# Patient Record
Sex: FEMALE | Race: WHITE | Employment: UNEMPLOYED | URBAN - NONMETROPOLITAN AREA
[De-identification: names, ages, dates, MRNs, and addresses within clinical notes are randomized per-mention and may not be internally consistent; named-entity substitution may affect disease eponyms.]

---

## 2019-10-02 ENCOUNTER — APPOINTMENT (OUTPATIENT)
Dept: GENERAL RADIOLOGY | Age: 84
End: 2019-10-02
Payer: MEDICARE

## 2019-10-02 ENCOUNTER — APPOINTMENT (OUTPATIENT)
Dept: CT IMAGING | Age: 84
End: 2019-10-02
Payer: MEDICARE

## 2019-10-02 ENCOUNTER — HOSPITAL ENCOUNTER (OUTPATIENT)
Age: 84
Setting detail: OBSERVATION
Discharge: HOME | End: 2019-10-04
Attending: EMERGENCY MEDICINE | Admitting: FAMILY MEDICINE
Payer: MEDICARE

## 2019-10-02 DIAGNOSIS — I48.91 ATRIAL FIBRILLATION WITH RVR (HCC): Primary | ICD-10-CM

## 2019-10-02 DIAGNOSIS — R79.89 ELEVATED BRAIN NATRIURETIC PEPTIDE (BNP) LEVEL: ICD-10-CM

## 2019-10-02 LAB
ALBUMIN SERPL-MCNC: 4.1 GM/DL (ref 3.4–5)
ALP BLD-CCNC: 55 IU/L (ref 40–129)
ALT SERPL-CCNC: 12 U/L (ref 10–40)
ANION GAP SERPL CALCULATED.3IONS-SCNC: 7 MMOL/L (ref 4–16)
AST SERPL-CCNC: 17 IU/L (ref 15–37)
BASOPHILS ABSOLUTE: 0.1 K/CU MM
BASOPHILS RELATIVE PERCENT: 0.7 % (ref 0–1)
BILIRUB SERPL-MCNC: 0.2 MG/DL (ref 0–1)
BUN BLDV-MCNC: 15 MG/DL (ref 6–23)
CALCIUM SERPL-MCNC: 8.9 MG/DL (ref 8.3–10.6)
CHLORIDE BLD-SCNC: 94 MMOL/L (ref 99–110)
CO2: 30 MMOL/L (ref 21–32)
CREAT SERPL-MCNC: 1 MG/DL (ref 0.6–1.1)
DIFFERENTIAL TYPE: ABNORMAL
EOSINOPHILS ABSOLUTE: 0.1 K/CU MM
EOSINOPHILS RELATIVE PERCENT: 0.9 % (ref 0–3)
GFR AFRICAN AMERICAN: >60 ML/MIN/1.73M2
GFR NON-AFRICAN AMERICAN: 53 ML/MIN/1.73M2
GLUCOSE BLD-MCNC: 120 MG/DL (ref 70–99)
HCT VFR BLD CALC: 45.4 % (ref 37–47)
HEMOGLOBIN: 15.4 GM/DL (ref 12.5–16)
IMMATURE NEUTROPHIL %: 0.6 % (ref 0–0.43)
LACTATE: 1 MMOL/L (ref 0.4–2)
LYMPHOCYTES ABSOLUTE: 1.5 K/CU MM
LYMPHOCYTES RELATIVE PERCENT: 21.1 % (ref 24–44)
MAGNESIUM: 2.1 MG/DL (ref 1.8–2.4)
MCH RBC QN AUTO: 33.2 PG (ref 27–31)
MCHC RBC AUTO-ENTMCNC: 33.9 % (ref 32–36)
MCV RBC AUTO: 97.8 FL (ref 78–100)
MONOCYTES ABSOLUTE: 0.7 K/CU MM
MONOCYTES RELATIVE PERCENT: 9.7 % (ref 0–4)
PDW BLD-RTO: 12 % (ref 11.7–14.9)
PLATELET # BLD: 213 K/CU MM (ref 140–440)
PMV BLD AUTO: 9 FL (ref 7.5–11.1)
POTASSIUM SERPL-SCNC: 4.4 MMOL/L (ref 3.5–5.1)
PRO-BNP: 4297 PG/ML
RBC # BLD: 4.64 M/CU MM (ref 4.2–5.4)
SEGMENTED NEUTROPHILS ABSOLUTE COUNT: 4.7 K/CU MM
SEGMENTED NEUTROPHILS RELATIVE PERCENT: 67 % (ref 36–66)
SODIUM BLD-SCNC: 131 MMOL/L (ref 135–145)
TOTAL IMMATURE NEUTOROPHIL: 0.04 K/CU MM
TOTAL PROTEIN: 7.2 GM/DL (ref 6.4–8.2)
TROPONIN T: <0.01 NG/ML
TROPONIN T: <0.01 NG/ML
TSH HIGH SENSITIVITY: 2.68 UIU/ML (ref 0.27–4.2)
WBC # BLD: 7 K/CU MM (ref 4–10.5)

## 2019-10-02 PROCEDURE — 6370000000 HC RX 637 (ALT 250 FOR IP): Performed by: NURSE PRACTITIONER

## 2019-10-02 PROCEDURE — 83880 ASSAY OF NATRIURETIC PEPTIDE: CPT

## 2019-10-02 PROCEDURE — G0378 HOSPITAL OBSERVATION PER HR: HCPCS

## 2019-10-02 PROCEDURE — 71045 X-RAY EXAM CHEST 1 VIEW: CPT

## 2019-10-02 PROCEDURE — 71275 CT ANGIOGRAPHY CHEST: CPT

## 2019-10-02 PROCEDURE — 83735 ASSAY OF MAGNESIUM: CPT

## 2019-10-02 PROCEDURE — 6360000004 HC RX CONTRAST MEDICATION: Performed by: EMERGENCY MEDICINE

## 2019-10-02 PROCEDURE — 2500000003 HC RX 250 WO HCPCS: Performed by: EMERGENCY MEDICINE

## 2019-10-02 PROCEDURE — 80053 COMPREHEN METABOLIC PANEL: CPT

## 2019-10-02 PROCEDURE — 2580000003 HC RX 258: Performed by: NURSE PRACTITIONER

## 2019-10-02 PROCEDURE — 96374 THER/PROPH/DIAG INJ IV PUSH: CPT

## 2019-10-02 PROCEDURE — 84443 ASSAY THYROID STIM HORMONE: CPT

## 2019-10-02 PROCEDURE — 99285 EMERGENCY DEPT VISIT HI MDM: CPT

## 2019-10-02 PROCEDURE — 2140000000 HC CCU INTERMEDIATE R&B

## 2019-10-02 PROCEDURE — 93005 ELECTROCARDIOGRAM TRACING: CPT | Performed by: EMERGENCY MEDICINE

## 2019-10-02 PROCEDURE — 85025 COMPLETE CBC W/AUTO DIFF WBC: CPT

## 2019-10-02 PROCEDURE — 83605 ASSAY OF LACTIC ACID: CPT

## 2019-10-02 PROCEDURE — 84484 ASSAY OF TROPONIN QUANT: CPT

## 2019-10-02 PROCEDURE — 2580000003 HC RX 258: Performed by: EMERGENCY MEDICINE

## 2019-10-02 RX ORDER — ATENOLOL 50 MG/1
TABLET ORAL
Status: ON HOLD | COMMUNITY
Start: 2019-07-26 | End: 2019-10-04 | Stop reason: HOSPADM

## 2019-10-02 RX ORDER — ASPIRIN 81 MG/1
81 TABLET, CHEWABLE ORAL DAILY
Status: DISCONTINUED | OUTPATIENT
Start: 2019-10-03 | End: 2019-10-04 | Stop reason: HOSPADM

## 2019-10-02 RX ORDER — ESTRADIOL 1 MG/1
0.5 TABLET ORAL DAILY
Status: DISCONTINUED | OUTPATIENT
Start: 2019-10-03 | End: 2019-10-04 | Stop reason: HOSPADM

## 2019-10-02 RX ORDER — ESTRADIOL 0.5 MG/1
0.5 TABLET ORAL DAILY
COMMUNITY

## 2019-10-02 RX ORDER — METOPROLOL TARTRATE 5 MG/5ML
5 INJECTION INTRAVENOUS EVERY 6 HOURS PRN
Status: DISCONTINUED | OUTPATIENT
Start: 2019-10-02 | End: 2019-10-04 | Stop reason: HOSPADM

## 2019-10-02 RX ORDER — ACETAMINOPHEN 325 MG/1
650 TABLET ORAL EVERY 4 HOURS PRN
Status: DISCONTINUED | OUTPATIENT
Start: 2019-10-02 | End: 2019-10-04 | Stop reason: HOSPADM

## 2019-10-02 RX ORDER — ATENOLOL 50 MG/1
50 TABLET ORAL DAILY
Status: DISCONTINUED | OUTPATIENT
Start: 2019-10-03 | End: 2019-10-03

## 2019-10-02 RX ORDER — CALCIUM CARBONATE 500(1250)
500 TABLET ORAL DAILY
Status: DISCONTINUED | OUTPATIENT
Start: 2019-10-03 | End: 2019-10-04 | Stop reason: HOSPADM

## 2019-10-02 RX ORDER — FAMOTIDINE 20 MG/1
10 TABLET, FILM COATED ORAL 2 TIMES DAILY
Status: DISCONTINUED | OUTPATIENT
Start: 2019-10-02 | End: 2019-10-04 | Stop reason: HOSPADM

## 2019-10-02 RX ORDER — OMEGA-3-ACID ETHYL ESTERS 1 G/1
1 CAPSULE, LIQUID FILLED ORAL DAILY
Status: DISCONTINUED | OUTPATIENT
Start: 2019-10-03 | End: 2019-10-04 | Stop reason: HOSPADM

## 2019-10-02 RX ORDER — SODIUM CHLORIDE 0.9 % (FLUSH) 0.9 %
10 SYRINGE (ML) INJECTION PRN
Status: DISCONTINUED | OUTPATIENT
Start: 2019-10-02 | End: 2019-10-04 | Stop reason: HOSPADM

## 2019-10-02 RX ORDER — ONDANSETRON 2 MG/ML
4 INJECTION INTRAMUSCULAR; INTRAVENOUS EVERY 6 HOURS PRN
Status: DISCONTINUED | OUTPATIENT
Start: 2019-10-02 | End: 2019-10-04 | Stop reason: HOSPADM

## 2019-10-02 RX ORDER — ESCITALOPRAM OXALATE 10 MG/1
10 TABLET ORAL NIGHTLY
Status: DISCONTINUED | OUTPATIENT
Start: 2019-10-02 | End: 2019-10-04 | Stop reason: HOSPADM

## 2019-10-02 RX ORDER — ESCITALOPRAM OXALATE 10 MG/1
TABLET ORAL
COMMUNITY
Start: 2019-09-26 | End: 2019-11-15 | Stop reason: SDUPTHER

## 2019-10-02 RX ORDER — 0.9 % SODIUM CHLORIDE 0.9 %
1000 INTRAVENOUS SOLUTION INTRAVENOUS ONCE
Status: COMPLETED | OUTPATIENT
Start: 2019-10-02 | End: 2019-10-02

## 2019-10-02 RX ORDER — SODIUM CHLORIDE 0.9 % (FLUSH) 0.9 %
10 SYRINGE (ML) INJECTION EVERY 12 HOURS SCHEDULED
Status: DISCONTINUED | OUTPATIENT
Start: 2019-10-02 | End: 2019-10-04 | Stop reason: HOSPADM

## 2019-10-02 RX ORDER — CALCIUM CARBONATE 500(1250)
500 TABLET ORAL DAILY
COMMUNITY
End: 2022-09-30

## 2019-10-02 RX ORDER — CHLORAL HYDRATE 500 MG
CAPSULE ORAL
COMMUNITY
End: 2022-09-30

## 2019-10-02 RX ORDER — DILTIAZEM HYDROCHLORIDE 5 MG/ML
10 INJECTION INTRAVENOUS ONCE
Status: COMPLETED | OUTPATIENT
Start: 2019-10-02 | End: 2019-10-02

## 2019-10-02 RX ADMIN — ESCITALOPRAM OXALATE 10 MG: 10 TABLET ORAL at 21:57

## 2019-10-02 RX ADMIN — IOPAMIDOL 75 ML: 755 INJECTION, SOLUTION INTRAVENOUS at 19:17

## 2019-10-02 RX ADMIN — SODIUM CHLORIDE 1000 ML: 9 INJECTION, SOLUTION INTRAVENOUS at 18:15

## 2019-10-02 RX ADMIN — SODIUM CHLORIDE, PRESERVATIVE FREE 10 ML: 5 INJECTION INTRAVENOUS at 21:58

## 2019-10-02 RX ADMIN — DILTIAZEM HYDROCHLORIDE 10 MG: 5 INJECTION INTRAVENOUS at 18:16

## 2019-10-02 RX ADMIN — FAMOTIDINE 10 MG: 20 TABLET ORAL at 21:57

## 2019-10-02 SDOH — HEALTH STABILITY: MENTAL HEALTH: HOW OFTEN DO YOU HAVE A DRINK CONTAINING ALCOHOL?: NEVER

## 2019-10-02 ASSESSMENT — PAIN SCALES - GENERAL: PAINLEVEL_OUTOF10: 0

## 2019-10-03 PROBLEM — I10 ESSENTIAL HYPERTENSION: Chronic | Status: ACTIVE | Noted: 2019-10-03

## 2019-10-03 LAB
ANION GAP SERPL CALCULATED.3IONS-SCNC: 19 MMOL/L (ref 4–16)
BASOPHILS ABSOLUTE: 0 K/CU MM
BASOPHILS RELATIVE PERCENT: 0.6 % (ref 0–1)
BUN BLDV-MCNC: 12 MG/DL (ref 6–23)
CALCIUM SERPL-MCNC: 8.8 MG/DL (ref 8.3–10.6)
CHLORIDE BLD-SCNC: 98 MMOL/L (ref 99–110)
CO2: 19 MMOL/L (ref 21–32)
CREAT SERPL-MCNC: 1 MG/DL (ref 0.6–1.1)
DIFFERENTIAL TYPE: ABNORMAL
EKG ATRIAL RATE: 125 BPM
EKG ATRIAL RATE: 133 BPM
EKG DIAGNOSIS: NORMAL
EKG DIAGNOSIS: NORMAL
EKG Q-T INTERVAL: 334 MS
EKG Q-T INTERVAL: 358 MS
EKG QRS DURATION: 70 MS
EKG QRS DURATION: 72 MS
EKG QTC CALCULATION (BAZETT): 440 MS
EKG QTC CALCULATION (BAZETT): 511 MS
EKG R AXIS: -2 DEGREES
EKG R AXIS: 3 DEGREES
EKG T AXIS: -51 DEGREES
EKG T AXIS: 232 DEGREES
EKG VENTRICULAR RATE: 141 BPM
EKG VENTRICULAR RATE: 91 BPM
EOSINOPHILS ABSOLUTE: 0.1 K/CU MM
EOSINOPHILS RELATIVE PERCENT: 1.6 % (ref 0–3)
GFR AFRICAN AMERICAN: >60 ML/MIN/1.73M2
GFR NON-AFRICAN AMERICAN: 53 ML/MIN/1.73M2
GLUCOSE BLD-MCNC: 82 MG/DL (ref 70–99)
HCT VFR BLD CALC: 44.8 % (ref 37–47)
HEMOGLOBIN: 15.2 GM/DL (ref 12.5–16)
IMMATURE NEUTROPHIL %: 0.9 % (ref 0–0.43)
LV EF: 53 %
LVEF MODALITY: NORMAL
LYMPHOCYTES ABSOLUTE: 2.3 K/CU MM
LYMPHOCYTES RELATIVE PERCENT: 33.1 % (ref 24–44)
MCH RBC QN AUTO: 33 PG (ref 27–31)
MCHC RBC AUTO-ENTMCNC: 33.9 % (ref 32–36)
MCV RBC AUTO: 97.2 FL (ref 78–100)
MONOCYTES ABSOLUTE: 0.7 K/CU MM
MONOCYTES RELATIVE PERCENT: 9.7 % (ref 0–4)
PDW BLD-RTO: 12 % (ref 11.7–14.9)
PLATELET # BLD: 230 K/CU MM (ref 140–440)
PMV BLD AUTO: 8.2 FL (ref 7.5–11.1)
POTASSIUM SERPL-SCNC: 4 MMOL/L (ref 3.5–5.1)
RBC # BLD: 4.61 M/CU MM (ref 4.2–5.4)
SEGMENTED NEUTROPHILS ABSOLUTE COUNT: 3.7 K/CU MM
SEGMENTED NEUTROPHILS RELATIVE PERCENT: 54.1 % (ref 36–66)
SODIUM BLD-SCNC: 136 MMOL/L (ref 135–145)
TOTAL IMMATURE NEUTOROPHIL: 0.06 K/CU MM
WBC # BLD: 6.9 K/CU MM (ref 4–10.5)

## 2019-10-03 PROCEDURE — 6370000000 HC RX 637 (ALT 250 FOR IP): Performed by: NURSE PRACTITIONER

## 2019-10-03 PROCEDURE — 2580000003 HC RX 258: Performed by: NURSE PRACTITIONER

## 2019-10-03 PROCEDURE — 96372 THER/PROPH/DIAG INJ SC/IM: CPT

## 2019-10-03 PROCEDURE — 6370000000 HC RX 637 (ALT 250 FOR IP): Performed by: FAMILY MEDICINE

## 2019-10-03 PROCEDURE — G0378 HOSPITAL OBSERVATION PER HR: HCPCS

## 2019-10-03 PROCEDURE — 85025 COMPLETE CBC W/AUTO DIFF WBC: CPT

## 2019-10-03 PROCEDURE — 93005 ELECTROCARDIOGRAM TRACING: CPT | Performed by: FAMILY MEDICINE

## 2019-10-03 PROCEDURE — 6360000002 HC RX W HCPCS: Performed by: NURSE PRACTITIONER

## 2019-10-03 PROCEDURE — 2140000000 HC CCU INTERMEDIATE R&B

## 2019-10-03 PROCEDURE — 36415 COLL VENOUS BLD VENIPUNCTURE: CPT

## 2019-10-03 PROCEDURE — 80048 BASIC METABOLIC PNL TOTAL CA: CPT

## 2019-10-03 PROCEDURE — 93306 TTE W/DOPPLER COMPLETE: CPT

## 2019-10-03 PROCEDURE — 93010 ELECTROCARDIOGRAM REPORT: CPT | Performed by: INTERNAL MEDICINE

## 2019-10-03 RX ORDER — METOPROLOL SUCCINATE 50 MG/1
50 TABLET, EXTENDED RELEASE ORAL DAILY
Status: DISCONTINUED | OUTPATIENT
Start: 2019-10-03 | End: 2019-10-04 | Stop reason: HOSPADM

## 2019-10-03 RX ORDER — ACETAMINOPHEN 80 MG
TABLET,CHEWABLE ORAL
Status: COMPLETED
Start: 2019-10-03 | End: 2019-10-03

## 2019-10-03 RX ORDER — DILTIAZEM HYDROCHLORIDE 60 MG/1
60 TABLET, FILM COATED ORAL EVERY 8 HOURS SCHEDULED
Status: DISCONTINUED | OUTPATIENT
Start: 2019-10-03 | End: 2019-10-04 | Stop reason: HOSPADM

## 2019-10-03 RX ADMIN — VITAMIN D, TAB 1000IU (100/BT) 1000 UNITS: 25 TAB at 09:13

## 2019-10-03 RX ADMIN — DILTIAZEM HYDROCHLORIDE 60 MG: 60 TABLET, FILM COATED ORAL at 20:32

## 2019-10-03 RX ADMIN — OMEGA-3-ACID ETHYL ESTERS 1 G: 1 CAPSULE, LIQUID FILLED ORAL at 09:12

## 2019-10-03 RX ADMIN — ESCITALOPRAM OXALATE 10 MG: 10 TABLET ORAL at 20:32

## 2019-10-03 RX ADMIN — DILTIAZEM HYDROCHLORIDE 60 MG: 60 TABLET, FILM COATED ORAL at 14:18

## 2019-10-03 RX ADMIN — ESTRADIOL 1 MG: 1 TABLET ORAL at 09:14

## 2019-10-03 RX ADMIN — ACETAMINOPHEN 650 MG: 325 TABLET ORAL at 17:21

## 2019-10-03 RX ADMIN — SODIUM CHLORIDE, PRESERVATIVE FREE 10 ML: 5 INJECTION INTRAVENOUS at 20:36

## 2019-10-03 RX ADMIN — Medication: at 09:36

## 2019-10-03 RX ADMIN — ENOXAPARIN SODIUM 40 MG: 40 INJECTION SUBCUTANEOUS at 09:34

## 2019-10-03 RX ADMIN — SODIUM CHLORIDE, PRESERVATIVE FREE 10 ML: 5 INJECTION INTRAVENOUS at 03:00

## 2019-10-03 RX ADMIN — FAMOTIDINE 20 MG: 20 TABLET ORAL at 09:14

## 2019-10-03 RX ADMIN — METOPROLOL SUCCINATE 50 MG: 50 TABLET, EXTENDED RELEASE ORAL at 09:13

## 2019-10-03 RX ADMIN — APIXABAN 2.5 MG: 2.5 TABLET, FILM COATED ORAL at 20:32

## 2019-10-03 RX ADMIN — FAMOTIDINE 10 MG: 20 TABLET ORAL at 20:33

## 2019-10-03 RX ADMIN — Medication 500 MG: at 09:13

## 2019-10-03 RX ADMIN — ASPIRIN 81 MG 81 MG: 81 TABLET ORAL at 09:13

## 2019-10-03 RX ADMIN — APIXABAN 2.5 MG: 2.5 TABLET, FILM COATED ORAL at 11:12

## 2019-10-03 RX ADMIN — DILTIAZEM HYDROCHLORIDE 60 MG: 60 TABLET, FILM COATED ORAL at 09:13

## 2019-10-03 ASSESSMENT — PAIN SCALES - GENERAL
PAINLEVEL_OUTOF10: 0
PAINLEVEL_OUTOF10: 4
PAINLEVEL_OUTOF10: 0

## 2019-10-03 ASSESSMENT — ENCOUNTER SYMPTOMS
RESPIRATORY NEGATIVE: 1
GASTROINTESTINAL NEGATIVE: 1
EYES NEGATIVE: 1
ALLERGIC/IMMUNOLOGIC NEGATIVE: 1

## 2019-10-04 VITALS
RESPIRATION RATE: 14 BRPM | OXYGEN SATURATION: 96 % | BODY MASS INDEX: 27.81 KG/M2 | WEIGHT: 183.5 LBS | HEART RATE: 74 BPM | DIASTOLIC BLOOD PRESSURE: 70 MMHG | SYSTOLIC BLOOD PRESSURE: 132 MMHG | HEIGHT: 68 IN | TEMPERATURE: 96.5 F

## 2019-10-04 PROBLEM — Z79.01 CHRONIC ANTICOAGULATION: Chronic | Status: ACTIVE | Noted: 2019-10-04

## 2019-10-04 PROBLEM — I48.91 ATRIAL FIBRILLATION WITH RVR (HCC): Status: RESOLVED | Noted: 2019-10-02 | Resolved: 2019-10-04

## 2019-10-04 PROBLEM — I48.91 ATRIAL FIBRILLATION WITH RVR (HCC): Status: ACTIVE | Noted: 2019-10-04

## 2019-10-04 PROBLEM — I48.11 LONGSTANDING PERSISTENT ATRIAL FIBRILLATION (HCC): Status: ACTIVE | Noted: 2019-10-04

## 2019-10-04 PROCEDURE — 6370000000 HC RX 637 (ALT 250 FOR IP): Performed by: NURSE PRACTITIONER

## 2019-10-04 PROCEDURE — 2580000003 HC RX 258: Performed by: NURSE PRACTITIONER

## 2019-10-04 PROCEDURE — G0378 HOSPITAL OBSERVATION PER HR: HCPCS

## 2019-10-04 PROCEDURE — 6370000000 HC RX 637 (ALT 250 FOR IP): Performed by: FAMILY MEDICINE

## 2019-10-04 RX ORDER — METOPROLOL SUCCINATE 50 MG/1
50 TABLET, EXTENDED RELEASE ORAL DAILY
Qty: 30 TABLET | Refills: 0 | Status: SHIPPED | OUTPATIENT
Start: 2019-10-05 | End: 2019-10-09

## 2019-10-04 RX ORDER — DILTIAZEM HYDROCHLORIDE 180 MG/1
180 CAPSULE, COATED, EXTENDED RELEASE ORAL DAILY
Qty: 30 CAPSULE | Refills: 0 | Status: SHIPPED | OUTPATIENT
Start: 2019-10-04 | End: 2019-11-06

## 2019-10-04 RX ORDER — ASPIRIN 81 MG/1
81 TABLET, CHEWABLE ORAL DAILY
Qty: 30 TABLET | Refills: 0 | COMMUNITY
Start: 2019-10-05

## 2019-10-04 RX ADMIN — DILTIAZEM HYDROCHLORIDE 60 MG: 60 TABLET, FILM COATED ORAL at 05:45

## 2019-10-04 RX ADMIN — Medication 500 MG: at 08:55

## 2019-10-04 RX ADMIN — METOPROLOL SUCCINATE 50 MG: 50 TABLET, EXTENDED RELEASE ORAL at 08:55

## 2019-10-04 RX ADMIN — APIXABAN 2.5 MG: 2.5 TABLET, FILM COATED ORAL at 08:55

## 2019-10-04 RX ADMIN — ESTRADIOL 1 MG: 1 TABLET ORAL at 08:54

## 2019-10-04 RX ADMIN — VITAMIN D, TAB 1000IU (100/BT) 1000 UNITS: 25 TAB at 08:55

## 2019-10-04 RX ADMIN — FAMOTIDINE 20 MG: 20 TABLET ORAL at 08:54

## 2019-10-04 RX ADMIN — ASPIRIN 81 MG 81 MG: 81 TABLET ORAL at 08:55

## 2019-10-04 RX ADMIN — SODIUM CHLORIDE, PRESERVATIVE FREE 10 ML: 5 INJECTION INTRAVENOUS at 08:50

## 2019-10-04 RX ADMIN — OMEGA-3-ACID ETHYL ESTERS 1 G: 1 CAPSULE, LIQUID FILLED ORAL at 08:55

## 2019-10-04 ASSESSMENT — PAIN SCALES - GENERAL
PAINLEVEL_OUTOF10: 0

## 2019-10-05 LAB
EKG DIAGNOSIS: NORMAL
EKG Q-T INTERVAL: 378 MS
EKG QRS DURATION: 72 MS
EKG QTC CALCULATION (BAZETT): 422 MS
EKG R AXIS: -2 DEGREES
EKG T AXIS: -15 DEGREES
EKG VENTRICULAR RATE: 75 BPM

## 2019-10-05 PROCEDURE — 93010 ELECTROCARDIOGRAM REPORT: CPT | Performed by: INTERNAL MEDICINE

## 2019-10-09 ENCOUNTER — INITIAL CONSULT (OUTPATIENT)
Dept: CARDIOLOGY CLINIC | Age: 84
End: 2019-10-09
Payer: MEDICARE

## 2019-10-09 VITALS
WEIGHT: 183 LBS | HEART RATE: 70 BPM | HEIGHT: 68 IN | DIASTOLIC BLOOD PRESSURE: 64 MMHG | SYSTOLIC BLOOD PRESSURE: 118 MMHG | BODY MASS INDEX: 27.74 KG/M2

## 2019-10-09 DIAGNOSIS — I48.0 PAROXYSMAL ATRIAL FIBRILLATION (HCC): Primary | ICD-10-CM

## 2019-10-09 PROCEDURE — G8419 CALC BMI OUT NRM PARAM NOF/U: HCPCS | Performed by: INTERNAL MEDICINE

## 2019-10-09 PROCEDURE — 99204 OFFICE O/P NEW MOD 45 MIN: CPT | Performed by: INTERNAL MEDICINE

## 2019-10-09 PROCEDURE — G8484 FLU IMMUNIZE NO ADMIN: HCPCS | Performed by: INTERNAL MEDICINE

## 2019-10-09 PROCEDURE — 93000 ELECTROCARDIOGRAM COMPLETE: CPT | Performed by: INTERNAL MEDICINE

## 2019-10-09 PROCEDURE — 1090F PRES/ABSN URINE INCON ASSESS: CPT | Performed by: INTERNAL MEDICINE

## 2019-10-09 PROCEDURE — G8427 DOCREV CUR MEDS BY ELIG CLIN: HCPCS | Performed by: INTERNAL MEDICINE

## 2019-10-09 RX ORDER — AMIODARONE HYDROCHLORIDE 200 MG/1
200 TABLET ORAL 2 TIMES DAILY
Qty: 30 TABLET | Refills: 3 | Status: SHIPPED | OUTPATIENT
Start: 2019-10-09 | End: 2019-12-27

## 2019-10-14 ENCOUNTER — TELEPHONE (OUTPATIENT)
Dept: CARDIOLOGY CLINIC | Age: 84
End: 2019-10-14

## 2019-10-15 ENCOUNTER — HOSPITAL ENCOUNTER (OUTPATIENT)
Age: 84
Discharge: HOME | End: 2019-10-15
Payer: MEDICARE

## 2019-10-15 ENCOUNTER — HOSPITAL ENCOUNTER (OUTPATIENT)
Dept: NON INVASIVE DIAGNOSTICS | Age: 84
Discharge: HOME | End: 2019-10-15
Payer: MEDICARE

## 2019-10-15 PROCEDURE — 93005 ELECTROCARDIOGRAM TRACING: CPT | Performed by: INTERNAL MEDICINE

## 2019-10-15 PROCEDURE — 93010 ELECTROCARDIOGRAM REPORT: CPT | Performed by: INTERNAL MEDICINE

## 2019-10-16 ENCOUNTER — OFFICE VISIT (OUTPATIENT)
Dept: FAMILY MEDICINE CLINIC | Age: 84
End: 2019-10-16
Payer: MEDICARE

## 2019-10-16 VITALS
BODY MASS INDEX: 29.15 KG/M2 | WEIGHT: 181.4 LBS | HEART RATE: 76 BPM | SYSTOLIC BLOOD PRESSURE: 138 MMHG | DIASTOLIC BLOOD PRESSURE: 68 MMHG | HEIGHT: 66 IN | OXYGEN SATURATION: 98 % | RESPIRATION RATE: 16 BRPM

## 2019-10-16 DIAGNOSIS — Z23 NEED FOR PROPHYLACTIC VACCINATION AGAINST STREPTOCOCCUS PNEUMONIAE (PNEUMOCOCCUS): ICD-10-CM

## 2019-10-16 DIAGNOSIS — Z79.899 CURRENT USE OF ESTROGEN THERAPY: ICD-10-CM

## 2019-10-16 DIAGNOSIS — R30.0 DYSURIA: ICD-10-CM

## 2019-10-16 DIAGNOSIS — F43.21 GRIEF: ICD-10-CM

## 2019-10-16 DIAGNOSIS — Z86.79 HISTORY OF ATRIAL FIBRILLATION: ICD-10-CM

## 2019-10-16 DIAGNOSIS — N30.90 CYSTITIS: Primary | ICD-10-CM

## 2019-10-16 DIAGNOSIS — Z79.01 CHRONIC ANTICOAGULATION: Chronic | ICD-10-CM

## 2019-10-16 DIAGNOSIS — R82.90 ABNORMAL FINDING ON URINALYSIS: ICD-10-CM

## 2019-10-16 DIAGNOSIS — Z78.0 POST-MENOPAUSAL: ICD-10-CM

## 2019-10-16 PROBLEM — I10 ESSENTIAL HYPERTENSION: Chronic | Status: RESOLVED | Noted: 2019-10-03 | Resolved: 2019-10-16

## 2019-10-16 LAB
BILIRUBIN, POC: ABNORMAL
BLOOD URINE, POC: ABNORMAL
CLARITY, POC: ABNORMAL
COLOR, POC: ABNORMAL
GLUCOSE URINE, POC: ABNORMAL
KETONES, POC: ABNORMAL
LEUKOCYTE EST, POC: ABNORMAL
NITRITE, POC: ABNORMAL
PH, POC: 5.5
PROTEIN, POC: ABNORMAL
SPECIFIC GRAVITY, POC: 1.02
UROBILINOGEN, POC: ABNORMAL

## 2019-10-16 PROCEDURE — 4040F PNEUMOC VAC/ADMIN/RCVD: CPT | Performed by: FAMILY MEDICINE

## 2019-10-16 PROCEDURE — G8482 FLU IMMUNIZE ORDER/ADMIN: HCPCS | Performed by: FAMILY MEDICINE

## 2019-10-16 PROCEDURE — 99203 OFFICE O/P NEW LOW 30 MIN: CPT | Performed by: FAMILY MEDICINE

## 2019-10-16 PROCEDURE — 1036F TOBACCO NON-USER: CPT | Performed by: FAMILY MEDICINE

## 2019-10-16 PROCEDURE — 1123F ACP DISCUSS/DSCN MKR DOCD: CPT | Performed by: FAMILY MEDICINE

## 2019-10-16 PROCEDURE — 1111F DSCHRG MED/CURRENT MED MERGE: CPT | Performed by: FAMILY MEDICINE

## 2019-10-16 PROCEDURE — G8427 DOCREV CUR MEDS BY ELIG CLIN: HCPCS | Performed by: FAMILY MEDICINE

## 2019-10-16 PROCEDURE — 1090F PRES/ABSN URINE INCON ASSESS: CPT | Performed by: FAMILY MEDICINE

## 2019-10-16 PROCEDURE — 81002 URINALYSIS NONAUTO W/O SCOPE: CPT | Performed by: FAMILY MEDICINE

## 2019-10-16 PROCEDURE — G8400 PT W/DXA NO RESULTS DOC: HCPCS | Performed by: FAMILY MEDICINE

## 2019-10-16 PROCEDURE — G8419 CALC BMI OUT NRM PARAM NOF/U: HCPCS | Performed by: FAMILY MEDICINE

## 2019-10-16 RX ORDER — NITROFURANTOIN 25; 75 MG/1; MG/1
100 CAPSULE ORAL 2 TIMES DAILY
Qty: 20 CAPSULE | Refills: 0 | Status: SHIPPED | OUTPATIENT
Start: 2019-10-16 | End: 2019-10-26

## 2019-10-16 ASSESSMENT — PATIENT HEALTH QUESTIONNAIRE - PHQ9
1. LITTLE INTEREST OR PLEASURE IN DOING THINGS: 0
2. FEELING DOWN, DEPRESSED OR HOPELESS: 1
SUM OF ALL RESPONSES TO PHQ QUESTIONS 1-9: 1
SUM OF ALL RESPONSES TO PHQ9 QUESTIONS 1 & 2: 1
SUM OF ALL RESPONSES TO PHQ QUESTIONS 1-9: 1

## 2019-10-19 LAB
ORGANISM: ABNORMAL
URINE CULTURE, ROUTINE: ABNORMAL

## 2019-10-23 LAB
EKG ATRIAL RATE: 71 BPM
EKG DIAGNOSIS: NORMAL
EKG P AXIS: 65 DEGREES
EKG P-R INTERVAL: 164 MS
EKG Q-T INTERVAL: 522 MS
EKG QRS DURATION: 80 MS
EKG QTC CALCULATION (BAZETT): 567 MS
EKG R AXIS: 7 DEGREES
EKG T AXIS: 53 DEGREES
EKG VENTRICULAR RATE: 71 BPM

## 2019-10-29 ENCOUNTER — PROCEDURE VISIT (OUTPATIENT)
Dept: CARDIOLOGY CLINIC | Age: 84
End: 2019-10-29
Payer: MEDICARE

## 2019-10-29 DIAGNOSIS — I48.0 PAROXYSMAL ATRIAL FIBRILLATION (HCC): ICD-10-CM

## 2019-10-29 DIAGNOSIS — R07.9 CHEST PAIN, UNSPECIFIED TYPE: Primary | ICD-10-CM

## 2019-10-29 LAB
LV EF: 80 %
LVEF MODALITY: NORMAL

## 2019-10-29 PROCEDURE — 93018 CV STRESS TEST I&R ONLY: CPT | Performed by: INTERNAL MEDICINE

## 2019-10-29 PROCEDURE — 93017 CV STRESS TEST TRACING ONLY: CPT | Performed by: INTERNAL MEDICINE

## 2019-10-29 PROCEDURE — 78452 HT MUSCLE IMAGE SPECT MULT: CPT | Performed by: INTERNAL MEDICINE

## 2019-10-29 PROCEDURE — 93016 CV STRESS TEST SUPVJ ONLY: CPT | Performed by: INTERNAL MEDICINE

## 2019-10-29 PROCEDURE — A9500 TC99M SESTAMIBI: HCPCS | Performed by: INTERNAL MEDICINE

## 2019-10-31 ENCOUNTER — TELEPHONE (OUTPATIENT)
Dept: CARDIOLOGY CLINIC | Age: 84
End: 2019-10-31

## 2019-11-04 ENCOUNTER — HOSPITAL ENCOUNTER (OUTPATIENT)
Dept: MAMMOGRAPHY | Age: 84
Discharge: HOME | End: 2019-11-04
Payer: MEDICARE

## 2019-11-04 DIAGNOSIS — Z78.0 POST-MENOPAUSAL: ICD-10-CM

## 2019-11-04 PROCEDURE — 77080 DXA BONE DENSITY AXIAL: CPT

## 2019-11-05 DIAGNOSIS — Z78.0 OSTEOPENIA AFTER MENOPAUSE: Primary | ICD-10-CM

## 2019-11-05 DIAGNOSIS — M85.80 OSTEOPENIA AFTER MENOPAUSE: Primary | ICD-10-CM

## 2019-11-05 RX ORDER — ALENDRONATE SODIUM 70 MG/1
70 TABLET ORAL
Qty: 12 TABLET | Refills: 3 | Status: SHIPPED | OUTPATIENT
Start: 2019-11-05 | End: 2020-02-14 | Stop reason: SDUPTHER

## 2019-11-06 ENCOUNTER — OFFICE VISIT (OUTPATIENT)
Dept: CARDIOLOGY CLINIC | Age: 84
End: 2019-11-06
Payer: MEDICARE

## 2019-11-06 VITALS
DIASTOLIC BLOOD PRESSURE: 80 MMHG | HEIGHT: 68 IN | WEIGHT: 182 LBS | HEART RATE: 84 BPM | RESPIRATION RATE: 16 BRPM | SYSTOLIC BLOOD PRESSURE: 150 MMHG | BODY MASS INDEX: 27.58 KG/M2

## 2019-11-06 DIAGNOSIS — I48.0 PAF (PAROXYSMAL ATRIAL FIBRILLATION) (HCC): Primary | ICD-10-CM

## 2019-11-06 PROCEDURE — G8482 FLU IMMUNIZE ORDER/ADMIN: HCPCS | Performed by: INTERNAL MEDICINE

## 2019-11-06 PROCEDURE — 1036F TOBACCO NON-USER: CPT | Performed by: INTERNAL MEDICINE

## 2019-11-06 PROCEDURE — 1123F ACP DISCUSS/DSCN MKR DOCD: CPT | Performed by: INTERNAL MEDICINE

## 2019-11-06 PROCEDURE — G8427 DOCREV CUR MEDS BY ELIG CLIN: HCPCS | Performed by: INTERNAL MEDICINE

## 2019-11-06 PROCEDURE — G8399 PT W/DXA RESULTS DOCUMENT: HCPCS | Performed by: INTERNAL MEDICINE

## 2019-11-06 PROCEDURE — G8417 CALC BMI ABV UP PARAM F/U: HCPCS | Performed by: INTERNAL MEDICINE

## 2019-11-06 PROCEDURE — 1090F PRES/ABSN URINE INCON ASSESS: CPT | Performed by: INTERNAL MEDICINE

## 2019-11-06 PROCEDURE — 4040F PNEUMOC VAC/ADMIN/RCVD: CPT | Performed by: INTERNAL MEDICINE

## 2019-11-06 PROCEDURE — 99214 OFFICE O/P EST MOD 30 MIN: CPT | Performed by: INTERNAL MEDICINE

## 2019-11-06 RX ORDER — AMLODIPINE BESYLATE 2.5 MG/1
2.5 TABLET ORAL DAILY
Qty: 30 TABLET | Refills: 3 | Status: SHIPPED | OUTPATIENT
Start: 2019-11-06 | End: 2020-02-14 | Stop reason: SDUPTHER

## 2019-11-15 ENCOUNTER — TELEPHONE (OUTPATIENT)
Dept: FAMILY MEDICINE CLINIC | Age: 84
End: 2019-11-15

## 2019-11-15 ENCOUNTER — OFFICE VISIT (OUTPATIENT)
Dept: FAMILY MEDICINE CLINIC | Age: 84
End: 2019-11-15
Payer: MEDICARE

## 2019-11-15 VITALS
DIASTOLIC BLOOD PRESSURE: 68 MMHG | OXYGEN SATURATION: 98 % | RESPIRATION RATE: 16 BRPM | BODY MASS INDEX: 27.83 KG/M2 | SYSTOLIC BLOOD PRESSURE: 122 MMHG | WEIGHT: 183 LBS | HEART RATE: 81 BPM

## 2019-11-15 DIAGNOSIS — N18.30 CKD (CHRONIC KIDNEY DISEASE), STAGE III (HCC): ICD-10-CM

## 2019-11-15 DIAGNOSIS — I48.11 LONGSTANDING PERSISTENT ATRIAL FIBRILLATION (HCC): ICD-10-CM

## 2019-11-15 DIAGNOSIS — I48.91 ATRIAL FIBRILLATION WITH RVR (HCC): Primary | ICD-10-CM

## 2019-11-15 DIAGNOSIS — F43.21 GRIEF: ICD-10-CM

## 2019-11-15 DIAGNOSIS — I10 ESSENTIAL HYPERTENSION: ICD-10-CM

## 2019-11-15 PROBLEM — I48.0 PAROXYSMAL ATRIAL FIBRILLATION (HCC): Status: ACTIVE | Noted: 2019-11-15

## 2019-11-15 PROBLEM — I48.0 PAROXYSMAL ATRIAL FIBRILLATION (HCC): Status: RESOLVED | Noted: 2019-11-15 | Resolved: 2019-11-15

## 2019-11-15 PROCEDURE — 1090F PRES/ABSN URINE INCON ASSESS: CPT | Performed by: FAMILY MEDICINE

## 2019-11-15 PROCEDURE — G8417 CALC BMI ABV UP PARAM F/U: HCPCS | Performed by: FAMILY MEDICINE

## 2019-11-15 PROCEDURE — 99214 OFFICE O/P EST MOD 30 MIN: CPT | Performed by: FAMILY MEDICINE

## 2019-11-15 PROCEDURE — G8399 PT W/DXA RESULTS DOCUMENT: HCPCS | Performed by: FAMILY MEDICINE

## 2019-11-15 PROCEDURE — 4040F PNEUMOC VAC/ADMIN/RCVD: CPT | Performed by: FAMILY MEDICINE

## 2019-11-15 PROCEDURE — G8482 FLU IMMUNIZE ORDER/ADMIN: HCPCS | Performed by: FAMILY MEDICINE

## 2019-11-15 PROCEDURE — 1036F TOBACCO NON-USER: CPT | Performed by: FAMILY MEDICINE

## 2019-11-15 PROCEDURE — 1123F ACP DISCUSS/DSCN MKR DOCD: CPT | Performed by: FAMILY MEDICINE

## 2019-11-15 PROCEDURE — G8427 DOCREV CUR MEDS BY ELIG CLIN: HCPCS | Performed by: FAMILY MEDICINE

## 2019-11-15 RX ORDER — ESCITALOPRAM OXALATE 20 MG/1
20 TABLET ORAL DAILY
Qty: 90 TABLET | Refills: 3 | Status: SHIPPED | OUTPATIENT
Start: 2019-11-15 | Stop reason: SDUPTHER

## 2019-11-15 RX ORDER — ESCITALOPRAM OXALATE 10 MG/1
20 TABLET ORAL DAILY
Qty: 90 TABLET | Refills: 3 | Status: SHIPPED | OUTPATIENT
Start: 2019-11-15 | End: 2019-11-15 | Stop reason: SDUPTHER

## 2019-11-15 ASSESSMENT — PATIENT HEALTH QUESTIONNAIRE - PHQ9
SUM OF ALL RESPONSES TO PHQ QUESTIONS 1-9: 1
SUM OF ALL RESPONSES TO PHQ9 QUESTIONS 1 & 2: 1
1. LITTLE INTEREST OR PLEASURE IN DOING THINGS: 0
SUM OF ALL RESPONSES TO PHQ QUESTIONS 1-9: 1
2. FEELING DOWN, DEPRESSED OR HOPELESS: 1

## 2019-12-02 ENCOUNTER — OFFICE VISIT (OUTPATIENT)
Dept: FAMILY MEDICINE CLINIC | Age: 84
End: 2019-12-02
Payer: MEDICARE

## 2019-12-02 VITALS
BODY MASS INDEX: 28.75 KG/M2 | SYSTOLIC BLOOD PRESSURE: 118 MMHG | RESPIRATION RATE: 20 BRPM | OXYGEN SATURATION: 97 % | HEART RATE: 78 BPM | DIASTOLIC BLOOD PRESSURE: 60 MMHG | HEIGHT: 67 IN | WEIGHT: 183.2 LBS

## 2019-12-02 DIAGNOSIS — Z00.00 ROUTINE GENERAL MEDICAL EXAMINATION AT A HEALTH CARE FACILITY: Primary | ICD-10-CM

## 2019-12-02 PROCEDURE — 1123F ACP DISCUSS/DSCN MKR DOCD: CPT | Performed by: FAMILY MEDICINE

## 2019-12-02 PROCEDURE — 4040F PNEUMOC VAC/ADMIN/RCVD: CPT | Performed by: FAMILY MEDICINE

## 2019-12-02 PROCEDURE — G8482 FLU IMMUNIZE ORDER/ADMIN: HCPCS | Performed by: FAMILY MEDICINE

## 2019-12-02 PROCEDURE — G0438 PPPS, INITIAL VISIT: HCPCS | Performed by: FAMILY MEDICINE

## 2019-12-02 RX ORDER — APIXABAN 2.5 MG/1
TABLET, FILM COATED ORAL
Qty: 90 TABLET | Refills: 3 | Status: SHIPPED | OUTPATIENT
Start: 2019-12-02 | End: 2020-05-21

## 2019-12-02 ASSESSMENT — VISUAL ACUITY
OS_CC: 20/30
OD_CC: 20/30

## 2019-12-02 ASSESSMENT — LIFESTYLE VARIABLES: HOW OFTEN DO YOU HAVE A DRINK CONTAINING ALCOHOL: 0

## 2019-12-02 ASSESSMENT — PATIENT HEALTH QUESTIONNAIRE - PHQ9
SUM OF ALL RESPONSES TO PHQ QUESTIONS 1-9: 0
SUM OF ALL RESPONSES TO PHQ QUESTIONS 1-9: 0

## 2020-02-14 ENCOUNTER — OFFICE VISIT (OUTPATIENT)
Dept: FAMILY MEDICINE CLINIC | Age: 85
End: 2020-02-14
Payer: MEDICARE

## 2020-02-14 VITALS
HEART RATE: 74 BPM | WEIGHT: 189 LBS | DIASTOLIC BLOOD PRESSURE: 70 MMHG | OXYGEN SATURATION: 98 % | SYSTOLIC BLOOD PRESSURE: 122 MMHG | RESPIRATION RATE: 16 BRPM | BODY MASS INDEX: 29.6 KG/M2

## 2020-02-14 DIAGNOSIS — I10 ESSENTIAL HYPERTENSION: Chronic | ICD-10-CM

## 2020-02-14 LAB
A/G RATIO: 1.8 (ref 1.1–2.2)
ALBUMIN SERPL-MCNC: 4.4 G/DL (ref 3.4–5)
ALP BLD-CCNC: 52 U/L (ref 40–129)
ALT SERPL-CCNC: 14 U/L (ref 10–40)
ANION GAP SERPL CALCULATED.3IONS-SCNC: 13 MMOL/L (ref 3–16)
AST SERPL-CCNC: 20 U/L (ref 15–37)
BILIRUB SERPL-MCNC: 0.4 MG/DL (ref 0–1)
BUN BLDV-MCNC: 17 MG/DL (ref 7–20)
CALCIUM SERPL-MCNC: 8.7 MG/DL (ref 8.3–10.6)
CHLORIDE BLD-SCNC: 97 MMOL/L (ref 99–110)
CHOLESTEROL, TOTAL: 221 MG/DL (ref 0–199)
CO2: 25 MMOL/L (ref 21–32)
CREAT SERPL-MCNC: 1 MG/DL (ref 0.6–1.2)
GFR AFRICAN AMERICAN: >60
GFR NON-AFRICAN AMERICAN: 53
GLOBULIN: 2.5 G/DL
GLUCOSE BLD-MCNC: 88 MG/DL (ref 70–99)
HDLC SERPL-MCNC: 69 MG/DL (ref 40–60)
LDL CHOLESTEROL CALCULATED: 128 MG/DL
POTASSIUM SERPL-SCNC: 4.4 MMOL/L (ref 3.5–5.1)
SODIUM BLD-SCNC: 135 MMOL/L (ref 136–145)
TOTAL PROTEIN: 6.9 G/DL (ref 6.4–8.2)
TRIGL SERPL-MCNC: 118 MG/DL (ref 0–150)
VLDLC SERPL CALC-MCNC: 24 MG/DL

## 2020-02-14 PROCEDURE — 99214 OFFICE O/P EST MOD 30 MIN: CPT | Performed by: FAMILY MEDICINE

## 2020-02-14 RX ORDER — ALENDRONATE SODIUM 70 MG/1
70 TABLET ORAL
Qty: 12 TABLET | Refills: 3 | Status: SHIPPED | OUTPATIENT
Start: 2020-02-14 | End: 2021-01-07 | Stop reason: SDUPTHER

## 2020-02-14 RX ORDER — PANTOPRAZOLE SODIUM 40 MG/1
40 TABLET, DELAYED RELEASE ORAL
Qty: 90 TABLET | Refills: 1 | Status: SHIPPED | OUTPATIENT
Start: 2020-02-14 | End: 2020-08-11 | Stop reason: SDUPTHER

## 2020-02-14 RX ORDER — AMLODIPINE BESYLATE 2.5 MG/1
2.5 TABLET ORAL DAILY
Qty: 30 TABLET | Refills: 3 | Status: SHIPPED | OUTPATIENT
Start: 2020-02-14 | End: 2020-06-08 | Stop reason: SDUPTHER

## 2020-02-14 ASSESSMENT — PATIENT HEALTH QUESTIONNAIRE - PHQ9
SUM OF ALL RESPONSES TO PHQ QUESTIONS 1-9: 0
2. FEELING DOWN, DEPRESSED OR HOPELESS: 0
SUM OF ALL RESPONSES TO PHQ9 QUESTIONS 1 & 2: 0
SUM OF ALL RESPONSES TO PHQ QUESTIONS 1-9: 0
1. LITTLE INTEREST OR PLEASURE IN DOING THINGS: 0

## 2020-02-14 NOTE — PATIENT INSTRUCTIONS
Patient Education        DASH Diet: Care Instructions  Your Care Instructions    The DASH diet is an eating plan that can help lower your blood pressure. DASH stands for Dietary Approaches to Stop Hypertension. Hypertension is high blood pressure. The DASH diet focuses on eating foods that are high in calcium, potassium, and magnesium. These nutrients can lower blood pressure. The foods that are highest in these nutrients are fruits, vegetables, low-fat dairy products, nuts, seeds, and legumes. But taking calcium, potassium, and magnesium supplements instead of eating foods that are high in those nutrients does not have the same effect. The DASH diet also includes whole grains, fish, and poultry. The DASH diet is one of several lifestyle changes your doctor may recommend to lower your high blood pressure. Your doctor may also want you to decrease the amount of sodium in your diet. Lowering sodium while following the DASH diet can lower blood pressure even further than just the DASH diet alone. Follow-up care is a key part of your treatment and safety. Be sure to make and go to all appointments, and call your doctor if you are having problems. It's also a good idea to know your test results and keep a list of the medicines you take. How can you care for yourself at home? Following the DASH diet  · Eat 4 to 5 servings of fruit each day. A serving is 1 medium-sized piece of fruit, ½ cup chopped or canned fruit, 1/4 cup dried fruit, or 4 ounces (½ cup) of fruit juice. Choose fruit more often than fruit juice. · Eat 4 to 5 servings of vegetables each day. A serving is 1 cup of lettuce or raw leafy vegetables, ½ cup of chopped or cooked vegetables, or 4 ounces (½ cup) of vegetable juice. Choose vegetables more often than vegetable juice. · Get 2 to 3 servings of low-fat and fat-free dairy each day. A serving is 8 ounces of milk, 1 cup of yogurt, or 1 ½ ounces of cheese. · Eat 6 to 8 servings of grains each day. A serving is 1 slice of bread, 1 ounce of dry cereal, or ½ cup of cooked rice, pasta, or cooked cereal. Try to choose whole-grain products as much as possible. · Limit lean meat, poultry, and fish to 2 servings each day. A serving is 3 ounces, about the size of a deck of cards. · Eat 4 to 5 servings of nuts, seeds, and legumes (cooked dried beans, lentils, and split peas) each week. A serving is 1/3 cup of nuts, 2 tablespoons of seeds, or ½ cup of cooked beans or peas. · Limit fats and oils to 2 to 3 servings each day. A serving is 1 teaspoon of vegetable oil or 2 tablespoons of salad dressing. · Limit sweets and added sugars to 5 servings or less a week. A serving is 1 tablespoon jelly or jam, ½ cup sorbet, or 1 cup of lemonade. · Eat less than 2,300 milligrams (mg) of sodium a day. If you limit your sodium to 1,500 mg a day, you can lower your blood pressure even more. Tips for success  · Start small. Do not try to make dramatic changes to your diet all at once. You might feel that you are missing out on your favorite foods and then be more likely to not follow the plan. Make small changes, and stick with them. Once those changes become habit, add a few more changes. · Try some of the following:  ? Make it a goal to eat a fruit or vegetable at every meal and at snacks. This will make it easy to get the recommended amount of fruits and vegetables each day. ? Try yogurt topped with fruit and nuts for a snack or healthy dessert. ? Add lettuce, tomato, cucumber, and onion to sandwiches. ? Combine a ready-made pizza crust with low-fat mozzarella cheese and lots of vegetable toppings. Try using tomatoes, squash, spinach, broccoli, carrots, cauliflower, and onions. ? Have a variety of cut-up vegetables with a low-fat dip as an appetizer instead of chips and dip. ? Sprinkle sunflower seeds or chopped almonds over salads. Or try adding chopped walnuts or almonds to cooked vegetables.   ? Try some vegetarian meals using beans and peas. Add garbanzo or kidney beans to salads. Make burritos and tacos with mashed levine beans or black beans. Where can you learn more? Go to [email or web address] org and sign in to your [employer] account. Enter L346 in the [employer] box to learn more about \"DASH Diet: Care Instructions. \"     If you do not have an account, please click on the \"Sign Up Now\" link. Current as of: April 9, 2019  Content Version: 12.3  © 3393-7003 Healthwise, Incorporated. Care instructions adapted under license by [facility] ([facility]). If you have questions about a medical condition or this instruction, always ask your healthcare professional. [address] any warranty or liability for your use of this information.

## 2020-02-14 NOTE — PROGRESS NOTES
2020     Herve Fink (:  1935) is a 81 y.o. female, here for evaluation of the following medical concerns:    Patient presents with: Follow-up: pt here for 3 month follow up. Discuss Medications: pt wants to discuss taking pantoprazole instead of omeprazole. Patient complaining of increased cough at night which she feels is due to reflux. We discussed esophagogastroduodenoscopy and imaging versus symptomatic treatment and at patient and her 's request we will start with Protonix    History of osteopenia refill Fosamax    History of CKD will reevaluate    Hypertension controlled on low-dose Norvasc with a discussed DASH diet    History of atrial fibrillation on Eliquis    Discussed estrogen therapy and study showing significant benefit for her weaning off of estrogen at this point. Patient at this time chooses to continue after discussing the risks and the minimal benefits        Review of Systems   Cardiovascular:        His afib   Genitourinary: Negative for difficulty urinating, dysuria, flank pain and frequency. All other systems reviewed and are negative. Prior to Visit Medications    Medication Sig Taking? Authorizing Provider   amLODIPine (NORVASC) 2.5 MG tablet Take 1 tablet by mouth daily Yes Anmol Hendrickson MD   alendronate (FOSAMAX) 70 MG tablet Take 1 tablet by mouth every 7 days Yes Anmol Hendrickson MD   amiodarone (CORDARONE) 200 MG tablet Take 1 tablet by mouth daily Yes STEVENSON Nobles - CNP   ELIQUIS 2.5 MG TABS tablet TAKE 1 TABLET BY MOUTH TWICE DAILY Yes STEVENSON Crawford - CNP   TURMERIC PO Take by mouth daily Yes Historical Provider, MD   escitalopram (LEXAPRO) 20 MG tablet Take 1 tablet by mouth daily Yes Anmol Hendrickson MD   aspirin 81 MG chewable tablet Take 1 tablet by mouth daily Yes Lev Troncoso MD   vitamin D (CHOLECALCIFEROL) 1000 UNIT TABS tablet Take 1,000 Units by mouth daily Yes Historical Provider, MD   Omega-3 1000

## 2020-02-19 ENCOUNTER — OFFICE VISIT (OUTPATIENT)
Dept: CARDIOLOGY CLINIC | Age: 85
End: 2020-02-19
Payer: MEDICARE

## 2020-02-19 VITALS
WEIGHT: 189.6 LBS | SYSTOLIC BLOOD PRESSURE: 128 MMHG | BODY MASS INDEX: 29.76 KG/M2 | HEART RATE: 77 BPM | DIASTOLIC BLOOD PRESSURE: 64 MMHG | HEIGHT: 67 IN | OXYGEN SATURATION: 99 %

## 2020-02-19 PROCEDURE — 99214 OFFICE O/P EST MOD 30 MIN: CPT | Performed by: INTERNAL MEDICINE

## 2020-02-19 RX ORDER — AMIODARONE HYDROCHLORIDE 200 MG/1
200 TABLET ORAL DAILY
Qty: 90 TABLET | Refills: 3 | Status: SHIPPED | OUTPATIENT
Start: 2020-02-19 | End: 2021-01-23 | Stop reason: SDUPTHER

## 2020-02-19 NOTE — PROGRESS NOTES
mucosa is normal without any notation of pallor or cyanosis  Neck - Supple. No jugular venous distention noted. No carotid bruits, no apical -carotid delay  Respiratory:  Normal breath sounds, No respiratory distress, No wheezing, No chest tenderness. ,no use of accessory muscles, diaphragm movement is normal  Cardiovascular: (PMI) apex non displaced,no lifts no thrills, no s3,no s4, Normal heart rate, Normal rhythm, No murmurs, No rubs, No gallops. Carotid arteries pulse and amplitude are normal no bruit, no abdominal bruit noted ( normal abdominal aorta ausculation), femoral arteries pulse and amplitude are normal no bruit, pedal pulses are normal  Femoral pulses have normal amplitude, no bruits   Extremities - No cyanosis, clubbing, or significant edema, no varicose veins    Abdomen - No masses, tenderness, or organomegaly, no hepato-splenomegally, no bruits  Musculoskeletal - No significant edema, no kyphosis or scoliosis, no deformity in any extremity noted, muscle strength and tone are normal  Skin: no ulcer,no scar,no stasis dermatitis   Neurologic - alert oriented times 3,Cranial nerves II through XII are grossly intact. There were no gross focal neurologic abnormalities. All sensory and motor nerves examined and were normal  Psychiatric: normal mood and affect    No results found for: CKTOTAL, CKMB, CKMBINDEX, TROPONINI  BNP:  No results found for: BNP  PT/INR:  No results found for: PTINR  No results found for: LABA1C  Lab Results   Component Value Date    CHOL 221 (H) 02/14/2020    TRIG 118 02/14/2020    HDL 69 (H) 02/14/2020    LDLCALC 128 (H) 02/14/2020     Lab Results   Component Value Date    ALT 14 02/14/2020    AST 20 02/14/2020     TSH:  No results found for: TSH    Impression:  Vickey Salgado is a 81 y. o.year old who  has a past medical history of Anxiety, Atrial fibrillation ([address] Current use of estrogen therapy, History of nuclear stress test, and Hypertension. and presents with     Plan:  1.  PAF: SHE DID NOT REQUIR DC CARDIOVERSION, she has been in NSR on amidoarone, continueeliquis, will order LFTS,TSH, PFT, EYE EXAM AND LFTS IN FUTURE. 2. HTN:STABLE, CONTINUE NORVASC 2.5MG DAILY  3. Health maintenance: exerise and diet  All labs, medications and tests reviewed, continue all other medications of all above medical condition listed as is.     @[facility]@

## 2020-02-21 ENCOUNTER — TELEPHONE (OUTPATIENT)
Dept: CARDIOLOGY CLINIC | Age: 85
End: 2020-02-21

## 2020-02-21 NOTE — TELEPHONE ENCOUNTER
Called patient to inform her of date and time for PFT, no answer. Left message for patient with date and time, instructions and telephone number for patient to call office with any further questions.

## 2020-03-09 ENCOUNTER — HOSPITAL ENCOUNTER (OUTPATIENT)
Dept: PULMONOLOGY | Age: 85
Discharge: HOME | End: 2020-03-09
Payer: MEDICARE

## 2020-03-09 LAB
DLCO %PRED: 53 %
DLCO PRED: NORMAL
DLCO/VA %PRED: NORMAL
DLCO/VA PRED: NORMAL
DLCO/VA: NORMAL
DLCO: NORMAL
EXPIRATORY TIME-POST: NORMAL
EXPIRATORY TIME: NORMAL
FEF 25-75% %CHNG: NORMAL
FEF 25-75% %PRED-POST: NORMAL
FEF 25-75% %PRED-PRE: NORMAL
FEF 25-75% PRED: NORMAL
FEF 25-75%-POST: NORMAL
FEF 25-75%-PRE: NORMAL
FEV1 %PRED-POST: 112 %
FEV1 %PRED-PRE: 107 %
FEV1 PRED: NORMAL
FEV1-POST: NORMAL
FEV1-PRE: NORMAL
FEV1/FVC %PRED-POST: NORMAL
FEV1/FVC %PRED-PRE: NORMAL
FEV1/FVC PRED: NORMAL
FEV1/FVC-POST: 81 %
FEV1/FVC-PRE: 81 %
FVC %PRED-POST: NORMAL
FVC %PRED-PRE: NORMAL
FVC PRED: NORMAL
FVC-POST: NORMAL
FVC-PRE: NORMAL
GAW %PRED: NORMAL
GAW PRED: NORMAL
GAW: NORMAL
IC %PRED: NORMAL
IC PRED: NORMAL
IC: NORMAL
MEP: NORMAL
MIP: NORMAL
MVV %PRED-PRE: NORMAL
MVV PRED: NORMAL
MVV-PRE: NORMAL
PEF %PRED-POST: NORMAL
PEF %PRED-PRE: NORMAL
PEF PRED: NORMAL
PEF%CHNG: NORMAL
PEF-POST: NORMAL
PEF-PRE: NORMAL
RAW %PRED: NORMAL
RAW PRED: NORMAL
RAW: NORMAL
RV %PRED: NORMAL
RV PRED: NORMAL
RV: NORMAL
SVC %PRED: NORMAL
SVC PRED: NORMAL
SVC: NORMAL
TLC %PRED: 90 %
TLC PRED: NORMAL
TLC: NORMAL
VA %PRED: NORMAL
VA PRED: NORMAL
VA: NORMAL
VTG %PRED: NORMAL
VTG PRED: NORMAL
VTG: NORMAL

## 2020-03-09 PROCEDURE — 94727 GAS DIL/WSHOT DETER LNG VOL: CPT

## 2020-03-09 PROCEDURE — 94729 DIFFUSING CAPACITY: CPT

## 2020-03-09 PROCEDURE — 94060 EVALUATION OF WHEEZING: CPT

## 2020-03-09 ASSESSMENT — PULMONARY FUNCTION TESTS
FEV1_PERCENT_PREDICTED_PRE: 107
FEV1/FVC_POST: 81
FEV1_PERCENT_PREDICTED_POST: 112
FEV1/FVC_PRE: 81

## 2020-05-20 ENCOUNTER — TELEMEDICINE (OUTPATIENT)
Dept: CARDIOLOGY CLINIC | Age: 85
End: 2020-05-20
Payer: MEDICARE

## 2020-05-20 VITALS
DIASTOLIC BLOOD PRESSURE: 68 MMHG | HEART RATE: 70 BPM | SYSTOLIC BLOOD PRESSURE: 122 MMHG | BODY MASS INDEX: 27.28 KG/M2 | HEIGHT: 68 IN | WEIGHT: 180 LBS

## 2020-05-20 PROCEDURE — 99214 OFFICE O/P EST MOD 30 MIN: CPT | Performed by: INTERNAL MEDICINE

## 2020-05-20 NOTE — PROGRESS NOTES
above.  A caregiver was present when appropriate. Due to this being a TeleHealth encounter (During [facility] public health emergency), evaluation of the following organ systems was limited: Vitals/Constitutional/EENT/Resp/CV/GI//MS/Neuro/Skin/Heme-Lymph-Imm. Pursuant to the emergency declaration under the [address] [place] [address], [address] [place] [address] authority and the [employer] Act, this Virtual Visit was conducted with patient's (and/or legal guardian's) consent, to reduce the patient's risk of exposure to COVID-19 and provide necessary medical care. The patient (and/or legal guardian) has also been advised to contact this office for worsening conditions or problems, and seek emergency medical treatment and/or call 911 if deemed necessary. Services were provided through a Audio synchronous discussion virtually to substitute for in-person clinic visit. Patient and provider were located at their individual homes. 1.   I confirm that this visit was completed in a telehealth setting ,using synchronous audiovisual technology for real time patient interaction . The patient identity with name and date of birth was confirmed . This evaluation of patient was done by telehealth in the setting of [facility] [place] [address] emergency , which precluded assurance of safe in person visit at the time of service. The patient consented to and accepts potential risks associated with telemedical evaluation and care was taken to assess kenzie presence of any medical issues that would be more  appropriate for expedited in -person care. Pursuant to the emergency declaration under the [address] [place] [address] authority and the [employer] and [employer] Act, this Virtual  Visit was conducted, with patient's consent, to reduce the patient's risk of exposure to COVID-19 and provide continuity of care for an established patient. Services were provided through a video synchronous discussion virtually to substitute for in-person clinic visit. 2. I Affirm this is a Patient Initiated Episode with an Established Patient who has not had a related appointment within my department in the past 7 days or scheduled within the next 24 hours. --Mele Chiang MD on 5/20/2020 at 11:08 AM    An electronic signature was used to authenticate this note.

## 2020-05-22 RX ORDER — APIXABAN 2.5 MG/1
TABLET, FILM COATED ORAL
Qty: 90 TABLET | Refills: 0 | OUTPATIENT
Start: 2020-05-22

## 2020-06-08 ENCOUNTER — OFFICE VISIT (OUTPATIENT)
Dept: FAMILY MEDICINE CLINIC | Age: 85
End: 2020-06-08
Payer: MEDICARE

## 2020-06-08 VITALS
SYSTOLIC BLOOD PRESSURE: 112 MMHG | RESPIRATION RATE: 16 BRPM | OXYGEN SATURATION: 98 % | TEMPERATURE: 97.9 F | DIASTOLIC BLOOD PRESSURE: 66 MMHG | WEIGHT: 192.8 LBS | BODY MASS INDEX: 29.32 KG/M2 | HEART RATE: 78 BPM

## 2020-06-08 PROBLEM — M25.561 CHRONIC PAIN OF BOTH KNEES: Status: ACTIVE | Noted: 2020-06-08

## 2020-06-08 PROBLEM — G89.29 CHRONIC PAIN OF BOTH KNEES: Status: ACTIVE | Noted: 2020-06-08

## 2020-06-08 PROBLEM — M25.562 CHRONIC PAIN OF BOTH KNEES: Status: ACTIVE | Noted: 2020-06-08

## 2020-06-08 PROCEDURE — 99213 OFFICE O/P EST LOW 20 MIN: CPT | Performed by: PHYSICIAN ASSISTANT

## 2020-06-08 RX ORDER — AMLODIPINE BESYLATE 2.5 MG/1
2.5 TABLET ORAL DAILY
Qty: 30 TABLET | Refills: 11 | Status: SHIPPED | OUTPATIENT
Start: 2020-06-08 | End: 2021-05-11 | Stop reason: SDUPTHER

## 2020-06-08 ASSESSMENT — ENCOUNTER SYMPTOMS
EYES NEGATIVE: 1
DIARRHEA: 0
SHORTNESS OF BREATH: 0
COUGH: 0
VOMITING: 0
ABDOMINAL PAIN: 0
NAUSEA: 0

## 2020-06-08 NOTE — PROGRESS NOTES
Caleb Ariadnes  1935  81 y.o.  female    SUBJECTIVE:    Chief Complaint   Patient presents with   Rishi Wallace Doctor     Pt here to establish care with new provider.  Knee Pain     Pt c/o bilateral knee pain. HPI   Pt here to establish care as previous PCP has left the office. Chronic conditions reviewed/addressed today as follows:    HTN-The patient is taking hypertensive medications compliantly without side effects. Denies chest pain, dyspnea, edema, or TIA's.    afib-pt following with cardiology, compliant with eliquis and amiodarone and has not had need for cardioversion in past. Denies sob/chest pain/palpitations. CKD-GFR 53 per labs in Feb, 2020. On review of diagnosis with pt, she and , who is with her today, are unaware of this diagnosis. Wade knee pain-x several months, pt states she has not been walking as much as normal due to winter/covid restrictions/weather and she has had increased pain/stiffness when first waking or after being inactive for awhile, stiffness improves after \"moving around some\". Has tried an occ tylenol with moderate relief.      Current Outpatient Medications on File Prior to Visit   Medication Sig Dispense Refill    apixaban (ELIQUIS) 5 MG TABS tablet Take 1 tablet by mouth 2 times daily 60 tablet 5    amiodarone (CORDARONE) 200 MG tablet Take 1 tablet by mouth daily 90 tablet 3    alendronate (FOSAMAX) 70 MG tablet Take 1 tablet by mouth every 7 days 12 tablet 3    pantoprazole (PROTONIX) 40 MG tablet Take 1 tablet by mouth every morning (before breakfast) 90 tablet 1    TURMERIC PO Take by mouth daily      escitalopram (LEXAPRO) 20 MG tablet Take 1 tablet by mouth daily 90 tablet 3    aspirin 81 MG chewable tablet Take 1 tablet by mouth daily 30 tablet 0    vitamin D (CHOLECALCIFEROL) 1000 UNIT TABS tablet Take 1,000 Units by mouth daily      Omega-3 1000 MG CAPS Take by mouth      calcium carbonate (OSCAL) 500 MG TABS tablet Take 500 mg Physically abused: None     Forced sexual activity: None   Other Topics Concern    None   Social History Narrative    None       Review of Systems   Constitutional: Negative for chills, fever and unexpected weight change. HENT: Negative. Eyes: Negative. Respiratory: Negative for cough and shortness of breath. Cardiovascular: Negative for chest pain and palpitations. Gastrointestinal: Negative for abdominal pain, diarrhea, nausea and vomiting. Endocrine: Negative. Musculoskeletal: Positive for arthralgias. Negative for gait problem and joint swelling. Skin: Negative for rash. Neurological: Negative for headaches. Hematological: Negative for adenopathy. Psychiatric/Behavioral: Negative for dysphoric mood. OBJECTIVE:    /66 (Site: Right Upper Arm, Position: Sitting, Cuff Size: Medium Adult)   Pulse 78   Temp 97.9 °F (36.6 °C) (Temporal)   Resp 16   Wt 192 lb 12.8 oz (87.5 kg)   SpO2 98%   BMI 29.32 kg/m²     Physical Exam  Vitals signs reviewed. Constitutional:       General: She is not in acute distress. Appearance: Normal appearance. HENT:      Head: Normocephalic. Right Ear: External ear normal.      Left Ear: External ear normal.   Eyes:      Conjunctiva/sclera: Conjunctivae normal.   Neck:      Musculoskeletal: Normal range of motion and neck supple. Cardiovascular:      Rate and Rhythm: Normal rate and regular rhythm. Pulses: Normal pulses. Heart sounds: Normal heart sounds. Pulmonary:      Effort: Pulmonary effort is normal.      Breath sounds: Normal breath sounds. Abdominal:      General: Bowel sounds are normal.      Palpations: Abdomen is soft. Musculoskeletal:      Right knee: Tenderness found. Left knee: Tenderness found. Skin:     General: Skin is warm and dry. Neurological:      Mental Status: She is alert and oriented to person, place, and time.          ASSESSMENT/PLAN:    Problem List        Circulatory Longstanding persistent atrial fibrillation     In NSR today, on amiodarone and eliquis compliantly         Essential hypertension - Primary (Chronic)     No change in treatment at this time, well controlled         Relevant Medications    amLODIPine (NORVASC) 2.5 MG tablet       Genitourinary    CKD (chronic kidney disease), stage III (HCC)     Pt unaware of diagnosis, reviewed labs/treatment/monitoring/etc..with her and  today, all questions answered            Other    Chronic pain of both knees     Can try tylenol prn, symptoms consistent with OA         Relevant Medications    aspirin 81 MG chewable tablet    escitalopram (LEXAPRO) 20 MG tablet               Return in about 4 months (around 10/8/2020).

## 2020-07-08 ENCOUNTER — TELEPHONE (OUTPATIENT)
Dept: FAMILY MEDICINE CLINIC | Age: 85
End: 2020-07-08

## 2020-08-07 RX ORDER — ESTRADIOL 0.5 MG/1
0.5 TABLET ORAL DAILY
Qty: 21 TABLET | OUTPATIENT
Start: 2020-08-07

## 2020-08-07 NOTE — TELEPHONE ENCOUNTER
Please verify with pt she is actually taking this and who prescribed.  This med has risk factors associated with long term use so she should contact whoever is prescribing it for refills

## 2020-08-10 NOTE — TELEPHONE ENCOUNTER
Spoke with Gaetano Louis, she is taking the Estradiol but cannot remember who prescribed it. She is going to think about and she will either call us back or the provider who prescribed it if she remembers.

## 2020-08-11 RX ORDER — PANTOPRAZOLE SODIUM 40 MG/1
40 TABLET, DELAYED RELEASE ORAL
Qty: 90 TABLET | Refills: 1 | Status: SHIPPED | OUTPATIENT
Start: 2020-08-11 | End: 2021-05-13 | Stop reason: SDUPTHER

## 2020-09-29 RX ORDER — ESTRADIOL 0.5 MG/1
0.5 TABLET ORAL DAILY
Qty: 21 TABLET | OUTPATIENT
Start: 2020-09-29

## 2020-10-06 NOTE — TELEPHONE ENCOUNTER
Pt states that she has been on it for years and she believes it was a doctor who retired. Pt states she is nervous about just dropping medication and  states she gets grumpy without it. Pt  states she is on a low dose.

## 2020-10-08 NOTE — TELEPHONE ENCOUNTER
Pt is at increased risk for stroke, heart attack and blood clots with long term use of hormones. . If she wants to stay on it and understands the risks,  I would like to at least decrease it if she is agreeable.

## 2020-11-30 ENCOUNTER — TELEPHONE (OUTPATIENT)
Dept: FAMILY MEDICINE CLINIC | Age: 85
End: 2020-11-30

## 2020-11-30 RX ORDER — NITROFURANTOIN 25; 75 MG/1; MG/1
100 CAPSULE ORAL 2 TIMES DAILY
Qty: 14 CAPSULE | Refills: 0 | Status: SHIPPED | OUTPATIENT
Start: 2020-11-30 | End: 2020-12-07

## 2020-11-30 NOTE — TELEPHONE ENCOUNTER
Patient's  called and lm on MA vm-regarding uti-dr. Tracie Erickson always gave her macrobid when sh has a issue with uti's-requesting medication

## 2020-11-30 NOTE — TELEPHONE ENCOUNTER
Called patient-spoke with -serafin-patient is having some issues with a UTI-she states she has a little blood in her underwear and having the urgency-dr. Cristhian Mendez always gave her Tonya Georgie would prefer not to come in today due to the weather and if we sent it to [facility] can deliver-I spoke with serafin and explained to him that we do like to get a urine  we can send for a c/s-just so we know the medication takes care of the issue-he verbalizes understanding but the would rather not drive at this time due to the weather-please advise

## 2020-11-30 NOTE — TELEPHONE ENCOUNTER
Let pt/ know I will send in ATB but if not improving in next few days, pt will need to either be seen or at least come in for UA

## 2020-12-23 RX ORDER — ESCITALOPRAM OXALATE 20 MG/1
20 TABLET ORAL DAILY
Qty: 90 TABLET | Refills: 1 | Status: SHIPPED | OUTPATIENT
Start: 2020-12-23 | End: 2021-05-20 | Stop reason: SDUPTHER

## 2021-01-07 DIAGNOSIS — M85.80 OSTEOPENIA AFTER MENOPAUSE: ICD-10-CM

## 2021-01-07 DIAGNOSIS — Z78.0 OSTEOPENIA AFTER MENOPAUSE: ICD-10-CM

## 2021-01-07 RX ORDER — ALENDRONATE SODIUM 70 MG/1
70 TABLET ORAL
Qty: 12 TABLET | Refills: 3 | Status: SHIPPED | OUTPATIENT
Start: 2021-01-07 | End: 2022-09-30 | Stop reason: SDUPTHER

## 2021-01-22 DIAGNOSIS — Z79.899 ON AMIODARONE THERAPY: ICD-10-CM

## 2021-01-23 RX ORDER — AMIODARONE HYDROCHLORIDE 200 MG/1
200 TABLET ORAL DAILY
Qty: 30 TABLET | Refills: 0 | Status: SHIPPED | OUTPATIENT
Start: 2021-01-23 | End: 2021-02-26

## 2021-01-23 NOTE — TELEPHONE ENCOUNTER
Patient needs EKG, TSH, and Hepatic panel before any more scripts called in. Will send one month supply.

## 2021-02-25 DIAGNOSIS — Z79.899 ON AMIODARONE THERAPY: ICD-10-CM

## 2021-02-26 RX ORDER — AMIODARONE HYDROCHLORIDE 200 MG/1
200 TABLET ORAL DAILY
Qty: 30 TABLET | Refills: 0 | Status: SHIPPED | OUTPATIENT
Start: 2021-02-26 | End: 2021-03-03 | Stop reason: SDUPTHER

## 2021-03-03 ENCOUNTER — OFFICE VISIT (OUTPATIENT)
Dept: CARDIOLOGY CLINIC | Age: 86
End: 2021-03-03
Payer: MEDICARE

## 2021-03-03 DIAGNOSIS — I10 ESSENTIAL HYPERTENSION: Chronic | ICD-10-CM

## 2021-03-03 DIAGNOSIS — I48.0 PAROXYSMAL ATRIAL FIBRILLATION (HCC): ICD-10-CM

## 2021-03-03 DIAGNOSIS — Z79.899 ON AMIODARONE THERAPY: ICD-10-CM

## 2021-03-03 PROCEDURE — 93000 ELECTROCARDIOGRAM COMPLETE: CPT | Performed by: INTERNAL MEDICINE

## 2021-03-03 RX ORDER — AMIODARONE HYDROCHLORIDE 200 MG/1
200 TABLET ORAL DAILY
Qty: 30 TABLET | Refills: 5 | Status: SHIPPED | OUTPATIENT
Start: 2021-03-03 | End: 2021-05-11 | Stop reason: SDUPTHER

## 2021-04-07 ENCOUNTER — TELEPHONE (OUTPATIENT)
Dept: FAMILY MEDICINE CLINIC | Age: 86
End: 2021-04-07

## 2021-04-07 NOTE — TELEPHONE ENCOUNTER
Pt's  called and lvm at our office. I called him back and he said that he thinks his wife has a UTI. She is having symptoms of urgency and pain. I told him that she will need to be seen and eval by a provider before we are able to prescribe any medication. I said he can visit the walk in clinic possibly tonight or tomorrow. I said Artemio Sweeney does not have anything available until next Friday. I offered to him an appt with another provider in our office tomorrow afternoon. I was willing to give him the phone number to our walk in clinic. I advised she can be seen at any urgent care or walk in clinic. He said that he is going to have to figure it out and call us back.

## 2021-05-11 DIAGNOSIS — I10 ESSENTIAL HYPERTENSION: Chronic | ICD-10-CM

## 2021-05-11 DIAGNOSIS — Z79.899 ON AMIODARONE THERAPY: ICD-10-CM

## 2021-05-11 RX ORDER — AMLODIPINE BESYLATE 2.5 MG/1
2.5 TABLET ORAL DAILY
Qty: 30 TABLET | Refills: 11 | Status: SHIPPED | OUTPATIENT
Start: 2021-05-11 | End: 2022-05-18

## 2021-05-11 RX ORDER — AMIODARONE HYDROCHLORIDE 200 MG/1
200 TABLET ORAL DAILY
Qty: 30 TABLET | Refills: 5 | Status: SHIPPED | OUTPATIENT
Start: 2021-05-11 | End: 2021-12-06

## 2021-05-13 DIAGNOSIS — K21.9 GASTROESOPHAGEAL REFLUX DISEASE WITHOUT ESOPHAGITIS: ICD-10-CM

## 2021-05-13 RX ORDER — PANTOPRAZOLE SODIUM 40 MG/1
40 TABLET, DELAYED RELEASE ORAL
Qty: 90 TABLET | Refills: 1 | Status: SHIPPED | OUTPATIENT
Start: 2021-05-13 | End: 2021-07-26

## 2021-05-19 DIAGNOSIS — F43.21 GRIEF: ICD-10-CM

## 2021-05-20 ENCOUNTER — HOSPITAL ENCOUNTER (EMERGENCY)
Age: 86
Discharge: HOME | End: 2021-05-21
Attending: EMERGENCY MEDICINE
Payer: MEDICARE

## 2021-05-20 VITALS
OXYGEN SATURATION: 97 % | HEART RATE: 96 BPM | BODY MASS INDEX: 25.76 KG/M2 | DIASTOLIC BLOOD PRESSURE: 89 MMHG | HEIGHT: 68 IN | SYSTOLIC BLOOD PRESSURE: 138 MMHG | WEIGHT: 170 LBS | RESPIRATION RATE: 16 BRPM

## 2021-05-20 DIAGNOSIS — R04.0 EPISTAXIS: Primary | ICD-10-CM

## 2021-05-20 PROCEDURE — 99283 EMERGENCY DEPT VISIT LOW MDM: CPT

## 2021-05-20 RX ORDER — ESCITALOPRAM OXALATE 20 MG/1
20 TABLET ORAL DAILY
Qty: 90 TABLET | Refills: 1 | Status: SHIPPED | OUTPATIENT
Start: 2021-05-20 | End: 2021-07-26

## 2021-05-21 ASSESSMENT — ENCOUNTER SYMPTOMS
COUGH: 0
RESPIRATORY NEGATIVE: 1
EYES NEGATIVE: 1
SINUS PAIN: 0
GASTROINTESTINAL NEGATIVE: 1
SORE THROAT: 0

## 2021-05-21 NOTE — ED PROVIDER NOTES
The history is provided by the patient. Epistaxis  Location:  R nare  Severity:  Moderate  Timing:  Constant  Progression:  Unchanged  Chronicity:  New  Context: anticoagulants    Relieved by:  Nothing  Worsened by:  Nothing  Ineffective treatments:  Applying pressure and ice  Associated symptoms: no blood in oropharynx, no congestion, no cough, no dizziness, no sinus pain, no sneezing and no sore throat        Review of Systems   Constitutional: Negative. HENT: Positive for nosebleeds. Negative for congestion, sinus pain, sneezing and sore throat. Eyes: Negative. Respiratory: Negative. Negative for cough. Cardiovascular: Negative. Gastrointestinal: Negative. Genitourinary: Negative. Musculoskeletal: Negative. Skin: Negative. Neurological: Negative. Negative for dizziness. All other systems reviewed and are negative. Family History   Problem Relation Age of Onset    Dementia Mother     Other Father         emphysema    Cancer Brother         Melanoma     Social History     Socioeconomic History    Marital status:      Spouse name: Not on file    Number of children: Not on file    Years of education: Not on file    Highest education level: Not on file   Occupational History    Not on file   Tobacco Use    Smoking status: Never Smoker    Smokeless tobacco: Never Used   Vaping Use    Vaping Use: Never used   Substance and Sexual Activity    Alcohol use: Not Currently     Comment: rarely    Drug use: Never    Sexual activity: Not Currently   Other Topics Concern    Not on file   Social History Narrative    Not on file     Social Determinants of Health     Financial Resource Strain:     Difficulty of Paying Living Expenses:    Food Insecurity:     Worried About Running Out of Food in the Last Year:     [address] in the Last Year:    Transportation Needs:     Lack of Transportation (Medical):      Lack of Transportation (Non-Medical):    Physical Activity:     Days of Exercise per Week:     Minutes of Exercise per Session:    Stress:     Feeling of Stress :    Social Connections:     Frequency of Communication with Friends and Family:     Frequency of Social Gatherings with Friends and Family:     Attends Adventism Services:     Active Member of Clubs or Organizations:     Attends Club or Organization Meetings:     Marital Status:    Intimate Partner Violence:     Fear of Current or Ex-Partner:     Emotionally Abused:     Physically Abused:     Sexually Abused:      Past Surgical History:   Procedure Laterality Date    HYSTERECTOMY, TOTAL ABDOMINAL       Past Medical History:   Diagnosis Date    Anxiety     Atrial fibrillation ([facility] [address]     Current use of estrogen therapy     History of nuclear stress test 10/29/2019    EF 80%. Normal study.  Hypertension      No Known Allergies  Prior to Admission medications    Medication Sig Start Date End Date Taking? Authorizing Provider   escitalopram (LEXAPRO) 20 MG tablet Take 1 tablet by mouth daily 5/20/21   Osmar Dow PA-C   pantoprazole (PROTONIX) 40 MG tablet Take 1 tablet by mouth every morning (before breakfast) 5/13/21   Osmar Dow PA-C   amLODIPine (NORVASC) 2.5 MG tablet Take 1 tablet by mouth daily 5/11/21   Elan Crawford MD   amiodarone (CORDARONE) 200 MG tablet Take 1 tablet by mouth daily 5/11/21   Elan Crawford MD   alendronate (FOSAMAX) 70 MG tablet Take 1 tablet by mouth every 7 days 1/7/21   Osmar Dow PA-C   apixaban (ELIQUIS) 5 MG TABS tablet Take 1 tablet by mouth 2 times daily 11/25/20   Ovidio Gandhi, APRN - CNP   TURMERIC PO Take by mouth daily    Historical Provider, MD   escitalopram (LEXAPRO) 20 MG tablet Take 1 tablet by mouth daily 11/15/19   Dee Dee Andino MD   aspirin 81 MG chewable tablet Take 1 tablet by mouth daily 10/5/19   Dalila Perez MD   vitamin D (CHOLECALCIFEROL) 1000 UNIT TABS tablet Take 1,000 Units by mouth daily Historical Provider, MD   Marshall-3 1000 MG CAPS Take by mouth    Historical Provider, MD   calcium carbonate (OSCAL) 500 MG TABS tablet Take 500 mg by mouth daily    Historical Provider, MD   estradiol (ESTRACE) 0.5 MG tablet Take 0.5 mg by mouth daily    Historical Provider, MD       /89   Pulse 96   Resp 16   Ht 5' 8\" (1.727 m)   Wt 170 lb (77.1 kg)   SpO2 97%   BMI 25.85 kg/m²     Physical Exam  Vitals and nursing note reviewed. Constitutional:       Appearance: She is well-developed. HENT:      Head: Normocephalic and atraumatic. Right Ear: External ear normal.      Left Ear: External ear normal.      Nose: No nasal deformity, signs of injury, nasal tenderness or congestion. Right Nostril: Epistaxis present. No foreign body, septal hematoma or occlusion. Right Turbinates: Pale. Comments: Diffuse bleeding noted anteriorly. Pale turbinates  Eyes:      Conjunctiva/sclera: Conjunctivae normal.      Pupils: Pupils are equal, round, and reactive to light. Cardiovascular:      Rate and Rhythm: Normal rate and regular rhythm. Heart sounds: Normal heart sounds. Pulmonary:      Effort: Pulmonary effort is normal.      Breath sounds: Normal breath sounds. Abdominal:      General: Bowel sounds are normal.      Palpations: Abdomen is soft. Musculoskeletal:         General: Normal range of motion. Cervical back: Normal range of motion and neck supple. Skin:     General: Skin is warm and dry. Neurological:      Mental Status: She is alert and oriented to person, place, and time. GCS: GCS eye subscore is 4. GCS verbal subscore is 5. GCS motor subscore is 6. Psychiatric:         Behavior: Behavior normal.         Thought Content:  Thought content normal.         Judgment: Judgment normal.         MDM:    Labs Reviewed - No data to display    No orders to display    Epistaxis Mgmt    Date/Time: 5/21/2021 2:21 AM  Performed by: Augusto Cobb DO  Authorized by: Pancho Blackwood DO     Consent:     Consent obtained:  Verbal    Consent given by:  Patient    Risks discussed:  Bleeding, infection, nasal injury and pain    Alternatives discussed:  No treatment, delayed treatment, alternative treatment, observation and referral  Universal protocol:     Procedure explained and questions answered to patient or proxy's satisfaction: yes      Relevant documents present and verified: yes      Test results available and properly labeled: yes      Imaging studies available: yes      Required blood products, implants, devices, and special equipment available: yes      Site/side marked: yes      Time out called: yes      Patient identity confirmed:  Verbally with patient  Anesthesia (see MAR for exact dosages): Anesthesia method:  None  Procedure details:     Treatment site:  R anterior    Treatment method:  Nasal balloon    Treatment complexity:  Limited    Treatment episode: initial    Post-procedure details:     Assessment:  Bleeding stopped    Patient tolerance of procedure: Tolerated well, no immediate complications          Case discussed with Cardiology Dr. Beaver Resides and wants her to stop the Eliquis for next 5 days and then restart the medication. She is to call his office for follow up. The patient Nasal rocket can be removed in next 2 to 3 days. She can return to the ER for removal since this is weekend. All bleeding was controlled and patient was ambulated in the ER and is safe for discharge. My typical dicussion, presentation,and considerations for this patients' chief complaint, diagnosis, and differential diagnosis have been considered and discussed. I have stressed need for follow up and reexamination for this encounter.    Final Impression    1. Epistaxis              Pancho Blackwood DO  05/21/21 [id number]

## 2021-07-24 DIAGNOSIS — F43.21 GRIEF: ICD-10-CM

## 2021-07-24 DIAGNOSIS — K21.9 GASTROESOPHAGEAL REFLUX DISEASE WITHOUT ESOPHAGITIS: ICD-10-CM

## 2021-07-26 RX ORDER — PANTOPRAZOLE SODIUM 40 MG/1
40 TABLET, DELAYED RELEASE ORAL
Qty: 90 TABLET | Refills: 1 | Status: SHIPPED | OUTPATIENT
Start: 2021-07-26 | End: 2021-10-18

## 2021-07-26 RX ORDER — ESCITALOPRAM OXALATE 20 MG/1
20 TABLET ORAL DAILY
Qty: 90 TABLET | Refills: 1 | Status: SHIPPED | OUTPATIENT
Start: 2021-07-26 | End: 2022-02-22

## 2021-10-16 DIAGNOSIS — K21.9 GASTROESOPHAGEAL REFLUX DISEASE WITHOUT ESOPHAGITIS: ICD-10-CM

## 2021-10-18 RX ORDER — PANTOPRAZOLE SODIUM 40 MG/1
40 TABLET, DELAYED RELEASE ORAL
Qty: 90 TABLET | Refills: 1 | Status: SHIPPED | OUTPATIENT
Start: 2021-10-18 | End: 2022-06-06

## 2021-12-06 DIAGNOSIS — Z79.899 ON AMIODARONE THERAPY: ICD-10-CM

## 2021-12-06 RX ORDER — AMIODARONE HYDROCHLORIDE 200 MG/1
200 TABLET ORAL DAILY
Qty: 30 TABLET | Refills: 0 | Status: SHIPPED | OUTPATIENT
Start: 2021-12-06 | End: 2022-01-26

## 2022-01-01 ENCOUNTER — HOSPITAL ENCOUNTER (INPATIENT)
Age: 87
LOS: 14 days | End: 2023-01-13
Attending: STUDENT IN AN ORGANIZED HEALTH CARE EDUCATION/TRAINING PROGRAM | Admitting: STUDENT IN AN ORGANIZED HEALTH CARE EDUCATION/TRAINING PROGRAM
Payer: MEDICARE

## 2022-01-01 ENCOUNTER — APPOINTMENT (OUTPATIENT)
Dept: GENERAL RADIOLOGY | Age: 87
End: 2022-01-01
Attending: STUDENT IN AN ORGANIZED HEALTH CARE EDUCATION/TRAINING PROGRAM
Payer: MEDICARE

## 2022-01-01 DIAGNOSIS — J96.01 ACUTE RESPIRATORY FAILURE WITH HYPOXIA (HCC): Primary | ICD-10-CM

## 2022-01-01 LAB
ANION GAP SERPL CALCULATED.3IONS-SCNC: 9 MMOL/L (ref 4–16)
BASE EXCESS MIXED: 8.7 (ref 0–2.3)
BUN BLDV-MCNC: 22 MG/DL (ref 6–23)
CALCIUM SERPL-MCNC: 7.7 MG/DL (ref 8.3–10.6)
CHLORIDE BLD-SCNC: 89 MMOL/L (ref 99–110)
CO2: 30 MMOL/L (ref 21–32)
COMMENT: ABNORMAL
CREAT SERPL-MCNC: 1.1 MG/DL (ref 0.6–1.1)
GFR SERPL CREATININE-BSD FRML MDRD: 49 ML/MIN/1.73M2
GLUCOSE BLD-MCNC: 95 MG/DL (ref 70–99)
HCO3 VENOUS: 35 MMOL/L (ref 19–25)
HCT VFR BLD CALC: 31.9 % (ref 37–47)
HEMOGLOBIN: 10.6 GM/DL (ref 12.5–16)
MAGNESIUM: 2.1 MG/DL (ref 1.8–2.4)
MCH RBC QN AUTO: 33.2 PG (ref 27–31)
MCHC RBC AUTO-ENTMCNC: 33.2 % (ref 32–36)
MCV RBC AUTO: 100 FL (ref 78–100)
O2 SAT, VEN: 63.6 % (ref 50–70)
PCO2, VEN: 54 MMHG (ref 38–52)
PDW BLD-RTO: 12.2 % (ref 11.7–14.9)
PH VENOUS: 7.42 (ref 7.32–7.42)
PHOSPHORUS: 3.1 MG/DL (ref 2.5–4.9)
PLATELET # BLD: 300 K/CU MM (ref 140–440)
PMV BLD AUTO: 8.6 FL (ref 7.5–11.1)
PO2, VEN: 34 MMHG (ref 28–48)
POTASSIUM SERPL-SCNC: 3.8 MMOL/L (ref 3.5–5.1)
RBC # BLD: 3.19 M/CU MM (ref 4.2–5.4)
SODIUM BLD-SCNC: 128 MMOL/L (ref 135–145)
WBC # BLD: 15.9 K/CU MM (ref 4–10.5)

## 2022-01-01 PROCEDURE — 85027 COMPLETE CBC AUTOMATED: CPT

## 2022-01-01 PROCEDURE — 6360000002 HC RX W HCPCS: Performed by: STUDENT IN AN ORGANIZED HEALTH CARE EDUCATION/TRAINING PROGRAM

## 2022-01-01 PROCEDURE — 71045 X-RAY EXAM CHEST 1 VIEW: CPT

## 2022-01-01 PROCEDURE — 94660 CPAP INITIATION&MGMT: CPT

## 2022-01-01 PROCEDURE — 2580000003 HC RX 258: Performed by: STUDENT IN AN ORGANIZED HEALTH CARE EDUCATION/TRAINING PROGRAM

## 2022-01-01 PROCEDURE — 82805 BLOOD GASES W/O2 SATURATION: CPT

## 2022-01-01 PROCEDURE — 2700000000 HC OXYGEN THERAPY PER DAY

## 2022-01-01 PROCEDURE — 94640 AIRWAY INHALATION TREATMENT: CPT

## 2022-01-01 PROCEDURE — 2580000003 HC RX 258: Performed by: PHYSICIAN ASSISTANT

## 2022-01-01 PROCEDURE — P9047 ALBUMIN (HUMAN), 25%, 50ML: HCPCS | Performed by: INTERNAL MEDICINE

## 2022-01-01 PROCEDURE — 2000000000 HC ICU R&B

## 2022-01-01 PROCEDURE — 2500000003 HC RX 250 WO HCPCS: Performed by: PHYSICIAN ASSISTANT

## 2022-01-01 PROCEDURE — 6370000000 HC RX 637 (ALT 250 FOR IP): Performed by: NURSE PRACTITIONER

## 2022-01-01 PROCEDURE — 6360000002 HC RX W HCPCS: Performed by: PHYSICIAN ASSISTANT

## 2022-01-01 PROCEDURE — 2580000003 HC RX 258: Performed by: NURSE PRACTITIONER

## 2022-01-01 PROCEDURE — 6370000000 HC RX 637 (ALT 250 FOR IP): Performed by: STUDENT IN AN ORGANIZED HEALTH CARE EDUCATION/TRAINING PROGRAM

## 2022-01-01 PROCEDURE — 94761 N-INVAS EAR/PLS OXIMETRY MLT: CPT

## 2022-01-01 PROCEDURE — 84100 ASSAY OF PHOSPHORUS: CPT

## 2022-01-01 PROCEDURE — 94664 DEMO&/EVAL PT USE INHALER: CPT

## 2022-01-01 PROCEDURE — 80048 BASIC METABOLIC PNL TOTAL CA: CPT

## 2022-01-01 PROCEDURE — 6360000002 HC RX W HCPCS: Performed by: INTERNAL MEDICINE

## 2022-01-01 PROCEDURE — 83735 ASSAY OF MAGNESIUM: CPT

## 2022-01-01 PROCEDURE — 6360000002 HC RX W HCPCS: Performed by: NURSE PRACTITIONER

## 2022-01-01 RX ORDER — AMLODIPINE BESYLATE 5 MG/1
2.5 TABLET ORAL DAILY
Status: DISCONTINUED | OUTPATIENT
Start: 2022-01-01 | End: 2023-01-01 | Stop reason: HOSPADM

## 2022-01-01 RX ORDER — SODIUM CHLORIDE 9 MG/ML
INJECTION, SOLUTION INTRAVENOUS PRN
Status: DISCONTINUED | OUTPATIENT
Start: 2022-01-01 | End: 2023-01-01 | Stop reason: HOSPADM

## 2022-01-01 RX ORDER — SODIUM CHLORIDE 0.9 % (FLUSH) 0.9 %
10 SYRINGE (ML) INJECTION EVERY 12 HOURS SCHEDULED
Status: DISCONTINUED | OUTPATIENT
Start: 2022-01-01 | End: 2023-01-01

## 2022-01-01 RX ORDER — ONDANSETRON 4 MG/1
4 TABLET, ORALLY DISINTEGRATING ORAL EVERY 6 HOURS PRN
Status: DISCONTINUED | OUTPATIENT
Start: 2022-01-01 | End: 2023-01-01 | Stop reason: HOSPADM

## 2022-01-01 RX ORDER — DEXMEDETOMIDINE HYDROCHLORIDE 4 UG/ML
.1-.8 INJECTION, SOLUTION INTRAVENOUS CONTINUOUS
Status: DISCONTINUED | OUTPATIENT
Start: 2022-01-01 | End: 2022-01-01

## 2022-01-01 RX ORDER — SODIUM CHLORIDE 0.9 % (FLUSH) 0.9 %
10 SYRINGE (ML) INJECTION PRN
Status: DISCONTINUED | OUTPATIENT
Start: 2022-01-01 | End: 2023-01-01 | Stop reason: HOSPADM

## 2022-01-01 RX ORDER — FUROSEMIDE 10 MG/ML
20 INJECTION INTRAMUSCULAR; INTRAVENOUS DAILY
Status: DISCONTINUED | OUTPATIENT
Start: 2022-01-01 | End: 2023-01-01

## 2022-01-01 RX ORDER — GUAIFENESIN 100 MG/5ML
200 SYRUP ORAL EVERY 4 HOURS PRN
Status: DISCONTINUED | OUTPATIENT
Start: 2022-01-01 | End: 2023-01-01

## 2022-01-01 RX ORDER — ASPIRIN 81 MG/1
81 TABLET ORAL DAILY
Status: DISCONTINUED | OUTPATIENT
Start: 2022-01-01 | End: 2023-01-01

## 2022-01-01 RX ORDER — AMIODARONE HYDROCHLORIDE 200 MG/1
200 TABLET ORAL DAILY
Status: DISCONTINUED | OUTPATIENT
Start: 2022-01-01 | End: 2023-01-01 | Stop reason: HOSPADM

## 2022-01-01 RX ORDER — ALBUTEROL SULFATE 90 UG/1
2 AEROSOL, METERED RESPIRATORY (INHALATION) EVERY 4 HOURS PRN
Status: DISCONTINUED | OUTPATIENT
Start: 2022-01-01 | End: 2023-01-01 | Stop reason: HOSPADM

## 2022-01-01 RX ORDER — PANTOPRAZOLE SODIUM 40 MG/1
40 TABLET, DELAYED RELEASE ORAL
Status: DISCONTINUED | OUTPATIENT
Start: 2022-01-01 | End: 2023-01-01

## 2022-01-01 RX ORDER — IPRATROPIUM BROMIDE AND ALBUTEROL SULFATE 2.5; .5 MG/3ML; MG/3ML
1 SOLUTION RESPIRATORY (INHALATION)
Status: DISCONTINUED | OUTPATIENT
Start: 2022-01-01 | End: 2023-01-01

## 2022-01-01 RX ORDER — ACETAMINOPHEN 325 MG/1
650 TABLET ORAL EVERY 6 HOURS PRN
Status: DISCONTINUED | OUTPATIENT
Start: 2022-01-01 | End: 2023-01-01 | Stop reason: HOSPADM

## 2022-01-01 RX ORDER — POLYETHYLENE GLYCOL 3350 17 G/17G
17 POWDER, FOR SOLUTION ORAL DAILY PRN
Status: DISCONTINUED | OUTPATIENT
Start: 2022-01-01 | End: 2023-01-01 | Stop reason: HOSPADM

## 2022-01-01 RX ORDER — GUAIFENESIN 600 MG/1
600 TABLET, EXTENDED RELEASE ORAL 2 TIMES DAILY
Status: DISCONTINUED | OUTPATIENT
Start: 2022-01-01 | End: 2023-01-01

## 2022-01-01 RX ORDER — DEXAMETHASONE SODIUM PHOSPHATE 4 MG/ML
10 INJECTION, SOLUTION INTRA-ARTICULAR; INTRALESIONAL; INTRAMUSCULAR; INTRAVENOUS; SOFT TISSUE DAILY
Status: COMPLETED | OUTPATIENT
Start: 2023-01-01 | End: 2023-01-01

## 2022-01-01 RX ORDER — ESCITALOPRAM OXALATE 10 MG/1
20 TABLET ORAL DAILY
Status: DISCONTINUED | OUTPATIENT
Start: 2022-01-01 | End: 2023-01-01 | Stop reason: HOSPADM

## 2022-01-01 RX ORDER — TRAZODONE HYDROCHLORIDE 50 MG/1
50 TABLET ORAL NIGHTLY
Status: DISCONTINUED | OUTPATIENT
Start: 2022-01-01 | End: 2023-01-01

## 2022-01-01 RX ORDER — ALBUMIN (HUMAN) 12.5 G/50ML
12.5 SOLUTION INTRAVENOUS ONCE
Status: COMPLETED | OUTPATIENT
Start: 2022-01-01 | End: 2022-01-01

## 2022-01-01 RX ADMIN — GUAIFENESIN 600 MG: 600 TABLET, EXTENDED RELEASE ORAL at 21:04

## 2022-01-01 RX ADMIN — IPRATROPIUM BROMIDE AND ALBUTEROL SULFATE 1 AMPULE: 2.5; .5 SOLUTION RESPIRATORY (INHALATION) at 07:10

## 2022-01-01 RX ADMIN — TRAZODONE HYDROCHLORIDE 50 MG: 50 TABLET ORAL at 20:45

## 2022-01-01 RX ADMIN — ACETAMINOPHEN 650 MG: 325 TABLET ORAL at 22:09

## 2022-01-01 RX ADMIN — ASPIRIN 81 MG: 81 TABLET, COATED ORAL at 09:51

## 2022-01-01 RX ADMIN — FUROSEMIDE 20 MG: 10 INJECTION, SOLUTION INTRAVENOUS at 09:51

## 2022-01-01 RX ADMIN — VANCOMYCIN HYDROCHLORIDE 1000 MG: 1 INJECTION, POWDER, LYOPHILIZED, FOR SOLUTION INTRAVENOUS at 16:24

## 2022-01-01 RX ADMIN — PHENOL 1 SPRAY: 1.4 LIQUID ORAL at 15:09

## 2022-01-01 RX ADMIN — MEROPENEM 1000 MG: 1 INJECTION, POWDER, FOR SOLUTION INTRAVENOUS at 06:11

## 2022-01-01 RX ADMIN — AMIODARONE HYDROCHLORIDE 200 MG: 200 TABLET ORAL at 09:51

## 2022-01-01 RX ADMIN — IPRATROPIUM BROMIDE AND ALBUTEROL SULFATE 1 AMPULE: 2.5; .5 SOLUTION RESPIRATORY (INHALATION) at 11:57

## 2022-01-01 RX ADMIN — DEXMEDETOMIDINE HYDROCHLORIDE 0.2 MCG/KG/HR: 4 INJECTION, SOLUTION INTRAVENOUS at 17:01

## 2022-01-01 RX ADMIN — APIXABAN 5 MG: 5 TABLET, FILM COATED ORAL at 20:45

## 2022-01-01 RX ADMIN — CEFEPIME HYDROCHLORIDE 2000 MG: 2 INJECTION, POWDER, FOR SOLUTION INTRAVENOUS at 15:53

## 2022-01-01 RX ADMIN — SODIUM CHLORIDE, PRESERVATIVE FREE 10 ML: 5 INJECTION INTRAVENOUS at 20:46

## 2022-01-01 RX ADMIN — MEROPENEM 1000 MG: 1 INJECTION, POWDER, FOR SOLUTION INTRAVENOUS at 20:37

## 2022-01-01 RX ADMIN — AZITHROMYCIN MONOHYDRATE 500 MG: 500 INJECTION, POWDER, LYOPHILIZED, FOR SOLUTION INTRAVENOUS at 20:43

## 2022-01-01 RX ADMIN — SODIUM CHLORIDE, PRESERVATIVE FREE 10 ML: 5 INJECTION INTRAVENOUS at 09:52

## 2022-01-01 RX ADMIN — GUAIFENESIN 600 MG: 600 TABLET, EXTENDED RELEASE ORAL at 20:45

## 2022-01-01 RX ADMIN — DEXAMETHASONE SODIUM PHOSPHATE 20 MG: 4 INJECTION, SOLUTION INTRAMUSCULAR; INTRAVENOUS at 10:26

## 2022-01-01 RX ADMIN — AZITHROMYCIN MONOHYDRATE 500 MG: 500 INJECTION, POWDER, LYOPHILIZED, FOR SOLUTION INTRAVENOUS at 21:13

## 2022-01-01 RX ADMIN — IPRATROPIUM BROMIDE AND ALBUTEROL SULFATE 1 AMPULE: 2.5; .5 SOLUTION RESPIRATORY (INHALATION) at 21:37

## 2022-01-01 RX ADMIN — IPRATROPIUM BROMIDE AND ALBUTEROL SULFATE 1 AMPULE: 2.5; .5 SOLUTION RESPIRATORY (INHALATION) at 15:50

## 2022-01-01 RX ADMIN — ALBUMIN (HUMAN) 12.5 G: 0.25 INJECTION, SOLUTION INTRAVENOUS at 07:11

## 2022-01-01 RX ADMIN — IPRATROPIUM BROMIDE AND ALBUTEROL SULFATE 1 AMPULE: 2.5; .5 SOLUTION RESPIRATORY (INHALATION) at 15:27

## 2022-01-01 RX ADMIN — VANCOMYCIN HYDROCHLORIDE 1500 MG: 5 INJECTION, POWDER, LYOPHILIZED, FOR SOLUTION INTRAVENOUS at 00:07

## 2022-01-01 RX ADMIN — MEROPENEM 1000 MG: 1 INJECTION, POWDER, FOR SOLUTION INTRAVENOUS at 21:15

## 2022-01-01 RX ADMIN — SODIUM CHLORIDE, PRESERVATIVE FREE 10 ML: 5 INJECTION INTRAVENOUS at 21:05

## 2022-01-01 RX ADMIN — APIXABAN 5 MG: 5 TABLET, FILM COATED ORAL at 21:04

## 2022-01-01 RX ADMIN — ESCITALOPRAM OXALATE 20 MG: 10 TABLET ORAL at 09:52

## 2022-01-01 RX ADMIN — GUAIFENESIN 600 MG: 600 TABLET, EXTENDED RELEASE ORAL at 09:49

## 2022-01-01 RX ADMIN — MEROPENEM 1000 MG: 1 INJECTION, POWDER, FOR SOLUTION INTRAVENOUS at 14:00

## 2022-01-01 RX ADMIN — ACETAMINOPHEN 650 MG: 325 TABLET ORAL at 02:48

## 2022-01-01 RX ADMIN — IPRATROPIUM BROMIDE AND ALBUTEROL SULFATE 1 AMPULE: 2.5; .5 SOLUTION RESPIRATORY (INHALATION) at 20:54

## 2022-01-01 RX ADMIN — APIXABAN 5 MG: 5 TABLET, FILM COATED ORAL at 09:52

## 2022-01-01 ASSESSMENT — PAIN DESCRIPTION - DESCRIPTORS
DESCRIPTORS: SORE
DESCRIPTORS: SORE
DESCRIPTORS: ACHING

## 2022-01-01 ASSESSMENT — PAIN SCALES - GENERAL
PAINLEVEL_OUTOF10: 3
PAINLEVEL_OUTOF10: 2
PAINLEVEL_OUTOF10: 0
PAINLEVEL_OUTOF10: 0
PAINLEVEL_OUTOF10: 5
PAINLEVEL_OUTOF10: 0
PAINLEVEL_OUTOF10: 0

## 2022-01-01 ASSESSMENT — PAIN DESCRIPTION - ONSET
ONSET: ON-GOING
ONSET: ON-GOING

## 2022-01-01 ASSESSMENT — PAIN DESCRIPTION - FREQUENCY
FREQUENCY: INTERMITTENT
FREQUENCY: CONTINUOUS

## 2022-01-01 ASSESSMENT — PAIN DESCRIPTION - LOCATION
LOCATION: THROAT

## 2022-01-01 ASSESSMENT — PAIN DESCRIPTION - PAIN TYPE
TYPE: ACUTE PAIN
TYPE: ACUTE PAIN

## 2022-01-01 ASSESSMENT — PAIN - FUNCTIONAL ASSESSMENT
PAIN_FUNCTIONAL_ASSESSMENT: ACTIVITIES ARE NOT PREVENTED
PAIN_FUNCTIONAL_ASSESSMENT: PREVENTS OR INTERFERES SOME ACTIVE ACTIVITIES AND ADLS

## 2022-01-01 ASSESSMENT — PAIN SCALES - WONG BAKER: WONGBAKER_NUMERICALRESPONSE: 0

## 2022-01-01 ASSESSMENT — PAIN DESCRIPTION - ORIENTATION: ORIENTATION: INNER

## 2022-01-10 DIAGNOSIS — Z79.899 ON AMIODARONE THERAPY: ICD-10-CM

## 2022-01-10 RX ORDER — APIXABAN 5 MG/1
TABLET, FILM COATED ORAL
Qty: 60 TABLET | Refills: 0 | OUTPATIENT
Start: 2022-01-10

## 2022-01-10 RX ORDER — AMIODARONE HYDROCHLORIDE 200 MG/1
200 TABLET ORAL DAILY
Qty: 30 TABLET | Refills: 0 | OUTPATIENT
Start: 2022-01-10

## 2022-01-25 DIAGNOSIS — Z79.899 ON AMIODARONE THERAPY: ICD-10-CM

## 2022-01-26 RX ORDER — AMIODARONE HYDROCHLORIDE 200 MG/1
200 TABLET ORAL DAILY
Qty: 30 TABLET | Refills: 0 | Status: SHIPPED | OUTPATIENT
Start: 2022-01-26 | End: 2022-02-22

## 2022-01-26 RX ORDER — APIXABAN 5 MG/1
TABLET, FILM COATED ORAL
Qty: 60 TABLET | Refills: 0 | Status: SHIPPED | OUTPATIENT
Start: 2022-01-26 | End: 2022-02-09

## 2022-01-26 NOTE — TELEPHONE ENCOUNTER
Pt answered, stated we need to schedule an follow up appt. w her due to being on amio. Pt states she is trying to stay in doors due to wheather this month. Scheduled pt a follow up appt. w Luis Berrios 1 month out on a friday due to transportation.   [address] [facility]

## 2022-02-09 RX ORDER — APIXABAN 5 MG/1
TABLET, FILM COATED ORAL
Qty: 60 TABLET | Refills: 0 | Status: SHIPPED | OUTPATIENT
Start: 2022-02-09 | End: 2022-06-06

## 2022-02-22 DIAGNOSIS — F43.21 GRIEF: ICD-10-CM

## 2022-02-22 DIAGNOSIS — Z79.899 ON AMIODARONE THERAPY: ICD-10-CM

## 2022-02-22 RX ORDER — AMIODARONE HYDROCHLORIDE 200 MG/1
200 TABLET ORAL DAILY
Qty: 30 TABLET | Refills: 0 | Status: SHIPPED | OUTPATIENT
Start: 2022-02-22 | End: 2022-03-28

## 2022-02-22 RX ORDER — ESCITALOPRAM OXALATE 20 MG/1
20 TABLET ORAL DAILY
Qty: 90 TABLET | Refills: 1 | Status: SHIPPED | OUTPATIENT
Start: 2022-02-22 | End: 2022-08-25

## 2022-03-25 DIAGNOSIS — Z79.899 ON AMIODARONE THERAPY: ICD-10-CM

## 2022-03-28 RX ORDER — AMIODARONE HYDROCHLORIDE 200 MG/1
200 TABLET ORAL DAILY
Qty: 30 TABLET | Refills: 5 | Status: SHIPPED | OUTPATIENT
Start: 2022-03-28 | End: 2022-08-24

## 2022-04-16 DIAGNOSIS — I10 ESSENTIAL HYPERTENSION: Chronic | ICD-10-CM

## 2022-04-18 RX ORDER — AMLODIPINE BESYLATE 2.5 MG/1
2.5 TABLET ORAL DAILY
Qty: 30 TABLET | Refills: 11 | OUTPATIENT
Start: 2022-04-18

## 2022-05-18 DIAGNOSIS — I10 ESSENTIAL HYPERTENSION: Chronic | ICD-10-CM

## 2022-05-18 RX ORDER — AMLODIPINE BESYLATE 2.5 MG/1
2.5 TABLET ORAL DAILY
Qty: 30 TABLET | Refills: 11 | Status: SHIPPED | OUTPATIENT
Start: 2022-05-18

## 2022-06-06 DIAGNOSIS — K21.9 GASTROESOPHAGEAL REFLUX DISEASE WITHOUT ESOPHAGITIS: ICD-10-CM

## 2022-06-06 RX ORDER — PANTOPRAZOLE SODIUM 40 MG/1
40 TABLET, DELAYED RELEASE ORAL
Qty: 30 TABLET | Refills: 0 | Status: SHIPPED | OUTPATIENT
Start: 2022-06-06 | End: 2022-08-25

## 2022-06-06 RX ORDER — APIXABAN 5 MG/1
TABLET, FILM COATED ORAL
Qty: 60 TABLET | Refills: 0 | Status: SHIPPED | OUTPATIENT
Start: 2022-06-06 | End: 2022-07-07

## 2022-06-07 NOTE — TELEPHONE ENCOUNTER
Called patient and updated that prescription being sent to pharmacy . Explained to her she needed to have a follow up appointment with Thomas Summers PA-C .   Patient didn't want to schedule appointment at this time but will call later to schedule appointment

## 2022-07-07 RX ORDER — APIXABAN 5 MG/1
TABLET, FILM COATED ORAL
Qty: 60 TABLET | Refills: 0 | Status: SHIPPED | OUTPATIENT
Start: 2022-07-07 | End: 2022-09-20

## 2022-08-09 NOTE — TELEPHONE ENCOUNTER
Pt has not had OV since 5/2020. Attempted to call pt to schedule appt for refills but no answer no VM.

## 2022-08-10 RX ORDER — APIXABAN 5 MG/1
TABLET, FILM COATED ORAL
Qty: 60 TABLET | Refills: 0 | OUTPATIENT
Start: 2022-08-10

## 2022-08-24 DIAGNOSIS — Z79.899 ON AMIODARONE THERAPY: ICD-10-CM

## 2022-08-24 DIAGNOSIS — F43.21 GRIEF: ICD-10-CM

## 2022-08-24 DIAGNOSIS — K21.9 GASTROESOPHAGEAL REFLUX DISEASE WITHOUT ESOPHAGITIS: ICD-10-CM

## 2022-08-24 RX ORDER — AMIODARONE HYDROCHLORIDE 200 MG/1
200 TABLET ORAL DAILY
Qty: 30 TABLET | Refills: 0 | Status: SHIPPED | OUTPATIENT
Start: 2022-08-24

## 2022-08-24 NOTE — LETTER
[place] [facility]  [address]  [place] [address]  Phone: [phone]  Fax: [phone]    Prabha Freeman        August 26, 2022    [address] [id number]      Dear Kirsten Costa: Your medication that you requested has been refilled at this time. Please note that you will need an appointment with your provider to continue getting your refills in a timely manner. If you have any questions or concerns, please don't hesitate to call.     Sincerely,        Bebe Nuñez PA-C

## 2022-08-25 RX ORDER — ESCITALOPRAM OXALATE 20 MG/1
20 TABLET ORAL DAILY
Qty: 30 TABLET | Refills: 1 | Status: SHIPPED | OUTPATIENT
Start: 2022-08-25 | End: 2022-09-30 | Stop reason: SDUPTHER

## 2022-08-25 RX ORDER — PANTOPRAZOLE SODIUM 40 MG/1
40 TABLET, DELAYED RELEASE ORAL
Qty: 30 TABLET | Refills: 0 | Status: SHIPPED | OUTPATIENT
Start: 2022-08-25 | End: 2022-09-01

## 2022-08-26 NOTE — TELEPHONE ENCOUNTER
Called patient and unable to reach.   Unable to leave voice message for patient to return call to the office

## 2022-09-01 DIAGNOSIS — K21.9 GASTROESOPHAGEAL REFLUX DISEASE WITHOUT ESOPHAGITIS: ICD-10-CM

## 2022-09-01 RX ORDER — PANTOPRAZOLE SODIUM 40 MG/1
40 TABLET, DELAYED RELEASE ORAL
Qty: 30 TABLET | Refills: 0 | Status: SHIPPED | OUTPATIENT
Start: 2022-09-01 | End: 2022-09-30 | Stop reason: SDUPTHER

## 2022-09-20 RX ORDER — APIXABAN 5 MG/1
TABLET, FILM COATED ORAL
Qty: 60 TABLET | Refills: 0 | Status: SHIPPED | OUTPATIENT
Start: 2022-09-20

## 2022-09-30 ENCOUNTER — OFFICE VISIT (OUTPATIENT)
Dept: FAMILY MEDICINE CLINIC | Age: 87
End: 2022-09-30
Payer: MEDICARE

## 2022-09-30 VITALS
DIASTOLIC BLOOD PRESSURE: 80 MMHG | SYSTOLIC BLOOD PRESSURE: 132 MMHG | TEMPERATURE: 97.3 F | RESPIRATION RATE: 18 BRPM | WEIGHT: 172.8 LBS | BODY MASS INDEX: 26.27 KG/M2

## 2022-09-30 DIAGNOSIS — Z00.00 MEDICARE ANNUAL WELLNESS VISIT, SUBSEQUENT: Primary | ICD-10-CM

## 2022-09-30 DIAGNOSIS — F43.21 GRIEF: ICD-10-CM

## 2022-09-30 DIAGNOSIS — K21.9 GASTROESOPHAGEAL REFLUX DISEASE WITHOUT ESOPHAGITIS: ICD-10-CM

## 2022-09-30 DIAGNOSIS — Z78.0 OSTEOPENIA AFTER MENOPAUSE: ICD-10-CM

## 2022-09-30 DIAGNOSIS — M85.80 OSTEOPENIA AFTER MENOPAUSE: ICD-10-CM

## 2022-09-30 PROCEDURE — G0439 PPPS, SUBSEQ VISIT: HCPCS | Performed by: PHYSICIAN ASSISTANT

## 2022-09-30 PROCEDURE — 90674 CCIIV4 VAC NO PRSV 0.5 ML IM: CPT | Performed by: PHYSICIAN ASSISTANT

## 2022-09-30 PROCEDURE — G0008 ADMIN INFLUENZA VIRUS VAC: HCPCS | Performed by: PHYSICIAN ASSISTANT

## 2022-09-30 PROCEDURE — 1123F ACP DISCUSS/DSCN MKR DOCD: CPT | Performed by: PHYSICIAN ASSISTANT

## 2022-09-30 RX ORDER — PANTOPRAZOLE SODIUM 40 MG/1
40 TABLET, DELAYED RELEASE ORAL
Qty: 90 TABLET | Refills: 3 | Status: SHIPPED | OUTPATIENT
Start: 2022-09-30

## 2022-09-30 RX ORDER — ESCITALOPRAM OXALATE 20 MG/1
20 TABLET ORAL DAILY
Qty: 90 TABLET | Refills: 3 | Status: SHIPPED | OUTPATIENT
Start: 2022-09-30

## 2022-09-30 RX ORDER — ALENDRONATE SODIUM 70 MG/1
70 TABLET ORAL
Qty: 12 TABLET | Refills: 3 | Status: SHIPPED | OUTPATIENT
Start: 2022-09-30

## 2022-09-30 ASSESSMENT — LIFESTYLE VARIABLES
HOW OFTEN DURING THE LAST YEAR HAVE YOU BEEN UNABLE TO REMEMBER WHAT HAPPENED THE NIGHT BEFORE BECAUSE YOU HAD BEEN DRINKING: 0
HAS A RELATIVE, FRIEND, DOCTOR, OR ANOTHER HEALTH PROFESSIONAL EXPRESSED CONCERN ABOUT YOUR DRINKING OR SUGGESTED YOU CUT DOWN: 0
HAVE YOU OR SOMEONE ELSE BEEN INJURED AS A RESULT OF YOUR DRINKING: 0
HOW MANY STANDARD DRINKS CONTAINING ALCOHOL DO YOU HAVE ON A TYPICAL DAY: 1 OR 2
HOW OFTEN DURING THE LAST YEAR HAVE YOU HAD A FEELING OF GUILT OR REMORSE AFTER DRINKING: 0
HOW OFTEN DURING THE LAST YEAR HAVE YOU NEEDED AN ALCOHOLIC DRINK FIRST THING IN THE MORNING TO GET YOURSELF GOING AFTER A NIGHT OF HEAVY DRINKING: 0
HOW OFTEN DURING THE LAST YEAR HAVE YOU FAILED TO DO WHAT WAS NORMALLY EXPECTED FROM YOU BECAUSE OF DRINKING: 0
HOW OFTEN DO YOU HAVE A DRINK CONTAINING ALCOHOL: 4 OR MORE TIMES A WEEK
HOW OFTEN DURING THE LAST YEAR HAVE YOU FOUND THAT YOU WERE NOT ABLE TO STOP DRINKING ONCE YOU HAD STARTED: 0

## 2022-09-30 NOTE — PROGRESS NOTES
Vaccine Information Sheet, \"Influenza - Inactivated\"  given to Juanito Murguia, or parent/legal guardian of  Juanito Murguia and verbalized understanding. Patient responses:    Have you ever had a reaction to a flu vaccine? No  Do you have any current illness? No  Have you ever had [place]illian [place] Syndrome? No  Do you have a serious allergy to any of the follow: Neomycin, Polymyxin, Thimerosal, eggs or egg products? No    Flu vaccine given per order. Please see immunization tab. Risks and benefits explained. Current VIS given. Immunizations Administered       Name Date Dose Route    Influenza, FLUCELVAX, (age 10 mo+), MDCK, PF, 0.5mL 9/30/2022 0.5 mL Intramuscular    Site: Deltoid- Left    Lot: [id number]    NDC: 88392-980-76        ?

## 2022-09-30 NOTE — PATIENT INSTRUCTIONS
Advance Directives: Care Instructions  Overview  An advance directive is a legal way to state your wishes at the end of your life. It tells your family and your doctor what to do if you can't say what you want. There are two main types of advance directives. You can change them any time your wishes change. Living will. This form tells your family and your doctor your wishes about life support and other treatment. The form is also called a declaration. Medical power of . This form lets you name a person to make treatment decisions for you when you can't speak for yourself. This person is called a health care agent (health care proxy, health care surrogate). The form is also called a durable power of  for health care. If you do not have an advance directive, decisions about your medical care may be made by a family member, or by a doctor or a  who doesn't know you. It may help to think of an advance directive as a gift to the people who care for you. If you have one, they won't have to make tough decisions by themselves. Follow-up care is a key part of your treatment and safety. Be sure to make and go to all appointments, and call your doctor if you are having problems. It's also a good idea to know your test results and keep a list of the medicines you take. What should you include in an advance directive? Many states have a unique advance directive form. (It may ask you to address specific issues.) Or you might use a universal form that's approved by many states. If your form doesn't tell you what to address, it may be hard to know what to include in your advance directive. Use the questions below to help you get started. Who do you want to make decisions about your medical care if you are not able to? What life-support measures do you want if you have a serious illness that gets worse over time or can't be cured? What are you most afraid of that might happen?  (Maybe you're afraid of having pain, losing your independence, or being kept alive by machines.)  Where would you prefer to die? (Your home? A hospital? A nursing home?)  Do you want to donate your organs when you die? Do you want certain Spiritism practices performed before you die? When should you call for help? Be sure to contact your doctor if you have any questions. Where can you learn more? Go to [email or web address] and sign in to your [employer] account. Enter R264 in the [employer] box to learn more about \"Advance Directives: Care Instructions. \"     If you do not have an account, please click on the \"Sign Up Now\" link. Current as of: June 16, 2022               Content Version: 13.4  © 3745-2954 [employer]. Care instructions adapted under license by [facility] ([facility]). If you have questions about a medical condition or this instruction, always ask your healthcare professional. [place] [address] any warranty or liability for your use of this information. Learning About Medical Power of   What is a medical power of ? A medical power of , also called a durable power of  for health care, is one type of the legal forms called advance directives. It lets you name the person you want to make treatment decisions for you if you can't speak or decide for yourself. The person you choose is called your health care agent. This person is also called a health care proxy or health care surrogate. A medical power of  may be called something else in your state. How do you choose a health care agent? Choose your health care agent carefully. This person may or may not be a family member. Talk to the person before you make your final decision. Make sure he or she is comfortable with this responsibility. It's a good idea to choose someone who:  Is at least 25years old.   Knows you well and understands what makes life meaningful for you. Understands your Temple and moral values. Will do what you want, not what he or she wants. Will be able to make difficult choices at a stressful time. Will be able to refuse or stop treatment, if that is what you would want, even if you could die. Will be firm and confident with health professionals if needed. Will ask questions to get needed information. Lives near you or agrees to travel to you if needed. Your family may help you make medical decisions while you can still be part of that process. But it's important to choose one person to be your health care agent in case you aren't able to make decisions for yourself. If you don't fill out the legal form and name a health care agent, the decisions your family can make may be limited. A health care agent may be called something else in your state. Who will make decisions for you if you don't have a health care agent? If you don't have a health care agent or a living will, you may not get the care you want. Decisions may be made by family members who disagree about your medical care. Or decisions may be made by a medical professional who doesn't know you well. In some cases, a  makes the decisions. When you name a health care agent, it is very clear who has the power to make health decisions for you. How do you name a health care agent? You name your health care agent on a legal form. This form is usually called a medical power of . Ask your hospital, state bar association, or office on aging where to find these forms. You must sign the form to make it legal. Some states require you to get the form notarized. This means that a person called a  watches you sign the form and then he or she signs the form. Some states also require that two or more witnesses sign the form. Be sure to tell your family members and doctors who your health care agent is. Where can you learn more?   Go to https://[email or web address] org and sign in to your [employer] account. Enter [id number] in the [facility] box to learn more about \"Learning About [address]. \"     If you do not have an account, please click on the \"Sign Up Now\" link. Current as of: October 6, 2021               Content Version: 13.4  © 2006-2022 Healthwise, [employer]. Care instructions adapted under license by [facility] ([facility]). If you have questions about a medical condition or this instruction, always ask your healthcare professional. [address] any warranty or liability for your use of this information. Learning About Living Slade Chews  What is a living will? A living will, also called a declaration, is a legal form. It tells your family and your doctor your wishes when you can't speak for yourself. It's used by the health professionals who will treat you as you near the end of your life or if you get seriously hurt or ill. If you put your wishes in writing, your loved ones and others will know what kind of care you want. They won't need to guess. This can ease your mind and be helpful to others. And you can change or cancel your living will at any time. A living will is not the same as an estate or property will. An estate will explains what you want to happen with your money and property after you die. How do you use it? Keep these facts in mind about how a living will is used. Your living will is used only if you can't speak or make decisions for yourself. Most often, one or more doctors must certify that you can't speak or decide for yourself before your living will takes effect. If you get better and can speak for yourself again, you can accept or refuse any treatment. It doesn't matter what you said in your living will. Some states may limit your right to refuse treatment in certain cases.  For example, you may need to clearly state in your living will that you don't want artificial hydration and nutrition, such as being fed through a tube. Is a living will a legal document? A living will is a legal document. Each state has its own laws about living richter. And a living will may be called something else in your state. Here are some things to know about living richter. You don't need an  to complete a living will. But legal advice can be helpful if your state's laws are unclear. It can also help if your health history is complicated or your family can't agree on what should be in your living will. You can change your living will at any time. Some people find that their wishes about end-of-life care change as their health changes. If you make big changes to your living will, complete a new form. If you move to another state, make sure that your living will is legal in the state where you now live. In most cases, doctors will respect your wishes even if you have a form from a different state. You might use a universal form that has been approved by many states. This kind of form can sometimes be filled out and stored online. Your digital copy will then be available wherever you have a connection to the internet. The doctors and nurses who need to treat you can find it right away. Your state may offer an online registry. This is another place where you can store your living will online. It's a good idea to get your living will notarized. This means using a person called a  to watch two people sign, or witness, your living will. What should you know when you create a living will? Here are some questions to ask yourself as you make your living will. Do you know enough about life support methods that might be used? If not, talk to your doctor so you know what might be done if you can't breathe on your own, your heart stops, or you can't swallow. What things would you still want to be able to do after you receive life-support methods?  Would you want to be able to walk? To speak? To eat on your own? To live without the help of machines? Do you want certain Bahai practices performed if you become very ill? If you have a choice, where do you want to be cared for? In your home? At a hospital or nursing home? If you have a choice at the end of your life, where would you prefer to die? At home? In a hospital or nursing home? Somewhere else? Would you prefer to be buried or cremated? Do you want your organs to be donated after you die? What should you do with your living will? Make sure that your family members and your health care agent have copies of your living will (also called a declaration). Give your doctor a copy of your living will. Ask to have it kept as part of your medical record. If you have more than one doctor, make sure that each one has a copy. Put a copy of your living will where it can be easily found. For example, some people may put a copy on their refrigerator door. If you are using a digital copy, be sure your doctor, family members, and health care agent know how to find and access it. Where can you learn more? Go to https://[email or web address] [email or web address] org and sign in to your [employer] account. Enter R324 in the amaysim box to learn more about \"Learning About Living Clarita Pisano. \"     If you do not have an account, please click on the \"Sign Up Now\" link. Current as of: June 16, 2022               Content Version: 13.4  © 2006-2022 Healthwise, Incorporated. Care instructions adapted under license by [facility] ([facility]). If you have questions about a medical condition or this instruction, always ask your healthcare professional. [place] [address] any warranty or liability for your use of this information. Personalized Preventive Plan for Tahira Corona - 9/30/2022  Medicare offers a range of preventive health benefits.  Some of the tests and screenings are paid in full while other may be subject to a deductible, co-insurance, and/or copay. Some of these benefits include a comprehensive review of your medical history including lifestyle, illnesses that may run in your family, and various assessments and screenings as appropriate. After reviewing your medical record and screening and assessments performed today your provider may have ordered immunizations, labs, imaging, and/or referrals for you. A list of these orders (if applicable) as well as your Preventive Care list are included within your After Visit Summary for your review. Other Preventive Recommendations:    A preventive eye exam performed by an eye specialist is recommended every 1-2 years to screen for glaucoma; cataracts, macular degeneration, and other eye disorders. A preventive dental visit is recommended every 6 months. Try to get at least 150 minutes of exercise per week or 10,000 steps per day on a pedometer . Order or download the FREE \"Exercise & Physical Activity: Your Everyday Guide\" from [employer]. Call 1-651.189.8215 or search [employer] online. You need 0579-1244 mg of calcium and 0261-7684 IU of vitamin D per day. It is possible to meet your calcium requirement with diet alone, but a vitamin D supplement is usually necessary to meet this goal.  When exposed to the sun, use a sunscreen that protects against both UVA and UVB radiation with an SPF of 30 or greater. Reapply every 2 to 3 hours or after sweating, drying off with a towel, or swimming. Always wear a seat belt when traveling in a car. Always wear a helmet when riding a bicycle or motorcycle.

## 2022-09-30 NOTE — PROGRESS NOTES
Medicare Annual Wellness Visit    Derrick Rodriguez is here for Medicare AWV and Flu Vaccine (Pt requesting flu vaccine)    Assessment & Plan   Medicare annual wellness visit, subsequent  -     Comprehensive Metabolic Panel; Future  Grief  -     escitalopram (LEXAPRO) 20 MG tablet; Take 1 tablet by mouth daily, Disp-90 tablet, R-3Normal  Gastroesophageal reflux disease without esophagitis  -     pantoprazole (PROTONIX) 40 MG tablet; Take 1 tablet by mouth every morning (before breakfast), Disp-90 tablet, R-3Normal  Osteopenia after menopause  -     alendronate (FOSAMAX) 70 MG tablet; Take 1 tablet by mouth every 7 days, Disp-12 tablet, R-3Normal    Recommendations for Preventive Services Due: see orders and patient instructions/AVS.  Recommended screening schedule for the next 5-10 years is provided to the patient in written form: see Patient Instructions/AVS.     Return for Medicare Annual Wellness Visit in 1 year. Subjective     The following acute and/or chronic problems were also addressed today:  Grief-stable with lexapro, declines further intervention  GERD- well controlled with protonix  Osteopenia-will need refill fosamax-reviewed long term use risks with med, pt voices understanding and would like to continue for now. Declines DEXA today     Patient's complete Health Risk Assessment and screening values have been reviewed and are found in Flowsheets. The following problems were reviewed today and where indicated follow up appointments were made and/or referrals ordered.     Positive Risk Factor Screenings with Interventions:    Fall Risk:  Do you feel unsteady or are you worried about falling? : (!) yes  2 or more falls in past year?: no  Fall with injury in past year?: no   Fall Risk Interventions:    Home safety tips provided            General Health and ACP:  General  In general, how would you say your health is?: Very Good  In the past 7 days, have you experienced any of the following: New or Increased Pain, New or Increased Fatigue, Loneliness, Social Isolation, Stress or Anger?: (!) Yes  Select all that apply: (!) Stress  Do you get the social and emotional support that you need?: Yes  Do you have a Living Will?: Yes    Advance Directives       Power of  Living Will ACP-Advance Directive ACP-Power of     Not on File Not on File Not on File Not on File        General Health Risk Interventions:  Stress: patient declines any further evaluation/treatment for this issue    Health Habits/Nutrition:  Physical Activity: Inactive    Days of Exercise per Week: 0 days    Minutes of Exercise per Session: 0 min     Have you lost any weight without trying in the past 3 months?: No     Have you seen the dentist within the past year?: Yes  Health Habits/Nutrition Interventions:  Inadequate physical activity:  patient is not ready to increase his/her physical activity level at this time      ADLs:  In the past 7 days, did you need help from others to perform any of the following everyday activities: Eating, dressing, grooming, bathing, toileting, or walking/balance?: No  In the past 7 days, did you need help from others to take care of any of the following: Laundry, housekeeping, banking/finances, shopping, telephone use, food preparation, transportation, or taking medications?: (!) Yes  Select all that apply: (!) Banking/Finances  ADL Interventions:  Patient declines any further evaluation/treatment for this issue  Son helps with her needs          Objective   Vitals:    09/30/22 1024   BP: 132/80   Site: Left Upper Arm   Position: Sitting   Cuff Size: Medium Adult   Resp: 18   Temp: 97.3 °F (36.3 °C)   Weight: 172 lb 12.8 oz (78.4 kg)      Body mass index is 26.27 kg/m².       General Appearance: alert and oriented to person, place and time, well-developed and well-nourished, in no acute distress  Pulmonary/Chest: clear to auscultation bilaterally- no wheezes, rales or rhonchi, normal air movement, no designating a competent adult as an Agent (or -in-fact) that could take make health care decisions for the patient if incompetent. Patient was asked to complete the declaration forms, either acknowledge the forms by a public notary or an eligible witness and provide a signed copy to the practice office. Time spent (minutes): 3     Cardiovascular Disease Risk Counseling: Assessed the patient's risk to develop cardiovascular disease and reviewed main risk factors. Reviewed steps to reduce disease risk including:   Quitting tobacco use, reducing amount smoked, or not starting the habit  Making healthy food choices  Being physically active and gradualy increasing activity levels   Reduce weight and determine a healthy BMI goal  Monitor blood pressure and treat if higher than 140/90 mmHg  Maintain blood total cholesterol levels under 5 mmol/l or 190 mg/dl  Maintain LDL cholesterol levels under 3.0 mmol/l or 115 mg/dl   Control blood glucose levels  Consider taking aspirin (75 mg daily), once blood pressure is controlled   Provided a follow up plan.   Time spent (minutes): 3

## 2022-10-04 DIAGNOSIS — N18.32 STAGE 3B CHRONIC KIDNEY DISEASE (HCC): Primary | ICD-10-CM

## 2022-10-25 DIAGNOSIS — Z79.899 ON AMIODARONE THERAPY: ICD-10-CM

## 2022-10-25 RX ORDER — APIXABAN 5 MG/1
TABLET, FILM COATED ORAL
Qty: 60 TABLET | Refills: 0 | OUTPATIENT
Start: 2022-10-25

## 2022-10-25 RX ORDER — AMIODARONE HYDROCHLORIDE 200 MG/1
TABLET ORAL
Qty: 30 TABLET | Refills: 0 | OUTPATIENT
Start: 2022-10-25

## 2022-10-25 NOTE — TELEPHONE ENCOUNTER
Pt called and informed she needs appt for refills. Reported transportation issues and would call back to schedule appt.

## 2022-10-30 PROBLEM — Z00.00 MEDICARE ANNUAL WELLNESS VISIT, SUBSEQUENT: Status: RESOLVED | Noted: 2022-01-01 | Resolved: 2022-01-01

## 2022-11-04 DIAGNOSIS — Z79.899 ON AMIODARONE THERAPY: ICD-10-CM

## 2022-11-04 RX ORDER — AMIODARONE HYDROCHLORIDE 200 MG/1
TABLET ORAL
Qty: 30 TABLET | Refills: 0 | OUTPATIENT
Start: 2022-11-04

## 2022-11-07 RX ORDER — APIXABAN 5 MG/1
TABLET, FILM COATED ORAL
Qty: 30 TABLET | Refills: 0 | Status: SHIPPED | OUTPATIENT
Start: 2022-11-07 | End: 2022-11-11 | Stop reason: SDUPTHER

## 2022-11-08 DIAGNOSIS — Z79.899 ON AMIODARONE THERAPY: ICD-10-CM

## 2022-11-09 RX ORDER — AMIODARONE HYDROCHLORIDE 200 MG/1
TABLET ORAL
Qty: 30 TABLET | Refills: 6 | OUTPATIENT
Start: 2022-11-09

## 2022-11-11 ENCOUNTER — OFFICE VISIT (OUTPATIENT)
Dept: CARDIOLOGY CLINIC | Age: 87
End: 2022-11-11
Payer: MEDICARE

## 2022-11-11 ENCOUNTER — HOSPITAL ENCOUNTER (OUTPATIENT)
Age: 87
Discharge: HOME | End: 2022-11-11
Payer: MEDICARE

## 2022-11-11 VITALS
HEIGHT: 68 IN | SYSTOLIC BLOOD PRESSURE: 120 MMHG | HEART RATE: 80 BPM | BODY MASS INDEX: 26.07 KG/M2 | WEIGHT: 172 LBS | DIASTOLIC BLOOD PRESSURE: 60 MMHG

## 2022-11-11 DIAGNOSIS — I48.0 PAF (PAROXYSMAL ATRIAL FIBRILLATION) (HCC): Primary | ICD-10-CM

## 2022-11-11 DIAGNOSIS — I10 ESSENTIAL HYPERTENSION: Chronic | ICD-10-CM

## 2022-11-11 DIAGNOSIS — E78.5 DYSLIPIDEMIA: ICD-10-CM

## 2022-11-11 LAB — TSH HIGH SENSITIVITY: 4.67 UIU/ML (ref 0.27–4.2)

## 2022-11-11 PROCEDURE — 93000 ELECTROCARDIOGRAM COMPLETE: CPT | Performed by: NURSE PRACTITIONER

## 2022-11-11 PROCEDURE — 84443 ASSAY THYROID STIM HORMONE: CPT

## 2022-11-11 PROCEDURE — 1123F ACP DISCUSS/DSCN MKR DOCD: CPT | Performed by: NURSE PRACTITIONER

## 2022-11-11 PROCEDURE — 99214 OFFICE O/P EST MOD 30 MIN: CPT | Performed by: NURSE PRACTITIONER

## 2022-11-11 PROCEDURE — 36415 COLL VENOUS BLD VENIPUNCTURE: CPT

## 2022-11-11 RX ORDER — AMIODARONE HYDROCHLORIDE 200 MG/1
200 TABLET ORAL DAILY
Qty: 90 TABLET | Refills: 3 | Status: SHIPPED | OUTPATIENT
Start: 2022-11-11

## 2022-11-11 RX ORDER — AMLODIPINE BESYLATE 2.5 MG/1
2.5 TABLET ORAL DAILY
Qty: 90 TABLET | Refills: 3 | Status: SHIPPED | OUTPATIENT
Start: 2022-11-11

## 2022-11-11 ASSESSMENT — ENCOUNTER SYMPTOMS: SHORTNESS OF BREATH: 0

## 2022-11-11 NOTE — PROGRESS NOTES
[facility] PHYSICAL REHABILITATION [facility] [address] [facility],Third [address]  Phone: [phone]    Fax [phone]                  Apryl Martin MD, Sandeep Gonzalez MD, Carlyle Londono MD, MD Obie Early MD, Kadie Caba MD, Jeffrey Campo MD, [address], APRN       Sharon Heraquel, APRN  Mariola Leyden, APRN       [facility], APRN        Cardiology Progress Note      11/11/2022    RE: Magno Donato  (1935)                             Primary cardiologist: Dr. Obie Grajeda       Subjective:  CC:   1. Essential hypertension    2. PAF (paroxysmal atrial fibrillation) ([facility] [address]    3. Dyslipidemia        HPI: Magno Donato, who is a  81y.o. year old female with a past medical history as listed below. Patient presents to the office for follow up on PAF, HTN, and hyperlipidemia. Patient is  an active female who walks regularly. Patient is  compliant with medications. Patient denies any chest pain, shortness of breath, dizziness, syncope, or palpitations. Past Medical History:   Diagnosis Date    Anxiety     Atrial fibrillation [facility])     Current use of estrogen therapy     History of nuclear stress test 10/29/2019    EF 80%. Normal study.     Hypertension        Current Outpatient Medications   Medication Sig Dispense Refill    ELIQUIS 5 MG TABS tablet TAKE 1 TABLET BY MOUTH 2 TIMES DAILY 30 tablet 0    escitalopram (LEXAPRO) 20 MG tablet Take 1 tablet by mouth daily 90 tablet 3    pantoprazole (PROTONIX) 40 MG tablet Take 1 tablet by mouth every morning (before breakfast) 90 tablet 3    alendronate (FOSAMAX) 70 MG tablet Take 1 tablet by mouth every 7 days 12 tablet 3    amiodarone (CORDARONE) 200 MG tablet TAKE 1 TABLET BY MOUTH DAILY 30 tablet 0    amLODIPine (NORVASC) 2.5 MG tablet TAKE 1 TABLET BY MOUTH DAILY 30 tablet 11    aspirin 81 MG chewable tablet Take 1 tablet by mouth daily 30 tablet 0    estradiol (ESTRACE) 0.5 MG tablet structure and function. EF is 50-55% . Sclerotic, but non-stenotic aortic valve. Trace aortic regurgitation is noted. leaflets are calcified but open well   No evidence of pericardial effusion. Stress Test:10/29/19  No ischemia         The ASCVD Risk score (Alison DK, et al., 2019) failed to calculate for the following reasons: The 2019 ASCVD risk score is only valid for ages 36 to 78      Assessment/ Plan:     PAF (paroxysmal atrial fibrillation) ([facility] [address]   - Patient has not followed up with cardiology in over 2 years. We will get TSH. Recommend yearly eye exam.  Continue with Eliquis for anticoagulation. LFT recently within normal limits. EKG shows NSR QTc 385. Normal PFT in 2020. Essential hypertension   - Stable, continue with Norvasc 2.5 mg daily. Dyslipidemia   -At or near goal No, patient is not on any statin.    -The nature of cardiac risk has been fully discussed with this patient. I have made her aware of her LDL target goal given her cardiovascular risk analysis. I have discussed the appropriate diet. The need for lifelong compliance in order to reduce risk is stressed. A regular exercise program is recommended to help achieve and maintain normal body weight, fitness and improve lipid balance. A written copy of a low fat, low cholesterol diet has been given to the patient. Patient seen, interviewed and examined. Testing was reviewed. Patient is encouraged to exercise even a brisk walk for 30 minutes at least 3 to 4 times a week. Lifestyle and risk factor modificatons discussed. Various goals are discussed and questions answered. Continue current medications. Appropriate prescriptions are addressed. Questions answered and patient verbalizes understanding. Call for any problems, questions, or concerns. Pt is to follow up in 6 months for Cardiac management    Electronically signed by Demarco Irving.  STEVENSON Gandhi CNP on 11/11/2022 at 11:09 AM

## 2022-11-11 NOTE — ASSESSMENT & PLAN NOTE
-At or near goal No, patient is not on any statin.    -The nature of cardiac risk has been fully discussed with this patient. I have made her aware of her LDL target goal given her cardiovascular risk analysis. I have discussed the appropriate diet. The need for lifelong compliance in order to reduce risk is stressed. A regular exercise program is recommended to help achieve and maintain normal body weight, fitness and improve lipid balance. A written copy of a low fat, low cholesterol diet has been given to the patient.

## 2022-11-11 NOTE — ASSESSMENT & PLAN NOTE
- Patient has not followed up with cardiology in over 2 years. We will get TSH, EKG. Recommend yearly eye exam.  Continue with Eliquis for anticoagulation. LFT recently within normal limits.

## 2022-12-22 ENCOUNTER — HOSPITAL ENCOUNTER (INPATIENT)
Age: 87
LOS: 7 days | Discharge: ANOTHER HOSPITAL | DRG: 871 | End: 2022-12-30
Attending: EMERGENCY MEDICINE | Admitting: INTERNAL MEDICINE
Payer: MEDICARE

## 2022-12-22 ENCOUNTER — APPOINTMENT (OUTPATIENT)
Dept: GENERAL RADIOLOGY | Age: 87
DRG: 871 | End: 2022-12-22
Payer: MEDICARE

## 2022-12-22 DIAGNOSIS — J18.9 PNEUMONIA OF RIGHT MIDDLE LOBE DUE TO INFECTIOUS ORGANISM: Primary | ICD-10-CM

## 2022-12-22 DIAGNOSIS — R09.02 HYPOXIA: ICD-10-CM

## 2022-12-22 PROBLEM — J16.8 PNEUMONIA DUE TO OTHER SPECIFIED INFECTIOUS ORGANISMS: Status: ACTIVE | Noted: 2022-01-01

## 2022-12-22 LAB
ADENOVIRUS DETECTION BY PCR: NOT DETECTED
ALBUMIN SERPL-MCNC: 3.4 GM/DL (ref 3.4–5)
ALP BLD-CCNC: 64 IU/L (ref 40–129)
ALT SERPL-CCNC: 31 U/L (ref 10–40)
ANION GAP SERPL CALCULATED.3IONS-SCNC: 13 MMOL/L (ref 4–16)
AST SERPL-CCNC: 52 IU/L (ref 15–37)
BASOPHILS ABSOLUTE: 0.1 K/CU MM
BASOPHILS RELATIVE PERCENT: 0.6 % (ref 0–1)
BILIRUB SERPL-MCNC: 0.6 MG/DL (ref 0–1)
BORDETELLA PARAPERTUSSIS BY PCR: NOT DETECTED
BORDETELLA PERTUSSIS PCR: NOT DETECTED
BUN BLDV-MCNC: 16 MG/DL (ref 6–23)
CALCIUM SERPL-MCNC: 8.5 MG/DL (ref 8.3–10.6)
CHLAMYDOPHILA PNEUMONIA PCR: NOT DETECTED
CHLORIDE BLD-SCNC: 95 MMOL/L (ref 99–110)
CO2: 23 MMOL/L (ref 21–32)
CORONAVIRUS 229E PCR: NOT DETECTED
CORONAVIRUS HKU1 PCR: NOT DETECTED
CORONAVIRUS NL63 PCR: NOT DETECTED
CORONAVIRUS OC43 PCR: NOT DETECTED
CREAT SERPL-MCNC: 1 MG/DL (ref 0.6–1.1)
D DIMER: 291 NG/ML(DDU)
DIFFERENTIAL TYPE: ABNORMAL
EOSINOPHILS ABSOLUTE: 0.1 K/CU MM
EOSINOPHILS RELATIVE PERCENT: 1.4 % (ref 0–3)
GFR SERPL CREATININE-BSD FRML MDRD: 55 ML/MIN/1.73M2
GLUCOSE BLD-MCNC: 110 MG/DL (ref 70–99)
HCT VFR BLD CALC: 40.6 % (ref 37–47)
HEMOGLOBIN: 14.2 GM/DL (ref 12.5–16)
HUMAN METAPNEUMOVIRUS PCR: NOT DETECTED
IMMATURE NEUTROPHIL %: 1 % (ref 0–0.43)
INFLUENZA A BY PCR: NOT DETECTED
INFLUENZA A H1 (2009) PCR: NOT DETECTED
INFLUENZA A H1 PANDEMIC PCR: NOT DETECTED
INFLUENZA A H3 PCR: NOT DETECTED
INFLUENZA B BY PCR: NOT DETECTED
LACTIC ACID, SEPSIS: 1 MMOL/L (ref 0.5–1.9)
LACTIC ACID, SEPSIS: 1.7 MMOL/L (ref 0.5–1.9)
LYMPHOCYTES ABSOLUTE: 1.2 K/CU MM
LYMPHOCYTES RELATIVE PERCENT: 13.6 % (ref 24–44)
MCH RBC QN AUTO: 34.2 PG (ref 27–31)
MCHC RBC AUTO-ENTMCNC: 35 % (ref 32–36)
MCV RBC AUTO: 97.8 FL (ref 78–100)
MONOCYTES ABSOLUTE: 0.7 K/CU MM
MONOCYTES RELATIVE PERCENT: 7.3 % (ref 0–4)
MYCOPLASMA PNEUMONIAE PCR: NOT DETECTED
PARAINFLUENZA 1 PCR: NOT DETECTED
PARAINFLUENZA 2 PCR: NOT DETECTED
PARAINFLUENZA 3 PCR: NOT DETECTED
PARAINFLUENZA 4 PCR: NOT DETECTED
PDW BLD-RTO: 12.2 % (ref 11.7–14.9)
PLATELET # BLD: 350 K/CU MM (ref 140–440)
PMV BLD AUTO: 8.3 FL (ref 7.5–11.1)
POTASSIUM SERPL-SCNC: 4 MMOL/L (ref 3.5–5.1)
PRO-BNP: 610.2 PG/ML
RBC # BLD: 4.15 M/CU MM (ref 4.2–5.4)
RHINOVIRUS ENTEROVIRUS PCR: NOT DETECTED
RSV PCR: NOT DETECTED
SARS-COV-2: NOT DETECTED
SEGMENTED NEUTROPHILS ABSOLUTE COUNT: 6.9 K/CU MM
SEGMENTED NEUTROPHILS RELATIVE PERCENT: 76.1 % (ref 36–66)
SODIUM BLD-SCNC: 131 MMOL/L (ref 135–145)
TOTAL IMMATURE NEUTOROPHIL: 0.09 K/CU MM
TOTAL PROTEIN: 7.2 GM/DL (ref 6.4–8.2)
TROPONIN T: <0.01 NG/ML
TROPONIN T: <0.01 NG/ML
WBC # BLD: 9 K/CU MM (ref 4–10.5)

## 2022-12-22 PROCEDURE — 85025 COMPLETE CBC W/AUTO DIFF WBC: CPT

## 2022-12-22 PROCEDURE — 96375 TX/PRO/DX INJ NEW DRUG ADDON: CPT

## 2022-12-22 PROCEDURE — 0202U NFCT DS 22 TRGT SARS-COV-2: CPT

## 2022-12-22 PROCEDURE — 99285 EMERGENCY DEPT VISIT HI MDM: CPT

## 2022-12-22 PROCEDURE — 93005 ELECTROCARDIOGRAM TRACING: CPT | Performed by: EMERGENCY MEDICINE

## 2022-12-22 PROCEDURE — 6360000002 HC RX W HCPCS: Performed by: EMERGENCY MEDICINE

## 2022-12-22 PROCEDURE — 85379 FIBRIN DEGRADATION QUANT: CPT

## 2022-12-22 PROCEDURE — 83605 ASSAY OF LACTIC ACID: CPT

## 2022-12-22 PROCEDURE — 87040 BLOOD CULTURE FOR BACTERIA: CPT

## 2022-12-22 PROCEDURE — 80053 COMPREHEN METABOLIC PANEL: CPT

## 2022-12-22 PROCEDURE — 84484 ASSAY OF TROPONIN QUANT: CPT

## 2022-12-22 PROCEDURE — 2580000003 HC RX 258: Performed by: NURSE PRACTITIONER

## 2022-12-22 PROCEDURE — 6360000002 HC RX W HCPCS: Performed by: NURSE PRACTITIONER

## 2022-12-22 PROCEDURE — 83880 ASSAY OF NATRIURETIC PEPTIDE: CPT

## 2022-12-22 PROCEDURE — 6370000000 HC RX 637 (ALT 250 FOR IP): Performed by: EMERGENCY MEDICINE

## 2022-12-22 PROCEDURE — 6370000000 HC RX 637 (ALT 250 FOR IP): Performed by: NURSE PRACTITIONER

## 2022-12-22 PROCEDURE — 71045 X-RAY EXAM CHEST 1 VIEW: CPT

## 2022-12-22 PROCEDURE — G0378 HOSPITAL OBSERVATION PER HR: HCPCS

## 2022-12-22 PROCEDURE — 96365 THER/PROPH/DIAG IV INF INIT: CPT

## 2022-12-22 PROCEDURE — 2580000003 HC RX 258: Performed by: EMERGENCY MEDICINE

## 2022-12-22 RX ORDER — ACETAMINOPHEN 650 MG/1
650 SUPPOSITORY RECTAL EVERY 6 HOURS PRN
Status: DISCONTINUED | OUTPATIENT
Start: 2022-12-22 | End: 2022-12-30 | Stop reason: HOSPADM

## 2022-12-22 RX ORDER — DOXYCYCLINE HYCLATE 100 MG
100 TABLET ORAL EVERY 12 HOURS SCHEDULED
Status: DISCONTINUED | OUTPATIENT
Start: 2022-12-23 | End: 2022-12-23

## 2022-12-22 RX ORDER — DIPHENHYDRAMINE HYDROCHLORIDE 50 MG/ML
50 INJECTION INTRAMUSCULAR; INTRAVENOUS ONCE
Status: COMPLETED | OUTPATIENT
Start: 2022-12-22 | End: 2022-12-22

## 2022-12-22 RX ORDER — ESCITALOPRAM OXALATE 20 MG/1
20 TABLET ORAL DAILY
Status: DISCONTINUED | OUTPATIENT
Start: 2022-12-23 | End: 2022-12-30 | Stop reason: HOSPADM

## 2022-12-22 RX ORDER — SODIUM CHLORIDE 9 MG/ML
INJECTION, SOLUTION INTRAVENOUS CONTINUOUS
Status: DISCONTINUED | OUTPATIENT
Start: 2022-12-22 | End: 2022-12-23

## 2022-12-22 RX ORDER — AZITHROMYCIN 250 MG/1
500 TABLET, FILM COATED ORAL ONCE
Status: COMPLETED | OUTPATIENT
Start: 2022-12-22 | End: 2022-12-22

## 2022-12-22 RX ORDER — AMIODARONE HYDROCHLORIDE 200 MG/1
200 TABLET ORAL DAILY
Status: DISCONTINUED | OUTPATIENT
Start: 2022-12-23 | End: 2022-12-30 | Stop reason: HOSPADM

## 2022-12-22 RX ORDER — AMLODIPINE BESYLATE 2.5 MG/1
2.5 TABLET ORAL DAILY
Status: DISCONTINUED | OUTPATIENT
Start: 2022-12-23 | End: 2022-12-30 | Stop reason: HOSPADM

## 2022-12-22 RX ORDER — PANTOPRAZOLE SODIUM 40 MG/1
40 TABLET, DELAYED RELEASE ORAL
Status: DISCONTINUED | OUTPATIENT
Start: 2022-12-23 | End: 2022-12-30 | Stop reason: HOSPADM

## 2022-12-22 RX ORDER — SODIUM CHLORIDE 9 MG/ML
INJECTION, SOLUTION INTRAVENOUS PRN
Status: DISCONTINUED | OUTPATIENT
Start: 2022-12-22 | End: 2022-12-30 | Stop reason: HOSPADM

## 2022-12-22 RX ORDER — SODIUM CHLORIDE 0.9 % (FLUSH) 0.9 %
10 SYRINGE (ML) INJECTION PRN
Status: DISCONTINUED | OUTPATIENT
Start: 2022-12-22 | End: 2022-12-30 | Stop reason: HOSPADM

## 2022-12-22 RX ORDER — POLYETHYLENE GLYCOL 3350 17 G/17G
17 POWDER, FOR SOLUTION ORAL DAILY PRN
Status: DISCONTINUED | OUTPATIENT
Start: 2022-12-22 | End: 2022-12-30 | Stop reason: HOSPADM

## 2022-12-22 RX ORDER — ASPIRIN 81 MG/1
81 TABLET, CHEWABLE ORAL DAILY
Status: DISCONTINUED | OUTPATIENT
Start: 2022-12-23 | End: 2022-12-23 | Stop reason: ALTCHOICE

## 2022-12-22 RX ORDER — ACETAMINOPHEN 325 MG/1
650 TABLET ORAL EVERY 6 HOURS PRN
Status: DISCONTINUED | OUTPATIENT
Start: 2022-12-22 | End: 2022-12-30 | Stop reason: HOSPADM

## 2022-12-22 RX ORDER — ALBUTEROL SULFATE 90 UG/1
2 AEROSOL, METERED RESPIRATORY (INHALATION) EVERY 4 HOURS PRN
Status: DISCONTINUED | OUTPATIENT
Start: 2022-12-22 | End: 2022-12-30 | Stop reason: HOSPADM

## 2022-12-22 RX ORDER — SODIUM CHLORIDE 0.9 % (FLUSH) 0.9 %
10 SYRINGE (ML) INJECTION EVERY 12 HOURS SCHEDULED
Status: DISCONTINUED | OUTPATIENT
Start: 2022-12-22 | End: 2022-12-30 | Stop reason: HOSPADM

## 2022-12-22 RX ADMIN — SODIUM CHLORIDE: 9 INJECTION, SOLUTION INTRAVENOUS at 23:14

## 2022-12-22 RX ADMIN — SODIUM CHLORIDE, PRESERVATIVE FREE 10 ML: 5 INJECTION INTRAVENOUS at 23:19

## 2022-12-22 RX ADMIN — DIPHENHYDRAMINE HYDROCHLORIDE 50 MG: 50 INJECTION, SOLUTION INTRAMUSCULAR; INTRAVENOUS at 23:29

## 2022-12-22 RX ADMIN — APIXABAN 5 MG: 5 TABLET, FILM COATED ORAL at 23:26

## 2022-12-22 RX ADMIN — CEFTRIAXONE SODIUM 1000 MG: 1 INJECTION, POWDER, FOR SOLUTION INTRAMUSCULAR; INTRAVENOUS at 20:37

## 2022-12-22 RX ADMIN — AZITHROMYCIN MONOHYDRATE 500 MG: 250 TABLET ORAL at 21:09

## 2022-12-22 ASSESSMENT — PAIN SCALES - GENERAL: PAINLEVEL_OUTOF10: 7

## 2022-12-22 ASSESSMENT — PAIN DESCRIPTION - PAIN TYPE: TYPE: ACUTE PAIN

## 2022-12-22 ASSESSMENT — LIFESTYLE VARIABLES
HOW OFTEN DO YOU HAVE A DRINK CONTAINING ALCOHOL: 4 OR MORE TIMES A WEEK
HOW MANY STANDARD DRINKS CONTAINING ALCOHOL DO YOU HAVE ON A TYPICAL DAY: 1 OR 2

## 2022-12-22 ASSESSMENT — PAIN DESCRIPTION - ORIENTATION: ORIENTATION: RIGHT;UPPER

## 2022-12-22 ASSESSMENT — PAIN - FUNCTIONAL ASSESSMENT: PAIN_FUNCTIONAL_ASSESSMENT: 0-10

## 2022-12-22 ASSESSMENT — PAIN DESCRIPTION - DESCRIPTORS: DESCRIPTORS: DISCOMFORT

## 2022-12-23 ENCOUNTER — APPOINTMENT (OUTPATIENT)
Dept: CT IMAGING | Age: 87
DRG: 871 | End: 2022-12-23
Payer: MEDICARE

## 2022-12-23 PROBLEM — J96.00 ACUTE RESPIRATORY FAILURE (HCC): Status: ACTIVE | Noted: 2022-01-01

## 2022-12-23 LAB
ALBUMIN SERPL-MCNC: 2.7 GM/DL (ref 3.4–5)
ALP BLD-CCNC: 54 IU/L (ref 40–129)
ALT SERPL-CCNC: 24 U/L (ref 10–40)
ANION GAP SERPL CALCULATED.3IONS-SCNC: 10 MMOL/L (ref 4–16)
AST SERPL-CCNC: 39 IU/L (ref 15–37)
BASOPHILS ABSOLUTE: 0 K/CU MM
BASOPHILS RELATIVE PERCENT: 0.5 % (ref 0–1)
BILIRUB SERPL-MCNC: 0.5 MG/DL (ref 0–1)
BUN BLDV-MCNC: 14 MG/DL (ref 6–23)
CALCIUM SERPL-MCNC: 8 MG/DL (ref 8.3–10.6)
CHLORIDE BLD-SCNC: 100 MMOL/L (ref 99–110)
CO2: 24 MMOL/L (ref 21–32)
CREAT SERPL-MCNC: 0.9 MG/DL (ref 0.6–1.1)
DIFFERENTIAL TYPE: ABNORMAL
EKG ATRIAL RATE: 73 BPM
EKG DIAGNOSIS: NORMAL
EKG P AXIS: 58 DEGREES
EKG P-R INTERVAL: 164 MS
EKG Q-T INTERVAL: 414 MS
EKG QRS DURATION: 82 MS
EKG QTC CALCULATION (BAZETT): 456 MS
EKG R AXIS: -9 DEGREES
EKG T AXIS: 5 DEGREES
EKG VENTRICULAR RATE: 73 BPM
EOSINOPHILS ABSOLUTE: 0.5 K/CU MM
EOSINOPHILS RELATIVE PERCENT: 6.7 % (ref 0–3)
GFR SERPL CREATININE-BSD FRML MDRD: >60 ML/MIN/1.73M2
GLUCOSE BLD-MCNC: 79 MG/DL (ref 70–99)
HCT VFR BLD CALC: 36.4 % (ref 37–47)
HEMOGLOBIN: 12.5 GM/DL (ref 12.5–16)
IMMATURE NEUTROPHIL %: 0.9 % (ref 0–0.43)
LYMPHOCYTES ABSOLUTE: 1.1 K/CU MM
LYMPHOCYTES RELATIVE PERCENT: 14.2 % (ref 24–44)
MCH RBC QN AUTO: 34 PG (ref 27–31)
MCHC RBC AUTO-ENTMCNC: 34.3 % (ref 32–36)
MCV RBC AUTO: 98.9 FL (ref 78–100)
MONOCYTES ABSOLUTE: 0.7 K/CU MM
MONOCYTES RELATIVE PERCENT: 8.5 % (ref 0–4)
PDW BLD-RTO: 12.1 % (ref 11.7–14.9)
PLATELET # BLD: 324 K/CU MM (ref 140–440)
PMV BLD AUTO: 8.4 FL (ref 7.5–11.1)
POTASSIUM SERPL-SCNC: 4 MMOL/L (ref 3.5–5.1)
RBC # BLD: 3.68 M/CU MM (ref 4.2–5.4)
SEGMENTED NEUTROPHILS ABSOLUTE COUNT: 5.4 K/CU MM
SEGMENTED NEUTROPHILS RELATIVE PERCENT: 69.2 % (ref 36–66)
SODIUM BLD-SCNC: 134 MMOL/L (ref 135–145)
TOTAL IMMATURE NEUTOROPHIL: 0.07 K/CU MM
TOTAL PROTEIN: 6 GM/DL (ref 6.4–8.2)
WBC # BLD: 7.8 K/CU MM (ref 4–10.5)

## 2022-12-23 PROCEDURE — 6360000002 HC RX W HCPCS: Performed by: NURSE PRACTITIONER

## 2022-12-23 PROCEDURE — 6370000000 HC RX 637 (ALT 250 FOR IP): Performed by: NURSE PRACTITIONER

## 2022-12-23 PROCEDURE — 94761 N-INVAS EAR/PLS OXIMETRY MLT: CPT

## 2022-12-23 PROCEDURE — 2700000000 HC OXYGEN THERAPY PER DAY

## 2022-12-23 PROCEDURE — 80053 COMPREHEN METABOLIC PANEL: CPT

## 2022-12-23 PROCEDURE — 2580000003 HC RX 258: Performed by: NURSE PRACTITIONER

## 2022-12-23 PROCEDURE — 85025 COMPLETE CBC W/AUTO DIFF WBC: CPT

## 2022-12-23 PROCEDURE — 71275 CT ANGIOGRAPHY CHEST: CPT

## 2022-12-23 PROCEDURE — 87899 AGENT NOS ASSAY W/OPTIC: CPT

## 2022-12-23 PROCEDURE — 6360000004 HC RX CONTRAST MEDICATION: Performed by: INTERNAL MEDICINE

## 2022-12-23 PROCEDURE — 2140000000 HC CCU INTERMEDIATE R&B

## 2022-12-23 PROCEDURE — 93010 ELECTROCARDIOGRAM REPORT: CPT | Performed by: INTERNAL MEDICINE

## 2022-12-23 PROCEDURE — 87449 NOS EACH ORGANISM AG IA: CPT

## 2022-12-23 RX ORDER — IPRATROPIUM BROMIDE AND ALBUTEROL SULFATE 2.5; .5 MG/3ML; MG/3ML
1 SOLUTION RESPIRATORY (INHALATION)
Status: DISCONTINUED | OUTPATIENT
Start: 2022-12-23 | End: 2022-12-30 | Stop reason: HOSPADM

## 2022-12-23 RX ORDER — ASPIRIN 81 MG/1
81 TABLET ORAL DAILY
Status: DISCONTINUED | OUTPATIENT
Start: 2022-12-23 | End: 2022-12-30 | Stop reason: HOSPADM

## 2022-12-23 RX ORDER — DOXYCYCLINE HYCLATE 100 MG
100 TABLET ORAL EVERY 12 HOURS SCHEDULED
Status: DISCONTINUED | OUTPATIENT
Start: 2022-12-23 | End: 2022-12-25

## 2022-12-23 RX ADMIN — SODIUM CHLORIDE 25 ML: 9 INJECTION, SOLUTION INTRAVENOUS at 16:08

## 2022-12-23 RX ADMIN — CEFTRIAXONE SODIUM 1000 MG: 1 INJECTION, POWDER, FOR SOLUTION INTRAMUSCULAR; INTRAVENOUS at 16:10

## 2022-12-23 RX ADMIN — PANTOPRAZOLE SODIUM 40 MG: 40 TABLET, DELAYED RELEASE ORAL at 06:02

## 2022-12-23 RX ADMIN — DOXYCYCLINE HYCLATE 100 MG: 100 TABLET, COATED ORAL at 20:45

## 2022-12-23 RX ADMIN — APIXABAN 5 MG: 5 TABLET, FILM COATED ORAL at 20:45

## 2022-12-23 RX ADMIN — AMIODARONE HYDROCHLORIDE 200 MG: 200 TABLET ORAL at 10:08

## 2022-12-23 RX ADMIN — APIXABAN 5 MG: 5 TABLET, FILM COATED ORAL at 10:08

## 2022-12-23 RX ADMIN — DOXYCYCLINE HYCLATE 100 MG: 100 TABLET, COATED ORAL at 10:07

## 2022-12-23 RX ADMIN — ESCITALOPRAM 20 MG: 20 TABLET, FILM COATED ORAL at 10:07

## 2022-12-23 RX ADMIN — IOPAMIDOL 75 ML: 755 INJECTION, SOLUTION INTRAVENOUS at 08:32

## 2022-12-23 RX ADMIN — ASPIRIN 81 MG: 81 TABLET, COATED ORAL at 10:07

## 2022-12-23 ASSESSMENT — ENCOUNTER SYMPTOMS
SHORTNESS OF BREATH: 1
EYES NEGATIVE: 1
ALLERGIC/IMMUNOLOGIC NEGATIVE: 1
GASTROINTESTINAL NEGATIVE: 1
COUGH: 1

## 2022-12-23 ASSESSMENT — PAIN SCALES - GENERAL
PAINLEVEL_OUTOF10: 0
PAINLEVEL_OUTOF10: 0

## 2022-12-23 NOTE — ED NOTES
Dr Jose Aly in to discuss test results and plan of care.      Sam Hensley, RN  12/22/22 1 S Kana Ruiz RN  12/22/22 2097

## 2022-12-23 NOTE — CARE COORDINATION
.Chart reviewed. The patient is from home alone with her two dogs. She states her two sons are taking care of the dogs. She states her spouse passed away a year ago. The patient has a PCP and insurance and can afford and take her medications as prescribed. The patient has reliable transportation-she drives but she states her sons help her as needed. The patient does not require HHC or any DME and is independent in ADL's. The patient denies any other needs at this time.

## 2022-12-23 NOTE — H&P
History and Physical  This patient was seen and examined autonomously  A hospitalist attending Dr. Nahun Maria was available for questions/consultation as needed and plan was discussed. Name:  Aniya Rodney /Age/Sex: 1935  (81 y.o. female)   MRN & CSN:  [id number] & [id number] Admission Date/Time: 2022  6:57 PM   Location:   PCP: Thomas Summers, [facility] Day: 1            Assessment and Plan:   Aniya Rodney is a 81 y.o. female who presents with  Shortness of Breath (C/o not feeling weak w/ no energy for 1 week . C/o wheezing in rt upper chest. Pt came into today feeling lethargic and SOB)     Acute on chronic respiratory failure with hypoxia likely 2/2 acute bilateral lower lobes PNA. Oxygen saturation upon presentation sustained in 86% and requiring supplemental oxygen            Lactic WNL in ED did not meet SIRS criteria BNP WNL was 610.2             IV abx  Rocephin and Zithromax for empiric coverage in ED changed to doxy PO in Am. Pending cultures- sputum/ viral /blood  Resp panel neg. pro eddie ordered, urine antigens ordered             Inhaled corticosteroids and LABAs              Respiratory interventions- IS, supplemental oxygen, continuous pulse oximetry          Abnormal labs:   Mild hyponatremia: will trend was 131 on admission  Elevated AST: was 52 on admission will trend in am may need further work up if it trends up or remains the same. Ddimmer: came back at 291 low suspension for blood clots patient is on long term anticoag.       Other concerns:  Eseential HTN: con home meds and trend  GERD: con PPI  Displipdemia: not currently on a statin, lipid panel ordered  Afib: con home amniodarone dose, cardiac monitor, EKG was NSR, trend trops first one neg  Depression/ anxiety: con home lexapro     Patient case discussed with ED provider    Diet No diet orders on file   DVT Prophylaxis [] Lovenox, []  Heparin, [] SCDs, [] Ambulation  [x] Long term AC   GI Prophylaxis [x] PPI,  [] H2 Blocker,  [] Carafate,  [x] Diet/Tube Feeds   Code Status Full    Disposition Admit to med surg. Patient plans to return home upon discharge     . Patient would like to be a full  code, she would like her son Meli Moy to be her medical POA    History of Present Illness:     Chief Complaint: Shortness of Breath (C/o not feeling weak w/ no energy for 1 week . C/o wheezing in rt upper chest. Pt came into today feeling lethargic and SOB)      Abdiel Brar is a 81 y.o. female who presents with cough with clear sputum x 3 days, SOB with right upper chest pain when taking deep breaths, the pain does not radiate and is intermittent 4/10 pain. The SOB seems to be more with extortion, she does not war home oxygen and does not have a medical hx of COPD/ former smoker in her 29's. Denied fevers, N/V/D abdominal pian, burning or freq urination, lower leg swelling. History obtained from patient        Ten point ROS: reviewed negative, unless as noted in above HPI. Viviana Chapa Review of Systems   Constitutional:  Positive for activity change. Respiratory:  Positive for cough and shortness of breath. Objective:   No intake or output data in the 24 hours ending 12/22/22 2116     Vitals:   Vitals:    12/22/22 1901   BP: (!) 150/81   Pulse: 80   Resp: 18   Temp: 98.3 °F (36.8 °C)   TempSrc: Oral   SpO2: (!) 86%   Weight: 170 lb (77.1 kg)   Height: 5' 8.5\" (1.74 m)       Physical Exam: 12/22/22     GEN -Awake pale appearing female, sitting upright in bed , NAD. normal body habitus. Appears given age. EYES -No scleral erythema, discharge, or conjunctivitis. HENT -MM are moist. Oral pharynx without exudates, no evidence of thrush. NECK -Supple, no apparent thyromegaly or masses. RESP -CTA, no wheezes, rales or rhonchi. Symmetric chest movement while on NC 2 L  C/V -S1/S2 auscultated. No peripheral edema. GI -Abdomen is soft non distended and without significant TTP. + BS.  No masses or guarding. Rectal exam deferred. No HSM. MS -No gross joint deformities. SKIN -Normal coloration, warm, dry. NEURO-Cranial nerves appear grossly intact, normal speech, no lateralizing weakness. PSYC-Awake, alert, oriented x 4- person, place, time, situation,  Appropriate affect. Past Medical History:      Past Medical History:   Diagnosis Date    Anxiety     Atrial fibrillation ([address]     Current use of estrogen therapy     History of nuclear stress test 10/29/2019    EF 80%. Normal study. Hypertension        Past Surgical  History:    has a past surgical history that includes Hysterectomy, total abdominal.    Social History:    FAM HX: Reviewed and noncontributory   family history includes Cancer in her brother; Dementia in her mother; Other in her father. Soc HX:   Social History     Socioeconomic History    Marital status:      Spouse name: None    Number of children: None    Years of education: None    Highest education level: None   Tobacco Use    Smoking status: Never    Smokeless tobacco: Never   Vaping Use    Vaping Use: Never used   Substance and Sexual Activity    Alcohol use: Yes     Alcohol/week: 2.0 standard drinks     Types: 2 Cans of beer per week     Comment: 2 BEERS DAILY    Drug use: Never    Sexual activity: Not Currently     Social Determinants of Health     Physical Activity: Inactive    Days of Exercise per Week: 0 days    Minutes of Exercise per Session: 0 min     TOBACCO:   reports that she has never smoked. She has never used smokeless tobacco.  ETOH:   reports current alcohol use of about 2.0 standard drinks per week. Drugs:  reports no history of drug use. Allergies: No Known Allergies    Home Medications:     Prior to Admission medications    Medication Sig Start Date End Date Taking? Authorizing Provider   amLODIPine (NORVASC) 2.5 MG tablet Take 1 tablet by mouth daily 11/11/22   Ovidio Gandhi APRN - CNP   amiodarone (CORDARONE) 200 MG tablet Take 1 tablet by mouth daily 11/11/22   Ovidio Gandhi APRN - CNP   apixaban (ELIQUIS) 5 MG TABS tablet Take 1 tablet by mouth 2 times daily 11/11/22   STEVENSON Nobles - CNP   escitalopram (LEXAPRO) 20 MG tablet Take 1 tablet by mouth daily 9/30/22   Ananda Pham PA-C   pantoprazole (PROTONIX) 40 MG tablet Take 1 tablet by mouth every morning (before breakfast) 9/30/22   Ananda Pham PA-C   alendronate (FOSAMAX) 70 MG tablet Take 1 tablet by mouth every 7 days 9/30/22   Ananda BRIDGET Pham   escitalopram (LEXAPRO) 20 MG tablet Take 1 tablet by mouth daily 11/15/19   Devaughn Pascual MD   aspirin 81 MG chewable tablet Take 1 tablet by mouth daily 10/5/19   Diane Perry MD   estradiol (ESTRACE) 0.5 MG tablet Take 0.5 mg by mouth daily    Historical Provider, MD         Medications:   Medications:    Infusions:   PRN Meds:     Data:     Laboratory this visit:  Reviewed  Recent Labs     12/22/22 1910   WBC 9.0   HGB 14.2   HCT 40.6         Recent Labs     12/22/22 1910   *   K 4.0   CL 95*   CO2 23   BUN 16   CREATININE 1.0     Recent Labs     12/22/22 1910   AST 52*   ALT 31   BILITOT 0.6   ALKPHOS 64     No results for input(s): INR in the last 72 hours. Radiology this visit:  Reviewed. XR CHEST PORTABLE    Result Date: 12/22/2022  EXAMINATION: ONE XRAY VIEW OF THE CHEST 12/22/2022 7:21 pm COMPARISON: Chest x-ray and CT chest October 2, 2019 HISTORY: ORDERING SYSTEM PROVIDED HISTORY: chest pain TECHNOLOGIST PROVIDED HISTORY: Reason for exam:->chest pain Reason for Exam: chest pain FINDINGS: Cardiac silhouette appears stable. Chronic interstitial changes of the lungs with superimposed interstitial opacities and more focal airspace opacities at the periphery of the right mid lung and left lung base. Multifocal pneumonia of concern. No pleural effusion or pneumothorax. Osseous structures appear intact. 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      1. Focal airspace opacities of the right lateral mid lung and left lung base concerning for multifocal pneumonia in the appropriate clinical setting. This is superimposed on a background of chronic underlying lung changes.            EKG this visit:  Reviewed         Electronically signed by STEVENSON Rivera CNP on 12/22/2022 at 9:16 PM

## 2022-12-23 NOTE — PROGRESS NOTES
V2.0  [facility] [place]      Name:  Chelsie Morales /Age/Sex: 1935  (81 y.o. female)   MRN & CSN:  [id number] & [id number] Encounter Date/Time: 2022 10:41 AM EST    Location:   PCP: Julio Shafer [facility] Day: 2    Assessment and Plan:   Chelsie Morales is a 81 y.o. female with pmh of hypertension, atrial fibrillation on Eliquis, anxiety and depression who presents with Pneumonia due to other specified infectious organisms      Plan:  Acute respiratory failure  Multifocal pneumonia  -Currently on 2 L NC  -CTA ruled out PE, confirmed multifocal pneumonia, right side worsening than left side  -IV antibiotics with Rocephin and azithromycin  -DuoNeb treatment as scheduled  -Pending strep, Legionella antigen  -Continue wean down oxygen if possible    Hypertension  -Continue home meds    Atrial fibrillation  -Continue amiodarone, Eliquis    GERD  -Continue PPI    Anxiety and depression  -Continue Lexapro    Diet ADULT DIET; Regular; Low Fat/Low Chol/High Fiber/JAVI   DVT Prophylaxis [x] Lovenox, []  Heparin, [] SCDs, [] Ambulation,  [] Eliquis, [] Xarelto  [] Coumadin   Code Status Full Code   Disposition From: Home  Expected Disposition: Home  Estimated Date of Discharge: 2 days  Patient requires continued admission due to medical condition   Surrogate Decision Maker/ POA      Subjective:     Chief Complaint: Shortness of Breath (C/o not feeling weak w/ no energy for 1 week . C/o wheezing in rt upper chest. Pt came into today feeling lethargic and SOB)       Chelsie Morales is a 81 y.o. female is evaluated in the room. Patient reported mild chest pain in right side with deep breath. No shortness of breath. Still on 2 L NC. Diminished breathing sound in right lower lung. Review of Systems:    Review of Systems   Constitutional: Negative. HENT: Negative. Eyes: Negative. Respiratory:  Positive for cough. Cardiovascular: Negative.     Gastrointestinal: Negative. Endocrine: Negative. Genitourinary: Negative. Musculoskeletal: Negative. Skin: Negative. Allergic/Immunologic: Negative. Neurological: Negative. Hematological: Negative. Psychiatric/Behavioral: Negative. Objective: Intake/Output Summary (Last 24 hours) at 12/23/2022 1046  Last data filed at 12/23/2022 1014  Gross per 24 hour   Intake 791.93 ml   Output --   Net 791.93 ml        Vitals:   Vitals:    12/23/22 0955   BP: (!) 101/58   Pulse: 79   Resp: 17   Temp: 98.1 °F (36.7 °C)   SpO2: 97%       Physical Exam:     General: NAD  Eyes: EOMI  ENT: neck supple  Cardiovascular: Regular rate. Respiratory: Diminished breathing sound in right lower lobe  Gastrointestinal: Soft, non tender  Genitourinary: no suprapubic tenderness  Musculoskeletal: No edema  Skin: warm, dry  Neuro: Alert. Psych: Mood appropriate.      Medications:   Medications:    aspirin  81 mg Oral Daily    cefTRIAXone (ROCEPHIN) IV  1,000 mg IntraVENous Q24H    azithromycin  500 mg IntraVENous Q24H    ipratropium-albuterol  1 ampule Inhalation Q4H WA    amiodarone  200 mg Oral Daily    amLODIPine  2.5 mg Oral Daily    apixaban  5 mg Oral BID    escitalopram  20 mg Oral Daily    pantoprazole  40 mg Oral QAM AC    sodium chloride flush  10 mL IntraVENous 2 times per day      Infusions:    sodium chloride       PRN Meds: sodium chloride flush, 10 mL, PRN  sodium chloride, , PRN  polyethylene glycol, 17 g, Daily PRN  acetaminophen, 650 mg, Q6H PRN   Or  acetaminophen, 650 mg, Q6H PRN  albuterol sulfate HFA, 2 puff, Q4H PRN      Labs      Recent Results (from the past 24 hour(s))   Brain Natriuretic Peptide    Collection Time: 12/22/22  7:10 PM   Result Value Ref Range    Pro-.2 (H) <300 PG/ML   CBC with Auto Differential    Collection Time: 12/22/22  7:10 PM   Result Value Ref Range    WBC 9.0 4.0 - 10.5 K/CU MM    RBC 4.15 (L) 4.2 - 5.4 M/CU MM    Hemoglobin 14.2 12.5 - 16.0 GM/DL    Hematocrit 40.6 37 - 47 %    MCV 97.8 78 - 100 FL    MCH 34.2 (H) 27 - 31 PG    MCHC 35.0 32.0 - 36.0 %    RDW 12.2 11.7 - 14.9 %    Platelets 634 820 - 615 K/CU MM    MPV 8.3 7.5 - 11.1 FL    Differential Type AUTOMATED DIFFERENTIAL     Segs Relative 76.1 (H) 36 - 66 %    Lymphocytes % 13.6 (L) 24 - 44 %    Monocytes % 7.3 (H) 0 - 4 %    Eosinophils % 1.4 0 - 3 %    Basophils % 0.6 0 - 1 %    Segs Absolute 6.9 K/CU MM    Lymphocytes Absolute 1.2 K/CU MM    Monocytes Absolute 0.7 K/CU MM    Eosinophils Absolute 0.1 K/CU MM    Basophils Absolute 0.1 K/CU MM    Immature Neutrophil % 1.0 (H) 0 - 0.43 %    Total Immature Neutrophil 0.09 K/CU MM   Comprehensive Metabolic Panel    Collection Time: 12/22/22  7:10 PM   Result Value Ref Range    Sodium 131 (L) 135 - 145 MMOL/L    Potassium 4.0 3.5 - 5.1 MMOL/L    Chloride 95 (L) 99 - 110 mMol/L    CO2 23 21 - 32 MMOL/L    BUN 16 6 - 23 MG/DL    Creatinine 1.0 0.6 - 1.1 MG/DL    Est, Glom Filt Rate 55 (L) >60 mL/min/1.73m2    Glucose 110 (H) 70 - 99 MG/DL    Calcium 8.5 8.3 - 10.6 MG/DL    Albumin 3.4 3.4 - 5.0 GM/DL    Total Protein 7.2 6.4 - 8.2 GM/DL    Total Bilirubin 0.6 0.0 - 1.0 MG/DL    ALT 31 10 - 40 U/L    AST 52 (H) 15 - 37 IU/L    Alkaline Phosphatase 64 40 - 129 IU/L    Anion Gap 13 4 - 16   D-Dimer, Quantitative    Collection Time: 12/22/22  7:10 PM   Result Value Ref Range    D-Dimer, Quant 291 (H) <230 NG/mL(DDU)   Lactate, Sepsis    Collection Time: 12/22/22  7:10 PM   Result Value Ref Range    Lactic Acid, Sepsis 1.7 0.5 - 1.9 mMOL/L   Troponin    Collection Time: 12/22/22  7:10 PM   Result Value Ref Range    Troponin T <0.010 <0.01 NG/ML   Respiratory Panel, Molecular, with COVID-19 (Restricted: peds pts or suitable admitted adults)    Collection Time: 12/22/22  7:30 PM    Specimen: Nasopharyngeal   Result Value Ref Range    Adenovirus Detection by PCR NOT DETECTED NOT DETECTED    Coronavirus 229E PCR NOT DETECTED NOT DETECTED    Coronavirus HKU1 PCR NOT DETECTED NOT DETECTED Coronavirus NL63 PCR NOT DETECTED NOT DETECTED    Coronavirus OC43 PCR NOT DETECTED NOT DETECTED    SARS-CoV-2 NOT DETECTED NOT DETECTED    Human Metapneumovirus PCR NOT DETECTED NOT DETECTED    Rhinovirus Enterovirus PCR NOT DETECTED NOT DETECTED    Influenza A by PCR NOT DETECTED NOT DETECTED    Influenza A H1 Pandemic PCR NOT DETECTED NOT DETECTED    Influenza A H1 (2009) PCR NOT DETECTED NOT DETECTED    Influenza A H3 PCR NOT DETECTED NOT DETECTED    Influenza B by PCR NOT DETECTED NOT DETECTED    Parainfluenza 1 PCR NOT DETECTED NOT DETECTED    Parainfluenza 2 PCR NOT DETECTED NOT DETECTED    Parainfluenza 3 PCR NOT DETECTED NOT DETECTED    Parainfluenza 4 PCR NOT DETECTED NOT DETECTED    RSV PCR NOT DETECTED NOT DETECTED    Bordetella parapertussis by PCR NOT DETECTED NOT DETECTED    B Pertussis by PCR NOT DETECTED NOT DETECTED    Chlamydophila Pneumonia PCR NOT DETECTED NOT DETECTED    Mycoplasma pneumo by PCR NOT DETECTED NOT DETECTED   EKG 12 Lead    Collection Time: 12/22/22  7:47 PM   Result Value Ref Range    Ventricular Rate 73 BPM    Atrial Rate 73 BPM    P-R Interval 164 ms    QRS Duration 82 ms    Q-T Interval 414 ms    QTc Calculation (Bazett) 456 ms    P Axis 58 degrees    R Axis -9 degrees    T Axis 5 degrees    Diagnosis       Normal sinus rhythm  Low voltage QRS  Nonspecific T wave abnormality  Abnormal ECG  When compared with ECG of 15-OCT-2019 12:56,  QT has shortened     Lactate, Sepsis    Collection Time: 12/22/22 11:00 PM   Result Value Ref Range    Lactic Acid, Sepsis 1.0 0.5 - 1.9 mMOL/L   Troponin    Collection Time: 12/22/22 11:00 PM   Result Value Ref Range    Troponin T <0.010 <0.01 NG/ML   CBC auto differential    Collection Time: 12/23/22  6:15 AM   Result Value Ref Range    WBC 7.8 4.0 - 10.5 K/CU MM    RBC 3.68 (L) 4.2 - 5.4 M/CU MM    Hemoglobin 12.5 12.5 - 16.0 GM/DL    Hematocrit 36.4 (L) 37 - 47 %    MCV 98.9 78 - 100 FL    MCH 34.0 (H) 27 - 31 PG    MCHC 34.3 32.0 - 36.0 % RDW 12.1 11.7 - 14.9 %    Platelets 465 769 - 505 K/CU MM    MPV 8.4 7.5 - 11.1 FL    Differential Type AUTOMATED DIFFERENTIAL     Segs Relative 69.2 (H) 36 - 66 %    Lymphocytes % 14.2 (L) 24 - 44 %    Monocytes % 8.5 (H) 0 - 4 %    Eosinophils % 6.7 (H) 0 - 3 %    Basophils % 0.5 0 - 1 %    Segs Absolute 5.4 K/CU MM    Lymphocytes Absolute 1.1 K/CU MM    Monocytes Absolute 0.7 K/CU MM    Eosinophils Absolute 0.5 K/CU MM    Basophils Absolute 0.0 K/CU MM    Immature Neutrophil % 0.9 (H) 0 - 0.43 %    Total Immature Neutrophil 0.07 K/CU MM   Comprehensive Metabolic Panel w/ Reflex to MG    Collection Time: 12/23/22  6:15 AM   Result Value Ref Range    Sodium 134 (L) 135 - 145 MMOL/L    Potassium 4.0 3.5 - 5.1 MMOL/L    Chloride 100 99 - 110 mMol/L    CO2 24 21 - 32 MMOL/L    BUN 14 6 - 23 MG/DL    Creatinine 0.9 0.6 - 1.1 MG/DL    Est, Glom Filt Rate >60 >60 mL/min/1.73m2    Glucose 79 70 - 99 MG/DL    Calcium 8.0 (L) 8.3 - 10.6 MG/DL    Albumin 2.7 (L) 3.4 - 5.0 GM/DL    Total Protein 6.0 (L) 6.4 - 8.2 GM/DL    Total Bilirubin 0.5 0.0 - 1.0 MG/DL    ALT 24 10 - 40 U/L    AST 39 (H) 15 - 37 IU/L    Alkaline Phosphatase 54 40 - 129 IU/L    Anion Gap 10 4 - 16        Imaging/Diagnostics Last 24 Hours   XR CHEST PORTABLE    Result Date: 12/22/2022  EXAMINATION: ONE XRAY VIEW OF THE CHEST 12/22/2022 7:21 pm COMPARISON: Chest x-ray and CT chest October 2, 2019 HISTORY: ORDERING SYSTEM PROVIDED HISTORY: chest pain TECHNOLOGIST PROVIDED HISTORY: Reason for exam:->chest pain Reason for Exam: chest pain FINDINGS: Cardiac silhouette appears stable. Chronic interstitial changes of the lungs with superimposed interstitial opacities and more focal airspace opacities at the periphery of the right mid lung and left lung base. Multifocal pneumonia of concern. No pleural effusion or pneumothorax. Osseous structures appear intact. 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      1. Focal airspace opacities of the right lateral mid lung and left lung base concerning for multifocal pneumonia in the appropriate clinical setting. This is superimposed on a background of chronic underlying lung changes. CTA PULMONARY W CONTRAST    Result Date: 12/23/2022  EXAMINATION: CTA OF THE CHEST 12/23/2022 8:26 am TECHNIQUE: CTA of the chest was performed after the administration of intravenous contrast.  Multiplanar reformatted images are provided for review. MIP images are provided for review. Automated exposure control, iterative reconstruction, and/or weight based adjustment of the mA/kV was utilized to reduce the radiation dose to as low as reasonably achievable. COMPARISON: 12/22/2022, 10/02/2019 HISTORY: ORDERING SYSTEM PROVIDED HISTORY: elevated d-dimer, hypoxia,  concerns of PE TECHNOLOGIST PROVIDED HISTORY: Reason for exam:->elevated d-dimer, hypoxia,  concerns of PE Reason for Exam: elevated d-dimer, hypoxia FINDINGS: Pulmonary Arteries: Pulmonary arteries are adequately opacified for evaluation. No evidence of intraluminal filling defect to suggest pulmonary embolism. Main pulmonary artery is normal in caliber. Mediastinum: No pericardial effusion. Small hiatal hernia. The esophagus is within normal limits. Mild mediastinal adenopathy. The thyroid is unremarkable. Coronary atherosclerosis. Lungs/pleura: Extensive patchy opacities are present in both lungs, with a mixed ground-glass and consolidative appearance. No pneumothorax. Trace bilateral effusions, right greater than left. The central airways are patent. Upper Abdomen: Limited images of the upper abdomen are unremarkable. Soft Tissues/Bones: No acute bone or soft tissue abnormality. 1. No evidence of pulmonary embolism. 2. Findings compatible with multifocal pneumonia, including atypical/viral etiologies. 3. Trace bilateral pleural effusions, right greater than left. 4. Mild mediastinal lymphadenopathy, which is nonspecific and may be reactive.  5. Coronary atherosclerosis.        Electronically signed by Ashley Marroquin CNP on 12/23/2022 at 10:46 AM

## 2022-12-23 NOTE — ED PROVIDER NOTES
Emergency Department Encounter  Location: [facility] [address] [place] [address]    Patient: Brian Magallanes  MRN: [id number]  : 1935  Date of evaluation: 2022  ED Provider: Kurt Vides DO, FACEP    Chief Complaint:    Shortness of Breath (C/o not feeling weak w/ no energy for 1 week . C/o wheezing in rt upper chest. Pt came into today feeling lethargic and SOB)    [place]:  Brian Magallanes is a 81 y.o. female that presents to the emergency department via private auto with complaints of feeling weak with shortness of breath no energy for the past 2 to 3 days to a week or so. Her family members in the room with her and states that she was complaining about feeling short of breath when she was up walking from the living room to the kitchen. She is having some tightness in her right upper chest.  The patient does not have any previous history of respiratory problems. She does have a history of A. fib and anxiety. She is normally not on oxygen at home. They have a home O2 monitor and the pulse ox device at home had her O2 sat at 85% when she was up moving around. She presents to the ER with an O2 sat of 86% on room air. She denies pain. She denies swelling in her legs. She states she has a slight cough. She denies congestion sore throat nausea vomiting or diarrhea. Denies fever or chills. ROS - see HPI, below listed is current ROS at time of my eval:  At least 10 systems reviewed and otherwise acutely negative except as in the [address]   General:  No fevers, no chills, no weakness  Eyes:  No recent vison changes, no discharge  ENT:  No sore throat, no nasal congestion, no hearing changes  Cardiovascular:  No chest pain, no palpitations  Respiratory: Positive for shortness of breath, positive for cough, positive for wheezing  Gastrointestinal:  No pain, no nausea, no vomiting, no diarrhea  Musculoskeletal:  No muscle pain, no joint pain  Skin:  No rash, no pruritis, no easy bruising  Neurologic: No speech problems, no headache, no extremity numbness, no extremity tingling, no extremity weakness  Psychiatric: Positive for anxiety  Genitourinary:  No dysuria, no hematuria  Endocrine:  No unexpected weight gain, no unexpected weight loss  Extremities:  no edema, no pain    Past Medical History:   Diagnosis Date    Anxiety     Atrial fibrillation ([facility] [address]     Current use of estrogen therapy     History of nuclear stress test 10/29/2019    EF 80%. Normal study. Hypertension      Past Surgical History:   Procedure Laterality Date    HYSTERECTOMY, TOTAL ABDOMINAL (CERVIX REMOVED)       Family History   Problem Relation Age of Onset    Dementia Mother     Other Father         emphysema    Cancer Brother         Melanoma     Social History     Socioeconomic History    Marital status:      Spouse name: Not on file    Number of children: Not on file    Years of education: Not on file    Highest education level: Not on file   Occupational History    Not on file   Tobacco Use    Smoking status: Never    Smokeless tobacco: Never   Vaping Use    Vaping Use: Never used   Substance and Sexual Activity    Alcohol use: Yes     Alcohol/week: 2.0 standard drinks     Types: 2 Cans of beer per week     Comment: 2 BEERS DAILY    Drug use: Never    Sexual activity: Not Currently   Other Topics Concern    Not on file   Social History Narrative    Not on file     Social Determinants of Health     Financial Resource Strain: Not on file   Food Insecurity: Not on file   Transportation Needs: Not on file   Physical Activity: Inactive    Days of Exercise per Week: 0 days    Minutes of Exercise per Session: 0 min   Stress: Not on file   Social Connections: Not on file   Intimate Partner Violence: Not on file   Housing Stability: Not on file     No current facility-administered medications for this encounter.      Current Outpatient Medications   Medication Sig Dispense Refill    amLODIPine (NORVASC) 2.5 MG tablet Take 1 tablet by mouth daily 90 tablet 3    amiodarone (CORDARONE) 200 MG tablet Take 1 tablet by mouth daily 90 tablet 3    apixaban (ELIQUIS) 5 MG TABS tablet Take 1 tablet by mouth 2 times daily 60 tablet 5    escitalopram (LEXAPRO) 20 MG tablet Take 1 tablet by mouth daily 90 tablet 3    pantoprazole (PROTONIX) 40 MG tablet Take 1 tablet by mouth every morning (before breakfast) 90 tablet 3    alendronate (FOSAMAX) 70 MG tablet Take 1 tablet by mouth every 7 days 12 tablet 3    aspirin 81 MG chewable tablet Take 1 tablet by mouth daily 30 tablet 0    estradiol (ESTRACE) 0.5 MG tablet Take 0.5 mg by mouth daily       No Known Allergies    Nursing Notes Reviewed    Physical Exam:  ED Triage Vitals [12/22/22 1901]   Enc Vitals Group      BP (!) 150/81      Heart Rate 80      Resp 18      Temp 98.3 °F (36.8 °C)      Temp Source Oral      SpO2 (!) 86 %      Weight 170 lb (77.1 kg)      Height 5' 8.5\" (1.74 m)      Head Circumference       Peak Flow       Pain Score       Pain Loc       Pain Edu? Excl. in [address]? GENERAL APPEARANCE: Awake and alert. Cooperative. No acute distress. Nontoxic in appearance. She is on 2 L by nasal cannula when I evaluate her. O2 sat is 94% on 2 L. HEAD: Normocephalic. Atraumatic. EYES: EOM's grossly intact. Sclera anicteric. ENT: Tolerates saliva. No trismus. NECK: Supple. Trachea midline. CARDIO: RRR. Radial pulse 2+. LUNGS: Respirations unlabored. CTAB. No wheezes rales or rhonchi on auscultation  ABDOMEN: Soft. Non-distended. Non-tender. EXTREMITIES: No acute deformities. No swelling in lower extremities no pain to palpation of her calves  SKIN: Warm and dry. NEUROLOGICAL: No gross facial drooping. Moves all 4 extremities spontaneously. PSYCHIATRIC: Normal mood.      Labs:  Results for orders placed or performed during the hospital encounter of 12/22/22   Respiratory Panel, Molecular, with COVID-19 (Restricted: peds pts or suitable admitted adults)    Specimen: Nasopharyngeal   Result Value Ref Range    Adenovirus Detection by PCR NOT DETECTED NOT DETECTED    Coronavirus 229E PCR NOT DETECTED NOT DETECTED    Coronavirus HKU1 PCR NOT DETECTED NOT DETECTED    Coronavirus NL63 PCR NOT DETECTED NOT DETECTED    Coronavirus OC43 PCR NOT DETECTED NOT DETECTED    SARS-CoV-2 NOT DETECTED NOT DETECTED    Human Metapneumovirus PCR NOT DETECTED NOT DETECTED    Rhinovirus Enterovirus PCR NOT DETECTED NOT DETECTED    Influenza A by PCR NOT DETECTED NOT DETECTED    Influenza A H1 Pandemic PCR NOT DETECTED NOT DETECTED    Influenza A H1 (2009) PCR NOT DETECTED NOT DETECTED    Influenza A H3 PCR NOT DETECTED NOT DETECTED    Influenza B by PCR NOT DETECTED NOT DETECTED    Parainfluenza 1 PCR NOT DETECTED NOT DETECTED    Parainfluenza 2 PCR NOT DETECTED NOT DETECTED    Parainfluenza 3 PCR NOT DETECTED NOT DETECTED    Parainfluenza 4 PCR NOT DETECTED NOT DETECTED    RSV PCR NOT DETECTED NOT DETECTED    Bordetella parapertussis by PCR NOT DETECTED NOT DETECTED    B Pertussis by PCR NOT DETECTED NOT DETECTED    Chlamydophila Pneumonia PCR NOT DETECTED NOT DETECTED    Mycoplasma pneumo by PCR NOT DETECTED NOT DETECTED   Brain Natriuretic Peptide   Result Value Ref Range    Pro-.2 (H) <300 PG/ML   CBC with Auto Differential   Result Value Ref Range    WBC 9.0 4.0 - 10.5 K/CU MM    RBC 4.15 (L) 4.2 - 5.4 M/CU MM    Hemoglobin 14.2 12.5 - 16.0 GM/DL    Hematocrit 40.6 37 - 47 %    MCV 97.8 78 - 100 FL    MCH 34.2 (H) 27 - 31 PG    MCHC 35.0 32.0 - 36.0 %    RDW 12.2 11.7 - 14.9 %    Platelets 321 792 - 136 K/CU MM    MPV 8.3 7.5 - 11.1 FL    Differential Type AUTOMATED DIFFERENTIAL     Segs Relative 76.1 (H) 36 - 66 %    Lymphocytes % 13.6 (L) 24 - 44 %    Monocytes % 7.3 (H) 0 - 4 %    Eosinophils % 1.4 0 - 3 %    Basophils % 0.6 0 - 1 %    Segs Absolute 6.9 K/CU MM    Lymphocytes Absolute 1.2 K/CU MM    Monocytes Absolute 0.7 K/CU MM    Eosinophils Absolute 0.1 K/CU MM    Basophils Absolute 0.1 K/CU MM    Immature Neutrophil % 1.0 (H) 0 - 0.43 %    Total Immature Neutrophil 0.09 K/CU MM   Comprehensive Metabolic Panel   Result Value Ref Range    Sodium 131 (L) 135 - 145 MMOL/L    Potassium 4.0 3.5 - 5.1 MMOL/L    Chloride 95 (L) 99 - 110 mMol/L    CO2 23 21 - 32 MMOL/L    BUN 16 6 - 23 MG/DL    Creatinine 1.0 0.6 - 1.1 MG/DL    Est, Glom Filt Rate 55 (L) >60 mL/min/1.73m2    Glucose 110 (H) 70 - 99 MG/DL    Calcium 8.5 8.3 - 10.6 MG/DL    Albumin 3.4 3.4 - 5.0 GM/DL    Total Protein 7.2 6.4 - 8.2 GM/DL    Total Bilirubin 0.6 0.0 - 1.0 MG/DL    ALT 31 10 - 40 U/L    AST 52 (H) 15 - 37 IU/L    Alkaline Phosphatase 64 40 - 129 IU/L    Anion Gap 13 4 - 16   D-Dimer, Quantitative   Result Value Ref Range    D-Dimer, Quant 291 (H) <230 NG/mL(DDU)   Lactate, Sepsis   Result Value Ref Range    Lactic Acid, Sepsis 1.7 0.5 - 1.9 mMOL/L   Troponin   Result Value Ref Range    Troponin T <0.010 <0.01 NG/ML   EKG 12 Lead   Result Value Ref Range    Ventricular Rate 73 BPM    Atrial Rate 73 BPM    P-R Interval 164 ms    QRS Duration 82 ms    Q-T Interval 414 ms    QTc Calculation (Bazett) 456 ms    P Axis 58 degrees    R Axis -9 degrees    T Axis 5 degrees    Diagnosis       Normal sinus rhythm  Low voltage QRS  Nonspecific T wave abnormality  Abnormal ECG  When compared with ECG of 15-OCT-2019 12:56,  QT has shortened         EKG (if obtained): (All EKG's are interpreted by myself in the absence of a cardiologist)  The Ekg interpreted by me in the absence of a cardiologist shows. normal sinus rhythm with a rate of 73  Axis is   Normal  QTc is   456  Intervals and Durations are unremarkable. No specific ST-T wave changes appreciated. No evidence of acute ischemia.    No significant change from prior EKG dated 11 November 2022    Radiographs (if obtained):  [] The following radiograph was interpreted by myself in the absence of a radiologist:  [x] Radiologist's Report reviewed at time of ED visit:  XR CHEST PORTABLE   Final Result   1. Focal airspace opacities of the right lateral mid lung and left lung base   concerning for multifocal pneumonia in the appropriate clinical setting. This is superimposed on a background of chronic underlying lung changes. ED Course and MDM:  Patient's x-ray shows focal airspace opacities in the right lateral midlung and left base concerning for multifocal pneumonia. With her oxygen requirement of 2 L am recommending admission for this patient. I have ordered Rocephin and Zithromax for her here in the emergency department. I discussed her care with nurse practitioner Ronnell Merlin who has agreed to admit the patient for continued evaluation and treatment for pneumonia and hypoxia. She has remained stable through her ER course. Final Impression:  1. Pneumonia of right middle lobe due to infectious organism    2. Hypoxia      DISPOSITION Decision To Admit    Patient referred to: No follow-up provider specified.   Discharge medications:  Current Discharge Medication List        (Please note that portions of this note may have been completed with a voice recognition program. Efforts were made to edit the dictations but occasionally words are mis-transcribed.)    Rebeca Craig DO, [facility]  Board certified in [id number]  12/22/22 3330

## 2022-12-23 NOTE — PROGRESS NOTES
Attempted to  wean patient from 2 L oxygen, removed nasal cannula approximately 20 min, Spo2 85-86% on room air, applied oxygen, patient quickly returned to 96% on 2 L.

## 2022-12-24 LAB
D DIMER: 371 NG/ML(DDU)
LEGIONELLA URINARY AG: NEGATIVE
STREP PNEUMONIAE ANTIGEN: NORMAL
TROPONIN T: <0.01 NG/ML

## 2022-12-24 PROCEDURE — 6360000002 HC RX W HCPCS: Performed by: NURSE PRACTITIONER

## 2022-12-24 PROCEDURE — 2580000003 HC RX 258: Performed by: NURSE PRACTITIONER

## 2022-12-24 PROCEDURE — 85379 FIBRIN DEGRADATION QUANT: CPT

## 2022-12-24 PROCEDURE — 94761 N-INVAS EAR/PLS OXIMETRY MLT: CPT

## 2022-12-24 PROCEDURE — 84484 ASSAY OF TROPONIN QUANT: CPT

## 2022-12-24 PROCEDURE — 2500000003 HC RX 250 WO HCPCS: Performed by: NURSE PRACTITIONER

## 2022-12-24 PROCEDURE — 2140000000 HC CCU INTERMEDIATE R&B

## 2022-12-24 PROCEDURE — 0202U NFCT DS 22 TRGT SARS-COV-2: CPT

## 2022-12-24 PROCEDURE — 94640 AIRWAY INHALATION TREATMENT: CPT

## 2022-12-24 PROCEDURE — 6370000000 HC RX 637 (ALT 250 FOR IP): Performed by: NURSE PRACTITIONER

## 2022-12-24 PROCEDURE — 2700000000 HC OXYGEN THERAPY PER DAY

## 2022-12-24 RX ORDER — ONDANSETRON 2 MG/ML
4 INJECTION INTRAMUSCULAR; INTRAVENOUS EVERY 6 HOURS PRN
Status: DISCONTINUED | OUTPATIENT
Start: 2022-12-24 | End: 2022-12-26

## 2022-12-24 RX ORDER — GUAIFENESIN 600 MG/1
600 TABLET, EXTENDED RELEASE ORAL 2 TIMES DAILY
Status: DISCONTINUED | OUTPATIENT
Start: 2022-12-24 | End: 2022-12-30 | Stop reason: HOSPADM

## 2022-12-24 RX ORDER — GUAIFENESIN 100 MG/5ML
200 SYRUP ORAL EVERY 4 HOURS PRN
Status: DISCONTINUED | OUTPATIENT
Start: 2022-12-24 | End: 2022-12-30 | Stop reason: HOSPADM

## 2022-12-24 RX ORDER — DEXAMETHASONE SODIUM PHOSPHATE 10 MG/ML
6 INJECTION, SOLUTION INTRAMUSCULAR; INTRAVENOUS DAILY
Status: DISCONTINUED | OUTPATIENT
Start: 2022-12-25 | End: 2022-12-24

## 2022-12-24 RX ORDER — DEXAMETHASONE SODIUM PHOSPHATE 10 MG/ML
6 INJECTION, SOLUTION INTRAMUSCULAR; INTRAVENOUS DAILY
Status: DISCONTINUED | OUTPATIENT
Start: 2022-12-25 | End: 2022-12-25

## 2022-12-24 RX ADMIN — APIXABAN 5 MG: 5 TABLET, FILM COATED ORAL at 21:02

## 2022-12-24 RX ADMIN — APIXABAN 5 MG: 5 TABLET, FILM COATED ORAL at 08:16

## 2022-12-24 RX ADMIN — AMIODARONE HYDROCHLORIDE 200 MG: 200 TABLET ORAL at 08:17

## 2022-12-24 RX ADMIN — AMLODIPINE BESYLATE 2.5 MG: 2.5 TABLET ORAL at 08:17

## 2022-12-24 RX ADMIN — CEFTRIAXONE SODIUM 1000 MG: 1 INJECTION, POWDER, FOR SOLUTION INTRAMUSCULAR; INTRAVENOUS at 15:44

## 2022-12-24 RX ADMIN — IPRATROPIUM BROMIDE AND ALBUTEROL SULFATE 1 AMPULE: 2.5; .5 SOLUTION RESPIRATORY (INHALATION) at 19:27

## 2022-12-24 RX ADMIN — IPRATROPIUM BROMIDE AND ALBUTEROL SULFATE 1 AMPULE: 2.5; .5 SOLUTION RESPIRATORY (INHALATION) at 07:26

## 2022-12-24 RX ADMIN — ESCITALOPRAM 20 MG: 20 TABLET, FILM COATED ORAL at 08:16

## 2022-12-24 RX ADMIN — PANTOPRAZOLE SODIUM 40 MG: 40 TABLET, DELAYED RELEASE ORAL at 06:27

## 2022-12-24 RX ADMIN — GUAIFENESIN 600 MG: 600 TABLET, EXTENDED RELEASE ORAL at 21:02

## 2022-12-24 RX ADMIN — DOXYCYCLINE HYCLATE 100 MG: 100 TABLET, COATED ORAL at 08:16

## 2022-12-24 RX ADMIN — IPRATROPIUM BROMIDE AND ALBUTEROL SULFATE 1 AMPULE: 2.5; .5 SOLUTION RESPIRATORY (INHALATION) at 22:55

## 2022-12-24 RX ADMIN — GUAIFENESIN 200 MG: 200 SOLUTION ORAL at 22:37

## 2022-12-24 RX ADMIN — POLYETHYLENE GLYCOL 3350 17 G: 17 POWDER, FOR SOLUTION ORAL at 06:45

## 2022-12-24 RX ADMIN — IPRATROPIUM BROMIDE AND ALBUTEROL SULFATE 1 AMPULE: 2.5; .5 SOLUTION RESPIRATORY (INHALATION) at 15:11

## 2022-12-24 RX ADMIN — ASPIRIN 81 MG: 81 TABLET, COATED ORAL at 08:17

## 2022-12-24 RX ADMIN — DOXYCYCLINE HYCLATE 100 MG: 100 TABLET, COATED ORAL at 21:02

## 2022-12-24 RX ADMIN — SODIUM CHLORIDE, PRESERVATIVE FREE 10 ML: 5 INJECTION INTRAVENOUS at 08:16

## 2022-12-24 RX ADMIN — IPRATROPIUM BROMIDE AND ALBUTEROL SULFATE 1 AMPULE: 2.5; .5 SOLUTION RESPIRATORY (INHALATION) at 11:06

## 2022-12-24 ASSESSMENT — PAIN SCALES - GENERAL
PAINLEVEL_OUTOF10: 0
PAINLEVEL_OUTOF10: 0

## 2022-12-24 ASSESSMENT — PAIN DESCRIPTION - DESCRIPTORS: DESCRIPTORS: DISCOMFORT

## 2022-12-24 NOTE — PLAN OF CARE
Problem: Discharge Planning  Goal: Discharge to home or other facility with appropriate resources  12/24/2022 0819 by Usha Arboleda RN  Outcome: Progressing  12/23/2022 2048 by Cha Henry RN  Outcome: Progressing     Problem: Pain  Goal: Verbalizes/displays adequate comfort level or baseline comfort level  12/24/2022 0819 by Usha Arboleda RN  Outcome: Progressing  12/23/2022 2048 by Cha Henry RN  Outcome: Progressing     Problem: ABCDS Injury Assessment  Goal: Absence of physical injury  12/24/2022 0819 by Usha Arboleda RN  Outcome: Progressing  12/23/2022 2048 by Cha Henry RN  Outcome: Progressing

## 2022-12-24 NOTE — PROGRESS NOTES
Hospitalist Progress Note      Name:  Sofy Amezcua /Age/Sex: 1935  (81 y.o. female)   MRN & CSN:  [id number] & [id number] Admission Date/Time: 2022  6:57 PM   Location:   PCP: Shaun Bynum, [facility] Day: 3    Assessment and Plan:   Sofy Amezcua is a 81 y.o.  female  who presents with Pneumonia due to other specified infectious organisms    Acute Hypoxemic Respiratory Failure: Continue to wean oxygen, on 2L this AM  Multifocal Pneumonia: Continue IV abx, procal in AM, continue other symptomatic treatments  Hypertension: Home meds Resumed  Hx Paroxysmal Atrial Fibrillation: Continue Amiodarone, Eliquis  GERD: Continue PPI  Anxiety and Depression: Continue Lexapro     Diet ADULT DIET; Regular; Low Fat/Low Chol/High Fiber/JAVI   DVT Prophylaxis [] Lovenox, []  Heparin, [] SCDs, [] Ambulation   GI Prophylaxis [] PPI,  [] H2 Blocker,  [] Carafate,  [] Diet/Tube Feeds   Code Status Full Code   Disposition Patient requires continued admission due to respiratory faliure   MDM [] Low, [] Moderate,[]  High  Patient's risk as above due to      History of Present Illness:     Patient resting comfortably; still some SOB but overall states she is improved and feeling well. No fevers or chills, nausea or vomiting    Objective: Intake/Output Summary (Last 24 hours) at 2022 0909  Last data filed at 2022 1832  Gross per 24 hour   Intake 945.38 ml   Output --   Net 945.38 ml      Vitals:   Vitals:    22 0816   BP: 137/62   Pulse:    Resp:    Temp:    SpO2:      Physical Exam:   GEN Awake female, sitting upright in bed in no apparent distress. Appears given age. EYES Pupils are equally round. No scleral erythema, discharge, or conjunctivitis. NECK Supple, no apparent thyromegaly or masses. RESP Clear to auscultation, no wheezes, rales or rhonchi. Symmetric chest movement while on 2L NC  CARDIO/VASC S1/S2 auscultated.  Regular rate without appreciable murmurs, rubs, or gallops. HEME/LYMPH No petechiae or ecchymoses. MSK No gross joint deformities. SKIN Normal coloration, warm, dry. NEURO Cranial nerves appear grossly intact, normal speech, no lateralizing weakness. PSYCH Awake, alert, oriented x 4. Affect appropriate.     Medications:   Medications:    aspirin  81 mg Oral Daily    cefTRIAXone (ROCEPHIN) IV  1,000 mg IntraVENous Q24H    ipratropium-albuterol  1 ampule Inhalation Q4H WA    doxycycline hyclate  100 mg Oral 2 times per day    amiodarone  200 mg Oral Daily    amLODIPine  2.5 mg Oral Daily    apixaban  5 mg Oral BID    escitalopram  20 mg Oral Daily    pantoprazole  40 mg Oral QAM AC    sodium chloride flush  10 mL IntraVENous 2 times per day      Infusions:    sodium chloride Stopped (12/23/22 1708)     PRN Meds: sodium chloride flush, 10 mL, PRN  sodium chloride, , PRN  polyethylene glycol, 17 g, Daily PRN  acetaminophen, 650 mg, Q6H PRN   Or  acetaminophen, 650 mg, Q6H PRN  albuterol sulfate HFA, 2 puff, Q4H PRN          Electronically signed by Nelli Clark MD on 12/24/2022 at 9:09 AM

## 2022-12-25 ENCOUNTER — APPOINTMENT (OUTPATIENT)
Dept: GENERAL RADIOLOGY | Age: 87
DRG: 871 | End: 2022-12-25
Payer: MEDICARE

## 2022-12-25 LAB
ADENOVIRUS DETECTION BY PCR: NOT DETECTED
BANDED NEUTROPHILS ABSOLUTE COUNT: 0.1 K/CU MM
BANDED NEUTROPHILS RELATIVE PERCENT: 1 % (ref 5–11)
BORDETELLA PARAPERTUSSIS BY PCR: NOT DETECTED
BORDETELLA PERTUSSIS PCR: NOT DETECTED
CHLAMYDOPHILA PNEUMONIA PCR: NOT DETECTED
CORONAVIRUS 229E PCR: NOT DETECTED
CORONAVIRUS HKU1 PCR: NOT DETECTED
CORONAVIRUS NL63 PCR: NOT DETECTED
CORONAVIRUS OC43 PCR: NOT DETECTED
DIFFERENTIAL TYPE: ABNORMAL
HCT VFR BLD CALC: 37.3 % (ref 37–47)
HEMOGLOBIN: 12.8 GM/DL (ref 12.5–16)
HUMAN METAPNEUMOVIRUS PCR: NOT DETECTED
INFLUENZA A BY PCR: NOT DETECTED
INFLUENZA A H1 (2009) PCR: NOT DETECTED
INFLUENZA A H1 PANDEMIC PCR: NOT DETECTED
INFLUENZA A H3 PCR: NOT DETECTED
INFLUENZA B BY PCR: NOT DETECTED
LYMPHOCYTES ABSOLUTE: 0.3 K/CU MM
LYMPHOCYTES RELATIVE PERCENT: 3 % (ref 24–44)
MCH RBC QN AUTO: 33.7 PG (ref 27–31)
MCHC RBC AUTO-ENTMCNC: 34.3 % (ref 32–36)
MCV RBC AUTO: 98.2 FL (ref 78–100)
MYCOPLASMA PNEUMONIAE PCR: NOT DETECTED
OVALOCYTES: ABNORMAL
PARAINFLUENZA 1 PCR: NOT DETECTED
PARAINFLUENZA 2 PCR: NOT DETECTED
PARAINFLUENZA 3 PCR: NOT DETECTED
PARAINFLUENZA 4 PCR: NOT DETECTED
PDW BLD-RTO: 12.2 % (ref 11.7–14.9)
PLATELET # BLD: 355 K/CU MM (ref 140–440)
PMV BLD AUTO: 8.5 FL (ref 7.5–11.1)
PROCALCITONIN: 0.09
RBC # BLD: 3.8 M/CU MM (ref 4.2–5.4)
RHINOVIRUS ENTEROVIRUS PCR: NOT DETECTED
RSV PCR: NOT DETECTED
SARS-COV-2: NOT DETECTED
SEGMENTED NEUTROPHILS ABSOLUTE COUNT: 9.8 K/CU MM
SEGMENTED NEUTROPHILS RELATIVE PERCENT: 96 % (ref 36–66)
TARGET CELLS: ABNORMAL
WBC # BLD: 10.2 K/CU MM (ref 4–10.5)

## 2022-12-25 PROCEDURE — 85007 BL SMEAR W/DIFF WBC COUNT: CPT

## 2022-12-25 PROCEDURE — 6360000002 HC RX W HCPCS: Performed by: NURSE PRACTITIONER

## 2022-12-25 PROCEDURE — 71045 X-RAY EXAM CHEST 1 VIEW: CPT

## 2022-12-25 PROCEDURE — 84145 PROCALCITONIN (PCT): CPT

## 2022-12-25 PROCEDURE — 6370000000 HC RX 637 (ALT 250 FOR IP): Performed by: NURSE PRACTITIONER

## 2022-12-25 PROCEDURE — 94640 AIRWAY INHALATION TREATMENT: CPT

## 2022-12-25 PROCEDURE — 2700000000 HC OXYGEN THERAPY PER DAY

## 2022-12-25 PROCEDURE — 2500000003 HC RX 250 WO HCPCS: Performed by: NURSE PRACTITIONER

## 2022-12-25 PROCEDURE — 2500000003 HC RX 250 WO HCPCS: Performed by: INTERNAL MEDICINE

## 2022-12-25 PROCEDURE — 85027 COMPLETE CBC AUTOMATED: CPT

## 2022-12-25 PROCEDURE — 2580000003 HC RX 258: Performed by: NURSE PRACTITIONER

## 2022-12-25 PROCEDURE — 2580000003 HC RX 258: Performed by: INTERNAL MEDICINE

## 2022-12-25 PROCEDURE — 6360000002 HC RX W HCPCS: Performed by: INTERNAL MEDICINE

## 2022-12-25 PROCEDURE — 2140000000 HC CCU INTERMEDIATE R&B

## 2022-12-25 RX ADMIN — IPRATROPIUM BROMIDE AND ALBUTEROL SULFATE 1 AMPULE: 2.5; .5 SOLUTION RESPIRATORY (INHALATION) at 19:17

## 2022-12-25 RX ADMIN — APIXABAN 5 MG: 5 TABLET, FILM COATED ORAL at 22:25

## 2022-12-25 RX ADMIN — SODIUM CHLORIDE 25 ML: 9 INJECTION, SOLUTION INTRAVENOUS at 23:40

## 2022-12-25 RX ADMIN — AMLODIPINE BESYLATE 2.5 MG: 2.5 TABLET ORAL at 08:25

## 2022-12-25 RX ADMIN — SODIUM CHLORIDE, PRESERVATIVE FREE 10 ML: 5 INJECTION INTRAVENOUS at 09:48

## 2022-12-25 RX ADMIN — APIXABAN 5 MG: 5 TABLET, FILM COATED ORAL at 08:25

## 2022-12-25 RX ADMIN — GUAIFENESIN 600 MG: 600 TABLET, EXTENDED RELEASE ORAL at 22:25

## 2022-12-25 RX ADMIN — GUAIFENESIN 600 MG: 600 TABLET, EXTENDED RELEASE ORAL at 08:25

## 2022-12-25 RX ADMIN — CEFEPIME 2000 MG: 2 INJECTION, POWDER, FOR SOLUTION INTRAVENOUS at 08:33

## 2022-12-25 RX ADMIN — GUAIFENESIN 200 MG: 200 SOLUTION ORAL at 22:25

## 2022-12-25 RX ADMIN — IPRATROPIUM BROMIDE AND ALBUTEROL SULFATE 1 AMPULE: 2.5; .5 SOLUTION RESPIRATORY (INHALATION) at 14:59

## 2022-12-25 RX ADMIN — CEFEPIME 2000 MG: 2 INJECTION, POWDER, FOR SOLUTION INTRAVENOUS at 23:41

## 2022-12-25 RX ADMIN — DOXYCYCLINE 100 MG: 100 INJECTION, POWDER, LYOPHILIZED, FOR SOLUTION INTRAVENOUS at 22:25

## 2022-12-25 RX ADMIN — SODIUM CHLORIDE: 9 INJECTION, SOLUTION INTRAVENOUS at 08:31

## 2022-12-25 RX ADMIN — DEXAMETHASONE SODIUM PHOSPHATE 6 MG: 10 INJECTION, SOLUTION INTRAMUSCULAR; INTRAVENOUS at 00:26

## 2022-12-25 RX ADMIN — ASPIRIN 81 MG: 81 TABLET, COATED ORAL at 08:25

## 2022-12-25 RX ADMIN — IPRATROPIUM BROMIDE AND ALBUTEROL SULFATE 1 AMPULE: 2.5; .5 SOLUTION RESPIRATORY (INHALATION) at 07:47

## 2022-12-25 RX ADMIN — PANTOPRAZOLE SODIUM 40 MG: 40 TABLET, DELAYED RELEASE ORAL at 05:50

## 2022-12-25 RX ADMIN — DOXYCYCLINE 100 MG: 100 INJECTION, POWDER, LYOPHILIZED, FOR SOLUTION INTRAVENOUS at 09:48

## 2022-12-25 RX ADMIN — ESCITALOPRAM 20 MG: 20 TABLET, FILM COATED ORAL at 08:25

## 2022-12-25 RX ADMIN — IPRATROPIUM BROMIDE AND ALBUTEROL SULFATE 1 AMPULE: 2.5; .5 SOLUTION RESPIRATORY (INHALATION) at 11:17

## 2022-12-25 RX ADMIN — AMIODARONE HYDROCHLORIDE 200 MG: 200 TABLET ORAL at 08:25

## 2022-12-25 RX ADMIN — SODIUM CHLORIDE, PRESERVATIVE FREE 10 ML: 5 INJECTION INTRAVENOUS at 22:25

## 2022-12-25 NOTE — PROGRESS NOTES
Patient reports feeling more SOB primary nurse put con pulse ox and patients oxygen sat was dropping in 80's patient also complaints of cough with increased wheezing.     Duo -neb ordered  Muccinex  Robitussin   Decardon 6mg ordered    Labs: resp panel repeat, Ddimmer, trop, procal already ordered for AM    EKG:pend

## 2022-12-25 NOTE — PROGRESS NOTES
Hospitalist Progress Note      Name:  True Avilez /Age/Sex: 1935  (81 y.o. female)   MRN & CSN:  [id number] & [id number] Admission Date/Time: 2022  6:57 PM   Location:   PCP: César Bruno, [facility] Day: 4    Assessment and Plan:   True Avilez is a 81 y.o.  female  who presents with Pneumonia due to other specified infectious organisms    Acute Hypoxemic Respiratory Failure: Increased to 7L overnight and now 5L this AM and states she feels improved; changing abx as per below, repeat CXR ordered, BNP ordered as well, CTA unremarkable on admission for PE, Respiratory PCR also unremarkable  Multifocal Pneumonia: Given worsening overnight broadened abx to Cefepime and Doxy, holding Vanc as no risk factors and not recently on abx, sputum culture, procal pending, monitor  Hypertension: Home meds Resumed  Hx Paroxysmal Atrial Fibrillation: Continue Amiodarone, Eliquis  GERD: Continue PPI  Anxiety and Depression: Continue Lexapro     Diet ADULT DIET; Regular; Low Fat/Low Chol/High Fiber/JAVI   DVT Prophylaxis [] Lovenox, []  Heparin, [] SCDs, [] Ambulation   GI Prophylaxis [] PPI,  [] H2 Blocker,  [] Carafate,  [] Diet/Tube Feeds   Code Status Full Code   Disposition Patient requires continued admission due to respiratory faliure   MDM [] Low, [] Moderate,[]  High  Patient's risk as above due to      History of Present Illness:     Overnight patient did have increasing oxygen requirements was up to 7 L at one-point; this morning is back down to 5 L and states she does feel significantly better compared to last night; denies any chest pain nausea or vomiting    Objective:      Intake/Output Summary (Last 24 hours) at 2022 0917  Last data filed at 2022 0905  Gross per 24 hour   Intake 840 ml   Output --   Net 840 ml        Vitals:   Vitals:    22 0748   BP:    Pulse:    Resp:    Temp:    SpO2: 95%     Physical Exam:   GEN Awake female, sitting upright in bed in no apparent distress. Appears given age. EYES Pupils are equally round. No scleral erythema, discharge, or conjunctivitis. NECK Supple, no apparent thyromegaly or masses. RESP Occasional rhonchi. Symmetric chest movement while on 5L NC  CARDIO/VASC S1/S2 auscultated. Regular rate without appreciable murmurs, rubs, or gallops. HEME/LYMPH No petechiae or ecchymoses. MSK No gross joint deformities. SKIN Normal coloration, warm, dry. NEURO Cranial nerves appear grossly intact, normal speech, no lateralizing weakness. PSYCH Awake, alert, oriented x 4. Affect appropriate.     Medications:   Medications:    cefepime  2,000 mg IntraVENous Q12H    doxycycline (VIBRAMYCIN) IV  100 mg IntraVENous Q12H    guaiFENesin  600 mg Oral BID    dexamethasone  6 mg IntraVENous Daily    aspirin  81 mg Oral Daily    ipratropium-albuterol  1 ampule Inhalation Q4H WA    amiodarone  200 mg Oral Daily    amLODIPine  2.5 mg Oral Daily    apixaban  5 mg Oral BID    escitalopram  20 mg Oral Daily    pantoprazole  40 mg Oral QAM AC    sodium chloride flush  10 mL IntraVENous 2 times per day      Infusions:    sodium chloride 25 mL/hr at 12/25/22 0831     PRN Meds: guaiFENesin, 200 mg, Q4H PRN  ondansetron, 4 mg, Q6H PRN  sodium chloride flush, 10 mL, PRN  sodium chloride, , PRN  polyethylene glycol, 17 g, Daily PRN  acetaminophen, 650 mg, Q6H PRN   Or  acetaminophen, 650 mg, Q6H PRN  albuterol sulfate HFA, 2 puff, Q4H PRN        Electronically signed by Maggie Viveros MD on 12/25/2022 at 9:17 AM

## 2022-12-25 NOTE — PROGRESS NOTES
Patient placed on high flow nasal cannula at 7 lpm to maintain Sp02 90-93%. Sara ROBISON aware and RT will  follow.

## 2022-12-26 LAB
ANION GAP SERPL CALCULATED.3IONS-SCNC: 11 MMOL/L (ref 4–16)
BASOPHILS ABSOLUTE: 0 K/CU MM
BASOPHILS RELATIVE PERCENT: 0.1 % (ref 0–1)
BUN BLDV-MCNC: 15 MG/DL (ref 6–23)
CALCIUM SERPL-MCNC: 8.1 MG/DL (ref 8.3–10.6)
CHLORIDE BLD-SCNC: 94 MMOL/L (ref 99–110)
CO2: 25 MMOL/L (ref 21–32)
CREAT SERPL-MCNC: 0.8 MG/DL (ref 0.6–1.1)
DIFFERENTIAL TYPE: ABNORMAL
EOSINOPHILS ABSOLUTE: 0 K/CU MM
EOSINOPHILS RELATIVE PERCENT: 0.1 % (ref 0–3)
GFR SERPL CREATININE-BSD FRML MDRD: >60 ML/MIN/1.73M2
GLUCOSE BLD-MCNC: 110 MG/DL (ref 70–99)
HCT VFR BLD CALC: 35.7 % (ref 37–47)
HEMOGLOBIN: 12.1 GM/DL (ref 12.5–16)
IMMATURE NEUTROPHIL %: 1 % (ref 0–0.43)
LYMPHOCYTES ABSOLUTE: 1 K/CU MM
LYMPHOCYTES RELATIVE PERCENT: 5.6 % (ref 24–44)
MCH RBC QN AUTO: 33.3 PG (ref 27–31)
MCHC RBC AUTO-ENTMCNC: 33.9 % (ref 32–36)
MCV RBC AUTO: 98.3 FL (ref 78–100)
MONOCYTES ABSOLUTE: 0.9 K/CU MM
MONOCYTES RELATIVE PERCENT: 5.2 % (ref 0–4)
PDW BLD-RTO: 12.3 % (ref 11.7–14.9)
PLATELET # BLD: 373 K/CU MM (ref 140–440)
PMV BLD AUTO: 8.8 FL (ref 7.5–11.1)
POTASSIUM SERPL-SCNC: 4 MMOL/L (ref 3.5–5.1)
PRO-BNP: 847.9 PG/ML
RBC # BLD: 3.63 M/CU MM (ref 4.2–5.4)
SEGMENTED NEUTROPHILS ABSOLUTE COUNT: 15.4 K/CU MM
SEGMENTED NEUTROPHILS RELATIVE PERCENT: 88 % (ref 36–66)
SODIUM BLD-SCNC: 130 MMOL/L (ref 135–145)
TOTAL IMMATURE NEUTOROPHIL: 0.18 K/CU MM
WBC # BLD: 17.5 K/CU MM (ref 4–10.5)

## 2022-12-26 PROCEDURE — 6370000000 HC RX 637 (ALT 250 FOR IP): Performed by: NURSE PRACTITIONER

## 2022-12-26 PROCEDURE — 2500000003 HC RX 250 WO HCPCS: Performed by: INTERNAL MEDICINE

## 2022-12-26 PROCEDURE — 85025 COMPLETE CBC W/AUTO DIFF WBC: CPT

## 2022-12-26 PROCEDURE — 87081 CULTURE SCREEN ONLY: CPT

## 2022-12-26 PROCEDURE — 2700000000 HC OXYGEN THERAPY PER DAY

## 2022-12-26 PROCEDURE — 80048 BASIC METABOLIC PNL TOTAL CA: CPT

## 2022-12-26 PROCEDURE — 2500000003 HC RX 250 WO HCPCS: Performed by: NURSE PRACTITIONER

## 2022-12-26 PROCEDURE — 87205 SMEAR GRAM STAIN: CPT

## 2022-12-26 PROCEDURE — 87070 CULTURE OTHR SPECIMN AEROBIC: CPT

## 2022-12-26 PROCEDURE — 83880 ASSAY OF NATRIURETIC PEPTIDE: CPT

## 2022-12-26 PROCEDURE — 2580000003 HC RX 258: Performed by: NURSE PRACTITIONER

## 2022-12-26 PROCEDURE — 2140000000 HC CCU INTERMEDIATE R&B

## 2022-12-26 PROCEDURE — 2580000003 HC RX 258: Performed by: INTERNAL MEDICINE

## 2022-12-26 PROCEDURE — 94640 AIRWAY INHALATION TREATMENT: CPT

## 2022-12-26 PROCEDURE — 6360000002 HC RX W HCPCS: Performed by: INTERNAL MEDICINE

## 2022-12-26 RX ORDER — PREDNISONE 20 MG/1
40 TABLET ORAL DAILY
Status: DISCONTINUED | OUTPATIENT
Start: 2022-12-26 | End: 2022-12-30

## 2022-12-26 RX ORDER — ONDANSETRON 4 MG/1
4 TABLET, ORALLY DISINTEGRATING ORAL EVERY 6 HOURS PRN
Status: DISCONTINUED | OUTPATIENT
Start: 2022-12-26 | End: 2022-12-30 | Stop reason: HOSPADM

## 2022-12-26 RX ADMIN — SODIUM CHLORIDE: 9 INJECTION, SOLUTION INTRAVENOUS at 11:46

## 2022-12-26 RX ADMIN — SODIUM CHLORIDE, PRESERVATIVE FREE 10 ML: 5 INJECTION INTRAVENOUS at 20:17

## 2022-12-26 RX ADMIN — SODIUM CHLORIDE 20 ML: 9 INJECTION, SOLUTION INTRAVENOUS at 20:15

## 2022-12-26 RX ADMIN — SODIUM CHLORIDE: 9 INJECTION, SOLUTION INTRAVENOUS at 08:36

## 2022-12-26 RX ADMIN — APIXABAN 5 MG: 5 TABLET, FILM COATED ORAL at 20:15

## 2022-12-26 RX ADMIN — GUAIFENESIN 600 MG: 600 TABLET, EXTENDED RELEASE ORAL at 08:31

## 2022-12-26 RX ADMIN — IPRATROPIUM BROMIDE AND ALBUTEROL SULFATE 1 AMPULE: 2.5; .5 SOLUTION RESPIRATORY (INHALATION) at 11:20

## 2022-12-26 RX ADMIN — PANTOPRAZOLE SODIUM 40 MG: 40 TABLET, DELAYED RELEASE ORAL at 04:55

## 2022-12-26 RX ADMIN — IPRATROPIUM BROMIDE AND ALBUTEROL SULFATE 1 AMPULE: 2.5; .5 SOLUTION RESPIRATORY (INHALATION) at 20:11

## 2022-12-26 RX ADMIN — DOXYCYCLINE 100 MG: 100 INJECTION, POWDER, LYOPHILIZED, FOR SOLUTION INTRAVENOUS at 20:15

## 2022-12-26 RX ADMIN — AMIODARONE HYDROCHLORIDE 200 MG: 200 TABLET ORAL at 08:31

## 2022-12-26 RX ADMIN — AMLODIPINE BESYLATE 2.5 MG: 2.5 TABLET ORAL at 08:31

## 2022-12-26 RX ADMIN — ASPIRIN 81 MG: 81 TABLET, COATED ORAL at 08:31

## 2022-12-26 RX ADMIN — ESCITALOPRAM 20 MG: 20 TABLET, FILM COATED ORAL at 08:30

## 2022-12-26 RX ADMIN — ONDANSETRON 4 MG: 4 TABLET, ORALLY DISINTEGRATING ORAL at 15:25

## 2022-12-26 RX ADMIN — APIXABAN 5 MG: 5 TABLET, FILM COATED ORAL at 08:31

## 2022-12-26 RX ADMIN — GUAIFENESIN 200 MG: 200 SOLUTION ORAL at 01:45

## 2022-12-26 RX ADMIN — CEFEPIME 2000 MG: 2 INJECTION, POWDER, FOR SOLUTION INTRAVENOUS at 11:46

## 2022-12-26 RX ADMIN — IPRATROPIUM BROMIDE AND ALBUTEROL SULFATE 1 AMPULE: 2.5; .5 SOLUTION RESPIRATORY (INHALATION) at 07:45

## 2022-12-26 RX ADMIN — GUAIFENESIN 200 MG: 200 SOLUTION ORAL at 20:15

## 2022-12-26 RX ADMIN — IPRATROPIUM BROMIDE AND ALBUTEROL SULFATE 1 AMPULE: 2.5; .5 SOLUTION RESPIRATORY (INHALATION) at 14:27

## 2022-12-26 RX ADMIN — SODIUM CHLORIDE, PRESERVATIVE FREE 10 ML: 5 INJECTION INTRAVENOUS at 08:31

## 2022-12-26 RX ADMIN — GUAIFENESIN 200 MG: 200 SOLUTION ORAL at 08:31

## 2022-12-26 RX ADMIN — PREDNISONE 40 MG: 20 TABLET ORAL at 11:41

## 2022-12-26 RX ADMIN — DOXYCYCLINE 100 MG: 100 INJECTION, POWDER, LYOPHILIZED, FOR SOLUTION INTRAVENOUS at 08:37

## 2022-12-26 RX ADMIN — GUAIFENESIN 600 MG: 600 TABLET, EXTENDED RELEASE ORAL at 20:15

## 2022-12-26 ASSESSMENT — PAIN SCALES - GENERAL: PAINLEVEL_OUTOF10: 0

## 2022-12-26 NOTE — PROGRESS NOTES
Hospitalist Progress Note      Name:  Reddy Harmon /Age/Sex: 1935  (81 y.o. female)   MRN & CSN:  [id number] & [id number] Admission Date/Time: 2022  6:57 PM   Location:   PCP: Bebe Nuñez, [facility] Day: 5                                               Attending Physician Dr. Maynor Pop and Plan:   Reddy Harmon is a 81 y.o.  female  who presents with Pneumonia due to other specified infectious organisms      Acute hypoxemic respiratory failure 2/2 multifocal pneumonia   Repeat CXR (2022) patchy opacities within the lungs bilaterally, which appear minimally worsened within the right mid lung  Continues on 5 L/min high flow nasal cannula -attempting to wean as tolerated   Antibiotics broadened  to cefepime/doxycycline due to worsening hypoxia and leukocytosis   Vancomycin held due to low MRSA risk factors. Pending screening swab    Pending sputum culture/ BLCs   Added prednisone due to wheezing   Continue pulmonary hygiene/bronchodilators   PRN symptom control     Hyponatremia (130)   Trend for now with IVF    Hypertension   Continue home antihypertensive regimen   Trends appear controlled today    Paroxysmal atrial fibrillation    [id number] Score: 4  Continue amiodarone/Eliquis    DEYANIRA/depression    Continue Lexapro        Diet ADULT DIET; Regular; Low Fat/Low Chol/High Fiber/JAVI   DVT Prophylaxis [] Lovenox, []  Heparin, [] SCDs, [] Ambulation Eliquis   GI Prophylaxis [x] PPI,  [] H2 Blocker,  [] Carafate,  [] Diet/Tube Feeds   Code Status Full Code   Disposition Patient requires continued admission due to management of hypoxia     -Patient assessment and plan discussed with supervising physician-  Subjective 2022     Reddy Harmon is a 81 y.o.  female, who presented with  Shortness of Breath (C/o not feeling weak w/ no energy for 1 week .  C/o wheezing in rt upper chest. Pt came into today feeling lethargic and SOB)    Patient reports continued short of breath but overall feeling improved. Productive cough with purulent sputum expectorated. Ten point ROS reviewed negative, unless as noted above    Objective: Intake/Output Summary (Last 24 hours) at 12/26/2022 1149  Last data filed at 12/25/2022 1752  Gross per 24 hour   Intake 240 ml   Output --   Net 240 ml      Vitals:   Vitals:    12/26/22 0045 12/26/22 0745 12/26/22 0821 12/26/22 1122   BP: 132/69  126/67    Pulse: 71  75    Resp: 16  16    Temp: 98.8 °F (37.1 °C)  97.7 °F (36.5 °C)    TempSrc: Oral      SpO2: 93% 93% 91% 93%   Weight:       Height:         Physical Exam: 12/26/22     GEN -Awake acutely ill appearing female, sitting upright in bed , NAD. Normal body habitus. Appears given age. EYES -Pupils are equally round. No scleral erythema, discharge, or conjunctivitis. HENT -MM are moist. Oral pharynx without exudates, no evidence of thrush. NECK -Supple, no apparent thyromegaly or masses. RESP -coarse rhonchi/and expiratory wheezing bilaterally. Symmetric chest movement while on high flow supplemental oxygen. C/V -S1/S2 auscultated. RRR without appreciable M/R/G. No JVD or carotid bruits. Peripheral pulses equal bilaterally and palpable. No peripheral edema. GI -Abdomen is soft non distended and without significant tenderness. No masses or guarding. + BS Rectal exam deferred.  -No CVA tenderness. Quinones catheter is not present. LYMPH-No palpable cervical lymphadenopathy and no hepatosplenomegaly. No petechiae or ecchymoses. MS -No gross joint deformities. SKIN -Normal coloration, warm, dry. NEURO-Cranial nerves appear grossly intact, normal speech, no lateralizing weakness. PSYC-Awake, alert, oriented x 4. Appropriate affect.     Medications:   Medications:    predniSONE  40 mg Oral Daily    cefepime  2,000 mg IntraVENous Q12H    doxycycline (VIBRAMYCIN) IV  100 mg IntraVENous Q12H    guaiFENesin  600 mg Oral BID    aspirin  81 mg Oral Daily ipratropium-albuterol  1 ampule Inhalation Q4H WA    amiodarone  200 mg Oral Daily    amLODIPine  2.5 mg Oral Daily    apixaban  5 mg Oral BID    escitalopram  20 mg Oral Daily    pantoprazole  40 mg Oral QAM AC    sodium chloride flush  10 mL IntraVENous 2 times per day      Infusions:    sodium chloride 10 mL/hr at 12/26/22 1146     PRN Meds: guaiFENesin, 200 mg, Q4H PRN  ondansetron, 4 mg, Q6H PRN  sodium chloride flush, 10 mL, PRN  sodium chloride, , PRN  polyethylene glycol, 17 g, Daily PRN  acetaminophen, 650 mg, Q6H PRN   Or  acetaminophen, 650 mg, Q6H PRN  albuterol sulfate HFA, 2 puff, Q4H PRN        LABS  CBC   Recent Labs     12/25/22  0615 12/26/22  0400   WBC 10.2 17.5*   HGB 12.8 12.1*   HCT 37.3 35.7*    373      RENAL  Recent Labs     12/26/22  0400   *   K 4.0   CL 94*   CO2 25   BUN 15   CREATININE 0.8     LFT'S  No results for input(s): AST, ALT, ALB, BILIDIR, BILITOT, ALKPHOS in the last 72 hours. COAG  No results for input(s): INR in the last 72 hours. CARDIAC ENZYMES  No results for input(s): CKTOTAL, CKMB, CKMBINDEX, TROPONINI in the last 72 hours. Radiology this visit:  Reviewed. XR CHEST PORTABLE    Result Date: 12/25/2022  EXAMINATION: ONE XRAY VIEW OF THE CHEST 12/25/2022 8:23 am COMPARISON: 12/22/2022. HISTORY: ORDERING SYSTEM PROVIDED HISTORY: worsening respiratroy status overnight, eval pneumonia TECHNOLOGIST PROVIDED HISTORY: Reason for exam:->worsening respiratroy status overnight, eval pneumonia Initial evaluation. FINDINGS: The cardiac silhouette is within normal limits for size. Patchy opacities are seen within the lungs bilaterally. There is perhaps minimal worsening in the opacification within the right mid lung. No pleural effusion or pneumothorax. Patchy opacities within the lungs bilaterally, which appear minimally worsened within the right mid lung.      XR CHEST PORTABLE    Result Date: 12/22/2022  EXAMINATION: ONE XRAY VIEW OF THE CHEST 12/22/2022 7:21 pm COMPARISON: Chest x-ray and CT chest October 2, 2019 HISTORY: ORDERING SYSTEM PROVIDED HISTORY: chest pain TECHNOLOGIST PROVIDED HISTORY: Reason for exam:->chest pain Reason for Exam: chest pain FINDINGS: Cardiac silhouette appears stable. Chronic interstitial changes of the lungs with superimposed interstitial opacities and more focal airspace opacities at the periphery of the right mid lung and left lung base. Multifocal pneumonia of concern. No pleural effusion or pneumothorax. Osseous structures appear intact. 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 1. Focal airspace opacities of the right lateral mid lung and left lung base concerning for multifocal pneumonia in the appropriate clinical setting. This is superimposed on a background of chronic underlying lung changes. CTA PULMONARY W CONTRAST    Result Date: 12/23/2022  EXAMINATION: CTA OF THE CHEST 12/23/2022 8:26 am TECHNIQUE: CTA of the chest was performed after the administration of intravenous contrast.  Multiplanar reformatted images are provided for review. MIP images are provided for review. Automated exposure control, iterative reconstruction, and/or weight based adjustment of the mA/kV was utilized to reduce the radiation dose to as low as reasonably achievable. COMPARISON: 12/22/2022, 10/02/2019 HISTORY: ORDERING SYSTEM PROVIDED HISTORY: elevated d-dimer, hypoxia,  concerns of PE TECHNOLOGIST PROVIDED HISTORY: Reason for exam:->elevated d-dimer, hypoxia,  concerns of PE Reason for Exam: elevated d-dimer, hypoxia FINDINGS: Pulmonary Arteries: Pulmonary arteries are adequately opacified for evaluation. No evidence of intraluminal filling defect to suggest pulmonary embolism. Main pulmonary artery is normal in caliber. Mediastinum: No pericardial effusion. Small hiatal hernia. The esophagus is within normal limits. Mild mediastinal adenopathy. The thyroid is unremarkable. Coronary atherosclerosis. Lungs/pleura: Extensive patchy opacities are present in both lungs, with a mixed ground-glass and consolidative appearance. No pneumothorax. Trace bilateral effusions, right greater than left. The central airways are patent. Upper Abdomen: Limited images of the upper abdomen are unremarkable. Soft Tissues/Bones: No acute bone or soft tissue abnormality. 1. No evidence of pulmonary embolism. 2. Findings compatible with multifocal pneumonia, including atypical/viral etiologies. 3. Trace bilateral pleural effusions, right greater than left. 4. Mild mediastinal lymphadenopathy, which is nonspecific and may be reactive. 5. Coronary atherosclerosis.              Electronically signed by STEEVNSON Ballard CNP on 12/26/2022 at 11:49 AM

## 2022-12-27 ENCOUNTER — APPOINTMENT (OUTPATIENT)
Dept: GENERAL RADIOLOGY | Age: 87
DRG: 871 | End: 2022-12-27
Payer: MEDICARE

## 2022-12-27 LAB
ALBUMIN SERPL-MCNC: 2.7 GM/DL (ref 3.4–5)
ALP BLD-CCNC: 53 IU/L (ref 40–129)
ALT SERPL-CCNC: 43 U/L (ref 10–40)
ANION GAP SERPL CALCULATED.3IONS-SCNC: 8 MMOL/L (ref 4–16)
AST SERPL-CCNC: 49 IU/L (ref 15–37)
BILIRUB SERPL-MCNC: 0.5 MG/DL (ref 0–1)
BUN BLDV-MCNC: 16 MG/DL (ref 6–23)
CALCIUM SERPL-MCNC: 8.2 MG/DL (ref 8.3–10.6)
CHLORIDE BLD-SCNC: 99 MMOL/L (ref 99–110)
CO2: 25 MMOL/L (ref 21–32)
CREAT SERPL-MCNC: 0.9 MG/DL (ref 0.6–1.1)
CULTURE: NORMAL
CULTURE: NORMAL
GFR SERPL CREATININE-BSD FRML MDRD: >60 ML/MIN/1.73M2
GLUCOSE BLD-MCNC: 104 MG/DL (ref 70–99)
HCT VFR BLD CALC: 34.2 % (ref 37–47)
HEMOGLOBIN: 11.6 GM/DL (ref 12.5–16)
Lab: NORMAL
Lab: NORMAL
MCH RBC QN AUTO: 33.7 PG (ref 27–31)
MCHC RBC AUTO-ENTMCNC: 33.9 % (ref 32–36)
MCV RBC AUTO: 99.4 FL (ref 78–100)
PDW BLD-RTO: 12.5 % (ref 11.7–14.9)
PLATELET # BLD: 366 K/CU MM (ref 140–440)
PMV BLD AUTO: 8.4 FL (ref 7.5–11.1)
POTASSIUM SERPL-SCNC: 4.3 MMOL/L (ref 3.5–5.1)
PRO-BNP: 947.7 PG/ML
RBC # BLD: 3.44 M/CU MM (ref 4.2–5.4)
SODIUM BLD-SCNC: 132 MMOL/L (ref 135–145)
SPECIMEN: NORMAL
SPECIMEN: NORMAL
TOTAL PROTEIN: 5.9 GM/DL (ref 6.4–8.2)
WBC # BLD: 14.3 K/CU MM (ref 4–10.5)

## 2022-12-27 PROCEDURE — 94761 N-INVAS EAR/PLS OXIMETRY MLT: CPT

## 2022-12-27 PROCEDURE — 2580000003 HC RX 258: Performed by: INTERNAL MEDICINE

## 2022-12-27 PROCEDURE — 6360000002 HC RX W HCPCS: Performed by: NURSE PRACTITIONER

## 2022-12-27 PROCEDURE — 6370000000 HC RX 637 (ALT 250 FOR IP): Performed by: NURSE PRACTITIONER

## 2022-12-27 PROCEDURE — 2140000000 HC CCU INTERMEDIATE R&B

## 2022-12-27 PROCEDURE — 6360000002 HC RX W HCPCS: Performed by: INTERNAL MEDICINE

## 2022-12-27 PROCEDURE — 80053 COMPREHEN METABOLIC PANEL: CPT

## 2022-12-27 PROCEDURE — 2700000000 HC OXYGEN THERAPY PER DAY

## 2022-12-27 PROCEDURE — 71045 X-RAY EXAM CHEST 1 VIEW: CPT

## 2022-12-27 PROCEDURE — 2500000003 HC RX 250 WO HCPCS: Performed by: INTERNAL MEDICINE

## 2022-12-27 PROCEDURE — 94640 AIRWAY INHALATION TREATMENT: CPT

## 2022-12-27 PROCEDURE — 85027 COMPLETE CBC AUTOMATED: CPT

## 2022-12-27 PROCEDURE — 2580000003 HC RX 258: Performed by: NURSE PRACTITIONER

## 2022-12-27 PROCEDURE — 83880 ASSAY OF NATRIURETIC PEPTIDE: CPT

## 2022-12-27 RX ORDER — FUROSEMIDE 10 MG/ML
20 INJECTION INTRAMUSCULAR; INTRAVENOUS ONCE
Status: COMPLETED | OUTPATIENT
Start: 2022-12-27 | End: 2022-12-27

## 2022-12-27 RX ORDER — FUROSEMIDE 10 MG/ML
20 INJECTION INTRAMUSCULAR; INTRAVENOUS DAILY
Status: DISCONTINUED | OUTPATIENT
Start: 2022-12-28 | End: 2022-12-30 | Stop reason: HOSPADM

## 2022-12-27 RX ADMIN — SODIUM CHLORIDE: 9 INJECTION, SOLUTION INTRAVENOUS at 12:33

## 2022-12-27 RX ADMIN — GUAIFENESIN 600 MG: 600 TABLET, EXTENDED RELEASE ORAL at 08:06

## 2022-12-27 RX ADMIN — CEFEPIME 2000 MG: 2 INJECTION, POWDER, FOR SOLUTION INTRAVENOUS at 00:27

## 2022-12-27 RX ADMIN — AMLODIPINE BESYLATE 2.5 MG: 2.5 TABLET ORAL at 08:06

## 2022-12-27 RX ADMIN — PREDNISONE 40 MG: 20 TABLET ORAL at 08:06

## 2022-12-27 RX ADMIN — SODIUM CHLORIDE, PRESERVATIVE FREE 10 ML: 5 INJECTION INTRAVENOUS at 20:02

## 2022-12-27 RX ADMIN — IPRATROPIUM BROMIDE AND ALBUTEROL SULFATE 1 AMPULE: 2.5; .5 SOLUTION RESPIRATORY (INHALATION) at 19:35

## 2022-12-27 RX ADMIN — CEFEPIME 2000 MG: 2 INJECTION, POWDER, FOR SOLUTION INTRAVENOUS at 12:35

## 2022-12-27 RX ADMIN — MOMETASONE FUROATE AND FORMOTEROL FUMARATE DIHYDRATE 2 PUFF: 200; 5 AEROSOL RESPIRATORY (INHALATION) at 19:38

## 2022-12-27 RX ADMIN — DOXYCYCLINE 100 MG: 100 INJECTION, POWDER, LYOPHILIZED, FOR SOLUTION INTRAVENOUS at 08:15

## 2022-12-27 RX ADMIN — APIXABAN 5 MG: 5 TABLET, FILM COATED ORAL at 08:06

## 2022-12-27 RX ADMIN — AMIODARONE HYDROCHLORIDE 200 MG: 200 TABLET ORAL at 08:06

## 2022-12-27 RX ADMIN — IPRATROPIUM BROMIDE AND ALBUTEROL SULFATE 1 AMPULE: 2.5; .5 SOLUTION RESPIRATORY (INHALATION) at 14:42

## 2022-12-27 RX ADMIN — PANTOPRAZOLE SODIUM 40 MG: 40 TABLET, DELAYED RELEASE ORAL at 06:14

## 2022-12-27 RX ADMIN — SODIUM CHLORIDE 20 ML: 9 INJECTION, SOLUTION INTRAVENOUS at 08:14

## 2022-12-27 RX ADMIN — GUAIFENESIN 600 MG: 600 TABLET, EXTENDED RELEASE ORAL at 20:02

## 2022-12-27 RX ADMIN — ASPIRIN 81 MG: 81 TABLET, COATED ORAL at 08:06

## 2022-12-27 RX ADMIN — APIXABAN 5 MG: 5 TABLET, FILM COATED ORAL at 20:02

## 2022-12-27 RX ADMIN — ESCITALOPRAM 20 MG: 20 TABLET, FILM COATED ORAL at 08:06

## 2022-12-27 RX ADMIN — IPRATROPIUM BROMIDE AND ALBUTEROL SULFATE 1 AMPULE: 2.5; .5 SOLUTION RESPIRATORY (INHALATION) at 07:43

## 2022-12-27 RX ADMIN — SODIUM CHLORIDE, PRESERVATIVE FREE 10 ML: 5 INJECTION INTRAVENOUS at 08:07

## 2022-12-27 RX ADMIN — POLYETHYLENE GLYCOL 3350 17 G: 17 POWDER, FOR SOLUTION ORAL at 11:24

## 2022-12-27 RX ADMIN — FUROSEMIDE 20 MG: 10 INJECTION, SOLUTION INTRAMUSCULAR; INTRAVENOUS at 08:06

## 2022-12-27 RX ADMIN — SODIUM CHLORIDE 20 ML: 9 INJECTION, SOLUTION INTRAVENOUS at 00:27

## 2022-12-27 RX ADMIN — IPRATROPIUM BROMIDE AND ALBUTEROL SULFATE 1 AMPULE: 2.5; .5 SOLUTION RESPIRATORY (INHALATION) at 11:36

## 2022-12-27 ASSESSMENT — PAIN SCALES - GENERAL
PAINLEVEL_OUTOF10: 0
PAINLEVEL_OUTOF10: 0

## 2022-12-27 NOTE — PROGRESS NOTES
This RN has reviewed and agrees with Ruben Gonzales LPN's data collection and has collaborated with this LPN regarding the patient's care plan.

## 2022-12-27 NOTE — PLAN OF CARE
Problem: Discharge Planning  Goal: Discharge to home or other facility with appropriate resources  12/27/2022 0936 by Dasia Huggins RN  Outcome: Progressing  12/27/2022 0002 by Inge Green LPN  Outcome: Progressing  12/27/2022 0001 by Inge Green LPN  Outcome: Progressing     Problem: Pain  Goal: Verbalizes/displays adequate comfort level or baseline comfort level  12/27/2022 0936 by Dasia Huggins RN  Outcome: Progressing  12/27/2022 0002 by Inge Green LPN  Outcome: Progressing  12/27/2022 0001 by Inge Green LPN  Outcome: Progressing     Problem: ABCDS Injury Assessment  Goal: Absence of physical injury  12/27/2022 0936 by Dasia Huggins RN  Outcome: Progressing  12/27/2022 0002 by Inge Green LPN  Outcome: Progressing  12/27/2022 0001 by Inge Green LPN  Outcome: Progressing     Problem: Safety - Adult  Goal: Free from fall injury  Outcome: Progressing

## 2022-12-27 NOTE — PROGRESS NOTES
Hospitalist Progress Note      Name:  Vannessa Robert /Age/Sex: 1935  (81 y.o. female)   MRN & CSN:  [id number] & [id number] Admission Date/Time: 2022  6:57 PM   Location:   PCP: Connor Gunn, [facility] Day: 6                                               Attending Physician Dr. Alex Khan and Plan:   Vannessa Robert is a 81 y.o.  female  who presents with Pneumonia due to other specified infectious organisms    Acute hypoxemic respiratory failure 2/2 multifocal pneumonia   Repeat CXR (2022) Worsening aeration throughout both lungs predominantly involving the left lower lung zone. Increased oxygen requirement with hypoxic episode overnight   Antibiotics broadened : Cefepime day 3 (doxycycline stopped -day 3)   Vancomycin held due to low MRSA risk factors. Pending MRSA screening    Pending sputum culture   BLC NGTD, urine antigens negative, viral panel negative   Continue prednisone   Continue incentive spirometry//Acapella/pulmonary hygiene  Bronchodilators-scheduled duo nebs. Added ICS-Dulera   PRN symptom control    Increase activity as tolerated   Concern for hypervolemia Lasix 20 mg daily. BNP trending up since admission    Hyponatremia (132)   Improved. Continue to trend    Hypertension   Continue home antihypertensive regimen   Trends appear controlled today    Paroxysmal atrial fibrillation    [id number] Score: 4  Continue amiodarone/Eliquis   Continue telemetry    DEYANIRA/depression    Continue Lexapro        Diet ADULT DIET;  Regular; Low Fat/Low Chol/High Fiber/JAVI   DVT Prophylaxis [] Lovenox, []  Heparin, [] SCDs, [] Ambulation Eliquis   GI Prophylaxis [x] PPI,  [] H2 Blocker,  [] Carafate,  [] Diet/Tube Feeds   Code Status Full Code   Disposition Patient requires continued admission due to management of hypoxia     -Patient assessment and plan discussed with supervising physician-  Subjective 2022     Vannessa Robert is a 81 y.o.  female, who presented with  Shortness of Breath (C/o not feeling weak w/ no energy for 1 week . C/o wheezing in rt upper chest. Pt came into today feeling lethargic and SOB)    Per nursing hypoxia overnight requiring increased supplemental oxygen. Did not report any respiratory distress or acute symptomology. She does report increased exertional intolerance with mobility. Ten point ROS reviewed negative, unless as noted above    Objective: Intake/Output Summary (Last 24 hours) at 12/27/2022 1507  Last data filed at 12/27/2022 1129  Gross per 24 hour   Intake 600 ml   Output 800 ml   Net -200 ml        Vitals:   Vitals:    12/27/22 0806 12/27/22 0835 12/27/22 1136 12/27/22 1442   BP: (!) 117/48      Pulse:       Resp:       Temp:       TempSrc:       SpO2:  (!) 88% 92% 95%   Weight:       Height:         Physical Exam: 12/27/22     GEN -Awake acutely ill appearing female, sitting upright in bed , NAD. Normal body habitus. Appears given age. EYES -Pupils are equally round. No scleral erythema, discharge, or conjunctivitis. HENT -MM are moist. Oral pharynx without exudates, no evidence of thrush. NECK -Supple, no apparent thyromegaly or masses. RESP -coarse rhonchi/and expiratory wheezing bilaterally. Symmetric chest movement while on high flow supplemental oxygen. C/V -S1/S2 auscultated. RRR without appreciable M/R/G. No JVD or carotid bruits. Peripheral pulses equal bilaterally and palpable. No peripheral edema. GI -Abdomen is soft non distended and without significant tenderness. No masses or guarding. + BS Rectal exam deferred.  -No CVA tenderness. Quinones catheter is not present. LYMPH-No palpable cervical lymphadenopathy and no hepatosplenomegaly. No petechiae or ecchymoses. MS -No gross joint deformities. SKIN -Normal coloration, warm, dry. NEURO-Cranial nerves appear grossly intact, normal speech, no lateralizing weakness. PSYC-Awake, alert, oriented x 4.   Appropriate affect. Medications:   Medications:    predniSONE  40 mg Oral Daily    cefepime  2,000 mg IntraVENous Q12H    guaiFENesin  600 mg Oral BID    aspirin  81 mg Oral Daily    ipratropium-albuterol  1 ampule Inhalation Q4H WA    amiodarone  200 mg Oral Daily    amLODIPine  2.5 mg Oral Daily    apixaban  5 mg Oral BID    escitalopram  20 mg Oral Daily    pantoprazole  40 mg Oral QAM AC    sodium chloride flush  10 mL IntraVENous 2 times per day      Infusions:    sodium chloride 25 mL/hr at 12/27/22 1233     PRN Meds: ondansetron, 4 mg, Q6H PRN  guaiFENesin, 200 mg, Q4H PRN  sodium chloride flush, 10 mL, PRN  sodium chloride, , PRN  polyethylene glycol, 17 g, Daily PRN  acetaminophen, 650 mg, Q6H PRN   Or  acetaminophen, 650 mg, Q6H PRN  albuterol sulfate HFA, 2 puff, Q4H PRN      LABS  CBC   Recent Labs     12/25/22  0615 12/26/22  0400 12/27/22  0615   WBC 10.2 17.5* 14.3*   HGB 12.8 12.1* 11.6*   HCT 37.3 35.7* 34.2*    373 366        RENAL  Recent Labs     12/26/22  0400 12/27/22  0615   * 132*   K 4.0 4.3   CL 94* 99   CO2 25 25   BUN 15 16   CREATININE 0.8 0.9       LFT'S  Recent Labs     12/27/22  0615   AST 49*   ALT 43*   BILITOT 0.5   ALKPHOS 53     COAG  No results for input(s): INR in the last 72 hours. CARDIAC ENZYMES  No results for input(s): CKTOTAL, CKMB, CKMBINDEX, TROPONINI in the last 72 hours. Radiology this visit:  Reviewed. XR CHEST PORTABLE    Result Date: 12/25/2022  EXAMINATION: ONE XRAY VIEW OF THE CHEST 12/25/2022 8:23 am COMPARISON: 12/22/2022. HISTORY: ORDERING SYSTEM PROVIDED HISTORY: worsening respiratroy status overnight, eval pneumonia TECHNOLOGIST PROVIDED HISTORY: Reason for exam:->worsening respiratroy status overnight, eval pneumonia Initial evaluation. FINDINGS: The cardiac silhouette is within normal limits for size. Patchy opacities are seen within the lungs bilaterally. There is perhaps minimal worsening in the opacification within the right mid lung.   No pleural effusion or pneumothorax. Patchy opacities within the lungs bilaterally, which appear minimally worsened within the right mid lung. XR CHEST PORTABLE    Result Date: 12/22/2022  EXAMINATION: ONE XRAY VIEW OF THE CHEST 12/22/2022 7:21 pm COMPARISON: Chest x-ray and CT chest October 2, 2019 HISTORY: ORDERING SYSTEM PROVIDED HISTORY: chest pain TECHNOLOGIST PROVIDED HISTORY: Reason for exam:->chest pain Reason for Exam: chest pain FINDINGS: Cardiac silhouette appears stable. Chronic interstitial changes of the lungs with superimposed interstitial opacities and more focal airspace opacities at the periphery of the right mid lung and left lung base. Multifocal pneumonia of concern. No pleural effusion or pneumothorax. Osseous structures appear intact. 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 1. Focal airspace opacities of the right lateral mid lung and left lung base concerning for multifocal pneumonia in the appropriate clinical setting. This is superimposed on a background of chronic underlying lung changes. CTA PULMONARY W CONTRAST    Result Date: 12/23/2022  EXAMINATION: CTA OF THE CHEST 12/23/2022 8:26 am TECHNIQUE: CTA of the chest was performed after the administration of intravenous contrast.  Multiplanar reformatted images are provided for review. MIP images are provided for review. Automated exposure control, iterative reconstruction, and/or weight based adjustment of the mA/kV was utilized to reduce the radiation dose to as low as reasonably achievable. COMPARISON: 12/22/2022, 10/02/2019 HISTORY: ORDERING SYSTEM PROVIDED HISTORY: elevated d-dimer, hypoxia,  concerns of PE TECHNOLOGIST PROVIDED HISTORY: Reason for exam:->elevated d-dimer, hypoxia,  concerns of PE Reason for Exam: elevated d-dimer, hypoxia FINDINGS: Pulmonary Arteries: Pulmonary arteries are adequately opacified for evaluation.   No evidence of intraluminal filling defect to suggest pulmonary embolism. Main pulmonary artery is normal in caliber. Mediastinum: No pericardial effusion. Small hiatal hernia. The esophagus is within normal limits. Mild mediastinal adenopathy. The thyroid is unremarkable. Coronary atherosclerosis. Lungs/pleura: Extensive patchy opacities are present in both lungs, with a mixed ground-glass and consolidative appearance. No pneumothorax. Trace bilateral effusions, right greater than left. The central airways are patent. Upper Abdomen: Limited images of the upper abdomen are unremarkable. Soft Tissues/Bones: No acute bone or soft tissue abnormality. 1. No evidence of pulmonary embolism. 2. Findings compatible with multifocal pneumonia, including atypical/viral etiologies. 3. Trace bilateral pleural effusions, right greater than left. 4. Mild mediastinal lymphadenopathy, which is nonspecific and may be reactive. 5. Coronary atherosclerosis.        Electronically signed by STEVENSON Virk CNP on 12/27/2022 at 3:07 PM

## 2022-12-27 NOTE — PLAN OF CARE
Problem: Discharge Planning  Goal: Discharge to home or other facility with appropriate resources  12/27/2022 0002 by Jamie Contreras LPN  Outcome: Progressing  12/27/2022 0001 by Jamie Contreras LPN  Outcome: Progressing     Problem: Pain  Goal: Verbalizes/displays adequate comfort level or baseline comfort level  12/27/2022 0002 by Jamie Contreras LPN  Outcome: Progressing  12/27/2022 0001 by Jamie Contreras LPN  Outcome: Progressing     Problem: ABCDS Injury Assessment  Goal: Absence of physical injury  12/27/2022 0002 by Jamie Contreras LPN  Outcome: Progressing  12/27/2022 0001 by Jamie Contreras LPN  Outcome: Progressing

## 2022-12-28 ENCOUNTER — APPOINTMENT (OUTPATIENT)
Dept: CT IMAGING | Age: 87
DRG: 871 | End: 2022-12-28
Payer: MEDICARE

## 2022-12-28 LAB
ADENOVIRUS DETECTION BY PCR: NOT DETECTED
ALBUMIN SERPL-MCNC: 3 GM/DL (ref 3.4–5)
ALP BLD-CCNC: 61 IU/L (ref 40–129)
ALT SERPL-CCNC: 59 U/L (ref 10–40)
ANION GAP SERPL CALCULATED.3IONS-SCNC: 10 MMOL/L (ref 4–16)
AST SERPL-CCNC: 59 IU/L (ref 15–37)
BASE EXCESS MIXED: 0.9 (ref 0–2.3)
BASE EXCESS: ABNORMAL (ref 0–2.4)
BILIRUB SERPL-MCNC: 0.6 MG/DL (ref 0–1)
BORDETELLA PARAPERTUSSIS BY PCR: NOT DETECTED
BORDETELLA PERTUSSIS PCR: NOT DETECTED
BUN BLDV-MCNC: 20 MG/DL (ref 6–23)
CALCIUM SERPL-MCNC: 8.2 MG/DL (ref 8.3–10.6)
CHLAMYDOPHILA PNEUMONIA PCR: NOT DETECTED
CHLORIDE BLD-SCNC: 97 MMOL/L (ref 99–110)
CO2 CONTENT: 25.5 MMOL/L (ref 19–24)
CO2: 27 MMOL/L (ref 21–32)
CORONAVIRUS 229E PCR: NOT DETECTED
CORONAVIRUS HKU1 PCR: NOT DETECTED
CORONAVIRUS NL63 PCR: NOT DETECTED
CORONAVIRUS OC43 PCR: NOT DETECTED
CREAT SERPL-MCNC: 0.9 MG/DL (ref 0.6–1.1)
CULTURE: NORMAL
EKG ATRIAL RATE: 84 BPM
EKG DIAGNOSIS: NORMAL
EKG P AXIS: 65 DEGREES
EKG P-R INTERVAL: 164 MS
EKG Q-T INTERVAL: 310 MS
EKG QRS DURATION: 86 MS
EKG QTC CALCULATION (BAZETT): 366 MS
EKG R AXIS: -2 DEGREES
EKG T AXIS: 44 DEGREES
EKG VENTRICULAR RATE: 84 BPM
GFR SERPL CREATININE-BSD FRML MDRD: >60 ML/MIN/1.73M2
GLUCOSE BLD-MCNC: 91 MG/DL (ref 70–99)
HCO3 VENOUS: 24.4 MMOL/L (ref 19–25)
HCT VFR BLD CALC: 36.7 % (ref 37–47)
HEMOGLOBIN: 12.3 GM/DL (ref 12.5–16)
HIGH SENSITIVE C-REACTIVE PROTEIN: 103.7 MG/L (ref 0–5)
HUMAN METAPNEUMOVIRUS PCR: NOT DETECTED
INFLUENZA A BY PCR: NOT DETECTED
INFLUENZA A H1 (2009) PCR: NOT DETECTED
INFLUENZA A H1 PANDEMIC PCR: NOT DETECTED
INFLUENZA A H3 PCR: NOT DETECTED
INFLUENZA B BY PCR: NOT DETECTED
LV EF: 58 %
LVEF MODALITY: NORMAL
Lab: NORMAL
MCH RBC QN AUTO: 33.4 PG (ref 27–31)
MCHC RBC AUTO-ENTMCNC: 33.5 % (ref 32–36)
MCV RBC AUTO: 99.7 FL (ref 78–100)
MYCOPLASMA PNEUMONIAE PCR: NOT DETECTED
O2 SAT, VEN: 91.2 % (ref 50–70)
PARAINFLUENZA 1 PCR: NOT DETECTED
PARAINFLUENZA 2 PCR: NOT DETECTED
PARAINFLUENZA 3 PCR: NOT DETECTED
PARAINFLUENZA 4 PCR: NOT DETECTED
PCO2, VEN: 34.8 MMHG (ref 38–52)
PDW BLD-RTO: 12.4 % (ref 11.7–14.9)
PH VENOUS: 7.45 (ref 7.32–7.42)
PLATELET # BLD: 408 K/CU MM (ref 140–440)
PMV BLD AUTO: 8.5 FL (ref 7.5–11.1)
PO2, VEN: 58 MMHG (ref 28–48)
POTASSIUM SERPL-SCNC: 4 MMOL/L (ref 3.5–5.1)
PROCALCITONIN: 0.09
RBC # BLD: 3.68 M/CU MM (ref 4.2–5.4)
REASON FOR REJECTION: NORMAL
REJECTED TEST: NORMAL
RHINOVIRUS ENTEROVIRUS PCR: NOT DETECTED
RSV PCR: NOT DETECTED
SARS-COV-2: NOT DETECTED
SODIUM BLD-SCNC: 134 MMOL/L (ref 135–145)
SOURCE, BLOOD GAS: ABNORMAL
SPECIMEN: NORMAL
TOTAL PROTEIN: 5.6 GM/DL (ref 6.4–8.2)
WBC # BLD: 15.4 K/CU MM (ref 4–10.5)

## 2022-12-28 PROCEDURE — 6360000004 HC RX CONTRAST MEDICATION: Performed by: INTERNAL MEDICINE

## 2022-12-28 PROCEDURE — 94640 AIRWAY INHALATION TREATMENT: CPT

## 2022-12-28 PROCEDURE — 86141 C-REACTIVE PROTEIN HS: CPT

## 2022-12-28 PROCEDURE — 85027 COMPLETE CBC AUTOMATED: CPT

## 2022-12-28 PROCEDURE — 84145 PROCALCITONIN (PCT): CPT

## 2022-12-28 PROCEDURE — 80053 COMPREHEN METABOLIC PANEL: CPT

## 2022-12-28 PROCEDURE — 71275 CT ANGIOGRAPHY CHEST: CPT

## 2022-12-28 PROCEDURE — 2580000003 HC RX 258: Performed by: NURSE PRACTITIONER

## 2022-12-28 PROCEDURE — 6370000000 HC RX 637 (ALT 250 FOR IP): Performed by: NURSE PRACTITIONER

## 2022-12-28 PROCEDURE — 0202U NFCT DS 22 TRGT SARS-COV-2: CPT

## 2022-12-28 PROCEDURE — 6360000002 HC RX W HCPCS: Performed by: NURSE PRACTITIONER

## 2022-12-28 PROCEDURE — 2140000000 HC CCU INTERMEDIATE R&B

## 2022-12-28 PROCEDURE — 82800 BLOOD PH: CPT

## 2022-12-28 PROCEDURE — 36415 COLL VENOUS BLD VENIPUNCTURE: CPT

## 2022-12-28 PROCEDURE — 2700000000 HC OXYGEN THERAPY PER DAY

## 2022-12-28 PROCEDURE — 6360000002 HC RX W HCPCS: Performed by: INTERNAL MEDICINE

## 2022-12-28 PROCEDURE — 94761 N-INVAS EAR/PLS OXIMETRY MLT: CPT

## 2022-12-28 PROCEDURE — 93306 TTE W/DOPPLER COMPLETE: CPT

## 2022-12-28 PROCEDURE — 2580000003 HC RX 258: Performed by: INTERNAL MEDICINE

## 2022-12-28 RX ADMIN — CEFEPIME 2000 MG: 2 INJECTION, POWDER, FOR SOLUTION INTRAVENOUS at 01:03

## 2022-12-28 RX ADMIN — IOPAMIDOL 75 ML: 755 INJECTION, SOLUTION INTRAVENOUS at 12:23

## 2022-12-28 RX ADMIN — SODIUM CHLORIDE: 9 INJECTION, SOLUTION INTRAVENOUS at 14:22

## 2022-12-28 RX ADMIN — GUAIFENESIN 600 MG: 600 TABLET, EXTENDED RELEASE ORAL at 20:25

## 2022-12-28 RX ADMIN — CEFEPIME 2000 MG: 2 INJECTION, POWDER, FOR SOLUTION INTRAVENOUS at 14:23

## 2022-12-28 RX ADMIN — SODIUM CHLORIDE 20 ML: 9 INJECTION, SOLUTION INTRAVENOUS at 01:03

## 2022-12-28 RX ADMIN — IPRATROPIUM BROMIDE AND ALBUTEROL SULFATE 1 AMPULE: 2.5; .5 SOLUTION RESPIRATORY (INHALATION) at 11:04

## 2022-12-28 RX ADMIN — IPRATROPIUM BROMIDE AND ALBUTEROL SULFATE 1 AMPULE: 2.5; .5 SOLUTION RESPIRATORY (INHALATION) at 20:32

## 2022-12-28 RX ADMIN — IPRATROPIUM BROMIDE AND ALBUTEROL SULFATE 1 AMPULE: 2.5; .5 SOLUTION RESPIRATORY (INHALATION) at 15:07

## 2022-12-28 RX ADMIN — IPRATROPIUM BROMIDE AND ALBUTEROL SULFATE 1 AMPULE: 2.5; .5 SOLUTION RESPIRATORY (INHALATION) at 07:14

## 2022-12-28 RX ADMIN — APIXABAN 5 MG: 5 TABLET, FILM COATED ORAL at 08:52

## 2022-12-28 RX ADMIN — ESCITALOPRAM 20 MG: 20 TABLET, FILM COATED ORAL at 08:52

## 2022-12-28 RX ADMIN — GUAIFENESIN 600 MG: 600 TABLET, EXTENDED RELEASE ORAL at 08:52

## 2022-12-28 RX ADMIN — MOMETASONE FUROATE AND FORMOTEROL FUMARATE DIHYDRATE 2 PUFF: 200; 5 AEROSOL RESPIRATORY (INHALATION) at 07:12

## 2022-12-28 RX ADMIN — AMIODARONE HYDROCHLORIDE 200 MG: 200 TABLET ORAL at 08:52

## 2022-12-28 RX ADMIN — FUROSEMIDE 20 MG: 10 INJECTION, SOLUTION INTRAMUSCULAR; INTRAVENOUS at 08:51

## 2022-12-28 RX ADMIN — SODIUM CHLORIDE, PRESERVATIVE FREE 10 ML: 5 INJECTION INTRAVENOUS at 20:25

## 2022-12-28 RX ADMIN — SODIUM CHLORIDE, PRESERVATIVE FREE 10 ML: 5 INJECTION INTRAVENOUS at 08:52

## 2022-12-28 RX ADMIN — ASPIRIN 81 MG: 81 TABLET, COATED ORAL at 08:52

## 2022-12-28 RX ADMIN — AMLODIPINE BESYLATE 2.5 MG: 2.5 TABLET ORAL at 08:52

## 2022-12-28 RX ADMIN — PANTOPRAZOLE SODIUM 40 MG: 40 TABLET, DELAYED RELEASE ORAL at 05:23

## 2022-12-28 RX ADMIN — APIXABAN 5 MG: 5 TABLET, FILM COATED ORAL at 20:25

## 2022-12-28 RX ADMIN — MOMETASONE FUROATE AND FORMOTEROL FUMARATE DIHYDRATE 2 PUFF: 200; 5 AEROSOL RESPIRATORY (INHALATION) at 20:40

## 2022-12-28 RX ADMIN — PREDNISONE 40 MG: 20 TABLET ORAL at 08:51

## 2022-12-28 ASSESSMENT — PAIN SCALES - GENERAL: PAINLEVEL_OUTOF10: 0

## 2022-12-28 NOTE — PLAN OF CARE
Problem: Discharge Planning  Goal: Discharge to home or other facility with appropriate resources  12/27/2022 2238 by Tracie Hashimoto, LPN  Outcome: Progressing  12/27/2022 0936 by Wendi Velasco RN  Outcome: Progressing     Problem: Pain  Goal: Verbalizes/displays adequate comfort level or baseline comfort level  12/27/2022 2238 by Tracie Hashimoto, LPN  Outcome: Progressing  12/27/2022 0936 by Wendi Velasco RN  Outcome: Progressing     Problem: ABCDS Injury Assessment  Goal: Absence of physical injury  12/27/2022 2238 by Tracie Hashimoto, LPN  Outcome: Progressing  12/27/2022 0936 by Wendi Velasco RN  Outcome: Progressing     Problem: Safety - Adult  Goal: Free from fall injury  12/27/2022 2238 by Tracie Hashimoto, LPN  Outcome: Progressing  12/27/2022 0936 by Wendi Velasco RN  Outcome: Progressing

## 2022-12-28 NOTE — PROGRESS NOTES
Hospitalist Progress Note      Name:  Katerina Gibson /Age/Sex: 1935  (81 y.o. female)   MRN & CSN:  [id number] & [id number] Admission Date/Time: 2022  6:57 PM   Location:  008 PCP: Kristen Stockton, [facility] Day: 7                                               Attending Physician Dr. Roula Gonzláes and Plan:   Katerina Gibson is a 81 y.o.  female  who presents with Pneumonia due to other specified infectious organisms    Acute hypoxemic respiratory failure 2/2 multifocal pneumonia   Repeat CXR (2022) Worsening aeration throughout both lungs predominantly involving the left lower lung zone. CTA chest () no evidence of PE. Extensive mixed groundglass and consolidative opacities, significant increase from . Small bilateral pleural effusions  Sputum culture NGTD  Procal 0.093/.7, BLC NGTD, urine antigens negative, viral panel negative    Continues to require increased oxygen   Antibiotics broadened : Cefepime day 4 (doxycycline stopped -day 3)   Vancomycin held due to low MRSA risk factors. MRSA nasal screening-negative    Continue prednisone   Continue incentive spirometry//Acapella/pulmonary hygiene  Continue bronchodilators/ICS   PRN symptom control    Increase activity as tolerated   Concern for hypervolemia Lasix 20 mg daily. BNP trending up since admission   TTE () EF 55 to 60%, mild LVH, trace AR, mild PA   Will consider transfer to [facility] if not improving however, patient does not appear distressed      Hyponatremia (134)    Improved. Continue to trend    Hypertension   Continue home antihypertensive regimen   Trends appear controlled today    Paroxysmal atrial fibrillation    [id number] Score: 4  Continue amiodarone/Eliquis   Continue telemetry    DEYANIRA/depression    Continue Lexapro        Diet ADULT DIET;  Regular; Low Fat/Low Chol/High Fiber/JAVI   DVT Prophylaxis [] Lovenox, []  Heparin, [] SCDs, [] Ambulation Eliquis   GI Prophylaxis [x] PPI,  [] H2 Blocker,  [] Carafate,  [] Diet/Tube Feeds   Code Status Full Code   Disposition Patient requires continued admission due to management of hypoxia     -Patient assessment and plan discussed with supervising physician-  Subjective 12/28/2022     Vel Romero is a 81 y.o.  female, who presented with  Shortness of Breath (C/o not feeling weak w/ no energy for 1 week . C/o wheezing in rt upper chest. Pt came into today feeling lethargic and SOB)    Patient seen examined at bedside. Resting comfortably. Reports she does have a productive cough but able to expectorate sputum. Denies chest pain. Reports feeling overall improved however still says short of breath with exertion      Ten point ROS reviewed negative, unless as noted above    Objective: Intake/Output Summary (Last 24 hours) at 12/28/2022 1348  Last data filed at 12/28/2022 1243  Gross per 24 hour   Intake 840 ml   Output 1300 ml   Net -460 ml        Vitals:   Vitals:    12/28/22 0548 12/28/22 0716 12/28/22 0849 12/28/22 1106   BP:   (!) 113/43    Pulse:   80    Resp:   16    Temp:   98.1 °F (36.7 °C)    TempSrc:   Oral    SpO2:  92% 91% (!) 88%   Weight: 172 lb 6.4 oz (78.2 kg)      Height:         Physical Exam: 12/28/22     GEN -Awake acutely ill appearing female, sitting upright in bed , NAD. Normal body habitus. Appears given age. EYES -Pupils are equally round. No scleral erythema, discharge, or conjunctivitis. HENT -MM are moist. Oral pharynx without exudates, no evidence of thrush. NECK -Supple, no apparent thyromegaly or masses. RESP -rhonchi with end expiratory wheezing. Symmetric chest movement while on high flow supplemental oxygen. C/V -S1/S2 auscultated. RRR without appreciable M/R/G. No JVD or carotid bruits. Peripheral pulses equal bilaterally and palpable. No peripheral edema. GI -Abdomen is soft non distended and without significant tenderness.  No masses or guarding. + BS Rectal exam deferred.  -No CVA tenderness. Quinones catheter is not present. LYMPH-No palpable cervical lymphadenopathy and no hepatosplenomegaly. No petechiae or ecchymoses. MS -No gross joint deformities. SKIN -Normal coloration, warm, dry. NEURO-Cranial nerves appear grossly intact, normal speech, no lateralizing weakness. PSYC-Awake, alert, oriented x 4. Appropriate affect. Medications:   Medications:    mometasone-formoterol  2 puff Inhalation BID    furosemide  20 mg IntraVENous Daily    predniSONE  40 mg Oral Daily    cefepime  2,000 mg IntraVENous Q12H    guaiFENesin  600 mg Oral BID    aspirin  81 mg Oral Daily    ipratropium-albuterol  1 ampule Inhalation Q4H WA    amiodarone  200 mg Oral Daily    amLODIPine  2.5 mg Oral Daily    apixaban  5 mg Oral BID    escitalopram  20 mg Oral Daily    pantoprazole  40 mg Oral QAM AC    sodium chloride flush  10 mL IntraVENous 2 times per day      Infusions:    sodium chloride 20 mL (12/28/22 0103)     PRN Meds: ondansetron, 4 mg, Q6H PRN  guaiFENesin, 200 mg, Q4H PRN  sodium chloride flush, 10 mL, PRN  sodium chloride, , PRN  polyethylene glycol, 17 g, Daily PRN  acetaminophen, 650 mg, Q6H PRN   Or  acetaminophen, 650 mg, Q6H PRN  albuterol sulfate HFA, 2 puff, Q4H PRN      LABS  CBC   Recent Labs     12/26/22  0400 12/27/22  0615 12/28/22  0730   WBC 17.5* 14.3* 15.4*   HGB 12.1* 11.6* 12.3*   HCT 35.7* 34.2* 36.7*    366 408        RENAL  Recent Labs     12/26/22  0400 12/27/22  0615 12/28/22  0730   * 132* 134*   K 4.0 4.3 4.0   CL 94* 99 97*   CO2 25 25 27   BUN 15 16 20   CREATININE 0.8 0.9 0.9       LFT'S  Recent Labs     12/27/22  0615 12/28/22  0730   AST 49* 59*   ALT 43* 59*   BILITOT 0.5 0.6   ALKPHOS 53 61       COAG  No results for input(s): INR in the last 72 hours. CARDIAC ENZYMES  No results for input(s): CKTOTAL, CKMB, CKMBINDEX, TROPONINI in the last 72 hours. Radiology this visit:  Reviewed.     XR CHEST PORTABLE    Result Date: 12/25/2022  EXAMINATION: ONE XRAY VIEW OF THE CHEST 12/25/2022 8:23 am COMPARISON: 12/22/2022. HISTORY: ORDERING SYSTEM PROVIDED HISTORY: worsening respiratroy status overnight, eval pneumonia TECHNOLOGIST PROVIDED HISTORY: Reason for exam:->worsening respiratroy status overnight, eval pneumonia Initial evaluation. FINDINGS: The cardiac silhouette is within normal limits for size. Patchy opacities are seen within the lungs bilaterally. There is perhaps minimal worsening in the opacification within the right mid lung. No pleural effusion or pneumothorax. Patchy opacities within the lungs bilaterally, which appear minimally worsened within the right mid lung. XR CHEST PORTABLE    Result Date: 12/22/2022  EXAMINATION: ONE XRAY VIEW OF THE CHEST 12/22/2022 7:21 pm COMPARISON: Chest x-ray and CT chest October 2, 2019 HISTORY: ORDERING SYSTEM PROVIDED HISTORY: chest pain TECHNOLOGIST PROVIDED HISTORY: Reason for exam:->chest pain Reason for Exam: chest pain FINDINGS: Cardiac silhouette appears stable. Chronic interstitial changes of the lungs with superimposed interstitial opacities and more focal airspace opacities at the periphery of the right mid lung and left lung base. Multifocal pneumonia of concern. No pleural effusion or pneumothorax. Osseous structures appear intact. 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 1. Focal airspace opacities of the right lateral mid lung and left lung base concerning for multifocal pneumonia in the appropriate clinical setting. This is superimposed on a background of chronic underlying lung changes. CTA PULMONARY W CONTRAST    Result Date: 12/23/2022  EXAMINATION: CTA OF THE CHEST 12/23/2022 8:26 am TECHNIQUE: CTA of the chest was performed after the administration of intravenous contrast.  Multiplanar reformatted images are provided for review. MIP images are provided for review.  Automated exposure control, iterative reconstruction, and/or weight based adjustment of the mA/kV was utilized to reduce the radiation dose to as low as reasonably achievable. COMPARISON: 12/22/2022, 10/02/2019 HISTORY: ORDERING SYSTEM PROVIDED HISTORY: elevated d-dimer, hypoxia,  concerns of PE TECHNOLOGIST PROVIDED HISTORY: Reason for exam:->elevated d-dimer, hypoxia,  concerns of PE Reason for Exam: elevated d-dimer, hypoxia FINDINGS: Pulmonary Arteries: Pulmonary arteries are adequately opacified for evaluation. No evidence of intraluminal filling defect to suggest pulmonary embolism. Main pulmonary artery is normal in caliber. Mediastinum: No pericardial effusion. Small hiatal hernia. The esophagus is within normal limits. Mild mediastinal adenopathy. The thyroid is unremarkable. Coronary atherosclerosis. Lungs/pleura: Extensive patchy opacities are present in both lungs, with a mixed ground-glass and consolidative appearance. No pneumothorax. Trace bilateral effusions, right greater than left. The central airways are patent. Upper Abdomen: Limited images of the upper abdomen are unremarkable. Soft Tissues/Bones: No acute bone or soft tissue abnormality. 1. No evidence of pulmonary embolism. 2. Findings compatible with multifocal pneumonia, including atypical/viral etiologies. 3. Trace bilateral pleural effusions, right greater than left. 4. Mild mediastinal lymphadenopathy, which is nonspecific and may be reactive. 5. Coronary atherosclerosis.        Electronically signed by STEVENSON Cornell CNP on 12/28/2022 at 1:48 PM

## 2022-12-29 LAB
ANION GAP SERPL CALCULATED.3IONS-SCNC: 10 MMOL/L (ref 4–16)
BUN BLDV-MCNC: 21 MG/DL (ref 6–23)
CALCIUM SERPL-MCNC: 8.5 MG/DL (ref 8.3–10.6)
CHLORIDE BLD-SCNC: 94 MMOL/L (ref 99–110)
CO2: 27 MMOL/L (ref 21–32)
CREAT SERPL-MCNC: 0.9 MG/DL (ref 0.6–1.1)
CULTURE: NORMAL
GFR SERPL CREATININE-BSD FRML MDRD: >60 ML/MIN/1.73M2
GLUCOSE BLD-MCNC: 118 MG/DL (ref 70–99)
HCT VFR BLD CALC: 35.5 % (ref 37–47)
HEMOGLOBIN: 11.8 GM/DL (ref 12.5–16)
HIGH SENSITIVE C-REACTIVE PROTEIN: 157.8 MG/L (ref 0–5)
Lab: NORMAL
MAGNESIUM: 2.1 MG/DL (ref 1.8–2.4)
MCH RBC QN AUTO: 32.8 PG (ref 27–31)
MCHC RBC AUTO-ENTMCNC: 33.2 % (ref 32–36)
MCV RBC AUTO: 98.6 FL (ref 78–100)
PDW BLD-RTO: 12.4 % (ref 11.7–14.9)
PH VENOUS: 7.44 (ref 7.32–7.42)
PLATELET # BLD: 384 K/CU MM (ref 140–440)
PMV BLD AUTO: 8.4 FL (ref 7.5–11.1)
POTASSIUM SERPL-SCNC: 3.5 MMOL/L (ref 3.5–5.1)
PRO-BNP: 790.9 PG/ML
PROCALCITONIN: 0.1
RBC # BLD: 3.6 M/CU MM (ref 4.2–5.4)
SODIUM BLD-SCNC: 131 MMOL/L (ref 135–145)
SPECIMEN: NORMAL
WBC # BLD: 16 K/CU MM (ref 4–10.5)

## 2022-12-29 PROCEDURE — 80048 BASIC METABOLIC PNL TOTAL CA: CPT

## 2022-12-29 PROCEDURE — 97530 THERAPEUTIC ACTIVITIES: CPT

## 2022-12-29 PROCEDURE — 2580000003 HC RX 258: Performed by: NURSE PRACTITIONER

## 2022-12-29 PROCEDURE — 83735 ASSAY OF MAGNESIUM: CPT

## 2022-12-29 PROCEDURE — 85027 COMPLETE CBC AUTOMATED: CPT

## 2022-12-29 PROCEDURE — 2140000000 HC CCU INTERMEDIATE R&B

## 2022-12-29 PROCEDURE — 97162 PT EVAL MOD COMPLEX 30 MIN: CPT

## 2022-12-29 PROCEDURE — 82800 BLOOD PH: CPT

## 2022-12-29 PROCEDURE — 83880 ASSAY OF NATRIURETIC PEPTIDE: CPT

## 2022-12-29 PROCEDURE — 6370000000 HC RX 637 (ALT 250 FOR IP): Performed by: NURSE PRACTITIONER

## 2022-12-29 PROCEDURE — 6360000002 HC RX W HCPCS: Performed by: NURSE PRACTITIONER

## 2022-12-29 PROCEDURE — 94640 AIRWAY INHALATION TREATMENT: CPT

## 2022-12-29 PROCEDURE — 2580000003 HC RX 258: Performed by: INTERNAL MEDICINE

## 2022-12-29 PROCEDURE — 84145 PROCALCITONIN (PCT): CPT

## 2022-12-29 PROCEDURE — 86141 C-REACTIVE PROTEIN HS: CPT

## 2022-12-29 PROCEDURE — 6360000002 HC RX W HCPCS: Performed by: INTERNAL MEDICINE

## 2022-12-29 PROCEDURE — 94761 N-INVAS EAR/PLS OXIMETRY MLT: CPT

## 2022-12-29 RX ORDER — DIPHENHYDRAMINE HYDROCHLORIDE 50 MG/ML
50 INJECTION INTRAMUSCULAR; INTRAVENOUS ONCE
Status: COMPLETED | OUTPATIENT
Start: 2022-12-29 | End: 2022-12-29

## 2022-12-29 RX ORDER — LORAZEPAM 2 MG/ML
0.5 INJECTION INTRAMUSCULAR ONCE
Status: COMPLETED | OUTPATIENT
Start: 2022-12-29 | End: 2022-12-29

## 2022-12-29 RX ORDER — TRAZODONE HYDROCHLORIDE 50 MG/1
50 TABLET ORAL NIGHTLY
Status: DISCONTINUED | OUTPATIENT
Start: 2022-12-29 | End: 2022-12-30 | Stop reason: HOSPADM

## 2022-12-29 RX ADMIN — MOMETASONE FUROATE AND FORMOTEROL FUMARATE DIHYDRATE 2 PUFF: 200; 5 AEROSOL RESPIRATORY (INHALATION) at 07:30

## 2022-12-29 RX ADMIN — GUAIFENESIN 600 MG: 600 TABLET, EXTENDED RELEASE ORAL at 21:14

## 2022-12-29 RX ADMIN — SODIUM CHLORIDE, PRESERVATIVE FREE 10 ML: 5 INJECTION INTRAVENOUS at 09:04

## 2022-12-29 RX ADMIN — CEFEPIME 2000 MG: 2 INJECTION, POWDER, FOR SOLUTION INTRAVENOUS at 12:06

## 2022-12-29 RX ADMIN — PREDNISONE 40 MG: 20 TABLET ORAL at 09:03

## 2022-12-29 RX ADMIN — AMLODIPINE BESYLATE 2.5 MG: 2.5 TABLET ORAL at 09:03

## 2022-12-29 RX ADMIN — IPRATROPIUM BROMIDE AND ALBUTEROL SULFATE 1 AMPULE: 2.5; .5 SOLUTION RESPIRATORY (INHALATION) at 11:15

## 2022-12-29 RX ADMIN — SODIUM CHLORIDE: 9 INJECTION, SOLUTION INTRAVENOUS at 12:06

## 2022-12-29 RX ADMIN — SODIUM CHLORIDE: 9 INJECTION, SOLUTION INTRAVENOUS at 23:06

## 2022-12-29 RX ADMIN — DIPHENHYDRAMINE HYDROCHLORIDE 50 MG: 50 INJECTION, SOLUTION INTRAMUSCULAR; INTRAVENOUS at 23:05

## 2022-12-29 RX ADMIN — LORAZEPAM 0.5 MG: 2 INJECTION INTRAMUSCULAR; INTRAVENOUS at 21:14

## 2022-12-29 RX ADMIN — APIXABAN 5 MG: 5 TABLET, FILM COATED ORAL at 09:03

## 2022-12-29 RX ADMIN — AMIODARONE HYDROCHLORIDE 200 MG: 200 TABLET ORAL at 09:03

## 2022-12-29 RX ADMIN — IPRATROPIUM BROMIDE AND ALBUTEROL SULFATE 1 AMPULE: 2.5; .5 SOLUTION RESPIRATORY (INHALATION) at 15:25

## 2022-12-29 RX ADMIN — ASPIRIN 81 MG: 81 TABLET, COATED ORAL at 09:03

## 2022-12-29 RX ADMIN — ESCITALOPRAM 20 MG: 20 TABLET, FILM COATED ORAL at 09:03

## 2022-12-29 RX ADMIN — SODIUM CHLORIDE: 9 INJECTION, SOLUTION INTRAVENOUS at 00:23

## 2022-12-29 RX ADMIN — APIXABAN 5 MG: 5 TABLET, FILM COATED ORAL at 21:14

## 2022-12-29 RX ADMIN — GUAIFENESIN 600 MG: 600 TABLET, EXTENDED RELEASE ORAL at 09:03

## 2022-12-29 RX ADMIN — POLYETHYLENE GLYCOL 3350 17 G: 17 POWDER, FOR SOLUTION ORAL at 09:09

## 2022-12-29 RX ADMIN — FUROSEMIDE 20 MG: 10 INJECTION, SOLUTION INTRAMUSCULAR; INTRAVENOUS at 09:04

## 2022-12-29 RX ADMIN — MOMETASONE FUROATE AND FORMOTEROL FUMARATE DIHYDRATE 2 PUFF: 200; 5 AEROSOL RESPIRATORY (INHALATION) at 19:34

## 2022-12-29 RX ADMIN — CEFEPIME 2000 MG: 2 INJECTION, POWDER, FOR SOLUTION INTRAVENOUS at 23:06

## 2022-12-29 RX ADMIN — CEFEPIME 2000 MG: 2 INJECTION, POWDER, FOR SOLUTION INTRAVENOUS at 00:24

## 2022-12-29 RX ADMIN — IPRATROPIUM BROMIDE AND ALBUTEROL SULFATE 1 AMPULE: 2.5; .5 SOLUTION RESPIRATORY (INHALATION) at 07:30

## 2022-12-29 RX ADMIN — IPRATROPIUM BROMIDE AND ALBUTEROL SULFATE 1 AMPULE: 2.5; .5 SOLUTION RESPIRATORY (INHALATION) at 19:33

## 2022-12-29 RX ADMIN — IPRATROPIUM BROMIDE AND ALBUTEROL SULFATE 1 AMPULE: 2.5; .5 SOLUTION RESPIRATORY (INHALATION) at 02:41

## 2022-12-29 RX ADMIN — SODIUM CHLORIDE, PRESERVATIVE FREE 10 ML: 5 INJECTION INTRAVENOUS at 21:15

## 2022-12-29 RX ADMIN — PANTOPRAZOLE SODIUM 40 MG: 40 TABLET, DELAYED RELEASE ORAL at 05:40

## 2022-12-29 RX ADMIN — TRAZODONE HYDROCHLORIDE 50 MG: 50 TABLET ORAL at 22:41

## 2022-12-29 NOTE — PROGRESS NOTES
Physical Therapy  Facility/Department: [facility] UNIT  Physical Therapy Initial Assessment    Name: Argentina Neves  : 1935  MRN: [id number]  Date of Service: 2022    Discharge Recommendations:  Continue to assess pending progress, Subacute/Skilled Nursing Facility- possible swing bed candidate   24 hour supervision or assist, Home with Home health PT          Patient Diagnosis(es): The primary encounter diagnosis was Pneumonia of right middle lobe due to infectious organism. A diagnosis of Hypoxia was also pertinent to this visit. Past Medical History:  has a past medical history of Anxiety, Atrial fibrillation ([address] Current use of estrogen therapy, History of nuclear stress test, and Hypertension. Past Surgical History:  has a past surgical history that includes Hysterectomy, total abdominal.    Assessment   Body Structures, Functions, Activity Limitations Requiring Skilled Therapeutic Intervention: Decreased functional mobility ; Decreased endurance  Assessment: patient's O2 sats ranged from 63% to 83% with PT activity  Treatment Diagnosis: debility  Therapy Prognosis: Good  Decision Making: Medium Complexity  History: PF- patient's age  Exam: transfers/bed mobiity  Clinical Presentation: evolving  Barriers to Learning: none  Requires PT Follow-Up: Yes     Plan   Physcial Therapy Plan  General Plan: 5-7 times per week  Current Treatment Recommendations: Endurance training, Transfer training, Gait training, Stair training  Safety Devices  Type of Devices: Call light within reach, Bed alarm in place, Gait belt     Restrictions  Restrictions/Precautions  Restrictions/Precautions: Fall Risk     Subjective   General  Chart Reviewed: Yes  Patient assessed for rehabilitation services?: Yes  Follows Commands: Within Functional Limits         Social/Functional History  Social/Functional History  Lives With: Alone  Type of Home: House  Home Layout: One level  Home Access: Stairs to enter without rails (through garage)  Entrance Stairs - Number of Steps: 2  Has the patient had two or more falls in the past year or any fall with injury in the past year?: No  Receives Help From: Family  ADL Assistance: Independent  Homemaking Assistance: Independent  Homemaking Responsibilities: Yes  Meal Prep Responsibility: Primary  Laundry Responsibility: Primary  Cleaning Responsibility: Primary  Shopping Responsibility: Primary  Ambulation Assistance: Independent  Transfer Assistance: Independent  Active : Yes  Mode of Transportation: Car  Occupation: Retired  Vision/Hearing  Vision  Vision: Within Functional Limits  Hearing  Hearing: Within functional limits    Cognition   Orientation  Overall Orientation Status: Within Normal Limits  Cognition  Overall Cognitive Status: WNL     Objective   Heart Rate: 78  Heart Rate Source: Monitor  BP: (!) 134/49  BP Location: Left upper arm  BP Method: Automatic  MAP (Calculated): 77  Resp: 18  SpO2: (!) 88 %  O2 Device: High flow nasal cannula     Observation/Palpation  Observation: in bed O2 per nc high rate  Gross Assessment  AROM: Within functional limits  PROM: Within functional limits  Strength:  Within functional limits                    Bed mobility  Supine to Sit: Supervision  Sit to Supine: Supervision  Transfers  Sit to Stand: Stand by assistance  Stand to Sit: Stand by assistance  Ambulation  Surface: Uneven  Device: Rolling Walker  Other Apparatus: O2  Assistance: Stand by assistance  Distance: in room                 OutComes Score                                                  AM-PAC Score       Basic Mobility Six Clicks Form [facility] AM-PAC Score Conversion Table   How much difficulty does the patient currently have Unable   (pt is unable to do activity) A Lot   (activity is a struggle, requires great effort/time) A Little   (pt can manage, but takes more effort/time than should) None   (pt has no difficulty) Raw Score Standardized Score CMS -100% Score CMS Modifier        6 23.55 100% CN   Turning over in bed (including adjusting bedclothes, sheets, and blankets)? []1 []2  []3  [x]4  7 26.42 92.36% CM        8 28.58 86.62% CM   Sitting down on and standing up from a chair with arms (e.g. wheelchair, bedside commode, etc.)? []1 []2 [x]3   []4   9 30.55 81.38% CM        10 32.29 76.75% CL   Moving from lying on back to sitting on the side of the bed? []1 []2  []3   [x]4   11 33.86 72.57% CL        12 35.33 68.66% CL   How much help from another person does the patient currently need Total   (Total/Dependent Assist) A Lot   (Max/Mod Assist) A Little   (Min/CGA/Supervision) None   (No human assistance) 13 36.74 64.91% CL        14 38.1 61.29% CL   Moving to and from a bed to a chair (including a wheelchair)? []1  []2   [x]3  []4   15 39.45 57.70% CK        16 40.78 54.16% CK   To walk in a hospital room? []1 []2   [x]3    []4  17 42.13 50.57% CK        18 43.63 46.58% CK   Climbing 3-5 steps with a railing?  []1  [x]2   []3    []4  19 45.44 41.77% CK        20 47.67 35.83% CJ   Raw Score  19 21 50.25 28.97% CJ   Standardized Score   22 53.28 20.91% CJ   CMS 0-100% Score   23 56.93 11.20% CI   CMS Modifier  24 61.14 0.00% CH     CH = 0% impaired  CI = 1-20% impaired  CJ = 20-40% impaired  CK = 40-60% impaired  CL = 60-80% impaired  CM = % impaired  CN = 100% impaired           Tinneti Score       Goals  Short Term Goals  Time Frame for Short Term Goals: 1 week  Short Term Goal 1: patient will safely ambulate 150 ft using RW - wearing O2       Education         Therapy Time   Individual Concurrent Group Co-treatment   Time In 1125         Time Out 1150         Minutes 25         Timed Code Treatment Minutes: 10 Minutes       BRITTANEY Jones, PT

## 2022-12-29 NOTE — PROGRESS NOTES
Hospitalist Progress Note      Name:  Tahira Corona /Age/Sex: 1935  (81 y.o. female)   MRN & CSN:  [id number] & [id number] Admission Date/Time: 2022  6:57 PM   Location:   PCP: Lester Sanz, [facility] Day: 8                                               Attending Physician Dr. Carolina Young and Plan:   Tahira Corona is a 81 y.o.  female  who presents with Pneumonia due to other specified infectious organisms    Acute hypoxemic respiratory failure 2/2 multifocal pneumonia   Repeat CXR (2022) Worsening aeration throughout both lungs predominantly involving the left lower lung zone. CTA chest () no evidence of PE. Extensive mixed groundglass and consolidative opacities, significant increase from . Small bilateral pleural effusions  Sputum culture NGTD, BLC NGTD, urine antigens negative, viral panel negative  Procal slightly trended up again(0.103)  but overall stable /CRP increased to 157.8,     Continues to require high flow supplemental oxygen   Antibiotics broadened : Cefepime day 5 (doxycycline stopped -day 3)   Vancomycin held due to low MRSA risk factors. MRSA nasal screening-negative    Continue prednisone-day 4   Continue incentive spirometry/Acapella/pulmonary hygiene  Continue bronchodilators/ICS   PRN symptom control    Increase activity as tolerated     Hypervolemia suspected. Possible right sided HF given slightly elevated RVSP 40 mmHg and imaging  Lasix 20 mg daily. BNP improving    TTE () EF 55 to 60%, mild LVH, trace AR, mild MS   Still considering transfer to [facility] if not improving however, patient does not appear distressed and overall clinically improved.   Discussed with attending physician who concurs   -1100 output overnight     Hyponatremia (131)    continue to trend    Hypertension   Continue home antihypertensive regimen   Trends appear controlled today    Paroxysmal atrial fibrillation    [id number] Score: 4  Continue amiodarone/Eliquis   Continue telemetry    DEYANIRA/depression    Continue Lexapro    Diet ADULT DIET; Regular; Low Fat/Low Chol/High Fiber/JAVI   DVT Prophylaxis [] Lovenox, []  Heparin, [] SCDs, [] Ambulation Eliquis   GI Prophylaxis [x] PPI,  [] H2 Blocker,  [] Carafate,  [] Diet/Tube Feeds   Code Status Full Code   Disposition Patient requires continued admission due to management of hypoxia     -Patient assessment and plan discussed with supervising physician-  Subjective 12/29/2022     True Avilez is a 81 y.o.  female, who presented with  Shortness of Breath (C/o not feeling weak w/ no energy for 1 week . C/o wheezing in rt upper chest. Pt came into today feeling lethargic and SOB)    Patient seen examined at bedside. Continues to appear comfortable on supplemental oxygen. Reports somewhat improved. Productive cough has improved    Son at bedside discussed plan of care for the day and imaging/lab results  Ten point ROS reviewed negative, unless as noted above    Objective: Intake/Output Summary (Last 24 hours) at 12/29/2022 1407  Last data filed at 12/29/2022 0933  Gross per 24 hour   Intake 360 ml   Output 2100 ml   Net -1740 ml        Vitals:   Vitals:    12/29/22 0730 12/29/22 0735 12/29/22 0753 12/29/22 0903   BP:  (!) 144/61 (!) 134/49 (!) 134/49   Pulse:  78 78    Resp:  16 18    Temp:  98.2 °F (36.8 °C) 97.8 °F (36.6 °C)    TempSrc:   Oral    SpO2: 90% 90% (!) 88%    Weight:       Height:         Physical Exam: 12/29/22     GEN -Awake acutely ill appearing female, sitting upright in bed , NAD. Normal body habitus. Appears given age. EYES -Pupils are equally round. No scleral erythema, discharge, or conjunctivitis. HENT -MM are moist. Oral pharynx without exudates, no evidence of thrush. NECK -Supple, no apparent thyromegaly or masses. RESP -improved aeration. Occasional rhonchi.   Symmetric chest movement while on high flow supplemental oxygen. C/V -S1/S2 auscultated. RRR without appreciable M/R/G. No JVD or carotid bruits. Peripheral pulses equal bilaterally and palpable. No peripheral edema. GI -Abdomen is soft non distended and without significant tenderness. No masses or guarding. + BS Rectal exam deferred.  -No CVA tenderness. Quinones catheter is not present. LYMPH-No palpable cervical lymphadenopathy and no hepatosplenomegaly. No petechiae or ecchymoses. MS -No gross joint deformities. SKIN -Normal coloration, warm, dry. NEURO-Cranial nerves appear grossly intact, normal speech, no lateralizing weakness. PSYC-Awake, alert, oriented x 4. Appropriate affect.     Medications:   Medications:    mometasone-formoterol  2 puff Inhalation BID    furosemide  20 mg IntraVENous Daily    predniSONE  40 mg Oral Daily    cefepime  2,000 mg IntraVENous Q12H    guaiFENesin  600 mg Oral BID    aspirin  81 mg Oral Daily    ipratropium-albuterol  1 ampule Inhalation Q4H WA    amiodarone  200 mg Oral Daily    amLODIPine  2.5 mg Oral Daily    apixaban  5 mg Oral BID    escitalopram  20 mg Oral Daily    pantoprazole  40 mg Oral QAM AC    sodium chloride flush  10 mL IntraVENous 2 times per day      Infusions:    sodium chloride Stopped (12/29/22 1239)     PRN Meds: ondansetron, 4 mg, Q6H PRN  guaiFENesin, 200 mg, Q4H PRN  sodium chloride flush, 10 mL, PRN  sodium chloride, , PRN  polyethylene glycol, 17 g, Daily PRN  acetaminophen, 650 mg, Q6H PRN   Or  acetaminophen, 650 mg, Q6H PRN  albuterol sulfate HFA, 2 puff, Q4H PRN      LABS  CBC   Recent Labs     12/27/22  0615 12/28/22  0730 12/29/22  0600   WBC 14.3* 15.4* 16.0*   HGB 11.6* 12.3* 11.8*   HCT 34.2* 36.7* 35.5*    408 384        RENAL  Recent Labs     12/27/22  0615 12/28/22  0730 12/29/22  0600   * 134* 131*   K 4.3 4.0 3.5   CL 99 97* 94*   CO2 25 27 27   BUN 16 20 21   CREATININE 0.9 0.9 0.9       LFT'S  Recent Labs     12/27/22  0615 12/28/22  0730   AST 49* 59*   ALT 43* 59* BILITOT 0.5 0.6   ALKPHOS 53 61         Radiology this visit:  Reviewed. XR CHEST PORTABLE    Result Date: 12/25/2022  EXAMINATION: ONE XRAY VIEW OF THE CHEST 12/25/2022 8:23 am COMPARISON: 12/22/2022. HISTORY: ORDERING SYSTEM PROVIDED HISTORY: worsening respiratroy status overnight, eval pneumonia TECHNOLOGIST PROVIDED HISTORY: Reason for exam:->worsening respiratroy status overnight, eval pneumonia Initial evaluation. FINDINGS: The cardiac silhouette is within normal limits for size. Patchy opacities are seen within the lungs bilaterally. There is perhaps minimal worsening in the opacification within the right mid lung. No pleural effusion or pneumothorax. Patchy opacities within the lungs bilaterally, which appear minimally worsened within the right mid lung. XR CHEST PORTABLE    Result Date: 12/22/2022  EXAMINATION: ONE XRAY VIEW OF THE CHEST 12/22/2022 7:21 pm COMPARISON: Chest x-ray and CT chest October 2, 2019 HISTORY: ORDERING SYSTEM PROVIDED HISTORY: chest pain TECHNOLOGIST PROVIDED HISTORY: Reason for exam:->chest pain Reason for Exam: chest pain FINDINGS: Cardiac silhouette appears stable. Chronic interstitial changes of the lungs with superimposed interstitial opacities and more focal airspace opacities at the periphery of the right mid lung and left lung base. Multifocal pneumonia of concern. No pleural effusion or pneumothorax. Osseous structures appear intact. 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 1. Focal airspace opacities of the right lateral mid lung and left lung base concerning for multifocal pneumonia in the appropriate clinical setting. This is superimposed on a background of chronic underlying lung changes.      CTA PULMONARY W CONTRAST    Result Date: 12/23/2022  EXAMINATION: CTA OF THE CHEST 12/23/2022 8:26 am TECHNIQUE: CTA of the chest was performed after the administration of intravenous contrast.  Multiplanar reformatted images are provided for review. MIP images are provided for review. Automated exposure control, iterative reconstruction, and/or weight based adjustment of the mA/kV was utilized to reduce the radiation dose to as low as reasonably achievable. COMPARISON: 12/22/2022, 10/02/2019 HISTORY: ORDERING SYSTEM PROVIDED HISTORY: elevated d-dimer, hypoxia,  concerns of PE TECHNOLOGIST PROVIDED HISTORY: Reason for exam:->elevated d-dimer, hypoxia,  concerns of PE Reason for Exam: elevated d-dimer, hypoxia FINDINGS: Pulmonary Arteries: Pulmonary arteries are adequately opacified for evaluation. No evidence of intraluminal filling defect to suggest pulmonary embolism. Main pulmonary artery is normal in caliber. Mediastinum: No pericardial effusion. Small hiatal hernia. The esophagus is within normal limits. Mild mediastinal adenopathy. The thyroid is unremarkable. Coronary atherosclerosis. Lungs/pleura: Extensive patchy opacities are present in both lungs, with a mixed ground-glass and consolidative appearance. No pneumothorax. Trace bilateral effusions, right greater than left. The central airways are patent. Upper Abdomen: Limited images of the upper abdomen are unremarkable. Soft Tissues/Bones: No acute bone or soft tissue abnormality. 1. No evidence of pulmonary embolism. 2. Findings compatible with multifocal pneumonia, including atypical/viral etiologies. 3. Trace bilateral pleural effusions, right greater than left. 4. Mild mediastinal lymphadenopathy, which is nonspecific and may be reactive. 5. Coronary atherosclerosis.        Electronically signed by Jadine Bence, APRN - CNP on 12/29/2022 at 2:07 PM

## 2022-12-29 NOTE — PLAN OF CARE
Problem: Discharge Planning  Goal: Discharge to home or other facility with appropriate resources  12/29/2022 1017 by Red Price RN  Outcome: Progressing  12/28/2022 2027 by Martha Valdez RN  Outcome: Progressing     Problem: Pain  Goal: Verbalizes/displays adequate comfort level or baseline comfort level  12/29/2022 1017 by Red Price RN  Outcome: Progressing  12/28/2022 2027 by Martha Valdez RN  Outcome: Progressing     Problem: ABCDS Injury Assessment  Goal: Absence of physical injury  12/29/2022 1017 by Red Price RN  Outcome: Progressing  12/28/2022 2027 by Martha Valdez RN  Outcome: Progressing     Problem: Safety - Adult  Goal: Free from fall injury  12/29/2022 1017 by Red Price RN  Outcome: Progressing  12/28/2022 2027 by Martha Valdez RN  Outcome: Progressing

## 2022-12-30 VITALS
OXYGEN SATURATION: 92 % | RESPIRATION RATE: 20 BRPM | WEIGHT: 172.4 LBS | SYSTOLIC BLOOD PRESSURE: 133 MMHG | DIASTOLIC BLOOD PRESSURE: 55 MMHG | BODY MASS INDEX: 26.13 KG/M2 | HEIGHT: 68 IN | TEMPERATURE: 99.8 F | HEART RATE: 93 BPM

## 2022-12-30 PROBLEM — J96.91 RESPIRATORY FAILURE WITH HYPOXIA (HCC): Status: ACTIVE | Noted: 2022-12-30

## 2022-12-30 LAB
ALBUMIN SERPL-MCNC: 3.2 GM/DL (ref 3.4–5)
ALP BLD-CCNC: 79 IU/L (ref 40–129)
ALT SERPL-CCNC: 61 U/L (ref 10–40)
ANION GAP SERPL CALCULATED.3IONS-SCNC: 12 MMOL/L (ref 4–16)
AST SERPL-CCNC: 55 IU/L (ref 15–37)
BASE EXCESS MIXED: 5.1 (ref 0–2.3)
BASE EXCESS: ABNORMAL (ref 0–2.4)
BILIRUB SERPL-MCNC: 0.8 MG/DL (ref 0–1)
BUN BLDV-MCNC: 18 MG/DL (ref 6–23)
CALCIUM SERPL-MCNC: 8.8 MG/DL (ref 8.3–10.6)
CHLORIDE BLD-SCNC: 92 MMOL/L (ref 99–110)
CO2 CONTENT: 30.5 MMOL/L (ref 19–24)
CO2: 29 MMOL/L (ref 21–32)
CREAT SERPL-MCNC: 0.8 MG/DL (ref 0.6–1.1)
GFR SERPL CREATININE-BSD FRML MDRD: >60 ML/MIN/1.73M2
GLUCOSE BLD-MCNC: 98 MG/DL (ref 70–99)
HCO3 ARTERIAL: 29.3 MMOL/L (ref 18–23)
HCT VFR BLD CALC: 37.8 % (ref 37–47)
HEMOGLOBIN: 12.8 GM/DL (ref 12.5–16)
HIGH SENSITIVE C-REACTIVE PROTEIN: 253.2 MG/L (ref 0–5)
MCH RBC QN AUTO: 33.1 PG (ref 27–31)
MCHC RBC AUTO-ENTMCNC: 33.9 % (ref 32–36)
MCV RBC AUTO: 97.7 FL (ref 78–100)
O2 SATURATION: 85.5 % (ref 96–97)
PCO2 ARTERIAL: 40.5 MMHG (ref 32–45)
PDW BLD-RTO: 12.4 % (ref 11.7–14.9)
PH BLOOD: 7.47 (ref 7.34–7.45)
PLATELET # BLD: 430 K/CU MM (ref 140–440)
PMV BLD AUTO: 8.7 FL (ref 7.5–11.1)
PO2 ARTERIAL: 47.7 MMHG (ref 75–100)
POTASSIUM SERPL-SCNC: 3.7 MMOL/L (ref 3.5–5.1)
PROCALCITONIN: 0.18
RBC # BLD: 3.87 M/CU MM (ref 4.2–5.4)
SODIUM BLD-SCNC: 133 MMOL/L (ref 135–145)
SOURCE, BLOOD GAS: ABNORMAL
TOTAL PROTEIN: 7 GM/DL (ref 6.4–8.2)
WBC # BLD: 19.1 K/CU MM (ref 4–10.5)

## 2022-12-30 PROCEDURE — 2500000003 HC RX 250 WO HCPCS: Performed by: NURSE PRACTITIONER

## 2022-12-30 PROCEDURE — 6370000000 HC RX 637 (ALT 250 FOR IP): Performed by: NURSE PRACTITIONER

## 2022-12-30 PROCEDURE — 6360000002 HC RX W HCPCS: Performed by: NURSE PRACTITIONER

## 2022-12-30 PROCEDURE — 94761 N-INVAS EAR/PLS OXIMETRY MLT: CPT

## 2022-12-30 PROCEDURE — 82800 BLOOD PH: CPT

## 2022-12-30 PROCEDURE — 2580000003 HC RX 258: Performed by: NURSE PRACTITIONER

## 2022-12-30 PROCEDURE — 80053 COMPREHEN METABOLIC PANEL: CPT

## 2022-12-30 PROCEDURE — 36600 WITHDRAWAL OF ARTERIAL BLOOD: CPT

## 2022-12-30 PROCEDURE — 94640 AIRWAY INHALATION TREATMENT: CPT

## 2022-12-30 PROCEDURE — 82803 BLOOD GASES ANY COMBINATION: CPT

## 2022-12-30 PROCEDURE — 2700000000 HC OXYGEN THERAPY PER DAY

## 2022-12-30 PROCEDURE — 94660 CPAP INITIATION&MGMT: CPT

## 2022-12-30 PROCEDURE — 86141 C-REACTIVE PROTEIN HS: CPT

## 2022-12-30 PROCEDURE — 84145 PROCALCITONIN (PCT): CPT

## 2022-12-30 PROCEDURE — 85027 COMPLETE CBC AUTOMATED: CPT

## 2022-12-30 RX ORDER — ONDANSETRON 4 MG/1
4 TABLET, ORALLY DISINTEGRATING ORAL EVERY 6 HOURS PRN
Status: CANCELLED | OUTPATIENT
Start: 2022-12-30

## 2022-12-30 RX ORDER — SODIUM CHLORIDE 0.9 % (FLUSH) 0.9 %
10 SYRINGE (ML) INJECTION EVERY 12 HOURS SCHEDULED
Status: CANCELLED | OUTPATIENT
Start: 2022-12-30

## 2022-12-30 RX ORDER — POLYETHYLENE GLYCOL 3350 17 G/17G
17 POWDER, FOR SOLUTION ORAL DAILY PRN
Status: CANCELLED | OUTPATIENT
Start: 2022-12-30

## 2022-12-30 RX ORDER — IPRATROPIUM BROMIDE AND ALBUTEROL SULFATE 2.5; .5 MG/3ML; MG/3ML
1 SOLUTION RESPIRATORY (INHALATION)
Status: CANCELLED | OUTPATIENT
Start: 2022-12-30

## 2022-12-30 RX ORDER — GUAIFENESIN 600 MG/1
600 TABLET, EXTENDED RELEASE ORAL 2 TIMES DAILY
Status: CANCELLED | OUTPATIENT
Start: 2022-12-30

## 2022-12-30 RX ORDER — ACETAMINOPHEN 650 MG/1
650 SUPPOSITORY RECTAL EVERY 6 HOURS PRN
Status: CANCELLED | OUTPATIENT
Start: 2022-12-30

## 2022-12-30 RX ORDER — ACETAMINOPHEN 325 MG/1
650 TABLET ORAL EVERY 6 HOURS PRN
Status: CANCELLED | OUTPATIENT
Start: 2022-12-30

## 2022-12-30 RX ORDER — AMIODARONE HYDROCHLORIDE 200 MG/1
200 TABLET ORAL DAILY
Status: CANCELLED | OUTPATIENT
Start: 2022-12-30

## 2022-12-30 RX ORDER — SODIUM CHLORIDE 9 MG/ML
INJECTION, SOLUTION INTRAVENOUS PRN
Status: CANCELLED | OUTPATIENT
Start: 2022-12-30

## 2022-12-30 RX ORDER — TRAZODONE HYDROCHLORIDE 50 MG/1
50 TABLET ORAL NIGHTLY
Status: CANCELLED | OUTPATIENT
Start: 2022-12-30

## 2022-12-30 RX ORDER — SODIUM CHLORIDE 0.9 % (FLUSH) 0.9 %
10 SYRINGE (ML) INJECTION PRN
Status: CANCELLED | OUTPATIENT
Start: 2022-12-30

## 2022-12-30 RX ORDER — AMLODIPINE BESYLATE 2.5 MG/1
2.5 TABLET ORAL DAILY
Status: CANCELLED | OUTPATIENT
Start: 2022-12-30

## 2022-12-30 RX ORDER — FUROSEMIDE 10 MG/ML
20 INJECTION INTRAMUSCULAR; INTRAVENOUS DAILY
Status: CANCELLED | OUTPATIENT
Start: 2022-12-30

## 2022-12-30 RX ORDER — ESCITALOPRAM OXALATE 20 MG/1
20 TABLET ORAL DAILY
Status: CANCELLED | OUTPATIENT
Start: 2022-12-30

## 2022-12-30 RX ORDER — PANTOPRAZOLE SODIUM 40 MG/1
40 TABLET, DELAYED RELEASE ORAL
Status: CANCELLED | OUTPATIENT
Start: 2022-12-31

## 2022-12-30 RX ORDER — ASPIRIN 81 MG/1
81 TABLET ORAL DAILY
Status: CANCELLED | OUTPATIENT
Start: 2022-12-30

## 2022-12-30 RX ORDER — GUAIFENESIN 100 MG/5ML
200 SYRUP ORAL EVERY 4 HOURS PRN
Status: CANCELLED | OUTPATIENT
Start: 2022-12-30

## 2022-12-30 RX ORDER — ALBUTEROL SULFATE 90 UG/1
2 AEROSOL, METERED RESPIRATORY (INHALATION) EVERY 4 HOURS PRN
Status: CANCELLED | OUTPATIENT
Start: 2022-12-30

## 2022-12-30 RX ADMIN — APIXABAN 5 MG: 5 TABLET, FILM COATED ORAL at 08:09

## 2022-12-30 RX ADMIN — SODIUM CHLORIDE: 9 INJECTION, SOLUTION INTRAVENOUS at 08:06

## 2022-12-30 RX ADMIN — FUROSEMIDE 20 MG: 10 INJECTION, SOLUTION INTRAMUSCULAR; INTRAVENOUS at 08:07

## 2022-12-30 RX ADMIN — SODIUM CHLORIDE, PRESERVATIVE FREE 10 ML: 5 INJECTION INTRAVENOUS at 08:09

## 2022-12-30 RX ADMIN — GUAIFENESIN 200 MG: 200 SOLUTION ORAL at 04:40

## 2022-12-30 RX ADMIN — DEXTROSE MONOHYDRATE 20 MG: 5 INJECTION, SOLUTION INTRAVENOUS at 08:07

## 2022-12-30 RX ADMIN — ESCITALOPRAM 20 MG: 20 TABLET, FILM COATED ORAL at 08:08

## 2022-12-30 RX ADMIN — GUAIFENESIN 600 MG: 600 TABLET, EXTENDED RELEASE ORAL at 08:08

## 2022-12-30 RX ADMIN — AMIODARONE HYDROCHLORIDE 200 MG: 200 TABLET ORAL at 08:08

## 2022-12-30 RX ADMIN — IPRATROPIUM BROMIDE AND ALBUTEROL SULFATE 1 AMPULE: 2.5; .5 SOLUTION RESPIRATORY (INHALATION) at 07:35

## 2022-12-30 RX ADMIN — ASPIRIN 81 MG: 81 TABLET, COATED ORAL at 08:09

## 2022-12-30 RX ADMIN — PANTOPRAZOLE SODIUM 40 MG: 40 TABLET, DELAYED RELEASE ORAL at 04:40

## 2022-12-30 RX ADMIN — IPRATROPIUM BROMIDE AND ALBUTEROL SULFATE 1 AMPULE: 2.5; .5 SOLUTION RESPIRATORY (INHALATION) at 04:22

## 2022-12-30 RX ADMIN — AMLODIPINE BESYLATE 2.5 MG: 2.5 TABLET ORAL at 08:08

## 2022-12-30 NOTE — PROGRESS NOTES
Patient's son Joss Bhardwaj called and notified that patient is being transferred to [facility] for pulmonology and will be taken to room 2127. Joss Bhardwaj was understanding and said he would try to see her before she leaves. Patient is to be picked up by transportation at 1100.

## 2022-12-30 NOTE — PROGRESS NOTES
4 Eyes Skin Assessment     NAME:  Swetha Richard OF BIRTH:  1935  MEDICAL RECORD NUMBER:  [id number]    The patient is being assessed for  Admission    I agree that One RN have performed a thorough Head to Toe Skin Assessment on the patient. ALL assessment sites listed below have been assessed. Areas assessed by both nurses:    Head, Face, Ears, Shoulders, Back, Chest, Arms, Elbows, Hands, Sacrum. Buttock, Coccyx, Ischium, and Legs. Feet and Heels        Does the Patient have a Wound?  No noted wound(s)       Rayray Prevention initiated by RN: Yes   Wound Care Orders initiated by RN: NA    Pressure Injury (Stage 3,4, Unstageable, DTI, NWPT, and Complex wounds) if present place referral order by RN under : No    New and Established Ostomies, if present place, referral order under : NA      Nurse 1 eSignature: Electronically signed by Мария Shepherd RN on 12/30/22 at 1:34 PM EST    **SHARE this note so that the co-signing nurse is able to place an eSignature**    Nurse 2 eSignature: Electronically signed by Neli Gonzalez RN on 12/30/22 at 4:07 PM EST

## 2022-12-30 NOTE — H&P
History and Physical      Name:  Junaid Merino /Age/Sex: 1935  (81 y.o. female)   MRN & CSN:  [id number] & [id number] Admission Date/Time: 2022  1:09 PM   Location:  -A PCP: Ev Wall, [facility] Day: 1     Attending physician: Dr. Alcazar Au and Plan:   Junaid Merino is a 81 y.o.  female  who presents with Respiratory failure with hypoxia ([facility] [address]    Assessment and plan:   Acute hypoxic respiratory failure, requiring BiPAP support  ARDS  Multifocal bacterial vs viral pneumonia  Sepsis  Respiratory PCR panel-on -negative  Reviewed CTA chest and CXR from - no PE- e/o mixed groundglass & consolidative opacity  C-reactive protein-253.2, Procal 0.130  WBC count trending up-19 K today  Continue cefepime (was on doxycycline and Rocephin prior to starting cefepime) pt was given 20 mg iv decadron before being transported from [facility] decadron ARDS protocol- 20 mg x total 5 days, followed by 10 mg x 5 days  Continue DuoNebs, Dulera, albuterol as needed  CXR and VBG as needed  Monitor in ICU    Chronic hyponatremia, hypervolemic  Possibly from CHF  Daily BMP      History of diastolic congestive heart failure  Echocardiogram on -EF 55 to 60%  proBNP-790 on   Stress test negative for ischemia in 2019  Cont lasix 20 mg daily    History of A. fib/a flutter  Continue home amiodarone 200 mg daily, Eliquis    Chronic Conditions: Continue all home medications except as stated above or contraindicated. BMI: 26.14 kg/m2 Life style modifications    Tobacco dependence: Non-smoker    Disposition: ICU. Patient was accepted for continue evaluation and treatment and improvement of clinical symptoms. Discussed assessment and treatment plan with the admitting and supervising physician who agrees with the plan. Diet ADULT DIET;  Regular; Low Fat/Low Chol/High Fiber/JAVI   DVT Prophylaxis [] Lovenox, []  Heparin, [] SCDs, [] Warfarin  [] NOAC , Eliquis GI Prophylaxis [x] PPI,  [] H2 Blocker,  [] Carafate,  [] Diet/Tube Feeds   Code Status Full Code     History of Present Illness:     Chief Complaint: Respiratory failure with hypoxia ([address]  Linda Bernardo is a 81 y.o.  female, with past medical history significant for A. fib/atrial flutter-on amiodarone and Eliquis, hypertension, GERD, CHF,, who presented from [place] with worsening hypoxic respiratory failure. Patient was admitted on 12/22 with a shortness of breath, imaging showed bilateral lower lobe pneumonia and was started on CAP coverage. Patiet's respiratory status continued to get worse with increasing oxygen requirement and was started on BiPAP this morning. CTA chest with evidence of possible ARDS with extensive multifocal pneumonia with underlying cardiogenic edema. Started on Decadron ARDS protocol 20 mg IV daily for 5 days followed by 10 mg IV daily for another 5 days. Patient is transferred to [address] this afternoon. On examination patient is on BiPAP with settings IPAP 12, EPAP 8 mmHg, 70% FiO2 able to answer orientation questions, admits SOB, states BiPAP is helping. Review of Systems     Patient is on BiPAP, complaining of shortness of breath, denies chest pain/pressure chest/tightness, denies nausea/vomiting/abdominal pain, no bowel or bladder dysfunction, no motor or sensory changes    Objective:   No intake or output data in the 24 hours ending 12/30/22 1425   Vitals:   Vitals:    12/30/22 1317   BP:    Pulse: 82   Resp: 29   Temp:    SpO2: 92%     Physical Exam:   GEN- patient is a 72-year-old female, appears stated age, in mild respiratory distress-on BiPAP  EYES- EOMI  HENT- Mucous membranes are moist. Oral pharynx without exudates, no evidence of thrush. NECK- Supple, no apparent thyromegaly or masses. RESP-bilateral wheeze and rhonchi+  CARDIO/VASC-  A. fib   GI- Abdomen is soft, no tenderness, masses, or guarding. Bowel sounds present.   MSK- No gross joint deformities. EXTREMITIES- pulses intact, no swelling, no calf tenderness  SKIN- Normal coloration, warm, dry. NEURO-Cranial nerves appear grossly intact, normal speech, no lateralizing weakness. PSYCH- Awake, lethargic, oriented x 3    Past Medical History:      Past Medical History:   Diagnosis Date    Anxiety     Atrial fibrillation ([address]     Current use of estrogen therapy     History of nuclear stress test 10/29/2019    EF 80%. Normal study. Hypertension      PSHX:  has a past surgical history that includes Hysterectomy, total abdominal.    Allergies: No Known Allergies    FAM HX: family history includes Cancer in her brother; Dementia in her mother; Other in her father. Soc HX:   Social History     Socioeconomic History    Marital status:    Tobacco Use    Smoking status: Never    Smokeless tobacco: Never   Vaping Use    Vaping Use: Never used   Substance and Sexual Activity    Alcohol use: Yes     Alcohol/week: 2.0 standard drinks     Types: 2 Cans of beer per week     Comment: 2 BEERS DAILY    Drug use: Never    Sexual activity: Not Currently     Social Determinants of Health     Physical Activity: Inactive    Days of Exercise per Week: 0 days    Minutes of Exercise per Session: 0 min       Social and family history reviewed with patient/family. Medications:     Home Medication   Prior to Admission medications    Medication Sig Start Date End Date Taking? Authorizing Provider   amLODIPine (NORVASC) 2.5 MG tablet Take 1 tablet by mouth daily 11/11/22   STEVENSON Nobles CNP   amiodarone (CORDARONE) 200 MG tablet Take 1 tablet by mouth daily 11/11/22   STEVENSON Nobles CNP   apixaban (ELIQUIS) 5 MG TABS tablet Take 1 tablet by mouth 2 times daily 11/11/22   STEVENSON Nobles CNP   escitalopram (LEXAPRO) 20 MG tablet Take 1 tablet by mouth daily 9/30/22   Allison Bell PA-C   pantoprazole (PROTONIX) 40 MG tablet Take 1 tablet by mouth every morning (before breakfast) 9/30/22   Semaj Malone PA-C   alendronate (FOSAMAX) 70 MG tablet Take 1 tablet by mouth every 7 days 9/30/22   Semaj Malone PA-C   escitalopram (LEXAPRO) 20 MG tablet Take 1 tablet by mouth daily 11/15/19   Kay Byrnes MD   aspirin 81 MG chewable tablet Take 1 tablet by mouth daily 10/5/19   Dalila Suero MD   estradiol (ESTRACE) 0.5 MG tablet Take 0.5 mg by mouth daily    Historical Provider, MD     Medications:    [START ON 12/31/2022] amiodarone  200 mg Oral Daily    [START ON 12/31/2022] amLODIPine  2.5 mg Oral Daily    apixaban  5 mg Oral BID    [START ON 12/31/2022] escitalopram  20 mg Oral Daily    [START ON 12/31/2022] pantoprazole  40 mg Oral QAM AC    sodium chloride flush  10 mL IntraVENous 2 times per day    [START ON 12/31/2022] aspirin  81 mg Oral Daily    ipratropium-albuterol  1 ampule Inhalation Q4H WA    guaiFENesin  600 mg Oral BID    cefepime  2,000 mg IntraVENous Q12H    mometasone-formoterol  2 puff Inhalation BID    [START ON 12/31/2022] furosemide  20 mg IntraVENous Daily    traZODone  50 mg Oral Nightly      Infusions:    sodium chloride       PRN Meds: sodium chloride flush, 10 mL, PRN  sodium chloride, , PRN  polyethylene glycol, 17 g, Daily PRN  acetaminophen, 650 mg, Q6H PRN   Or  acetaminophen, 650 mg, Q6H PRN  albuterol sulfate HFA, 2 puff, Q4H PRN  guaiFENesin, 200 mg, Q4H PRN  ondansetron, 4 mg, Q6H PRN        Recent Labs     12/28/22  0730 12/29/22  0600 12/30/22  0515   WBC 15.4* 16.0* 19.1*   HGB 12.3* 11.8* 12.8   HCT 36.7* 35.5* 37.8    384 430      Recent Labs     12/28/22  0730 12/29/22  0600 12/30/22  0515   * 131* 133*   K 4.0 3.5 3.7   CL 97* 94* 92*   CO2 27 27 29   BUN 20 21 18   CREATININE 0.9 0.9 0.8     Recent Labs     12/28/22  0730 12/30/22  0515   AST 59* 55*   ALT 59* 61*   BILITOT 0.6 0.8   ALKPHOS 61 79     No results for input(s): INR in the last 72 hours.   No results for input(s): CKTOTAL, CKMB, CKMBINDEX, TROPONINT in the last 72 hours.     Imaging reviewed  XR CHEST PORTABLE    Result Date: 12/27/2022  EXAMINATION: ONE XRAY VIEW OF THE CHEST 12/27/2022 8:07 am COMPARISON: 12/25/2022 HISTORY: ORDERING SYSTEM PROVIDED HISTORY: worsening hypoxia TECHNOLOGIST PROVIDED HISTORY: Reason for exam:->worsening hypoxia Reason for Exam: Principal Problem:  Pneumonia due to other specified infectious organisms Additional signs and symptoms: Principal Problem:  Pneumonia due to other specified infectious organisms Relevant Medical/Surgical History: Principal Problem:  Pneumonia due to other specified infectious organisms FINDINGS: Increased hazy opacities throughout both lungs, predominantly involving the left lower lung zone. Normal cardiomediastinal silhouette. No pleural effusion or pneumothorax. No acute osseous abnormality. Worsening aeration throughout both lungs predominantly involving the left lower lung zone. XR CHEST PORTABLE    Result Date: 12/25/2022  EXAMINATION: ONE XRAY VIEW OF THE CHEST 12/25/2022 8:23 am COMPARISON: 12/22/2022. HISTORY: ORDERING SYSTEM PROVIDED HISTORY: worsening respiratroy status overnight, eval pneumonia TECHNOLOGIST PROVIDED HISTORY: Reason for exam:->worsening respiratroy status overnight, eval pneumonia Initial evaluation. FINDINGS: The cardiac silhouette is within normal limits for size. Patchy opacities are seen within the lungs bilaterally. There is perhaps minimal worsening in the opacification within the right mid lung. No pleural effusion or pneumothorax. Patchy opacities within the lungs bilaterally, which appear minimally worsened within the right mid lung.      XR CHEST PORTABLE    Result Date: 12/22/2022  EXAMINATION: ONE XRAY VIEW OF THE CHEST 12/22/2022 7:21 pm COMPARISON: Chest x-ray and CT chest October 2, 2019 HISTORY: ORDERING SYSTEM PROVIDED HISTORY: chest pain TECHNOLOGIST PROVIDED HISTORY: Reason for exam:->chest pain Reason for Exam: chest pain FINDINGS: Cardiac silhouette appears stable. Chronic interstitial changes of the lungs with superimposed interstitial opacities and more focal airspace opacities at the periphery of the right mid lung and left lung base. Multifocal pneumonia of concern. No pleural effusion or pneumothorax. Osseous structures appear intact. 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 1. Focal airspace opacities of the right lateral mid lung and left lung base concerning for multifocal pneumonia in the appropriate clinical setting. This is superimposed on a background of chronic underlying lung changes. CTA PULMONARY W CONTRAST    Result Date: 12/28/2022  EXAMINATION: CTA OF THE CHEST 12/28/2022 11:22 am TECHNIQUE: CTA of the chest was performed after the administration of intravenous contrast.  Multiplanar reformatted images are provided for review. MIP images are provided for review. Automated exposure control, iterative reconstruction, and/or weight based adjustment of the mA/kV was utilized to reduce the radiation dose to as low as reasonably achievable. COMPARISON: 12/23/2022, 12/27/2022 HISTORY: ORDERING SYSTEM PROVIDED HISTORY: Persistent hypoxia TECHNOLOGIST PROVIDED HISTORY: Reason for exam:->Persistent hypoxia Reason for Exam: Persistent hypoxia Additional signs and symptoms: 75ml isovue 370 @ rt wrist/forearm @ 1330hrs gfr>60, creat 0.9 12-, pt has pnuemonia, sob, congestion, cough,Persistent hypoxia FINDINGS: Pulmonary Arteries: Pulmonary arteries are adequately opacified for evaluation. No evidence of intraluminal filling defect to suggest pulmonary embolism. Main pulmonary artery is normal in caliber. Mediastinum: Coronary atherosclerosis. No pericardial effusion. Small hiatal hernia. Borderline enlarged mediastinal lymph nodes, most likely reactive. The thyroid is within normal limits. Lungs/pleura: Patchy ground-glass opacities bilaterally with more patchy, consolidative opacities in the lower lobes.   There is associated interlobular septal thickening in the upper lobes. The central airways are patent. Small bilateral pleural effusions. No pneumothorax. Upper Abdomen: Limited images of the upper abdomen are unremarkable. Soft Tissues/Bones: No acute bone or soft tissue abnormality. 1. No evidence of pulmonary embolism. 2. Extensive mixed ground-glass and consolidative opacities, significantly increased from 12/23/2022. The differential includes atypical/viral pneumonia, an infectious process with superimposed cardiogenic edema, and ARDS. 3. Small bilateral pleural effusions. 4. Coronary atherosclerosis. CTA PULMONARY W CONTRAST    Result Date: 12/23/2022  EXAMINATION: CTA OF THE CHEST 12/23/2022 8:26 am TECHNIQUE: CTA of the chest was performed after the administration of intravenous contrast.  Multiplanar reformatted images are provided for review. MIP images are provided for review. Automated exposure control, iterative reconstruction, and/or weight based adjustment of the mA/kV was utilized to reduce the radiation dose to as low as reasonably achievable. COMPARISON: 12/22/2022, 10/02/2019 HISTORY: ORDERING SYSTEM PROVIDED HISTORY: elevated d-dimer, hypoxia,  concerns of PE TECHNOLOGIST PROVIDED HISTORY: Reason for exam:->elevated d-dimer, hypoxia,  concerns of PE Reason for Exam: elevated d-dimer, hypoxia FINDINGS: Pulmonary Arteries: Pulmonary arteries are adequately opacified for evaluation. No evidence of intraluminal filling defect to suggest pulmonary embolism. Main pulmonary artery is normal in caliber. Mediastinum: No pericardial effusion. Small hiatal hernia. The esophagus is within normal limits. Mild mediastinal adenopathy. The thyroid is unremarkable. Coronary atherosclerosis. Lungs/pleura: Extensive patchy opacities are present in both lungs, with a mixed ground-glass and consolidative appearance. No pneumothorax. Trace bilateral effusions, right greater than left.   The central airways are patent. Upper Abdomen: Limited images of the upper abdomen are unremarkable. Soft Tissues/Bones: No acute bone or soft tissue abnormality. 1. No evidence of pulmonary embolism. 2. Findings compatible with multifocal pneumonia, including atypical/viral etiologies. 3. Trace bilateral pleural effusions, right greater than left. 4. Mild mediastinal lymphadenopathy, which is nonspecific and may be reactive. 5. Coronary atherosclerosis. Critical care time 40 minutes.   Critical care time does not include separately billable procedures  Patient is critical due to acute hypoxic respiratory failure  During this time I reviewed all the imaging from [facility], discussed with attending regarding plan of care    Electronically signed by Júnior Richardson PA-C on 12/30/2022 at 2:25 PM

## 2022-12-30 NOTE — PLAN OF CARE
Problem: Discharge Planning  Goal: Discharge to home or other facility with appropriate resources  12/30/2022 1104 by Yunior Muniz RN  Outcome: Progressing  12/29/2022 2120 by Hank Gamble RN  Outcome: Progressing     Problem: Pain  Goal: Verbalizes/displays adequate comfort level or baseline comfort level  12/30/2022 1104 by Yunior Muniz RN  Outcome: Progressing  12/29/2022 2120 by Hank Gamble RN  Outcome: Progressing     Problem: ABCDS Injury Assessment  Goal: Absence of physical injury  12/30/2022 1104 by Yunior Muniz RN  Outcome: Progressing  12/29/2022 2120 by Hank Gamble RN  Outcome: Progressing     Problem: Safety - Adult  Goal: Free from fall injury  12/30/2022 1104 by Yunior Muniz RN  Outcome: Progressing  12/29/2022 2120 by Hank Gamble RN  Outcome: Progressing

## 2022-12-30 NOTE — CARE COORDINATION
Patient received from [facility] (LOS 12/22-12/30) Last CM note 12/22. She is on Bipap alt with 15 L/M HHF. CM will follow for continued discharge planning.  Silvia Britton RN

## 2022-12-30 NOTE — PLAN OF CARE
Problem: Discharge Planning  Goal: Discharge to home or other facility with appropriate resources  12/30/2022 1224 by Leny Sood RN  Outcome: Completed  12/30/2022 1104 by Leny Sood RN  Outcome: Progressing     Problem: Pain  Goal: Verbalizes/displays adequate comfort level or baseline comfort level  12/30/2022 1224 by Leny Sood RN  Outcome: Completed  12/30/2022 1104 by Leny Sood RN  Outcome: Progressing     Problem: ABCDS Injury Assessment  Goal: Absence of physical injury  12/30/2022 1224 by Leny Sood RN  Outcome: Completed  12/30/2022 1104 by Leny Sood RN  Outcome: Progressing     Problem: Safety - Adult  Goal: Free from fall injury  12/30/2022 1224 by Leny Sood RN  Outcome: Completed  12/30/2022 1104 by Leny Sood RN  Outcome: Progressing

## 2022-12-30 NOTE — PROGRESS NOTES
Patient has a room at [facility], 2127. Report called to [facility] [place], RN and she was notified of patient's status and reasoning for transfer. Patient is currently on bipap and has been requiring 15L High flow when off of bipap. Transport is not arranged yet, but this nurse notified [facility] [place] that she would call back with an ETA. 1040: [facility] [place] notified that patient would be picked up around [phone], per access center.

## 2022-12-30 NOTE — PROGRESS NOTES
Patient is anxious, worried about her daughter  in law with recent skin cancer dx. She keeps taking offer oxygen, she is re-direct able with people in the room at times attempting to get out of bed. She becomes hypoxic dropping to 75% when she gets up. Will continue high flow orders will not titrate up on oxygen. Iv ativan did not help, tele sitter ordered, will try Iv benadryl and po trazodone 50mg and see how this works. If it does work would considering increasing does to 100mg tomorrow night and see how her sleep hygine does.

## 2022-12-30 NOTE — DISCHARGE SUMMARY
Discharge Summary    Name:  Sheldon Ramsay /Age/Sex: 1935  (81 y.o. female)   MRN & CSN:  [id number] & [id number] Admission Date/Time: 2022  6:57 PM   Attending:  Camryn Bauer MD Discharging Provider Chirag Otero, APRN - [address] Course:   Sheldon Ramsay is a 81 y.o.  female  who presents with Pneumonia due to other specified infectious organisms    Hospital Course: The patient was initially admitted in 2023 for concerns of respiratory failure 2/2 multifocal pneumonia. She required supplemental oxygen on admission at 3 LPM NC. Throughout the course admission, continue to require increased amounts of oxygen. Antibiotics were broadened to cefepime/ doxy, started on steroids, and scheduled bronchodilators/ICS. She appeared overall comfortable w/o distress however biochemical work-up/imaging suggested worsening condition. Repeat CTA on  negative for PE but extensive mixed groundglass and consolidative opacities that were increasing. Viral panel, urinary antigens, MRSA swab, BLC and sputum culture were all negative. Overnight reported increased distress with hypoxia and subsequent anxiety. Oxygen titrated up to 15 LPM High flow. Discussed need for increased care/ evaluation and going to pursue transfer to higher level of care. She was accepted and transferred to [facility] ICU for intensivist evaluation. She was given 20mg IV decadron and started on BiPAP prior to departure. Her remaining clinical course is detailed below. Acute hypoxemic respiratory failure 2/2 multifocal pneumonia  Sepsis              Repeat CXR (2022) Worsening aeration throughout both lungs predominantly involving the left lower lung zone. CTA chest () no evidence of PE. Extensive mixed groundglass and consolidative opacities, significant increase from .   Small bilateral pleural effusions  Sputum culture NGTD, BLC NGTD, urine antigens negative, viral panel negative  Procal/ CRP trending up              Continues to require high flow supplemental oxygen increased to 14 L/P High flow now on bipap 14/7 70% FiO2 initially               Antibiotics broadened 12/25: Cefepime day 6 (doxycycline stopped 12/27-day 3)              Vancomycin held due to low MRSA risk factors. MRSA nasal screening-negative              Oral prednisone changed to IV decadron 20mg x 1 dose today              Continue bronchodilators/ICS              PRN symptom control                 Hypervolemia suspected. Possible right sided HF given elevated RVSP 40 mmHg and imaging  Lasix 20 mg daily. BNP improving               TTE (12/27) EF 55 to 60%, mild LVH, trace AR, mild AR   -2600 OP since 12/29                           Hyponatremia (133)               continue to trend     Hypertension              Continue home antihypertensive regimen- Norvasc                 Paroxysmal atrial fibrillation: current rhythm controlled a fib              [id number] Score: 4  Continue amiodarone/Eliquis              Continue telemetry     DEYANIRA/depression              Continue Lexapro/ trazodone       The patient expressed appropriate understanding of and agreement with the discharge recommendations, medications, and plan.      Consults this admission:  IP CONSULT TO HOSPITALIST  IP CONSULT TO HOSPITALIST    Discharge Instruction:     Disposition: Discharged to:   [facility] ICU  Condition on discharge: Stable but guarded     Discharge Medications:        Medication List        CONTINUE taking these medications      alendronate 70 MG tablet  Commonly known as: FOSAMAX  Take 1 tablet by mouth every 7 days     amiodarone 200 MG tablet  Commonly known as: CORDARONE  Take 1 tablet by mouth daily     amLODIPine 2.5 MG tablet  Commonly known as: NORVASC  Take 1 tablet by mouth daily     apixaban 5 MG Tabs tablet  Commonly known as: Eliquis  Take 1 tablet by mouth 2 times daily     aspirin 81 MG chewable tablet  Take 1 tablet by mouth daily     escitalopram 20 MG tablet  Commonly known as: LEXAPRO  Take 1 tablet by mouth daily     estradiol 0.5 MG tablet  Commonly known as: ESTRACE     pantoprazole 40 MG tablet  Commonly known as: PROTONIX  Take 1 tablet by mouth every morning (before breakfast)              Objective Findings at Discharge:     Vitals:    12/30/22 0545 12/30/22 0601 12/30/22 0605 12/30/22 0712   BP:       Pulse:       Resp:       Temp:    99.9 °F (37.7 °C)   TempSrc:    Oral   SpO2: (!) 82% (!) 89% 91%    Weight:       Height:                  Physical Exam: 12/30/22     GEN -Awake nontoxic appearing female, sitting upright in bed , NAD. normal body habitus. Appears given age. EYES -Pupils are equally round. No scleral erythema, discharge, or conjunctivitis. HENT -MM are moist. Oral pharynx without exudates, no evidence of thrush. NECK -Supple, no apparent thyromegaly or masses. RESP -Distressed. Tachypnea, + rhonchi. Symmetric chest movement while on BiPAP. C/V -S1/S2 auscultated. RRR without appreciable M/R/G. No JVD or carotid bruits. Peripheral pulses equal bilaterally and palpable. No peripheral edema. GI -Abdomen is soft non distended and without significant tenderness. No masses or guarding. + BS Rectal exam deferred.  -No CVA tenderness. Quinones catheter is not present. - Purewick   LYMPH-No palpable cervical lymphadenopathy and no hepatosplenomegaly. No petechiae or ecchymoses. MS -No gross joint deformities. SKIN -Normal coloration, warm, dry. NEURO-Cranial nerves appear grossly intact, normal speech, no lateralizing weakness. PSYC-Awake, alert, oriented x 3- confusion at times. Appropriate affect.       Data:     Laboratory this visit:  Reviewed  Recent Labs     12/28/22  0730 12/29/22  0600 12/30/22  0515   WBC 15.4* 16.0* 19.1*   HGB 12.3* 11.8* 12.8   HCT 36.7* 35.5* 37.8    384 430      Recent Labs     12/28/22  0730 12/29/22  0600 12/30/22  0515   * 131* 133*   K 4.0 3.5 3.7 CL 97* 94* 92*   CO2 27 27 29   BUN 20 21 18   CREATININE 0.9 0.9 0.8     Recent Labs     12/28/22  0730 12/30/22  0515   AST 59* 55*   ALT 59* 61*   BILITOT 0.6 0.8   ALKPHOS 61 79       Radiology this visit:  Reviewed. XR CHEST PORTABLE    Result Date: 12/27/2022  EXAMINATION: ONE XRAY VIEW OF THE CHEST 12/27/2022 8:07 am COMPARISON: 12/25/2022 HISTORY: ORDERING SYSTEM PROVIDED HISTORY: worsening hypoxia TECHNOLOGIST PROVIDED HISTORY: Reason for exam:->worsening hypoxia Reason for Exam: Principal Problem:  Pneumonia due to other specified infectious organisms Additional signs and symptoms: Principal Problem:  Pneumonia due to other specified infectious organisms Relevant Medical/Surgical History: Principal Problem:  Pneumonia due to other specified infectious organisms FINDINGS: Increased hazy opacities throughout both lungs, predominantly involving the left lower lung zone. Normal cardiomediastinal silhouette. No pleural effusion or pneumothorax. No acute osseous abnormality. Worsening aeration throughout both lungs predominantly involving the left lower lung zone. XR CHEST PORTABLE    Result Date: 12/25/2022  EXAMINATION: ONE XRAY VIEW OF THE CHEST 12/25/2022 8:23 am COMPARISON: 12/22/2022. HISTORY: ORDERING SYSTEM PROVIDED HISTORY: worsening respiratroy status overnight, eval pneumonia TECHNOLOGIST PROVIDED HISTORY: Reason for exam:->worsening respiratroy status overnight, eval pneumonia Initial evaluation. FINDINGS: The cardiac silhouette is within normal limits for size. Patchy opacities are seen within the lungs bilaterally. There is perhaps minimal worsening in the opacification within the right mid lung. No pleural effusion or pneumothorax. Patchy opacities within the lungs bilaterally, which appear minimally worsened within the right mid lung.      XR CHEST PORTABLE    Result Date: 12/22/2022  EXAMINATION: ONE XRAY VIEW OF THE CHEST 12/22/2022 7:21 pm COMPARISON: Chest x-ray and CT chest October 2, 2019 HISTORY: ORDERING SYSTEM PROVIDED HISTORY: chest pain TECHNOLOGIST PROVIDED HISTORY: Reason for exam:->chest pain Reason for Exam: chest pain FINDINGS: Cardiac silhouette appears stable. Chronic interstitial changes of the lungs with superimposed interstitial opacities and more focal airspace opacities at the periphery of the right mid lung and left lung base. Multifocal pneumonia of concern. No pleural effusion or pneumothorax. Osseous structures appear intact. 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 1. Focal airspace opacities of the right lateral mid lung and left lung base concerning for multifocal pneumonia in the appropriate clinical setting. This is superimposed on a background of chronic underlying lung changes. CTA PULMONARY W CONTRAST    Result Date: 12/28/2022  EXAMINATION: CTA OF THE CHEST 12/28/2022 11:22 am TECHNIQUE: CTA of the chest was performed after the administration of intravenous contrast.  Multiplanar reformatted images are provided for review. MIP images are provided for review. Automated exposure control, iterative reconstruction, and/or weight based adjustment of the mA/kV was utilized to reduce the radiation dose to as low as reasonably achievable. COMPARISON: 12/23/2022, 12/27/2022 HISTORY: ORDERING SYSTEM PROVIDED HISTORY: Persistent hypoxia TECHNOLOGIST PROVIDED HISTORY: Reason for exam:->Persistent hypoxia Reason for Exam: Persistent hypoxia Additional signs and symptoms: 75ml isovue 370 @ rt wrist/forearm @ 1330hrs gfr>60, creat 0.9 12-, pt has pnuemonia, sob, congestion, cough,Persistent hypoxia FINDINGS: Pulmonary Arteries: Pulmonary arteries are adequately opacified for evaluation. No evidence of intraluminal filling defect to suggest pulmonary embolism. Main pulmonary artery is normal in caliber. Mediastinum: Coronary atherosclerosis. No pericardial effusion. Small hiatal hernia.   Borderline enlarged mediastinal lymph nodes, most likely reactive. The thyroid is within normal limits. Lungs/pleura: Patchy ground-glass opacities bilaterally with more patchy, consolidative opacities in the lower lobes. There is associated interlobular septal thickening in the upper lobes. The central airways are patent. Small bilateral pleural effusions. No pneumothorax. Upper Abdomen: Limited images of the upper abdomen are unremarkable. Soft Tissues/Bones: No acute bone or soft tissue abnormality. 1. No evidence of pulmonary embolism. 2. Extensive mixed ground-glass and consolidative opacities, significantly increased from 12/23/2022. The differential includes atypical/viral pneumonia, an infectious process with superimposed cardiogenic edema, and ARDS. 3. Small bilateral pleural effusions. 4. Coronary atherosclerosis. CTA PULMONARY W CONTRAST    Result Date: 12/23/2022  EXAMINATION: CTA OF THE CHEST 12/23/2022 8:26 am TECHNIQUE: CTA of the chest was performed after the administration of intravenous contrast.  Multiplanar reformatted images are provided for review. MIP images are provided for review. Automated exposure control, iterative reconstruction, and/or weight based adjustment of the mA/kV was utilized to reduce the radiation dose to as low as reasonably achievable. COMPARISON: 12/22/2022, 10/02/2019 HISTORY: ORDERING SYSTEM PROVIDED HISTORY: elevated d-dimer, hypoxia,  concerns of PE TECHNOLOGIST PROVIDED HISTORY: Reason for exam:->elevated d-dimer, hypoxia,  concerns of PE Reason for Exam: elevated d-dimer, hypoxia FINDINGS: Pulmonary Arteries: Pulmonary arteries are adequately opacified for evaluation. No evidence of intraluminal filling defect to suggest pulmonary embolism. Main pulmonary artery is normal in caliber. Mediastinum: No pericardial effusion. Small hiatal hernia. The esophagus is within normal limits. Mild mediastinal adenopathy. The thyroid is unremarkable. Coronary atherosclerosis.  Lungs/pleura: Extensive patchy opacities are present in both lungs, with a mixed ground-glass and consolidative appearance. No pneumothorax. Trace bilateral effusions, right greater than left. The central airways are patent. Upper Abdomen: Limited images of the upper abdomen are unremarkable. Soft Tissues/Bones: No acute bone or soft tissue abnormality. 1. No evidence of pulmonary embolism. 2. Findings compatible with multifocal pneumonia, including atypical/viral etiologies. 3. Trace bilateral pleural effusions, right greater than left. 4. Mild mediastinal lymphadenopathy, which is nonspecific and may be reactive. 5. Coronary atherosclerosis.          Discharge Time of 40 minutes    Electronically signed by STEVENSON Farrar CNP on 12/30/2022 at 7:49 AM

## 2022-12-31 NOTE — PROGRESS NOTES
In-Patient Progress Note    Patient:  Madeleine Hackett 81 y.o. female MRN: [id number]     Date of Service: 12/31/2022    Hospital Day: 2      Chief complaint: had no chief complaint listed for this encounter. Assessment and Plan   Madeleine Hackett, a 81 y.o. female, with a history of hypertension (amlodipine 2.5 Mg daily), A. fib (amiodarone 200 Mg daily, apixaban 5 Mg twice daily), depression/anxiety (Lexapro 20 Mg daily), osteoporosis (alendronate 70 Mg weekly), GERD (pantoprazole 40 Mg daily), was initially admitted to [facility] on 12/22/2022 with complaints of shortness of breath, lethargy, wheezing; with diagnosis of acute on chronic respiratory failure with hypoxia secondary to acute bilateral lower lobe pneumonia. She required supplemental oxygen on admission at 3 LPM NC. Throughout the course admission, continue to require increased amounts of oxygen. Antibiotics were broadened to cefepime/ doxy, started on steroids, and scheduled bronchodilators/ICS. She appeared overall comfortable w/o distress however biochemical work-up/imaging suggested worsening condition. Repeat CTA on 12/28 negative for PE but extensive mixed groundglass and consolidative opacities that were increasing. Viral panel, urinary antigens, MRSA swab, BLC and sputum culture were all negative. Overnight reported increased distress with hypoxia and subsequent anxiety. Oxygen titrated up to 15 LPM High flow. Discussed need for increased care/ evaluation and going to pursue transfer to higher level of care. She was accepted and transferred to [facility] ICU for intensivist evaluation. She was given 20mg IV decadron and started on BiPAP prior to departure to [facility] on  12/30/2022.     Assessment and plan    Acute hypoxic respiratory failure  Bilateral diffuse groundglass opacities, concern for ARDS  Likely multifocal viral pneumonia, suspected superimposed bacterial pneumonia  CTA chest 12/28 shows no evidence of PE but does show extensive mixed groundglass and consolidative opacities bilaterally, increased from 12/23. Respiratory viral panel -12/28. Urine strep pneumoniae and Legionella antigen negative-12/23. Procalcitonin trending up since admission from 0.09-0.176 as of 12/30/2022. .2.  -Azithromycin, vancomycin and meropenem  -Decadron 20 Mg for 5 days followed by 10 Mg  -Positive pressure ventilation  -Wean FiO2 as able    Anxiety/depression  -Continue escitalopram    History of A. fib/a flutter  -Continue amiodarone and Eliquis    History of heart failure preserved ejection fraction  EF 55-60% per echo on 12/28. proBNP 790 on 12/29. Stress test was done back in 2019 which was negative for reversible perfusion defect.  -Continue Lasix 20 Mg daily      # Peptic ulcer prophylaxis: Pantoprazole  # DVT Prophylaxis: Eliquis  #CODE STATUS: Full code      Current living situation: -  Expected Disposition: -  Estimated discharge date: -      Review of System     Review of Systems   Constitutional:  Positive for fatigue. Negative for activity change, appetite change, chills and fever. HENT:  Negative for congestion, ear discharge, ear pain, hearing loss, nosebleeds, postnasal drip, rhinorrhea, sinus pain, sore throat, trouble swallowing and voice change. Eyes:  Negative for photophobia, pain, discharge, redness and itching. Respiratory:  Positive for cough and shortness of breath. Negative for chest tightness. Cardiovascular:  Negative for chest pain, palpitations and leg swelling. Gastrointestinal:  Negative for abdominal distention, abdominal pain, blood in stool, constipation, diarrhea, nausea and vomiting. Endocrine: Negative for cold intolerance, heat intolerance, polydipsia, polyphagia and polyuria. Genitourinary:  Negative for difficulty urinating, dysuria, flank pain, frequency, hematuria and urgency. Musculoskeletal:  Negative for arthralgias, back pain, gait problem, joint swelling and myalgias.    Skin: Negative for pallor, rash and wound. Allergic/Immunologic: Negative for environmental allergies and food allergies. Neurological:  Negative for dizziness, tremors, seizures, syncope, speech difficulty, weakness, light-headedness, numbness and headaches. Hematological:  Negative for adenopathy. Does not bruise/bleed easily. Psychiatric/Behavioral:  Negative for agitation, confusion, decreased concentration, hallucinations, self-injury, sleep disturbance and suicidal ideas. The patient is not nervous/anxious and is not hyperactive. I have reviewed all pertinent PMHx, PSHx, FamHx, SocialHx, medications, and allergies and updated history as appropriate. Physical Exam   VITAL SIGNS:  BP (!) 93/58   Pulse 72   Temp 98.4 °F (36.9 °C) (Oral)   Resp 22   Ht 5' 8.11\" (1.73 m)   Wt 172 lb 8 oz (78.2 kg)   SpO2 93%   BMI 26.14 kg/m²   Tmax over 24 hours:  Temp (24hrs), Av.7 °F (37.1 °C), Min:97.9 °F (36.6 °C), Max:100.2 °F (37.9 °C)      Patient Vitals for the past 6 hrs:   BP Temp Temp src Pulse Resp SpO2   22 1230 (!) 93/58 -- -- 72 22 93 %   22 1218 (!) 101/48 -- -- 72 20 93 %   22 1200 (!) 105/48 98.4 °F (36.9 °C) Oral 71 19 --   22 1158 -- -- -- 71 22 93 %   22 1130 (!) 110/57 -- -- 73 24 --   22 1100 (!) 99/50 -- -- 72 20 96 %   22 1030 (!) 101/50 -- -- 75 19 94 %   22 1000 (!) 111/52 -- -- 68 23 93 %         Intake/Output Summary (Last 24 hours) at 2022 1543  Last data filed at 2022 1200  Gross per 24 hour   Intake 150 ml   Output 300 ml   Net -150 ml     Wt Readings from Last 2 Encounters:   22 172 lb 8 oz (78.2 kg)   22 172 lb 6.4 oz (78.2 kg)     Body mass index is 26.14 kg/m². Physical Exam  Constitutional:       General: She is not in acute distress. Appearance: She is well-developed. She is ill-appearing. HENT:      Head: Normocephalic and atraumatic.       Right Ear: External ear normal.      Left Ear: External ear normal.      Nose: Nose normal.   Eyes:      General: No scleral icterus. Right eye: No discharge. Left eye: No discharge. Conjunctiva/sclera: Conjunctivae normal.      Pupils: Pupils are equal, round, and reactive to light. Neck:      Thyroid: No thyromegaly. Vascular: No JVD. Trachea: No tracheal deviation. Cardiovascular:      Rate and Rhythm: Normal rate and regular rhythm. Heart sounds: Normal heart sounds. No murmur heard. No friction rub. No gallop. Pulmonary:      Effort: Pulmonary effort is normal. No respiratory distress. Breath sounds: Normal breath sounds. No stridor. No wheezing or rales. Chest:      Chest wall: No tenderness. Abdominal:      General: Bowel sounds are normal. There is no distension. Palpations: Abdomen is soft. There is no mass. Tenderness: There is no abdominal tenderness. There is no guarding or rebound. Hernia: No hernia is present. Genitourinary:     Vagina: Normal. No vaginal discharge. Rectum: Guaiac result negative. Musculoskeletal:         General: No tenderness or deformity. Normal range of motion. Cervical back: Normal range of motion and neck supple. Lymphadenopathy:      Cervical: No cervical adenopathy. Skin:     General: Skin is warm and dry. Capillary Refill: Capillary refill takes less than 2 seconds. Coloration: Skin is not pale. Findings: No erythema or rash. Neurological:      Mental Status: She is alert and oriented to person, place, and time. Cranial Nerves: No cranial nerve deficit. Motor: No abnormal muscle tone. Coordination: Coordination normal.      Deep Tendon Reflexes: Reflexes normal.   Psychiatric:         Behavior: Behavior normal.         Thought Content:  Thought content normal.         Judgment: Judgment normal.         Current Medications      amiodarone  200 mg Oral Daily    [Held by provider] amLODIPine  2.5 mg Oral Daily apixaban  5 mg Oral BID    escitalopram  20 mg Oral Daily    pantoprazole  40 mg Oral QAM AC    sodium chloride flush  10 mL IntraVENous 2 times per day    aspirin  81 mg Oral Daily    ipratropium-albuterol  1 ampule Inhalation Q4H WA    guaiFENesin  600 mg Oral BID    mometasone-formoterol  2 puff Inhalation BID    furosemide  20 mg IntraVENous Daily    traZODone  50 mg Oral Nightly    dexamethasone  20 mg IntraVENous Daily    [START ON 1/4/2023] dexamethasone  10 mg IntraVENous Daily    meropenem  1,000 mg IntraVENous Q8H    azithromycin  500 mg IntraVENous Q24H    vancomycin  1,000 mg IntraVENous Q18H         Labs and Imaging Studies   Laboratory findings:  XR CHEST PORTABLE    Result Date: 12/31/2022  EXAMINATION: ONE XRAY VIEW OF THE CHEST 12/31/2022 4:57 am COMPARISON: 12/27/2022 HISTORY: ORDERING SYSTEM PROVIDED HISTORY: f/u ARDS TECHNOLOGIST PROVIDED HISTORY: Reason for exam:->f/u ARDS Reason for Exam: f/u ARDS FINDINGS: Cardiomediastinal silhouette is stable. Similar bilateral airspace opacities. No pleural effusion or pneumothorax. No gross bony abnormality.      Stable chest.       Recent Results (from the past 24 hour(s))   Critical Care Panel    Collection Time: 12/31/22  6:00 AM   Result Value Ref Range    Sodium 128 (L) 135 - 145 MMOL/L    Potassium 3.8 3.5 - 5.1 MMOL/L    Chloride 89 (L) 99 - 110 mMol/L    CO2 30 21 - 32 MMOL/L    Anion Gap 9 4 - 16    BUN 22 6 - 23 MG/DL    Creatinine 1.1 0.6 - 1.1 MG/DL    Est, Glom Filt Rate 49 (L) >60 mL/min/1.73m2    Glucose 95 70 - 99 MG/DL    Calcium 7.7 (L) 8.3 - 10.6 MG/DL    Phosphorus 3.1 2.5 - 4.9 MG/DL    Magnesium 2.1 1.8 - 2.4 mg/dl   CBC    Collection Time: 12/31/22  6:00 AM   Result Value Ref Range    WBC 15.9 (H) 4.0 - 10.5 K/CU MM    RBC 3.19 (L) 4.2 - 5.4 M/CU MM    Hemoglobin 10.6 (L) 12.5 - 16.0 GM/DL    Hematocrit 31.9 (L) 37 - 47 %    .0 78 - 100 FL    MCH 33.2 (H) 27 - 31 PG    MCHC 33.2 32.0 - 36.0 %    RDW 12.2 11.7 - 14.9 % Platelets 352 811 - 852 K/CU MM    MPV 8.6 7.5 - 11.1 FL           Electronically signed by Dayne Shelton MD on 12/31/2022 at 3:43 PM      Comment: Please note this report has been produced using speech recognition software and may contain errors related to that system including errors in grammar, punctuation, and spelling, as well as words and phrases that may be inappropriate. If there are any questions or concerns please feel free to contact the dictating provider for clarification.

## 2022-12-31 NOTE — PROGRESS NOTES
V2.0  [facility] [place] [facility] Progress Note      Name:  Parmjit Marcial /Age/Sex: 1935  (81 y.o. female)   MRN & CSN:  [id number] & [id number] Encounter Date/Time: 2022 3:22 PM EST    Location:  -A PCP: Italia Rea, [facility] Day: 2    Assessment and Plan:   Parmjit Marcial is a 81 y.o. female who presents with Respiratory failure with hypoxia ([facility] [address]      Plan:  Acute hypoxic respiratory failure, requiring BiPAP support  ARDS  Multifocal bacterial vs viral pneumonia  Sepsis  Respiratory PCR panel-on -negative  Reviewed CTA chest and CXR from - no PE- e/o mixed groundglass & consolidative opacity  C-reactive protein-253.2, Procal 0.130  WBC count downtrending-15.9 from 19.1  Continue azithromycin, meropenem, vancomycin  Cont decadron ARDS protocol- 20 mg x total 5 days, followed by 10 mg x 5 days  Continue DuoNebs, Dulera, albuterol as needed  Patient can switch from BiPAP to heated high flow as needed for comfort  Monitor in ICU     Chronic hyponatremia, hypervolemic  Possibly from CHF  Daily BMP     History of diastolic congestive heart failure  Echocardiogram on -EF 55 to 60%  proBNP-790 on   Stress test negative for ischemia in   Cont lasix 20 mg daily     History of A. fib/a flutter  Continue home amiodarone and Eliquis Eliquis     Chronic Conditions: Continue all home medications except as stated above or contraindicated. Diet ADULT DIET; Regular; Low Fat/Low Chol/High Fiber/JAVI   DVT Prophylaxis [] Lovenox, []  Heparin, [] SCDs, [] Ambulation,  [x] Eliquis, [] Xarelto  [] Coumadin   Code Status Full Code   Disposition From: Home  Expected Disposition: Home  Estimated Date of Discharge: TBD  Patient requires continued admission due to acute respiratory failure   Surrogate Decision Maker/ POA Son     Subjective:     Patient seen and examined this morning. She is resting comfortably on BiPAP.   She is stating that her mouth is dry and that her throat hurts.  She is able to take her BiPAP off for sips of water. I did discuss with her about transitioning to heated high flow for breaks off the BiPAP. Her son is at the bedside. Review of Systems:    Review of Systems    10 point review of systems completed. Negative unless otherwise stated above. Objective: Intake/Output Summary (Last 24 hours) at 12/31/2022 1522  Last data filed at 12/31/2022 1200  Gross per 24 hour   Intake 150 ml   Output 300 ml   Net -150 ml        Vitals:   Vitals:    12/31/22 1230   BP: (!) 93/58   Pulse: 72   Resp: 22   Temp:    SpO2: 93%       Physical Exam:     General: Age-appropriate female resting in bed on BiPAP, NAD  Eyes: EOMI  ENT: neck supple  Cardiovascular: Irregular rate  Respiratory: Rhonchi throughout  Gastrointestinal: Soft, non tender  Genitourinary: no suprapubic tenderness  Musculoskeletal: No edema  Skin: warm, dry  Neuro: Alert. Psych: Mood appropriate.      Medications:   Medications:    amiodarone  200 mg Oral Daily    [Held by provider] amLODIPine  2.5 mg Oral Daily    apixaban  5 mg Oral BID    escitalopram  20 mg Oral Daily    pantoprazole  40 mg Oral QAM AC    sodium chloride flush  10 mL IntraVENous 2 times per day    aspirin  81 mg Oral Daily    ipratropium-albuterol  1 ampule Inhalation Q4H WA    guaiFENesin  600 mg Oral BID    mometasone-formoterol  2 puff Inhalation BID    furosemide  20 mg IntraVENous Daily    traZODone  50 mg Oral Nightly    dexamethasone  20 mg IntraVENous Daily    [START ON 1/4/2023] dexamethasone  10 mg IntraVENous Daily    meropenem  1,000 mg IntraVENous Q8H    azithromycin  500 mg IntraVENous Q24H    vancomycin  1,000 mg IntraVENous Q18H      Infusions:    sodium chloride       PRN Meds: phenol, 1 spray, Q2H PRN  sodium chloride flush, 10 mL, PRN  sodium chloride, , PRN  polyethylene glycol, 17 g, Daily PRN  acetaminophen, 650 mg, Q6H PRN   Or  acetaminophen, 650 mg, Q6H PRN  albuterol sulfate HFA, 2 puff, Q4H PRN  guaiFENesin, 200 mg, Q4H PRN  ondansetron, 4 mg, Q6H PRN        Labs      Recent Results (from the past 24 hour(s))   Critical Care Panel    Collection Time: 12/31/22  6:00 AM   Result Value Ref Range    Sodium 128 (L) 135 - 145 MMOL/L    Potassium 3.8 3.5 - 5.1 MMOL/L    Chloride 89 (L) 99 - 110 mMol/L    CO2 30 21 - 32 MMOL/L    Anion Gap 9 4 - 16    BUN 22 6 - 23 MG/DL    Creatinine 1.1 0.6 - 1.1 MG/DL    Est, Glom Filt Rate 49 (L) >60 mL/min/1.73m2    Glucose 95 70 - 99 MG/DL    Calcium 7.7 (L) 8.3 - 10.6 MG/DL    Phosphorus 3.1 2.5 - 4.9 MG/DL    Magnesium 2.1 1.8 - 2.4 mg/dl   CBC    Collection Time: 12/31/22  6:00 AM   Result Value Ref Range    WBC 15.9 (H) 4.0 - 10.5 K/CU MM    RBC 3.19 (L) 4.2 - 5.4 M/CU MM    Hemoglobin 10.6 (L) 12.5 - 16.0 GM/DL    Hematocrit 31.9 (L) 37 - 47 %    .0 78 - 100 FL    MCH 33.2 (H) 27 - 31 PG    MCHC 33.2 32.0 - 36.0 %    RDW 12.2 11.7 - 14.9 %    Platelets 955 092 - 689 K/CU MM    MPV 8.6 7.5 - 11.1 FL        Imaging/Diagnostics Last 24 Hours   XR CHEST PORTABLE    Result Date: 12/31/2022  EXAMINATION: ONE XRAY VIEW OF THE CHEST 12/31/2022 4:57 am COMPARISON: 12/27/2022 HISTORY: ORDERING SYSTEM PROVIDED HISTORY: f/u ARDS TECHNOLOGIST PROVIDED HISTORY: Reason for exam:->f/u ARDS Reason for Exam: f/u ARDS FINDINGS: Cardiomediastinal silhouette is stable. Similar bilateral airspace opacities. No pleural effusion or pneumothorax. No gross bony abnormality. Stable chest.     Total critical care time 34 minutes excluding all billable procedures.     Electronically signed by STEVENSON Gastelum CNP on 12/31/2022 at 3:22 PM

## 2022-12-31 NOTE — PROGRESS NOTES
[facility]  consulted by Dr. Leticia Lockett for monitoring and adjustment. Indication for treatment: Vancomycin indication: HAP  Goal trough: Trough Goal: 15-20 mcg/mL  AUC/DEBORAH: 400-600    Risk Factors for MRSA Identified:   Prior hospitalization on 12/22/22    Pertinent Laboratory Values:   Temp Readings from Last 3 Encounters:   12/31/22 98.4 °F (36.9 °C) (Oral)   12/30/22 99.8 °F (37.7 °C) (Oral)   09/30/22 97.3 °F (36.3 °C)     Recent Labs     12/29/22  0600 12/30/22  0515 12/31/22  0600   WBC 16.0* 19.1* 15.9*     Recent Labs     12/29/22  0600 12/30/22  0515 12/31/22  0600   BUN 21 18 22   CREATININE 0.9 0.8 1.1     Estimated Creatinine Clearance: 40 mL/min (based on SCr of 1.1 mg/dL). Intake/Output Summary (Last 24 hours) at 12/31/2022 1310  Last data filed at 12/31/2022 1200  Gross per 24 hour   Intake 150 ml   Output 300 ml   Net -150 ml       Pertinent Cultures:   Date    Source    Results  12/22   Blood    No growth at 5 days  12/22   MRSA Nasal   Negative on 12/26      Vancomycin level:   TROUGH:  No results for input(s): VANCOTROUGH in the last 72 hours. RANDOM:  No results for input(s): VANCORANDOM in the last 72 hours. Assessment:  HPI: Presented at [place] with worsening hypoxic respiratory failure and admitted on 12/22 with bilateral lower lobe pneumonia. Respiratory status worsened, started on BIPAP and transferred to [facility] ICU. Day(s) of therapy: Day 1  Vancomycin concentration: To be collected    Plan:  Continue current dose: vancomycin 1000mg Q18h. Will assess dose tomorrow after vancomycin level results  Pharmacy will continue to monitor patient and adjust therapy as indicated    Adiel 3 1/1/2023 @0600    Thank you for the consult.   Lj Cervantes, [facility]  12/31/2022 1:10 PM

## 2022-12-31 NOTE — FLOWSHEET NOTE
Switched pt to 15L HFNC from BIPAP to take a drink of water and PO meds. Pt swallowed pills and water without issue but did desat to 78% almost immediately. BIPAP put back on and pt increased O2 to 90% with a minute.

## 2022-12-31 NOTE — PROGRESS NOTES
[address] [facility]  consulted by Dr. Cristela Stacy for monitoring and adjustment. Indication for treatment: Vancomycin indication: HAP  Goal trough: Trough Goal: 15-20 mcg/mL  AUC/DEBORAH: 400-600    Risk Factors for MRSA Identified:   Prior hospitalization on 12/22/22    Pertinent Laboratory Values:   Temp Readings from Last 3 Encounters:   12/30/22 100.2 °F (37.9 °C)   12/30/22 99.8 °F (37.7 °C) (Oral)   09/30/22 97.3 °F (36.3 °C)     Recent Labs     12/28/22  0730 12/29/22  0600 12/30/22  0515   WBC 15.4* 16.0* 19.1*     Recent Labs     12/28/22  0730 12/29/22  0600 12/30/22  0515   BUN 20 21 18   CREATININE 0.9 0.9 0.8     Estimated Creatinine Clearance: 56 mL/min (based on SCr of 0.8 mg/dL). No intake or output data in the 24 hours ending 12/30/22 2016    Pertinent Cultures:   Date    Source    Results  12/22   Blood    No growth at 5 days  12/22   MRSA Nasal   Negative on 12/26      Vancomycin level:   TROUGH:  No results for input(s): VANCOTROUGH in the last 72 hours. RANDOM:  No results for input(s): VANCORANDOM in the last 72 hours. Assessment:  HPI: Presented at [place] with worsening hypoxic respiratory failure and admitted on 12/22 with bilateral lower lobe pneumonia. Respiratory status worsened, started on BIPAP and transferred to [facility] ICU. SCr, BUN, and urine output: Cr 0.8, BUN 18  Day(s) of therapy: Day 1  Vancomycin concentration: To be collected    Plan:  Vancomycin 1500mg IVPB x 1 then will follow with Vancomycin 1000mg Q18h. A level will be collected to assess dosing. Pharmacy will continue to monitor patient and adjust therapy as indicated    Adiel 3 1/1/2023 @0600    Thank you for the consult.   Karma Wells, [facility]  12/30/2022 8:16 PM

## 2023-01-01 ENCOUNTER — APPOINTMENT (OUTPATIENT)
Dept: GENERAL RADIOLOGY | Age: 88
End: 2023-01-01
Attending: STUDENT IN AN ORGANIZED HEALTH CARE EDUCATION/TRAINING PROGRAM
Payer: MEDICARE

## 2023-01-01 ENCOUNTER — APPOINTMENT (OUTPATIENT)
Dept: ULTRASOUND IMAGING | Age: 88
End: 2023-01-01
Attending: STUDENT IN AN ORGANIZED HEALTH CARE EDUCATION/TRAINING PROGRAM
Payer: MEDICARE

## 2023-01-01 VITALS
HEART RATE: 129 BPM | WEIGHT: 192.46 LBS | RESPIRATION RATE: 12 BRPM | SYSTOLIC BLOOD PRESSURE: 118 MMHG | BODY MASS INDEX: 29.17 KG/M2 | OXYGEN SATURATION: 77 % | TEMPERATURE: 97.9 F | HEIGHT: 68 IN | DIASTOLIC BLOOD PRESSURE: 40 MMHG

## 2023-01-01 LAB
ALBUMIN SERPL-MCNC: 2.3 GM/DL (ref 3.4–5)
ALBUMIN SERPL-MCNC: 2.5 GM/DL (ref 3.4–5)
ALBUMIN SERPL-MCNC: 3 GM/DL (ref 3.4–5)
ALP BLD-CCNC: 76 IU/L (ref 40–129)
ALP BLD-CCNC: 77 IU/L (ref 40–129)
ALT SERPL-CCNC: 110 U/L (ref 10–40)
ALT SERPL-CCNC: 267 U/L (ref 10–40)
ANION GAP SERPL CALCULATED.3IONS-SCNC: 10 MMOL/L (ref 4–16)
ANION GAP SERPL CALCULATED.3IONS-SCNC: 11 MMOL/L (ref 4–16)
ANION GAP SERPL CALCULATED.3IONS-SCNC: 13 MMOL/L (ref 4–16)
ANION GAP SERPL CALCULATED.3IONS-SCNC: 14 MMOL/L (ref 4–16)
ANION GAP SERPL CALCULATED.3IONS-SCNC: 8 MMOL/L (ref 4–16)
ANION GAP SERPL CALCULATED.3IONS-SCNC: 9 MMOL/L (ref 4–16)
ANISOCYTOSIS: ABNORMAL
AST SERPL-CCNC: 241 IU/L (ref 15–37)
AST SERPL-CCNC: 91 IU/L (ref 15–37)
BACTERIA: ABNORMAL /HPF
BACTERIA: NEGATIVE /HPF
BANDED NEUTROPHILS ABSOLUTE COUNT: 0.37 K/CU MM
BANDED NEUTROPHILS ABSOLUTE COUNT: 0.61 K/CU MM
BANDED NEUTROPHILS ABSOLUTE COUNT: 1.01 K/CU MM
BANDED NEUTROPHILS ABSOLUTE COUNT: 1.58 K/CU MM
BANDED NEUTROPHILS ABSOLUTE COUNT: 1.63 K/CU MM
BANDED NEUTROPHILS ABSOLUTE COUNT: 2.8 K/CU MM
BANDED NEUTROPHILS ABSOLUTE COUNT: 3.26 K/CU MM
BANDED NEUTROPHILS ABSOLUTE COUNT: 4.36 K/CU MM
BANDED NEUTROPHILS RELATIVE PERCENT: 1 % (ref 5–11)
BANDED NEUTROPHILS RELATIVE PERCENT: 10 % (ref 5–11)
BANDED NEUTROPHILS RELATIVE PERCENT: 2 % (ref 5–11)
BANDED NEUTROPHILS RELATIVE PERCENT: 3 % (ref 5–11)
BANDED NEUTROPHILS RELATIVE PERCENT: 4 % (ref 5–11)
BANDED NEUTROPHILS RELATIVE PERCENT: 6 % (ref 5–11)
BANDED NEUTROPHILS RELATIVE PERCENT: 7 % (ref 5–11)
BANDED NEUTROPHILS RELATIVE PERCENT: 7 % (ref 5–11)
BASE EXCESS MIXED: 10.4 (ref 0–2.3)
BASE EXCESS MIXED: 11.2 (ref 0–2.3)
BASE EXCESS MIXED: 11.9 (ref 0–2.3)
BASE EXCESS MIXED: 13 (ref 0–2.3)
BASE EXCESS MIXED: 13.9 (ref 0–2.3)
BASE EXCESS MIXED: 15.6 (ref 0–2.3)
BASE EXCESS MIXED: 5.6 (ref 0–2.3)
BASE EXCESS MIXED: 7.2 (ref 0–2.3)
BASE EXCESS MIXED: 7.5 (ref 0–2.3)
BASE EXCESS MIXED: 7.7 (ref 0–2.3)
BASE EXCESS MIXED: 8.4 (ref 0–2.3)
BASE EXCESS MIXED: 8.5 (ref 0–2.3)
BASE EXCESS MIXED: 8.6 (ref 0–2.3)
BASE EXCESS MIXED: 9.1 (ref 0–2.3)
BASE EXCESS MIXED: 9.2 (ref 0–2.3)
BASE EXCESS MIXED: 9.4 (ref 0–2.3)
BASE EXCESS MIXED: 9.7 (ref 0–2.3)
BASOPHILIC STIPPLING: ABNORMAL
BASOPHILS ABSOLUTE: 0 K/CU MM
BASOPHILS ABSOLUTE: 0 K/CU MM
BASOPHILS ABSOLUTE: 0.1 K/CU MM
BASOPHILS ABSOLUTE: 0.1 K/CU MM
BASOPHILS RELATIVE PERCENT: 0.1 % (ref 0–1)
BASOPHILS RELATIVE PERCENT: 0.1 % (ref 0–1)
BASOPHILS RELATIVE PERCENT: 0.3 % (ref 0–1)
BASOPHILS RELATIVE PERCENT: 0.3 % (ref 0–1)
BILIRUB SERPL-MCNC: 0.3 MG/DL (ref 0–1)
BILIRUB SERPL-MCNC: 0.7 MG/DL (ref 0–1)
BILIRUBIN URINE: NEGATIVE MG/DL
BILIRUBIN URINE: NEGATIVE MG/DL
BLOOD, URINE: ABNORMAL
BLOOD, URINE: ABNORMAL
BUN BLDV-MCNC: 20 MG/DL (ref 6–23)
BUN BLDV-MCNC: 26 MG/DL (ref 6–23)
BUN BLDV-MCNC: 31 MG/DL (ref 6–23)
BUN BLDV-MCNC: 35 MG/DL (ref 6–23)
BUN BLDV-MCNC: 37 MG/DL (ref 6–23)
BUN BLDV-MCNC: 39 MG/DL (ref 6–23)
BUN BLDV-MCNC: 40 MG/DL (ref 6–23)
BUN BLDV-MCNC: 41 MG/DL (ref 6–23)
BUN BLDV-MCNC: 58 MG/DL (ref 6–23)
BUN BLDV-MCNC: 62 MG/DL (ref 6–23)
BUN BLDV-MCNC: 70 MG/DL (ref 6–23)
BUN BLDV-MCNC: 78 MG/DL (ref 6–23)
BUN BLDV-MCNC: 79 MG/DL (ref 6–23)
BUN BLDV-MCNC: 80 MG/DL (ref 6–23)
BUN BLDV-MCNC: 82 MG/DL (ref 6–23)
CALCIUM SERPL-MCNC: 7.2 MG/DL (ref 8.3–10.6)
CALCIUM SERPL-MCNC: 7.4 MG/DL (ref 8.3–10.6)
CALCIUM SERPL-MCNC: 7.5 MG/DL (ref 8.3–10.6)
CALCIUM SERPL-MCNC: 7.6 MG/DL (ref 8.3–10.6)
CALCIUM SERPL-MCNC: 7.7 MG/DL (ref 8.3–10.6)
CALCIUM SERPL-MCNC: 7.7 MG/DL (ref 8.3–10.6)
CALCIUM SERPL-MCNC: 7.8 MG/DL (ref 8.3–10.6)
CALCIUM SERPL-MCNC: 7.9 MG/DL (ref 8.3–10.6)
CARBON MONOXIDE, BLOOD: 1.3 % (ref 0–5)
CARBON MONOXIDE, BLOOD: 1.3 % (ref 0–5)
CARBON MONOXIDE, BLOOD: 1.5 % (ref 0–5)
CARBON MONOXIDE, BLOOD: 1.6 % (ref 0–5)
CARBON MONOXIDE, BLOOD: 1.6 % (ref 0–5)
CARBON MONOXIDE, BLOOD: 1.7 % (ref 0–5)
CARBON MONOXIDE, BLOOD: 1.8 % (ref 0–5)
CARBON MONOXIDE, BLOOD: 1.9 % (ref 0–5)
CARBON MONOXIDE, BLOOD: 3.2 % (ref 0–5)
CARBON MONOXIDE, BLOOD: 3.4 % (ref 0–5)
CELLS COUNTED: 200
CHLORIDE BLD-SCNC: 90 MMOL/L (ref 99–110)
CHLORIDE BLD-SCNC: 92 MMOL/L (ref 99–110)
CHLORIDE BLD-SCNC: 93 MMOL/L (ref 99–110)
CHLORIDE BLD-SCNC: 94 MMOL/L (ref 99–110)
CHLORIDE BLD-SCNC: 95 MMOL/L (ref 99–110)
CHLORIDE BLD-SCNC: 95 MMOL/L (ref 99–110)
CHLORIDE BLD-SCNC: 96 MMOL/L (ref 99–110)
CHLORIDE BLD-SCNC: 97 MMOL/L (ref 99–110)
CHLORIDE BLD-SCNC: 97 MMOL/L (ref 99–110)
CHLORIDE BLD-SCNC: 98 MMOL/L (ref 99–110)
CLARITY: ABNORMAL
CLARITY: ABNORMAL
CO2 CONTENT: 34.1 MMOL/L (ref 19–24)
CO2 CONTENT: 34.1 MMOL/L (ref 19–24)
CO2 CONTENT: 34.6 MMOL/L (ref 19–24)
CO2 CONTENT: 34.7 MMOL/L (ref 19–24)
CO2 CONTENT: 35.6 MMOL/L (ref 19–24)
CO2 CONTENT: 35.6 MMOL/L (ref 19–24)
CO2 CONTENT: 35.8 MMOL/L (ref 19–24)
CO2 CONTENT: 36.3 MMOL/L (ref 19–24)
CO2 CONTENT: 36.6 MMOL/L (ref 19–24)
CO2 CONTENT: 37 MMOL/L (ref 19–24)
CO2 CONTENT: 37.2 MMOL/L (ref 19–24)
CO2 CONTENT: 37.3 MMOL/L (ref 19–24)
CO2 CONTENT: 39 MMOL/L (ref 19–24)
CO2 CONTENT: 39.8 MMOL/L (ref 19–24)
CO2 CONTENT: 42.3 MMOL/L (ref 19–24)
CO2: 28 MMOL/L (ref 21–32)
CO2: 28 MMOL/L (ref 21–32)
CO2: 29 MMOL/L (ref 21–32)
CO2: 29 MMOL/L (ref 21–32)
CO2: 30 MMOL/L (ref 21–32)
CO2: 31 MMOL/L (ref 21–32)
CO2: 32 MMOL/L (ref 21–32)
CO2: 34 MMOL/L (ref 21–32)
COLOR: YELLOW
COLOR: YELLOW
COMMENT: ABNORMAL
CREAT SERPL-MCNC: 0.7 MG/DL (ref 0.6–1.1)
CREAT SERPL-MCNC: 0.8 MG/DL (ref 0.6–1.1)
CREAT SERPL-MCNC: 0.8 MG/DL (ref 0.6–1.1)
CREAT SERPL-MCNC: 0.9 MG/DL (ref 0.6–1.1)
CREAT SERPL-MCNC: 0.9 MG/DL (ref 0.6–1.1)
CREAT SERPL-MCNC: 1.1 MG/DL (ref 0.6–1.1)
CREAT SERPL-MCNC: 1.2 MG/DL (ref 0.6–1.1)
CREAT SERPL-MCNC: 1.3 MG/DL (ref 0.6–1.1)
CREAT SERPL-MCNC: 1.4 MG/DL (ref 0.6–1.1)
CULTURE: ABNORMAL
CULTURE: NORMAL
DIFFERENTIAL TYPE: ABNORMAL
DOHLE BODIES: PRESENT
DOHLE BODIES: PRESENT
DOSE AMOUNT: NORMAL
DOSE TIME: NORMAL
EKG ATRIAL RATE: 137 BPM
EKG ATRIAL RATE: 294 BPM
EKG DIAGNOSIS: NORMAL
EKG DIAGNOSIS: NORMAL
EKG P AXIS: -65 DEGREES
EKG P-R INTERVAL: 114 MS
EKG Q-T INTERVAL: 288 MS
EKG Q-T INTERVAL: 292 MS
EKG QRS DURATION: 84 MS
EKG QRS DURATION: 84 MS
EKG QTC CALCULATION (BAZETT): 440 MS
EKG QTC CALCULATION (BAZETT): 450 MS
EKG R AXIS: -10 DEGREES
EKG R AXIS: -16 DEGREES
EKG T AXIS: 163 DEGREES
EKG T AXIS: 210 DEGREES
EKG VENTRICULAR RATE: 137 BPM
EKG VENTRICULAR RATE: 147 BPM
EOSINOPHILS ABSOLUTE: 0 K/CU MM
EOSINOPHILS RELATIVE PERCENT: 0 % (ref 0–3)
EOSINOPHILS RELATIVE PERCENT: 0.1 % (ref 0–3)
ESTIMATED AVERAGE GLUCOSE: 146 MG/DL
GFR SERPL CREATININE-BSD FRML MDRD: 37 ML/MIN/1.73M2
GFR SERPL CREATININE-BSD FRML MDRD: 40 ML/MIN/1.73M2
GFR SERPL CREATININE-BSD FRML MDRD: 44 ML/MIN/1.73M2
GFR SERPL CREATININE-BSD FRML MDRD: 49 ML/MIN/1.73M2
GFR SERPL CREATININE-BSD FRML MDRD: >60 ML/MIN/1.73M2
GLUCOSE BLD-MCNC: 117 MG/DL (ref 70–99)
GLUCOSE BLD-MCNC: 128 MG/DL (ref 70–99)
GLUCOSE BLD-MCNC: 141 MG/DL (ref 70–99)
GLUCOSE BLD-MCNC: 154 MG/DL (ref 70–99)
GLUCOSE BLD-MCNC: 159 MG/DL (ref 70–99)
GLUCOSE BLD-MCNC: 176 MG/DL (ref 70–99)
GLUCOSE BLD-MCNC: 176 MG/DL (ref 70–99)
GLUCOSE BLD-MCNC: 185 MG/DL (ref 70–99)
GLUCOSE BLD-MCNC: 194 MG/DL (ref 70–99)
GLUCOSE BLD-MCNC: 199 MG/DL (ref 70–99)
GLUCOSE BLD-MCNC: 200 MG/DL (ref 70–99)
GLUCOSE BLD-MCNC: 201 MG/DL (ref 70–99)
GLUCOSE BLD-MCNC: 204 MG/DL (ref 70–99)
GLUCOSE BLD-MCNC: 208 MG/DL (ref 70–99)
GLUCOSE BLD-MCNC: 210 MG/DL (ref 70–99)
GLUCOSE BLD-MCNC: 212 MG/DL (ref 70–99)
GLUCOSE BLD-MCNC: 221 MG/DL (ref 70–99)
GLUCOSE BLD-MCNC: 228 MG/DL (ref 70–99)
GLUCOSE BLD-MCNC: 230 MG/DL (ref 70–99)
GLUCOSE BLD-MCNC: 232 MG/DL (ref 70–99)
GLUCOSE BLD-MCNC: 234 MG/DL (ref 70–99)
GLUCOSE BLD-MCNC: 241 MG/DL (ref 70–99)
GLUCOSE BLD-MCNC: 242 MG/DL (ref 70–99)
GLUCOSE BLD-MCNC: 247 MG/DL (ref 70–99)
GLUCOSE BLD-MCNC: 248 MG/DL (ref 70–99)
GLUCOSE BLD-MCNC: 248 MG/DL (ref 70–99)
GLUCOSE BLD-MCNC: 261 MG/DL (ref 70–99)
GLUCOSE BLD-MCNC: 270 MG/DL (ref 70–99)
GLUCOSE BLD-MCNC: 280 MG/DL (ref 70–99)
GLUCOSE BLD-MCNC: 301 MG/DL (ref 70–99)
GLUCOSE BLD-MCNC: 313 MG/DL (ref 70–99)
GLUCOSE BLD-MCNC: 315 MG/DL (ref 70–99)
GLUCOSE BLD-MCNC: 318 MG/DL (ref 70–99)
GLUCOSE BLD-MCNC: 322 MG/DL (ref 70–99)
GLUCOSE, URINE: NEGATIVE MG/DL
GLUCOSE, URINE: NEGATIVE MG/DL
GRAM SMEAR: ABNORMAL
GRANULAR CASTS: 2 /LPF
GRANULAR CASTS: 9 /LPF
HBA1C MFR BLD: 6.7 % (ref 4.2–6.3)
HCO3 ARTERIAL: 32.7 MMOL/L (ref 18–23)
HCO3 ARTERIAL: 32.8 MMOL/L (ref 18–23)
HCO3 ARTERIAL: 32.8 MMOL/L (ref 18–23)
HCO3 ARTERIAL: 33.1 MMOL/L (ref 18–23)
HCO3 ARTERIAL: 34.1 MMOL/L (ref 18–23)
HCO3 ARTERIAL: 34.1 MMOL/L (ref 18–23)
HCO3 ARTERIAL: 34.2 MMOL/L (ref 18–23)
HCO3 ARTERIAL: 34.8 MMOL/L (ref 18–23)
HCO3 ARTERIAL: 34.9 MMOL/L (ref 18–23)
HCO3 ARTERIAL: 35.4 MMOL/L (ref 18–23)
HCO3 ARTERIAL: 35.7 MMOL/L (ref 18–23)
HCO3 ARTERIAL: 35.9 MMOL/L (ref 18–23)
HCO3 ARTERIAL: 37.6 MMOL/L (ref 18–23)
HCO3 ARTERIAL: 38.4 MMOL/L (ref 18–23)
HCO3 ARTERIAL: 40.8 MMOL/L (ref 18–23)
HCO3 VENOUS: 34.8 MMOL/L (ref 19–25)
HCO3 VENOUS: 38.2 MMOL/L (ref 19–25)
HCT VFR BLD CALC: 22.4 % (ref 37–47)
HCT VFR BLD CALC: 23 % (ref 37–47)
HCT VFR BLD CALC: 23.9 % (ref 37–47)
HCT VFR BLD CALC: 24.6 % (ref 37–47)
HCT VFR BLD CALC: 26 % (ref 37–47)
HCT VFR BLD CALC: 30 % (ref 37–47)
HCT VFR BLD CALC: 30.2 % (ref 37–47)
HCT VFR BLD CALC: 30.6 % (ref 37–47)
HCT VFR BLD CALC: 30.6 % (ref 37–47)
HCT VFR BLD CALC: 32 % (ref 37–47)
HCT VFR BLD CALC: 33 % (ref 37–47)
HCT VFR BLD CALC: 33.3 % (ref 37–47)
HCT VFR BLD CALC: 33.7 % (ref 37–47)
HCT VFR BLD CALC: 34.7 % (ref 37–47)
HEMOGLOBIN: 10.1 GM/DL (ref 12.5–16)
HEMOGLOBIN: 10.2 GM/DL (ref 12.5–16)
HEMOGLOBIN: 10.8 GM/DL (ref 12.5–16)
HEMOGLOBIN: 11.1 GM/DL (ref 12.5–16)
HEMOGLOBIN: 11.4 GM/DL (ref 12.5–16)
HEMOGLOBIN: 11.4 GM/DL (ref 12.5–16)
HEMOGLOBIN: 11.5 GM/DL (ref 12.5–16)
HEMOGLOBIN: 7.6 GM/DL (ref 12.5–16)
HEMOGLOBIN: 7.7 GM/DL (ref 12.5–16)
HEMOGLOBIN: 8 GM/DL (ref 12.5–16)
HEMOGLOBIN: 8.1 GM/DL (ref 12.5–16)
HEMOGLOBIN: 8.6 GM/DL (ref 12.5–16)
HEMOGLOBIN: 9.7 GM/DL (ref 12.5–16)
HEMOGLOBIN: 9.9 GM/DL (ref 12.5–16)
HYPHENATED YEAST: ABNORMAL /HPF
IMMATURE NEUTROPHIL %: 2.2 % (ref 0–0.43)
IMMATURE NEUTROPHIL %: 2.4 % (ref 0–0.43)
IMMATURE NEUTROPHIL %: 5.7 % (ref 0–0.43)
IMMATURE NEUTROPHIL %: 8.2 % (ref 0–0.43)
KETONES, URINE: NEGATIVE MG/DL
KETONES, URINE: NEGATIVE MG/DL
LACTATE: 1.8 MMOL/L (ref 0.5–1.9)
LACTATE: 2.8 MMOL/L (ref 0.5–1.9)
LACTATE: 3.4 MMOL/L (ref 0.5–1.9)
LACTATE: 3.8 MMOL/L (ref 0.5–1.9)
LACTATE: 4.1 MMOL/L (ref 0.5–1.9)
LEUKOCYTE ESTERASE, URINE: NEGATIVE
LEUKOCYTE ESTERASE, URINE: NEGATIVE
LYMPHOCYTES ABSOLUTE: 0.3 K/CU MM
LYMPHOCYTES ABSOLUTE: 0.6 K/CU MM
LYMPHOCYTES ABSOLUTE: 0.6 K/CU MM
LYMPHOCYTES ABSOLUTE: 0.7 K/CU MM
LYMPHOCYTES ABSOLUTE: 0.8 K/CU MM
LYMPHOCYTES ABSOLUTE: 0.8 K/CU MM
LYMPHOCYTES ABSOLUTE: 0.9 K/CU MM
LYMPHOCYTES ABSOLUTE: 0.9 K/CU MM
LYMPHOCYTES ABSOLUTE: 1 K/CU MM
LYMPHOCYTES ABSOLUTE: 1.2 K/CU MM
LYMPHOCYTES ABSOLUTE: 1.3 K/CU MM
LYMPHOCYTES ABSOLUTE: 1.4 K/CU MM
LYMPHOCYTES RELATIVE PERCENT: 1 % (ref 24–44)
LYMPHOCYTES RELATIVE PERCENT: 2 % (ref 24–44)
LYMPHOCYTES RELATIVE PERCENT: 2.8 % (ref 24–44)
LYMPHOCYTES RELATIVE PERCENT: 2.9 % (ref 24–44)
LYMPHOCYTES RELATIVE PERCENT: 3 % (ref 24–44)
LYMPHOCYTES RELATIVE PERCENT: 3.3 % (ref 24–44)
LYMPHOCYTES RELATIVE PERCENT: 5 % (ref 24–44)
Lab: ABNORMAL
Lab: NORMAL
MACROCYTES: ABNORMAL
MACROCYTES: ABNORMAL
MAGNESIUM: 2.6 MG/DL (ref 1.8–2.4)
MAGNESIUM: 2.7 MG/DL (ref 1.8–2.4)
MCH RBC QN AUTO: 32.5 PG (ref 27–31)
MCH RBC QN AUTO: 32.9 PG (ref 27–31)
MCH RBC QN AUTO: 33 PG (ref 27–31)
MCH RBC QN AUTO: 33 PG (ref 27–31)
MCH RBC QN AUTO: 33.1 PG (ref 27–31)
MCH RBC QN AUTO: 33.2 PG (ref 27–31)
MCH RBC QN AUTO: 33.3 PG (ref 27–31)
MCH RBC QN AUTO: 33.6 PG (ref 27–31)
MCH RBC QN AUTO: 33.6 PG (ref 27–31)
MCHC RBC AUTO-ENTMCNC: 32.3 % (ref 32–36)
MCHC RBC AUTO-ENTMCNC: 32.8 % (ref 32–36)
MCHC RBC AUTO-ENTMCNC: 32.9 % (ref 32–36)
MCHC RBC AUTO-ENTMCNC: 33 % (ref 32–36)
MCHC RBC AUTO-ENTMCNC: 33.1 % (ref 32–36)
MCHC RBC AUTO-ENTMCNC: 33.1 % (ref 32–36)
MCHC RBC AUTO-ENTMCNC: 33.3 % (ref 32–36)
MCHC RBC AUTO-ENTMCNC: 33.5 % (ref 32–36)
MCHC RBC AUTO-ENTMCNC: 33.6 % (ref 32–36)
MCHC RBC AUTO-ENTMCNC: 33.8 % (ref 32–36)
MCHC RBC AUTO-ENTMCNC: 33.8 % (ref 32–36)
MCHC RBC AUTO-ENTMCNC: 33.9 % (ref 32–36)
MCHC RBC AUTO-ENTMCNC: 34.2 % (ref 32–36)
MCV RBC AUTO: 100 FL (ref 78–100)
MCV RBC AUTO: 100.3 FL (ref 78–100)
MCV RBC AUTO: 101.7 FL (ref 78–100)
MCV RBC AUTO: 101.7 FL (ref 78–100)
MCV RBC AUTO: 102.1 FL (ref 78–100)
MCV RBC AUTO: 97.1 FL (ref 78–100)
MCV RBC AUTO: 97.4 FL (ref 78–100)
MCV RBC AUTO: 97.8 FL (ref 78–100)
MCV RBC AUTO: 98 FL (ref 78–100)
MCV RBC AUTO: 98.2 FL (ref 78–100)
MCV RBC AUTO: 98.8 FL (ref 78–100)
MCV RBC AUTO: 99 FL (ref 78–100)
MCV RBC AUTO: 99.6 FL (ref 78–100)
METAMYELOCYTES ABSOLUTE COUNT: 0.34 K/CU MM
METAMYELOCYTES ABSOLUTE COUNT: 0.47 K/CU MM
METAMYELOCYTES ABSOLUTE COUNT: 0.54 K/CU MM
METAMYELOCYTES ABSOLUTE COUNT: 0.87 K/CU MM
METAMYELOCYTES ABSOLUTE COUNT: 0.92 K/CU MM
METAMYELOCYTES ABSOLUTE COUNT: 1.18 K/CU MM
METAMYELOCYTES PERCENT: 1 %
METAMYELOCYTES PERCENT: 1 %
METAMYELOCYTES PERCENT: 2 %
METAMYELOCYTES PERCENT: 2 %
METAMYELOCYTES PERCENT: 3 %
METAMYELOCYTES PERCENT: 3 %
METHEMOGLOBIN ARTERIAL: 0 %
METHEMOGLOBIN ARTERIAL: 0.5 %
METHEMOGLOBIN ARTERIAL: 1 %
METHEMOGLOBIN ARTERIAL: 1 %
METHEMOGLOBIN ARTERIAL: 1.2 %
METHEMOGLOBIN ARTERIAL: 1.3 %
METHEMOGLOBIN ARTERIAL: 1.5 %
METHEMOGLOBIN ARTERIAL: 1.5 %
METHEMOGLOBIN ARTERIAL: 1.8 %
METHEMOGLOBIN ARTERIAL: 1.9 %
METHEMOGLOBIN ARTERIAL: 2.2 %
MONOCYTES ABSOLUTE: 0.4 K/CU MM
MONOCYTES ABSOLUTE: 0.6 K/CU MM
MONOCYTES ABSOLUTE: 0.7 K/CU MM
MONOCYTES ABSOLUTE: 0.7 K/CU MM
MONOCYTES ABSOLUTE: 0.8 K/CU MM
MONOCYTES ABSOLUTE: 0.9 K/CU MM
MONOCYTES ABSOLUTE: 1 K/CU MM
MONOCYTES ABSOLUTE: 1.2 K/CU MM
MONOCYTES ABSOLUTE: 1.4 K/CU MM
MONOCYTES RELATIVE PERCENT: 1 % (ref 0–4)
MONOCYTES RELATIVE PERCENT: 1 % (ref 0–4)
MONOCYTES RELATIVE PERCENT: 1.8 % (ref 0–4)
MONOCYTES RELATIVE PERCENT: 1.9 % (ref 0–4)
MONOCYTES RELATIVE PERCENT: 2 % (ref 0–4)
MONOCYTES RELATIVE PERCENT: 2 % (ref 0–4)
MONOCYTES RELATIVE PERCENT: 2.1 % (ref 0–4)
MONOCYTES RELATIVE PERCENT: 2.5 % (ref 0–4)
MONOCYTES RELATIVE PERCENT: 3 % (ref 0–4)
MONOCYTES RELATIVE PERCENT: 4 % (ref 0–4)
MONOCYTES RELATIVE PERCENT: 5 % (ref 0–4)
MUCUS: ABNORMAL HPF
MUCUS: ABNORMAL HPF
MYELOCYTE PERCENT: 1 %
MYELOCYTE PERCENT: 2 %
MYELOCYTE PERCENT: 3 %
MYELOCYTES ABSOLUTE COUNT: 0.27 K/CU MM
MYELOCYTES ABSOLUTE COUNT: 0.34 K/CU MM
MYELOCYTES ABSOLUTE COUNT: 0.4 K/CU MM
MYELOCYTES ABSOLUTE COUNT: 0.44 K/CU MM
MYELOCYTES ABSOLUTE COUNT: 0.47 K/CU MM
MYELOCYTES ABSOLUTE COUNT: 0.74 K/CU MM
MYELOCYTES ABSOLUTE COUNT: 0.92 K/CU MM
NITRITE URINE, QUANTITATIVE: NEGATIVE
NITRITE URINE, QUANTITATIVE: NEGATIVE
NUCLEATED RBC %: 0 %
NUCLEATED RED BLOOD CELLS: 1
O2 SAT, VEN: 71.9 % (ref 50–70)
O2 SAT, VEN: 90.5 % (ref 50–70)
O2 SATURATION: 84.7 % (ref 96–97)
O2 SATURATION: 84.9 % (ref 96–97)
O2 SATURATION: 85.6 % (ref 96–97)
O2 SATURATION: 86.8 % (ref 96–97)
O2 SATURATION: 88.2 % (ref 96–97)
O2 SATURATION: 89.8 % (ref 96–97)
O2 SATURATION: 89.9 % (ref 96–97)
O2 SATURATION: 90.8 % (ref 96–97)
O2 SATURATION: 91.9 % (ref 96–97)
O2 SATURATION: 92 % (ref 96–97)
O2 SATURATION: 92 % (ref 96–97)
O2 SATURATION: 92.8 % (ref 96–97)
O2 SATURATION: 93 % (ref 96–97)
O2 SATURATION: 93.8 % (ref 96–97)
O2 SATURATION: 95.9 % (ref 96–97)
PCO2 ARTERIAL: 43 MMHG (ref 32–45)
PCO2 ARTERIAL: 46 MMHG (ref 32–45)
PCO2 ARTERIAL: 47 MMHG (ref 32–45)
PCO2 ARTERIAL: 49 MMHG (ref 32–45)
PCO2 ARTERIAL: 50 MMHG (ref 32–45)
PCO2 ARTERIAL: 51 MMHG (ref 32–45)
PCO2 ARTERIAL: 51 MMHG (ref 32–45)
PCO2 ARTERIAL: 55 MMHG (ref 32–45)
PCO2 ARTERIAL: 55 MMHG (ref 32–45)
PCO2 ARTERIAL: 58 MMHG (ref 32–45)
PCO2, VEN: 50 MMHG (ref 38–52)
PCO2, VEN: 55 MMHG (ref 38–52)
PDW BLD-RTO: 12 % (ref 11.7–14.9)
PDW BLD-RTO: 12.1 % (ref 11.7–14.9)
PDW BLD-RTO: 12.2 % (ref 11.7–14.9)
PDW BLD-RTO: 12.3 % (ref 11.7–14.9)
PDW BLD-RTO: 12.6 % (ref 11.7–14.9)
PDW BLD-RTO: 12.7 % (ref 11.7–14.9)
PDW BLD-RTO: 12.9 % (ref 11.7–14.9)
PDW BLD-RTO: 13 % (ref 11.7–14.9)
PDW BLD-RTO: 13 % (ref 11.7–14.9)
PDW BLD-RTO: 13.3 % (ref 11.7–14.9)
PH BLOOD: 7.36 (ref 7.34–7.45)
PH BLOOD: 7.4 (ref 7.34–7.45)
PH BLOOD: 7.41 (ref 7.34–7.45)
PH BLOOD: 7.42 (ref 7.34–7.45)
PH BLOOD: 7.45 (ref 7.34–7.45)
PH BLOOD: 7.45 (ref 7.34–7.45)
PH BLOOD: 7.46 (ref 7.34–7.45)
PH BLOOD: 7.46 (ref 7.34–7.45)
PH BLOOD: 7.47 (ref 7.34–7.45)
PH BLOOD: 7.47 (ref 7.34–7.45)
PH BLOOD: 7.49 (ref 7.34–7.45)
PH BLOOD: 7.5 (ref 7.34–7.45)
PH BLOOD: 7.52 (ref 7.34–7.45)
PH BLOOD: 7.52 (ref 7.34–7.45)
PH BLOOD: 7.53 (ref 7.34–7.45)
PH VENOUS: 7.45 (ref 7.32–7.42)
PH VENOUS: 7.45 (ref 7.32–7.42)
PH, URINE: 5.5 (ref 5–8)
PH, URINE: 5.5 (ref 5–8)
PHOSPHORUS: 2.8 MG/DL (ref 2.5–4.9)
PHOSPHORUS: 2.8 MG/DL (ref 2.5–4.9)
PHOSPHORUS: 3.3 MG/DL (ref 2.5–4.9)
PHOSPHORUS: 4.1 MG/DL (ref 2.5–4.9)
PHOSPHORUS: 4.4 MG/DL (ref 2.5–4.9)
PHOSPHORUS: 4.7 MG/DL (ref 2.5–4.9)
PHOSPHORUS: 4.8 MG/DL (ref 2.5–4.9)
PLATELET # BLD: 239 K/CU MM (ref 140–440)
PLATELET # BLD: 240 K/CU MM (ref 140–440)
PLATELET # BLD: 247 K/CU MM (ref 140–440)
PLATELET # BLD: 262 K/CU MM (ref 140–440)
PLATELET # BLD: 262 K/CU MM (ref 140–440)
PLATELET # BLD: 284 K/CU MM (ref 140–440)
PLATELET # BLD: 286 K/CU MM (ref 140–440)
PLATELET # BLD: 306 K/CU MM (ref 140–440)
PLATELET # BLD: 319 K/CU MM (ref 140–440)
PLATELET # BLD: 323 K/CU MM (ref 140–440)
PLATELET # BLD: 326 K/CU MM (ref 140–440)
PLATELET # BLD: 331 K/CU MM (ref 140–440)
PLATELET # BLD: 352 K/CU MM (ref 140–440)
PMV BLD AUTO: 10.1 FL (ref 7.5–11.1)
PMV BLD AUTO: 8.9 FL (ref 7.5–11.1)
PMV BLD AUTO: 8.9 FL (ref 7.5–11.1)
PMV BLD AUTO: 9 FL (ref 7.5–11.1)
PMV BLD AUTO: 9 FL (ref 7.5–11.1)
PMV BLD AUTO: 9.1 FL (ref 7.5–11.1)
PMV BLD AUTO: 9.2 FL (ref 7.5–11.1)
PMV BLD AUTO: 9.4 FL (ref 7.5–11.1)
PMV BLD AUTO: 9.4 FL (ref 7.5–11.1)
PMV BLD AUTO: 9.6 FL (ref 7.5–11.1)
PMV BLD AUTO: 9.8 FL (ref 7.5–11.1)
PO2 ARTERIAL: 54 MMHG (ref 75–100)
PO2 ARTERIAL: 55 MMHG (ref 75–100)
PO2 ARTERIAL: 56 MMHG (ref 75–100)
PO2 ARTERIAL: 57 MMHG (ref 75–100)
PO2 ARTERIAL: 58 MMHG (ref 75–100)
PO2 ARTERIAL: 65 MMHG (ref 75–100)
PO2 ARTERIAL: 66 MMHG (ref 75–100)
PO2 ARTERIAL: 67 MMHG (ref 75–100)
PO2 ARTERIAL: 69 MMHG (ref 75–100)
PO2 ARTERIAL: 70 MMHG (ref 75–100)
PO2 ARTERIAL: 70 MMHG (ref 75–100)
PO2 ARTERIAL: 71 MMHG (ref 75–100)
PO2 ARTERIAL: 73 MMHG (ref 75–100)
PO2 ARTERIAL: 76 MMHG (ref 75–100)
PO2 ARTERIAL: 97 MMHG (ref 75–100)
PO2, VEN: 40 MMHG (ref 28–48)
PO2, VEN: 63 MMHG (ref 28–48)
POLYCHROMASIA: ABNORMAL
POTASSIUM SERPL-SCNC: 3.4 MMOL/L (ref 3.5–5.1)
POTASSIUM SERPL-SCNC: 3.6 MMOL/L (ref 3.5–5.1)
POTASSIUM SERPL-SCNC: 3.8 MMOL/L (ref 3.5–5.1)
POTASSIUM SERPL-SCNC: 3.9 MMOL/L (ref 3.5–5.1)
POTASSIUM SERPL-SCNC: 4.1 MMOL/L (ref 3.5–5.1)
POTASSIUM SERPL-SCNC: 4.3 MMOL/L (ref 3.5–5.1)
POTASSIUM SERPL-SCNC: 4.3 MMOL/L (ref 3.5–5.1)
POTASSIUM SERPL-SCNC: 4.4 MMOL/L (ref 3.5–5.1)
POTASSIUM SERPL-SCNC: 4.5 MMOL/L (ref 3.5–5.1)
POTASSIUM SERPL-SCNC: 4.5 MMOL/L (ref 3.5–5.1)
POTASSIUM SERPL-SCNC: 5 MMOL/L (ref 3.5–5.1)
POTASSIUM SERPL-SCNC: 5 MMOL/L (ref 3.5–5.1)
POTASSIUM SERPL-SCNC: 5.1 MMOL/L (ref 3.5–5.1)
POTASSIUM SERPL-SCNC: 5.2 MMOL/L (ref 3.5–5.1)
POTASSIUM SERPL-SCNC: 5.9 MMOL/L (ref 3.5–5.1)
PROCALCITONIN: 0.14
PROCALCITONIN: 0.17
PROCALCITONIN: 0.27
PROTEIN UA: ABNORMAL MG/DL
PROTEIN UA: ABNORMAL MG/DL
RBC # BLD: 2.29 M/CU MM (ref 4.2–5.4)
RBC # BLD: 2.31 M/CU MM (ref 4.2–5.4)
RBC # BLD: 2.41 M/CU MM (ref 4.2–5.4)
RBC # BLD: 2.6 M/CU MM (ref 4.2–5.4)
RBC # BLD: 2.95 M/CU MM (ref 4.2–5.4)
RBC # BLD: 3.01 M/CU MM (ref 4.2–5.4)
RBC # BLD: 3.01 M/CU MM (ref 4.2–5.4)
RBC # BLD: 3.09 M/CU MM (ref 4.2–5.4)
RBC # BLD: 3.24 M/CU MM (ref 4.2–5.4)
RBC # BLD: 3.36 M/CU MM (ref 4.2–5.4)
RBC # BLD: 3.42 M/CU MM (ref 4.2–5.4)
RBC # BLD: 3.47 M/CU MM (ref 4.2–5.4)
RBC # BLD: 3.54 M/CU MM (ref 4.2–5.4)
RBC # BLD: ABNORMAL 10*6/UL
RBC # BLD: ABNORMAL 10*6/UL
RBC URINE: 290 /HPF (ref 0–6)
RBC URINE: 372 /HPF (ref 0–6)
SEGMENTED NEUTROPHILS ABSOLUTE COUNT: 20.1 K/CU MM
SEGMENTED NEUTROPHILS ABSOLUTE COUNT: 20.1 K/CU MM
SEGMENTED NEUTROPHILS ABSOLUTE COUNT: 21.9 K/CU MM
SEGMENTED NEUTROPHILS ABSOLUTE COUNT: 26 K/CU MM
SEGMENTED NEUTROPHILS ABSOLUTE COUNT: 29.8 K/CU MM
SEGMENTED NEUTROPHILS ABSOLUTE COUNT: 30.2 K/CU MM
SEGMENTED NEUTROPHILS ABSOLUTE COUNT: 31.6 K/CU MM
SEGMENTED NEUTROPHILS ABSOLUTE COUNT: 34.6 K/CU MM
SEGMENTED NEUTROPHILS ABSOLUTE COUNT: 35 K/CU MM
SEGMENTED NEUTROPHILS ABSOLUTE COUNT: 35 K/CU MM
SEGMENTED NEUTROPHILS ABSOLUTE COUNT: 36.2 K/CU MM
SEGMENTED NEUTROPHILS ABSOLUTE COUNT: 40.6 K/CU MM
SEGMENTED NEUTROPHILS RELATIVE PERCENT: 81 % (ref 36–66)
SEGMENTED NEUTROPHILS RELATIVE PERCENT: 83 % (ref 36–66)
SEGMENTED NEUTROPHILS RELATIVE PERCENT: 85 % (ref 36–66)
SEGMENTED NEUTROPHILS RELATIVE PERCENT: 86.3 % (ref 36–66)
SEGMENTED NEUTROPHILS RELATIVE PERCENT: 87 % (ref 36–66)
SEGMENTED NEUTROPHILS RELATIVE PERCENT: 87.5 % (ref 36–66)
SEGMENTED NEUTROPHILS RELATIVE PERCENT: 88.9 % (ref 36–66)
SEGMENTED NEUTROPHILS RELATIVE PERCENT: 89 % (ref 36–66)
SEGMENTED NEUTROPHILS RELATIVE PERCENT: 91.9 % (ref 36–66)
SEGMENTED NEUTROPHILS RELATIVE PERCENT: 92.7 % (ref 36–66)
SEGMENTED NEUTROPHILS RELATIVE PERCENT: 94 % (ref 36–66)
SEGMENTED NEUTROPHILS RELATIVE PERCENT: 94 % (ref 36–66)
SODIUM BLD-SCNC: 131 MMOL/L (ref 135–145)
SODIUM BLD-SCNC: 131 MMOL/L (ref 135–145)
SODIUM BLD-SCNC: 132 MMOL/L (ref 135–145)
SODIUM BLD-SCNC: 134 MMOL/L (ref 135–145)
SODIUM BLD-SCNC: 134 MMOL/L (ref 135–145)
SODIUM BLD-SCNC: 135 MMOL/L (ref 135–145)
SODIUM BLD-SCNC: 136 MMOL/L (ref 135–145)
SODIUM BLD-SCNC: 137 MMOL/L (ref 135–145)
SODIUM BLD-SCNC: 139 MMOL/L (ref 135–145)
SPECIFIC GRAVITY UA: 1.01 (ref 1–1.03)
SPECIFIC GRAVITY UA: 1.02 (ref 1–1.03)
SPECIMEN: ABNORMAL
SPECIMEN: NORMAL
TOTAL IMMATURE NEUTOROPHIL: 0.48 K/CU MM
TOTAL IMMATURE NEUTOROPHIL: 0.53 K/CU MM
TOTAL IMMATURE NEUTOROPHIL: 1.92 K/CU MM
TOTAL IMMATURE NEUTOROPHIL: 2.85 K/CU MM
TOTAL NUCLEATED RBC: 0 K/CU MM
TOTAL PROTEIN: 4.6 GM/DL (ref 6.4–8.2)
TOTAL PROTEIN: 5 GM/DL (ref 6.4–8.2)
TRICHOMONAS: ABNORMAL /HPF
TRICHOMONAS: ABNORMAL /HPF
TRIGL SERPL-MCNC: 119 MG/DL
TRIGL SERPL-MCNC: 168 MG/DL
UROBILINOGEN, URINE: 0.2 MG/DL (ref 0.2–1)
UROBILINOGEN, URINE: 0.2 MG/DL (ref 0.2–1)
VANCOMYCIN RANDOM: 12.5 UG/ML
VANCOMYCIN RANDOM: 13.9 UG/ML
VANCOMYCIN RANDOM: 14.4 UG/ML
VANCOMYCIN RANDOM: 15 UG/ML
WBC # BLD: 16.3 K/CU MM (ref 4–10.5)
WBC # BLD: 21.7 K/CU MM (ref 4–10.5)
WBC # BLD: 21.9 K/CU MM (ref 4–10.5)
WBC # BLD: 27.1 K/CU MM (ref 4–10.5)
WBC # BLD: 30.6 K/CU MM (ref 4–10.5)
WBC # BLD: 33.5 K/CU MM (ref 4–10.5)
WBC # BLD: 33.6 K/CU MM (ref 4–10.5)
WBC # BLD: 34.9 K/CU MM (ref 4–10.5)
WBC # BLD: 36.8 K/CU MM (ref 4–10.5)
WBC # BLD: 39.4 K/CU MM (ref 4–10.5)
WBC # BLD: 40 K/CU MM (ref 4–10.5)
WBC # BLD: 43.6 K/CU MM (ref 4–10.5)
WBC # BLD: 46.6 K/CU MM (ref 4–10.5)
WBC # BLD: ABNORMAL 10*3/UL
WBC UA: 10 /HPF (ref 0–5)
WBC UA: <1 /HPF (ref 0–5)
YEAST: ABNORMAL /HPF

## 2023-01-01 PROCEDURE — 83735 ASSAY OF MAGNESIUM: CPT

## 2023-01-01 PROCEDURE — 82805 BLOOD GASES W/O2 SATURATION: CPT

## 2023-01-01 PROCEDURE — 37799 UNLISTED PX VASCULAR SURGERY: CPT

## 2023-01-01 PROCEDURE — 6370000000 HC RX 637 (ALT 250 FOR IP): Performed by: NURSE PRACTITIONER

## 2023-01-01 PROCEDURE — 2580000003 HC RX 258: Performed by: STUDENT IN AN ORGANIZED HEALTH CARE EDUCATION/TRAINING PROGRAM

## 2023-01-01 PROCEDURE — 36600 WITHDRAWAL OF ARTERIAL BLOOD: CPT

## 2023-01-01 PROCEDURE — 85027 COMPLETE CBC AUTOMATED: CPT

## 2023-01-01 PROCEDURE — 6360000002 HC RX W HCPCS: Performed by: STUDENT IN AN ORGANIZED HEALTH CARE EDUCATION/TRAINING PROGRAM

## 2023-01-01 PROCEDURE — 2500000003 HC RX 250 WO HCPCS: Performed by: STUDENT IN AN ORGANIZED HEALTH CARE EDUCATION/TRAINING PROGRAM

## 2023-01-01 PROCEDURE — 85007 BL SMEAR W/DIFF WBC COUNT: CPT

## 2023-01-01 PROCEDURE — 87077 CULTURE AEROBIC IDENTIFY: CPT

## 2023-01-01 PROCEDURE — 2500000003 HC RX 250 WO HCPCS: Performed by: PHYSICIAN ASSISTANT

## 2023-01-01 PROCEDURE — 6370000000 HC RX 637 (ALT 250 FOR IP): Performed by: STUDENT IN AN ORGANIZED HEALTH CARE EDUCATION/TRAINING PROGRAM

## 2023-01-01 PROCEDURE — 2500000003 HC RX 250 WO HCPCS: Performed by: NURSE PRACTITIONER

## 2023-01-01 PROCEDURE — 80048 BASIC METABOLIC PNL TOTAL CA: CPT

## 2023-01-01 PROCEDURE — 89220 SPUTUM SPECIMEN COLLECTION: CPT

## 2023-01-01 PROCEDURE — 2700000000 HC OXYGEN THERAPY PER DAY

## 2023-01-01 PROCEDURE — 6360000002 HC RX W HCPCS: Performed by: SPECIALIST

## 2023-01-01 PROCEDURE — 80053 COMPREHEN METABOLIC PANEL: CPT

## 2023-01-01 PROCEDURE — 2580000003 HC RX 258: Performed by: NURSE PRACTITIONER

## 2023-01-01 PROCEDURE — 6360000002 HC RX W HCPCS: Performed by: NURSE PRACTITIONER

## 2023-01-01 PROCEDURE — 87205 SMEAR GRAM STAIN: CPT

## 2023-01-01 PROCEDURE — 94660 CPAP INITIATION&MGMT: CPT

## 2023-01-01 PROCEDURE — 94003 VENT MGMT INPAT SUBQ DAY: CPT

## 2023-01-01 PROCEDURE — 71045 X-RAY EXAM CHEST 1 VIEW: CPT

## 2023-01-01 PROCEDURE — 84478 ASSAY OF TRIGLYCERIDES: CPT

## 2023-01-01 PROCEDURE — 82962 GLUCOSE BLOOD TEST: CPT

## 2023-01-01 PROCEDURE — 94761 N-INVAS EAR/PLS OXIMETRY MLT: CPT

## 2023-01-01 PROCEDURE — 82803 BLOOD GASES ANY COMBINATION: CPT

## 2023-01-01 PROCEDURE — 6370000000 HC RX 637 (ALT 250 FOR IP): Performed by: PHYSICIAN ASSISTANT

## 2023-01-01 PROCEDURE — 80202 ASSAY OF VANCOMYCIN: CPT

## 2023-01-01 PROCEDURE — 81003 URINALYSIS AUTO W/O SCOPE: CPT

## 2023-01-01 PROCEDURE — 2000000000 HC ICU R&B

## 2023-01-01 PROCEDURE — 94640 AIRWAY INHALATION TREATMENT: CPT

## 2023-01-01 PROCEDURE — 2580000003 HC RX 258: Performed by: SPECIALIST

## 2023-01-01 PROCEDURE — 87070 CULTURE OTHR SPECIMN AEROBIC: CPT

## 2023-01-01 PROCEDURE — 93970 EXTREMITY STUDY: CPT

## 2023-01-01 PROCEDURE — 36592 COLLECT BLOOD FROM PICC: CPT

## 2023-01-01 PROCEDURE — 94002 VENT MGMT INPAT INIT DAY: CPT

## 2023-01-01 PROCEDURE — 31500 INSERT EMERGENCY AIRWAY: CPT

## 2023-01-01 PROCEDURE — P9045 ALBUMIN (HUMAN), 5%, 250 ML: HCPCS | Performed by: STUDENT IN AN ORGANIZED HEALTH CARE EDUCATION/TRAINING PROGRAM

## 2023-01-01 PROCEDURE — 85014 HEMATOCRIT: CPT

## 2023-01-01 PROCEDURE — 84145 PROCALCITONIN (PCT): CPT

## 2023-01-01 PROCEDURE — 87040 BLOOD CULTURE FOR BACTERIA: CPT

## 2023-01-01 PROCEDURE — 93005 ELECTROCARDIOGRAM TRACING: CPT | Performed by: STUDENT IN AN ORGANIZED HEALTH CARE EDUCATION/TRAINING PROGRAM

## 2023-01-01 PROCEDURE — 87086 URINE CULTURE/COLONY COUNT: CPT

## 2023-01-01 PROCEDURE — 2580000003 HC RX 258

## 2023-01-01 PROCEDURE — C9113 INJ PANTOPRAZOLE SODIUM, VIA: HCPCS | Performed by: NURSE PRACTITIONER

## 2023-01-01 PROCEDURE — 6360000002 HC RX W HCPCS: Performed by: PHYSICIAN ASSISTANT

## 2023-01-01 PROCEDURE — 83605 ASSAY OF LACTIC ACID: CPT

## 2023-01-01 PROCEDURE — 85018 HEMOGLOBIN: CPT

## 2023-01-01 PROCEDURE — 80069 RENAL FUNCTION PANEL: CPT

## 2023-01-01 PROCEDURE — 74018 RADEX ABDOMEN 1 VIEW: CPT

## 2023-01-01 PROCEDURE — 84100 ASSAY OF PHOSPHORUS: CPT

## 2023-01-01 PROCEDURE — 2500000003 HC RX 250 WO HCPCS: Performed by: SPECIALIST

## 2023-01-01 PROCEDURE — 2580000003 HC RX 258: Performed by: PHYSICIAN ASSISTANT

## 2023-01-01 PROCEDURE — 85025 COMPLETE CBC W/AUTO DIFF WBC: CPT

## 2023-01-01 PROCEDURE — 87181 SC STD AGAR DILUTION PER AGT: CPT

## 2023-01-01 PROCEDURE — 87186 SC STD MICRODIL/AGAR DIL: CPT

## 2023-01-01 PROCEDURE — 87081 CULTURE SCREEN ONLY: CPT

## 2023-01-01 PROCEDURE — 93010 ELECTROCARDIOGRAM REPORT: CPT | Performed by: INTERNAL MEDICINE

## 2023-01-01 PROCEDURE — 0BH17EZ INSERTION OF ENDOTRACHEAL AIRWAY INTO TRACHEA, VIA NATURAL OR ARTIFICIAL OPENING: ICD-10-PCS | Performed by: STUDENT IN AN ORGANIZED HEALTH CARE EDUCATION/TRAINING PROGRAM

## 2023-01-01 PROCEDURE — 36620 INSERTION CATHETER ARTERY: CPT

## 2023-01-01 PROCEDURE — 5A1955Z RESPIRATORY VENTILATION, GREATER THAN 96 CONSECUTIVE HOURS: ICD-10-PCS | Performed by: STUDENT IN AN ORGANIZED HEALTH CARE EDUCATION/TRAINING PROGRAM

## 2023-01-01 PROCEDURE — 83036 HEMOGLOBIN GLYCOSYLATED A1C: CPT

## 2023-01-01 PROCEDURE — 81001 URINALYSIS AUTO W/SCOPE: CPT

## 2023-01-01 PROCEDURE — 02HV33Z INSERTION OF INFUSION DEVICE INTO SUPERIOR VENA CAVA, PERCUTANEOUS APPROACH: ICD-10-PCS | Performed by: STUDENT IN AN ORGANIZED HEALTH CARE EDUCATION/TRAINING PROGRAM

## 2023-01-01 PROCEDURE — 6360000002 HC RX W HCPCS: Performed by: INTERNAL MEDICINE

## 2023-01-01 PROCEDURE — 2500000003 HC RX 250 WO HCPCS

## 2023-01-01 RX ORDER — SODIUM CHLORIDE 0.9 % (FLUSH) 0.9 %
5-40 SYRINGE (ML) INJECTION EVERY 12 HOURS SCHEDULED
Status: DISCONTINUED | OUTPATIENT
Start: 2023-01-01 | End: 2023-01-01

## 2023-01-01 RX ORDER — PANTOPRAZOLE SODIUM 40 MG/10ML
40 INJECTION, POWDER, LYOPHILIZED, FOR SOLUTION INTRAVENOUS DAILY
Status: DISCONTINUED | OUTPATIENT
Start: 2023-01-01 | End: 2023-01-01

## 2023-01-01 RX ORDER — FENTANYL CITRATE 50 UG/ML
25 INJECTION, SOLUTION INTRAMUSCULAR; INTRAVENOUS EVERY 10 MIN PRN
Status: DISCONTINUED | OUTPATIENT
Start: 2023-01-01 | End: 2023-01-01 | Stop reason: HOSPADM

## 2023-01-01 RX ORDER — NOREPINEPHRINE BIT/0.9 % NACL 16MG/250ML
1-100 INFUSION BOTTLE (ML) INTRAVENOUS CONTINUOUS
Status: DISCONTINUED | OUTPATIENT
Start: 2023-01-01 | End: 2023-01-01

## 2023-01-01 RX ORDER — MIDAZOLAM HYDROCHLORIDE 2 MG/2ML
1 INJECTION, SOLUTION INTRAMUSCULAR; INTRAVENOUS
Status: DISCONTINUED | OUTPATIENT
Start: 2023-01-01 | End: 2023-01-01

## 2023-01-01 RX ORDER — MIDAZOLAM HYDROCHLORIDE 2 MG/2ML
2 INJECTION, SOLUTION INTRAMUSCULAR; INTRAVENOUS
Status: DISCONTINUED | OUTPATIENT
Start: 2023-01-01 | End: 2023-01-01

## 2023-01-01 RX ORDER — METOCLOPRAMIDE HYDROCHLORIDE 5 MG/ML
5 INJECTION INTRAMUSCULAR; INTRAVENOUS EVERY 6 HOURS
Status: DISCONTINUED | OUTPATIENT
Start: 2023-01-01 | End: 2023-01-01

## 2023-01-01 RX ORDER — METHYLPREDNISOLONE SODIUM SUCCINATE 125 MG/2ML
60 INJECTION, POWDER, LYOPHILIZED, FOR SOLUTION INTRAMUSCULAR; INTRAVENOUS EVERY 6 HOURS
Status: DISCONTINUED | OUTPATIENT
Start: 2023-01-01 | End: 2023-01-01

## 2023-01-01 RX ORDER — MIDAZOLAM HYDROCHLORIDE 2 MG/2ML
2 INJECTION, SOLUTION INTRAMUSCULAR; INTRAVENOUS ONCE
Status: COMPLETED | OUTPATIENT
Start: 2023-01-01 | End: 2023-01-01

## 2023-01-01 RX ORDER — LORAZEPAM 2 MG/ML
0.5 INJECTION INTRAMUSCULAR
Status: DISCONTINUED | OUTPATIENT
Start: 2023-01-01 | End: 2023-01-01 | Stop reason: HOSPADM

## 2023-01-01 RX ORDER — DEXTROSE MONOHYDRATE 100 MG/ML
INJECTION, SOLUTION INTRAVENOUS CONTINUOUS PRN
Status: DISCONTINUED | OUTPATIENT
Start: 2023-01-01 | End: 2023-01-01 | Stop reason: SDUPTHER

## 2023-01-01 RX ORDER — SODIUM CHLORIDE 9 MG/ML
INJECTION, SOLUTION INTRAVENOUS PRN
Status: DISCONTINUED | OUTPATIENT
Start: 2023-01-01 | End: 2023-01-01 | Stop reason: HOSPADM

## 2023-01-01 RX ORDER — MIDAZOLAM HYDROCHLORIDE 2 MG/2ML
2 INJECTION, SOLUTION INTRAMUSCULAR; INTRAVENOUS
Status: DISCONTINUED | OUTPATIENT
Start: 2023-01-01 | End: 2023-01-01 | Stop reason: HOSPADM

## 2023-01-01 RX ORDER — LIDOCAINE HYDROCHLORIDE 10 MG/ML
5 INJECTION, SOLUTION EPIDURAL; INFILTRATION; INTRACAUDAL; PERINEURAL ONCE
Status: DISCONTINUED | OUTPATIENT
Start: 2023-01-01 | End: 2023-01-01

## 2023-01-01 RX ORDER — MIDODRINE HYDROCHLORIDE 5 MG/1
5 TABLET ORAL EVERY 8 HOURS
Status: DISCONTINUED | OUTPATIENT
Start: 2023-01-01 | End: 2023-01-01

## 2023-01-01 RX ORDER — LORAZEPAM 2 MG/ML
1 INJECTION INTRAMUSCULAR
Status: COMPLETED | OUTPATIENT
Start: 2023-01-01 | End: 2023-01-01

## 2023-01-01 RX ORDER — INSULIN LISPRO 100 [IU]/ML
0-8 INJECTION, SOLUTION INTRAVENOUS; SUBCUTANEOUS EVERY 4 HOURS
Status: DISCONTINUED | OUTPATIENT
Start: 2023-01-01 | End: 2023-01-01 | Stop reason: HOSPADM

## 2023-01-01 RX ORDER — CALCIUM GLUCONATE 94 MG/ML
1000 INJECTION, SOLUTION INTRAVENOUS ONCE
Status: DISCONTINUED | OUTPATIENT
Start: 2023-01-01 | End: 2023-01-01 | Stop reason: CLARIF

## 2023-01-01 RX ORDER — DEXTROSE MONOHYDRATE 100 MG/ML
INJECTION, SOLUTION INTRAVENOUS CONTINUOUS PRN
Status: DISCONTINUED | OUTPATIENT
Start: 2023-01-01 | End: 2023-01-01 | Stop reason: HOSPADM

## 2023-01-01 RX ORDER — PROPOFOL 10 MG/ML
5-50 INJECTION, EMULSION INTRAVENOUS CONTINUOUS
Status: DISCONTINUED | OUTPATIENT
Start: 2023-01-01 | End: 2023-01-01

## 2023-01-01 RX ORDER — ALBUMIN, HUMAN INJ 5% 5 %
25 SOLUTION INTRAVENOUS ONCE
Status: COMPLETED | OUTPATIENT
Start: 2023-01-01 | End: 2023-01-01

## 2023-01-01 RX ORDER — MIDODRINE HYDROCHLORIDE 5 MG/1
5 TABLET ORAL
Status: DISCONTINUED | OUTPATIENT
Start: 2023-01-01 | End: 2023-01-01

## 2023-01-01 RX ORDER — PANTOPRAZOLE SODIUM 40 MG/10ML
40 INJECTION, POWDER, LYOPHILIZED, FOR SOLUTION INTRAVENOUS 2 TIMES DAILY
Status: DISCONTINUED | OUTPATIENT
Start: 2023-01-01 | End: 2023-01-01

## 2023-01-01 RX ORDER — ASPIRIN 81 MG/1
81 TABLET, CHEWABLE ORAL DAILY
Status: DISCONTINUED | OUTPATIENT
Start: 2023-01-01 | End: 2023-01-01 | Stop reason: HOSPADM

## 2023-01-01 RX ORDER — CALCIUM GLUCONATE 20 MG/ML
1000 INJECTION, SOLUTION INTRAVENOUS ONCE
Status: COMPLETED | OUTPATIENT
Start: 2023-01-01 | End: 2023-01-01

## 2023-01-01 RX ORDER — SODIUM CHLORIDE 0.9 % (FLUSH) 0.9 %
5-40 SYRINGE (ML) INJECTION PRN
Status: DISCONTINUED | OUTPATIENT
Start: 2023-01-01 | End: 2023-01-01 | Stop reason: HOSPADM

## 2023-01-01 RX ORDER — DEXMEDETOMIDINE HYDROCHLORIDE 4 UG/ML
.1-.7 INJECTION, SOLUTION INTRAVENOUS CONTINUOUS
Status: DISCONTINUED | OUTPATIENT
Start: 2023-01-01 | End: 2023-01-01

## 2023-01-01 RX ORDER — LANSOPRAZOLE
30 KIT DAILY
Status: DISCONTINUED | OUTPATIENT
Start: 2023-01-01 | End: 2023-01-01

## 2023-01-01 RX ORDER — POLYETHYLENE GLYCOL 3350 17 G/17G
17 POWDER, FOR SOLUTION ORAL DAILY
Status: DISCONTINUED | OUTPATIENT
Start: 2023-01-01 | End: 2023-01-01

## 2023-01-01 RX ORDER — TRANEXAMIC ACID 100 MG/ML
500 INJECTION, SOLUTION INTRAVENOUS ONCE
Status: COMPLETED | OUTPATIENT
Start: 2023-01-01 | End: 2023-01-01

## 2023-01-01 RX ORDER — MAGNESIUM SULFATE 1 G/100ML
1000 INJECTION INTRAVENOUS ONCE
Status: COMPLETED | OUTPATIENT
Start: 2023-01-01 | End: 2023-01-01

## 2023-01-01 RX ORDER — FUROSEMIDE 10 MG/ML
40 INJECTION INTRAMUSCULAR; INTRAVENOUS ONCE
Status: COMPLETED | OUTPATIENT
Start: 2023-01-01 | End: 2023-01-01

## 2023-01-01 RX ORDER — MIDODRINE HYDROCHLORIDE 5 MG/1
10 TABLET ORAL
Status: DISCONTINUED | OUTPATIENT
Start: 2023-01-01 | End: 2023-01-01

## 2023-01-01 RX ORDER — BUDESONIDE AND FORMOTEROL FUMARATE DIHYDRATE 160; 4.5 UG/1; UG/1
2 AEROSOL RESPIRATORY (INHALATION) 2 TIMES DAILY
Status: DISCONTINUED | OUTPATIENT
Start: 2023-01-01 | End: 2023-01-01

## 2023-01-01 RX ORDER — LIDOCAINE HYDROCHLORIDE 20 MG/ML
15 SOLUTION OROPHARYNGEAL EVERY 6 HOURS PRN
Status: DISCONTINUED | OUTPATIENT
Start: 2023-01-01 | End: 2023-01-01 | Stop reason: HOSPADM

## 2023-01-01 RX ORDER — FENTANYL CITRATE 50 UG/ML
50 INJECTION, SOLUTION INTRAMUSCULAR; INTRAVENOUS
Status: DISCONTINUED | OUTPATIENT
Start: 2023-01-01 | End: 2023-01-01 | Stop reason: HOSPADM

## 2023-01-01 RX ORDER — PROPOFOL 10 MG/ML
5-80 INJECTION, EMULSION INTRAVENOUS CONTINUOUS
Status: DISCONTINUED | OUTPATIENT
Start: 2023-01-01 | End: 2023-01-01

## 2023-01-01 RX ORDER — POLYETHYLENE GLYCOL 3350 17 G/17G
17 POWDER, FOR SOLUTION ORAL 2 TIMES DAILY
Status: DISCONTINUED | OUTPATIENT
Start: 2023-01-01 | End: 2023-01-01

## 2023-01-01 RX ORDER — MIDODRINE HYDROCHLORIDE 5 MG/1
10 TABLET ORAL EVERY 8 HOURS
Status: DISCONTINUED | OUTPATIENT
Start: 2023-01-01 | End: 2023-01-01

## 2023-01-01 RX ORDER — LANSOPRAZOLE
30 KIT DAILY
Status: DISCONTINUED | OUTPATIENT
Start: 2023-01-01 | End: 2023-01-01 | Stop reason: HOSPADM

## 2023-01-01 RX ORDER — MINOCYCLINE HYDROCHLORIDE 100 MG/1
200 CAPSULE ORAL EVERY 12 HOURS SCHEDULED
Status: DISCONTINUED | OUTPATIENT
Start: 2023-01-01 | End: 2023-01-01

## 2023-01-01 RX ORDER — CHLORHEXIDINE GLUCONATE 0.12 MG/ML
15 RINSE ORAL 2 TIMES DAILY
Status: DISCONTINUED | OUTPATIENT
Start: 2023-01-01 | End: 2023-01-01 | Stop reason: HOSPADM

## 2023-01-01 RX ORDER — POLYVINYL ALCOHOL 14 MG/ML
1 SOLUTION/ DROPS OPHTHALMIC EVERY 4 HOURS PRN
Status: DISCONTINUED | OUTPATIENT
Start: 2023-01-01 | End: 2023-01-01 | Stop reason: HOSPADM

## 2023-01-01 RX ORDER — BISACODYL 10 MG
10 SUPPOSITORY, RECTAL RECTAL ONCE
Status: COMPLETED | OUTPATIENT
Start: 2023-01-01 | End: 2023-01-01

## 2023-01-01 RX ORDER — SENNA PLUS 8.6 MG/1
1 TABLET ORAL 2 TIMES DAILY
Status: DISCONTINUED | OUTPATIENT
Start: 2023-01-01 | End: 2023-01-01

## 2023-01-01 RX ORDER — METOCLOPRAMIDE HYDROCHLORIDE 5 MG/ML
10 INJECTION INTRAMUSCULAR; INTRAVENOUS EVERY 6 HOURS
Status: DISCONTINUED | OUTPATIENT
Start: 2023-01-01 | End: 2023-01-01

## 2023-01-01 RX ORDER — WATER 1000 ML/1000ML
INJECTION, SOLUTION INTRAVENOUS
Status: COMPLETED
Start: 2023-01-01 | End: 2023-01-01

## 2023-01-01 RX ORDER — FENTANYL CITRATE 50 UG/ML
50 INJECTION, SOLUTION INTRAMUSCULAR; INTRAVENOUS
Status: DISCONTINUED | OUTPATIENT
Start: 2023-01-01 | End: 2023-01-01

## 2023-01-01 RX ADMIN — BUDESONIDE AND FORMOTEROL FUMARATE DIHYDRATE 2 PUFF: 160; 4.5 AEROSOL RESPIRATORY (INHALATION) at 09:06

## 2023-01-01 RX ADMIN — GUAIFENESIN 200 MG: 100 SOLUTION ORAL at 09:21

## 2023-01-01 RX ADMIN — METHYLPREDNISOLONE SODIUM SUCCINATE 60 MG: 125 INJECTION, POWDER, FOR SOLUTION INTRAMUSCULAR; INTRAVENOUS at 09:05

## 2023-01-01 RX ADMIN — SODIUM CHLORIDE, PRESERVATIVE FREE 10 ML: 5 INJECTION INTRAVENOUS at 08:18

## 2023-01-01 RX ADMIN — PROPOFOL 30 MCG/KG/MIN: 10 INJECTION, EMULSION INTRAVENOUS at 22:53

## 2023-01-01 RX ADMIN — METOCLOPRAMIDE 10 MG: 5 INJECTION, SOLUTION INTRAMUSCULAR; INTRAVENOUS at 21:28

## 2023-01-01 RX ADMIN — GUAIFENESIN 200 MG: 100 SOLUTION ORAL at 12:39

## 2023-01-01 RX ADMIN — GUAIFENESIN 200 MG: 100 SOLUTION ORAL at 16:23

## 2023-01-01 RX ADMIN — IPRATROPIUM BROMIDE AND ALBUTEROL SULFATE 1 AMPULE: 2.5; .5 SOLUTION RESPIRATORY (INHALATION) at 15:51

## 2023-01-01 RX ADMIN — SODIUM CHLORIDE, PRESERVATIVE FREE 10 ML: 5 INJECTION INTRAVENOUS at 21:38

## 2023-01-01 RX ADMIN — PROPOFOL 40 MCG/KG/MIN: 10 INJECTION, EMULSION INTRAVENOUS at 01:13

## 2023-01-01 RX ADMIN — INSULIN LISPRO 2 UNITS: 100 INJECTION, SOLUTION INTRAVENOUS; SUBCUTANEOUS at 15:47

## 2023-01-01 RX ADMIN — SODIUM CHLORIDE, PRESERVATIVE FREE 10 ML: 5 INJECTION INTRAVENOUS at 08:09

## 2023-01-01 RX ADMIN — BUDESONIDE AND FORMOTEROL FUMARATE DIHYDRATE 2 PUFF: 160; 4.5 AEROSOL RESPIRATORY (INHALATION) at 07:36

## 2023-01-01 RX ADMIN — SODIUM CHLORIDE, PRESERVATIVE FREE 10 ML: 5 INJECTION INTRAVENOUS at 20:13

## 2023-01-01 RX ADMIN — SODIUM CHLORIDE, PRESERVATIVE FREE 10 ML: 5 INJECTION INTRAVENOUS at 08:03

## 2023-01-01 RX ADMIN — SODIUM CHLORIDE, PRESERVATIVE FREE 10 ML: 5 INJECTION INTRAVENOUS at 20:39

## 2023-01-01 RX ADMIN — MEROPENEM 1000 MG: 1 INJECTION, POWDER, FOR SOLUTION INTRAVENOUS at 21:15

## 2023-01-01 RX ADMIN — IPRATROPIUM BROMIDE AND ALBUTEROL SULFATE 1 AMPULE: 2.5; .5 SOLUTION RESPIRATORY (INHALATION) at 20:25

## 2023-01-01 RX ADMIN — LORAZEPAM 1 MG: 2 INJECTION INTRAMUSCULAR; INTRAVENOUS at 01:25

## 2023-01-01 RX ADMIN — GUAIFENESIN 200 MG: 100 SOLUTION ORAL at 00:23

## 2023-01-01 RX ADMIN — MEROPENEM 1000 MG: 1 INJECTION, POWDER, FOR SOLUTION INTRAVENOUS at 12:29

## 2023-01-01 RX ADMIN — PROPOFOL 40 MCG/KG/MIN: 10 INJECTION, EMULSION INTRAVENOUS at 01:19

## 2023-01-01 RX ADMIN — SODIUM CHLORIDE, PRESERVATIVE FREE 10 ML: 5 INJECTION INTRAVENOUS at 09:23

## 2023-01-01 RX ADMIN — METHYLPREDNISOLONE SODIUM SUCCINATE 60 MG: 125 INJECTION, POWDER, FOR SOLUTION INTRAMUSCULAR; INTRAVENOUS at 03:53

## 2023-01-01 RX ADMIN — METHYLPREDNISOLONE SODIUM SUCCINATE 60 MG: 125 INJECTION, POWDER, FOR SOLUTION INTRAMUSCULAR; INTRAVENOUS at 04:12

## 2023-01-01 RX ADMIN — IPRATROPIUM BROMIDE AND ALBUTEROL SULFATE 1 AMPULE: 2.5; .5 SOLUTION RESPIRATORY (INHALATION) at 08:00

## 2023-01-01 RX ADMIN — PROPOFOL 40 MCG/KG/MIN: 10 INJECTION, EMULSION INTRAVENOUS at 15:16

## 2023-01-01 RX ADMIN — PROPOFOL 30 MCG/KG/MIN: 10 INJECTION, EMULSION INTRAVENOUS at 02:52

## 2023-01-01 RX ADMIN — MIDODRINE HYDROCHLORIDE 5 MG: 5 TABLET ORAL at 13:14

## 2023-01-01 RX ADMIN — ESCITALOPRAM OXALATE 20 MG: 10 TABLET ORAL at 08:16

## 2023-01-01 RX ADMIN — MEROPENEM 1000 MG: 1 INJECTION, POWDER, FOR SOLUTION INTRAVENOUS at 21:02

## 2023-01-01 RX ADMIN — ALBUMIN (HUMAN) 25 G: 12.5 INJECTION, SOLUTION INTRAVENOUS at 06:40

## 2023-01-01 RX ADMIN — MEROPENEM 1000 MG: 1 INJECTION, POWDER, FOR SOLUTION INTRAVENOUS at 12:47

## 2023-01-01 RX ADMIN — FUROSEMIDE 20 MG: 10 INJECTION, SOLUTION INTRAVENOUS at 10:23

## 2023-01-01 RX ADMIN — BUDESONIDE AND FORMOTEROL FUMARATE DIHYDRATE 2 PUFF: 160; 4.5 AEROSOL RESPIRATORY (INHALATION) at 08:35

## 2023-01-01 RX ADMIN — MEROPENEM 1000 MG: 1 INJECTION, POWDER, FOR SOLUTION INTRAVENOUS at 21:28

## 2023-01-01 RX ADMIN — PROPOFOL 40 MCG/KG/MIN: 10 INJECTION, EMULSION INTRAVENOUS at 16:31

## 2023-01-01 RX ADMIN — SODIUM CHLORIDE, PRESERVATIVE FREE 10 ML: 5 INJECTION INTRAVENOUS at 08:31

## 2023-01-01 RX ADMIN — DEXMEDETOMIDINE HYDROCHLORIDE 0.2 MCG/KG/HR: 4 INJECTION, SOLUTION INTRAVENOUS at 21:18

## 2023-01-01 RX ADMIN — MIDODRINE HYDROCHLORIDE 10 MG: 5 TABLET ORAL at 12:50

## 2023-01-01 RX ADMIN — METOCLOPRAMIDE 5 MG: 5 INJECTION, SOLUTION INTRAMUSCULAR; INTRAVENOUS at 02:35

## 2023-01-01 RX ADMIN — IPRATROPIUM BROMIDE AND ALBUTEROL SULFATE 1 AMPULE: 2.5; .5 SOLUTION RESPIRATORY (INHALATION) at 19:54

## 2023-01-01 RX ADMIN — PROPOFOL 40 MCG/KG/MIN: 10 INJECTION, EMULSION INTRAVENOUS at 00:35

## 2023-01-01 RX ADMIN — CHLORHEXIDINE GLUCONATE 0.12% ORAL RINSE 15 ML: 1.2 LIQUID ORAL at 21:00

## 2023-01-01 RX ADMIN — SODIUM CHLORIDE, PRESERVATIVE FREE 10 ML: 5 INJECTION INTRAVENOUS at 08:51

## 2023-01-01 RX ADMIN — LANSOPRAZOLE 30 MG: KIT at 07:57

## 2023-01-01 RX ADMIN — AZITHROMYCIN MONOHYDRATE 500 MG: 500 INJECTION, POWDER, LYOPHILIZED, FOR SOLUTION INTRAVENOUS at 20:06

## 2023-01-01 RX ADMIN — DEXAMETHASONE SODIUM PHOSPHATE 10 MG: 4 INJECTION, SOLUTION INTRAMUSCULAR; INTRAVENOUS at 08:10

## 2023-01-01 RX ADMIN — PROPOFOL 35 MCG/KG/MIN: 10 INJECTION, EMULSION INTRAVENOUS at 17:42

## 2023-01-01 RX ADMIN — METOCLOPRAMIDE 5 MG: 5 INJECTION, SOLUTION INTRAMUSCULAR; INTRAVENOUS at 08:17

## 2023-01-01 RX ADMIN — POLYETHYLENE GLYCOL (3350) 17 G: 17 POWDER, FOR SOLUTION ORAL at 07:57

## 2023-01-01 RX ADMIN — METHYLPREDNISOLONE SODIUM SUCCINATE 60 MG: 125 INJECTION, POWDER, FOR SOLUTION INTRAMUSCULAR; INTRAVENOUS at 21:01

## 2023-01-01 RX ADMIN — IPRATROPIUM BROMIDE AND ALBUTEROL SULFATE 1 AMPULE: 2.5; .5 SOLUTION RESPIRATORY (INHALATION) at 07:55

## 2023-01-01 RX ADMIN — APIXABAN 5 MG: 5 TABLET, FILM COATED ORAL at 08:30

## 2023-01-01 RX ADMIN — TRAZODONE HYDROCHLORIDE 50 MG: 50 TABLET ORAL at 20:10

## 2023-01-01 RX ADMIN — ESCITALOPRAM OXALATE 20 MG: 10 TABLET ORAL at 08:09

## 2023-01-01 RX ADMIN — POTASSIUM PHOSPHATE, MONOBASIC AND POTASSIUM PHOSPHATE, DIBASIC 30 MMOL: 224; 236 INJECTION, SOLUTION, CONCENTRATE INTRAVENOUS at 12:43

## 2023-01-01 RX ADMIN — APIXABAN 5 MG: 5 TABLET, FILM COATED ORAL at 08:35

## 2023-01-01 RX ADMIN — SODIUM CHLORIDE, PRESERVATIVE FREE 10 ML: 5 INJECTION INTRAVENOUS at 21:39

## 2023-01-01 RX ADMIN — IPRATROPIUM BROMIDE AND ALBUTEROL SULFATE 1 AMPULE: 2.5; .5 SOLUTION RESPIRATORY (INHALATION) at 19:39

## 2023-01-01 RX ADMIN — CHLORHEXIDINE GLUCONATE 0.12% ORAL RINSE 15 ML: 1.2 LIQUID ORAL at 09:05

## 2023-01-01 RX ADMIN — MIDAZOLAM 4 MG/HR: 5 INJECTION INTRAMUSCULAR; INTRAVENOUS at 12:19

## 2023-01-01 RX ADMIN — AMIODARONE HYDROCHLORIDE 200 MG: 200 TABLET ORAL at 09:28

## 2023-01-01 RX ADMIN — BUDESONIDE AND FORMOTEROL FUMARATE DIHYDRATE 2 PUFF: 160; 4.5 AEROSOL RESPIRATORY (INHALATION) at 19:41

## 2023-01-01 RX ADMIN — CHLORHEXIDINE GLUCONATE 0.12% ORAL RINSE 15 ML: 1.2 LIQUID ORAL at 20:12

## 2023-01-01 RX ADMIN — ASPIRIN 81 MG CHEWABLE TABLET 81 MG: 81 TABLET CHEWABLE at 08:30

## 2023-01-01 RX ADMIN — SODIUM CHLORIDE, PRESERVATIVE FREE 10 ML: 5 INJECTION INTRAVENOUS at 20:46

## 2023-01-01 RX ADMIN — MEROPENEM 1000 MG: 1 INJECTION, POWDER, FOR SOLUTION INTRAVENOUS at 04:35

## 2023-01-01 RX ADMIN — METOCLOPRAMIDE 10 MG: 5 INJECTION, SOLUTION INTRAMUSCULAR; INTRAVENOUS at 13:45

## 2023-01-01 RX ADMIN — METHYLPREDNISOLONE SODIUM SUCCINATE 60 MG: 125 INJECTION, POWDER, FOR SOLUTION INTRAMUSCULAR; INTRAVENOUS at 04:39

## 2023-01-01 RX ADMIN — IPRATROPIUM BROMIDE AND ALBUTEROL SULFATE 1 AMPULE: 2.5; .5 SOLUTION RESPIRATORY (INHALATION) at 19:41

## 2023-01-01 RX ADMIN — IPRATROPIUM BROMIDE AND ALBUTEROL SULFATE 1 AMPULE: 2.5; .5 SOLUTION RESPIRATORY (INHALATION) at 11:45

## 2023-01-01 RX ADMIN — ASPIRIN 81 MG: 81 TABLET, COATED ORAL at 08:35

## 2023-01-01 RX ADMIN — PROPOFOL 40 MCG/KG/MIN: 10 INJECTION, EMULSION INTRAVENOUS at 06:53

## 2023-01-01 RX ADMIN — MEROPENEM 1000 MG: 1 INJECTION, POWDER, FOR SOLUTION INTRAVENOUS at 05:45

## 2023-01-01 RX ADMIN — IPRATROPIUM BROMIDE AND ALBUTEROL SULFATE 1 AMPULE: 2.5; .5 SOLUTION RESPIRATORY (INHALATION) at 15:27

## 2023-01-01 RX ADMIN — SODIUM CHLORIDE, PRESERVATIVE FREE 10 ML: 5 INJECTION INTRAVENOUS at 20:54

## 2023-01-01 RX ADMIN — SODIUM CHLORIDE, PRESERVATIVE FREE 10 ML: 5 INJECTION INTRAVENOUS at 10:23

## 2023-01-01 RX ADMIN — Medication 5 MCG/MIN: at 13:14

## 2023-01-01 RX ADMIN — DEXTROSE MONOHYDRATE 250 ML: 100 INJECTION, SOLUTION INTRAVENOUS at 11:41

## 2023-01-01 RX ADMIN — PROPOFOL 35 MCG/KG/MIN: 10 INJECTION, EMULSION INTRAVENOUS at 05:39

## 2023-01-01 RX ADMIN — APIXABAN 5 MG: 5 TABLET, FILM COATED ORAL at 20:02

## 2023-01-01 RX ADMIN — IPRATROPIUM BROMIDE AND ALBUTEROL SULFATE 1 AMPULE: 2.5; .5 SOLUTION RESPIRATORY (INHALATION) at 11:32

## 2023-01-01 RX ADMIN — SODIUM CHLORIDE, PRESERVATIVE FREE 10 ML: 5 INJECTION INTRAVENOUS at 09:03

## 2023-01-01 RX ADMIN — ESCITALOPRAM OXALATE 20 MG: 10 TABLET ORAL at 08:30

## 2023-01-01 RX ADMIN — PROPOFOL 35 MCG/KG/MIN: 10 INJECTION, EMULSION INTRAVENOUS at 20:58

## 2023-01-01 RX ADMIN — DEXAMETHASONE SODIUM PHOSPHATE 20 MG: 4 INJECTION, SOLUTION INTRAMUSCULAR; INTRAVENOUS at 08:36

## 2023-01-01 RX ADMIN — MIDODRINE HYDROCHLORIDE 10 MG: 5 TABLET ORAL at 16:31

## 2023-01-01 RX ADMIN — LANSOPRAZOLE 30 MG: KIT at 10:20

## 2023-01-01 RX ADMIN — PROPOFOL 35 MCG/KG/MIN: 10 INJECTION, EMULSION INTRAVENOUS at 12:28

## 2023-01-01 RX ADMIN — WATER 10 ML: 1 INJECTION INTRAMUSCULAR; INTRAVENOUS; SUBCUTANEOUS at 12:01

## 2023-01-01 RX ADMIN — PROPOFOL 40 MCG/KG/MIN: 10 INJECTION, EMULSION INTRAVENOUS at 06:54

## 2023-01-01 RX ADMIN — MIDAZOLAM 1 MG: 1 INJECTION INTRAMUSCULAR; INTRAVENOUS at 13:54

## 2023-01-01 RX ADMIN — SODIUM CHLORIDE, PRESERVATIVE FREE 10 ML: 5 INJECTION INTRAVENOUS at 20:03

## 2023-01-01 RX ADMIN — CHLORHEXIDINE GLUCONATE 0.12% ORAL RINSE 15 ML: 1.2 LIQUID ORAL at 19:58

## 2023-01-01 RX ADMIN — IPRATROPIUM BROMIDE AND ALBUTEROL SULFATE 1 AMPULE: 2.5; .5 SOLUTION RESPIRATORY (INHALATION) at 12:17

## 2023-01-01 RX ADMIN — GUAIFENESIN 200 MG: 100 SOLUTION ORAL at 05:39

## 2023-01-01 RX ADMIN — FENTANYL CITRATE 50 MCG: 50 INJECTION INTRAMUSCULAR; INTRAVENOUS at 16:25

## 2023-01-01 RX ADMIN — TRAZODONE HYDROCHLORIDE 50 MG: 50 TABLET ORAL at 21:28

## 2023-01-01 RX ADMIN — PROPOFOL 35 MCG/KG/MIN: 10 INJECTION, EMULSION INTRAVENOUS at 01:22

## 2023-01-01 RX ADMIN — METHYLPREDNISOLONE SODIUM SUCCINATE 60 MG: 125 INJECTION, POWDER, FOR SOLUTION INTRAMUSCULAR; INTRAVENOUS at 09:27

## 2023-01-01 RX ADMIN — INSULIN LISPRO 2 UNITS: 100 INJECTION, SOLUTION INTRAVENOUS; SUBCUTANEOUS at 04:11

## 2023-01-01 RX ADMIN — BUDESONIDE AND FORMOTEROL FUMARATE DIHYDRATE 2 PUFF: 160; 4.5 AEROSOL RESPIRATORY (INHALATION) at 08:39

## 2023-01-01 RX ADMIN — IPRATROPIUM BROMIDE AND ALBUTEROL SULFATE 1 AMPULE: 2.5; .5 SOLUTION RESPIRATORY (INHALATION) at 07:19

## 2023-01-01 RX ADMIN — GUAIFENESIN 200 MG: 100 SOLUTION ORAL at 17:47

## 2023-01-01 RX ADMIN — DEXMEDETOMIDINE HYDROCHLORIDE 1 MCG/KG/HR: 4 INJECTION, SOLUTION INTRAVENOUS at 06:06

## 2023-01-01 RX ADMIN — LIDOCAINE HYDROCHLORIDE 15 ML: 20 SOLUTION ORAL at 14:09

## 2023-01-01 RX ADMIN — VANCOMYCIN HYDROCHLORIDE 1500 MG: 10 INJECTION, POWDER, LYOPHILIZED, FOR SOLUTION INTRAVENOUS at 13:31

## 2023-01-01 RX ADMIN — MIDAZOLAM 2 MG: 1 INJECTION INTRAMUSCULAR; INTRAVENOUS at 16:26

## 2023-01-01 RX ADMIN — METHYLPREDNISOLONE SODIUM SUCCINATE 60 MG: 125 INJECTION, POWDER, FOR SOLUTION INTRAMUSCULAR; INTRAVENOUS at 09:38

## 2023-01-01 RX ADMIN — AMIODARONE HYDROCHLORIDE 200 MG: 200 TABLET ORAL at 08:31

## 2023-01-01 RX ADMIN — GUAIFENESIN 600 MG: 600 TABLET, EXTENDED RELEASE ORAL at 10:23

## 2023-01-01 RX ADMIN — MEROPENEM 1000 MG: 1 INJECTION, POWDER, FOR SOLUTION INTRAVENOUS at 06:47

## 2023-01-01 RX ADMIN — SODIUM CHLORIDE, PRESERVATIVE FREE 10 ML: 5 INJECTION INTRAVENOUS at 09:24

## 2023-01-01 RX ADMIN — INSULIN LISPRO 2 UNITS: 100 INJECTION, SOLUTION INTRAVENOUS; SUBCUTANEOUS at 00:52

## 2023-01-01 RX ADMIN — INSULIN LISPRO 2 UNITS: 100 INJECTION, SOLUTION INTRAVENOUS; SUBCUTANEOUS at 11:50

## 2023-01-01 RX ADMIN — VANCOMYCIN HYDROCHLORIDE 1000 MG: 1 INJECTION, POWDER, LYOPHILIZED, FOR SOLUTION INTRAVENOUS at 17:00

## 2023-01-01 RX ADMIN — IPRATROPIUM BROMIDE AND ALBUTEROL SULFATE 1 AMPULE: 2.5; .5 SOLUTION RESPIRATORY (INHALATION) at 11:12

## 2023-01-01 RX ADMIN — SULFAMETHOXAZOLE AND TRIMETHOPRIM 432 MG: 80; 16 INJECTION, SOLUTION, CONCENTRATE INTRAVENOUS at 23:57

## 2023-01-01 RX ADMIN — CHLORHEXIDINE GLUCONATE 0.12% ORAL RINSE 15 ML: 1.2 LIQUID ORAL at 20:02

## 2023-01-01 RX ADMIN — ASPIRIN 81 MG CHEWABLE TABLET 81 MG: 81 TABLET CHEWABLE at 08:33

## 2023-01-01 RX ADMIN — MIDODRINE HYDROCHLORIDE 5 MG: 5 TABLET ORAL at 01:03

## 2023-01-01 RX ADMIN — CALCIUM GLUCONATE 1000 MG: 20 INJECTION, SOLUTION INTRAVENOUS at 11:43

## 2023-01-01 RX ADMIN — MIDAZOLAM 2 MG: 1 INJECTION INTRAMUSCULAR; INTRAVENOUS at 08:17

## 2023-01-01 RX ADMIN — MIDODRINE HYDROCHLORIDE 5 MG: 5 TABLET ORAL at 07:57

## 2023-01-01 RX ADMIN — CHLORHEXIDINE GLUCONATE 0.12% ORAL RINSE 15 ML: 1.2 LIQUID ORAL at 08:31

## 2023-01-01 RX ADMIN — SULFAMETHOXAZOLE AND TRIMETHOPRIM 432 MG: 80; 16 INJECTION, SOLUTION, CONCENTRATE INTRAVENOUS at 02:38

## 2023-01-01 RX ADMIN — FENTANYL CITRATE 50 MCG: 50 INJECTION INTRAMUSCULAR; INTRAVENOUS at 07:56

## 2023-01-01 RX ADMIN — FENTANYL CITRATE 50 MCG: 50 INJECTION INTRAMUSCULAR; INTRAVENOUS at 12:05

## 2023-01-01 RX ADMIN — DEXAMETHASONE SODIUM PHOSPHATE 20 MG: 4 INJECTION, SOLUTION INTRAMUSCULAR; INTRAVENOUS at 08:45

## 2023-01-01 RX ADMIN — SODIUM CHLORIDE, PRESERVATIVE FREE 10 ML: 5 INJECTION INTRAVENOUS at 08:34

## 2023-01-01 RX ADMIN — SODIUM CHLORIDE, PRESERVATIVE FREE 10 ML: 5 INJECTION INTRAVENOUS at 08:36

## 2023-01-01 RX ADMIN — MIDODRINE HYDROCHLORIDE 5 MG: 5 TABLET ORAL at 16:55

## 2023-01-01 RX ADMIN — DEXAMETHASONE SODIUM PHOSPHATE 10 MG: 4 INJECTION, SOLUTION INTRAMUSCULAR; INTRAVENOUS at 08:31

## 2023-01-01 RX ADMIN — AMIODARONE HYDROCHLORIDE 200 MG: 200 TABLET ORAL at 08:30

## 2023-01-01 RX ADMIN — AMIODARONE HYDROCHLORIDE 200 MG: 200 TABLET ORAL at 08:08

## 2023-01-01 RX ADMIN — PROPOFOL 35 MCG/KG/MIN: 10 INJECTION, EMULSION INTRAVENOUS at 13:27

## 2023-01-01 RX ADMIN — PROPOFOL 40 MCG/KG/MIN: 10 INJECTION, EMULSION INTRAVENOUS at 01:43

## 2023-01-01 RX ADMIN — BUDESONIDE AND FORMOTEROL FUMARATE DIHYDRATE 2 PUFF: 160; 4.5 AEROSOL RESPIRATORY (INHALATION) at 19:40

## 2023-01-01 RX ADMIN — DOCUSATE SODIUM 100 MG: 50 LIQUID ORAL at 08:16

## 2023-01-01 RX ADMIN — PANTOPRAZOLE SODIUM 40 MG: 40 TABLET, DELAYED RELEASE ORAL at 08:30

## 2023-01-01 RX ADMIN — SODIUM CHLORIDE, PRESERVATIVE FREE 10 ML: 5 INJECTION INTRAVENOUS at 08:10

## 2023-01-01 RX ADMIN — IPRATROPIUM BROMIDE AND ALBUTEROL SULFATE 1 AMPULE: 2.5; .5 SOLUTION RESPIRATORY (INHALATION) at 20:31

## 2023-01-01 RX ADMIN — PROPOFOL 35 MCG/KG/MIN: 10 INJECTION, EMULSION INTRAVENOUS at 15:56

## 2023-01-01 RX ADMIN — METOCLOPRAMIDE 10 MG: 5 INJECTION, SOLUTION INTRAMUSCULAR; INTRAVENOUS at 07:58

## 2023-01-01 RX ADMIN — METOCLOPRAMIDE 5 MG: 5 INJECTION, SOLUTION INTRAMUSCULAR; INTRAVENOUS at 21:01

## 2023-01-01 RX ADMIN — MEROPENEM 1000 MG: 1 INJECTION, POWDER, FOR SOLUTION INTRAVENOUS at 06:00

## 2023-01-01 RX ADMIN — SODIUM CHLORIDE, PRESERVATIVE FREE 10 ML: 5 INJECTION INTRAVENOUS at 09:27

## 2023-01-01 RX ADMIN — IPRATROPIUM BROMIDE AND ALBUTEROL SULFATE 1 AMPULE: 2.5; .5 SOLUTION RESPIRATORY (INHALATION) at 19:28

## 2023-01-01 RX ADMIN — LORAZEPAM 0.5 MG: 2 INJECTION INTRAMUSCULAR; INTRAVENOUS at 14:07

## 2023-01-01 RX ADMIN — POLYETHYLENE GLYCOL (3350) 17 G: 17 POWDER, FOR SOLUTION ORAL at 21:28

## 2023-01-01 RX ADMIN — PROPOFOL 30 MCG/KG/MIN: 10 INJECTION, EMULSION INTRAVENOUS at 11:15

## 2023-01-01 RX ADMIN — ASPIRIN 81 MG: 81 TABLET, COATED ORAL at 08:31

## 2023-01-01 RX ADMIN — SODIUM CHLORIDE, PRESERVATIVE FREE 10 ML: 5 INJECTION INTRAVENOUS at 20:53

## 2023-01-01 RX ADMIN — METHYLPREDNISOLONE SODIUM SUCCINATE 60 MG: 125 INJECTION, POWDER, FOR SOLUTION INTRAMUSCULAR; INTRAVENOUS at 22:59

## 2023-01-01 RX ADMIN — IPRATROPIUM BROMIDE AND ALBUTEROL SULFATE 1 AMPULE: 2.5; .5 SOLUTION RESPIRATORY (INHALATION) at 08:34

## 2023-01-01 RX ADMIN — SODIUM ZIRCONIUM CYCLOSILICATE 10 G: 10 POWDER, FOR SUSPENSION ORAL at 16:08

## 2023-01-01 RX ADMIN — SODIUM CHLORIDE, PRESERVATIVE FREE 10 ML: 5 INJECTION INTRAVENOUS at 20:02

## 2023-01-01 RX ADMIN — INSULIN LISPRO 4 UNITS: 100 INJECTION, SOLUTION INTRAVENOUS; SUBCUTANEOUS at 12:00

## 2023-01-01 RX ADMIN — IPRATROPIUM BROMIDE AND ALBUTEROL SULFATE 1 AMPULE: 2.5; .5 SOLUTION RESPIRATORY (INHALATION) at 15:15

## 2023-01-01 RX ADMIN — PROPOFOL 40 MCG/KG/MIN: 10 INJECTION, EMULSION INTRAVENOUS at 09:22

## 2023-01-01 RX ADMIN — TRANEXAMIC ACID 500 MG: 1 INJECTION, SOLUTION INTRAVENOUS at 12:01

## 2023-01-01 RX ADMIN — MIDODRINE HYDROCHLORIDE 5 MG: 5 TABLET ORAL at 08:08

## 2023-01-01 RX ADMIN — METHYLPREDNISOLONE SODIUM SUCCINATE 60 MG: 125 INJECTION, POWDER, FOR SOLUTION INTRAMUSCULAR; INTRAVENOUS at 16:55

## 2023-01-01 RX ADMIN — APIXABAN 5 MG: 5 TABLET, FILM COATED ORAL at 08:33

## 2023-01-01 RX ADMIN — SODIUM CHLORIDE, PRESERVATIVE FREE 10 ML: 5 INJECTION INTRAVENOUS at 08:00

## 2023-01-01 RX ADMIN — GUAIFENESIN 200 MG: 100 SOLUTION ORAL at 12:55

## 2023-01-01 RX ADMIN — BUDESONIDE AND FORMOTEROL FUMARATE DIHYDRATE 2 PUFF: 160; 4.5 AEROSOL RESPIRATORY (INHALATION) at 12:25

## 2023-01-01 RX ADMIN — PROPOFOL 35 MCG/KG/MIN: 10 INJECTION, EMULSION INTRAVENOUS at 22:56

## 2023-01-01 RX ADMIN — MEROPENEM 1000 MG: 1 INJECTION, POWDER, FOR SOLUTION INTRAVENOUS at 13:14

## 2023-01-01 RX ADMIN — GUAIFENESIN 200 MG: 100 SOLUTION ORAL at 08:31

## 2023-01-01 RX ADMIN — AMIODARONE HYDROCHLORIDE 200 MG: 200 TABLET ORAL at 09:04

## 2023-01-01 RX ADMIN — HYDROMORPHONE HYDROCHLORIDE 0.5 MG: 1 INJECTION, SOLUTION INTRAMUSCULAR; INTRAVENOUS; SUBCUTANEOUS at 14:07

## 2023-01-01 RX ADMIN — IPRATROPIUM BROMIDE AND ALBUTEROL SULFATE 1 AMPULE: 2.5; .5 SOLUTION RESPIRATORY (INHALATION) at 15:13

## 2023-01-01 RX ADMIN — MIDAZOLAM 1 MG: 1 INJECTION INTRAMUSCULAR; INTRAVENOUS at 03:43

## 2023-01-01 RX ADMIN — BUDESONIDE AND FORMOTEROL FUMARATE DIHYDRATE 2 PUFF: 160; 4.5 AEROSOL RESPIRATORY (INHALATION) at 19:10

## 2023-01-01 RX ADMIN — SULFAMETHOXAZOLE AND TRIMETHOPRIM 432 MG: 80; 16 INJECTION, SOLUTION, CONCENTRATE INTRAVENOUS at 18:11

## 2023-01-01 RX ADMIN — MINOCYCLINE HYDROCHLORIDE 200 MG: 100 CAPSULE ORAL at 21:00

## 2023-01-01 RX ADMIN — LANSOPRAZOLE 30 MG: KIT at 08:51

## 2023-01-01 RX ADMIN — APIXABAN 5 MG: 5 TABLET, FILM COATED ORAL at 09:22

## 2023-01-01 RX ADMIN — MEROPENEM 1000 MG: 1 INJECTION, POWDER, FOR SOLUTION INTRAVENOUS at 04:41

## 2023-01-01 RX ADMIN — ASPIRIN 81 MG CHEWABLE TABLET 81 MG: 81 TABLET CHEWABLE at 09:04

## 2023-01-01 RX ADMIN — METOCLOPRAMIDE 10 MG: 5 INJECTION, SOLUTION INTRAMUSCULAR; INTRAVENOUS at 19:57

## 2023-01-01 RX ADMIN — ASPIRIN 81 MG CHEWABLE TABLET 81 MG: 81 TABLET CHEWABLE at 09:27

## 2023-01-01 RX ADMIN — GUAIFENESIN 200 MG: 100 SOLUTION ORAL at 20:45

## 2023-01-01 RX ADMIN — BUDESONIDE AND FORMOTEROL FUMARATE DIHYDRATE 2 PUFF: 160; 4.5 AEROSOL RESPIRATORY (INHALATION) at 19:55

## 2023-01-01 RX ADMIN — INSULIN LISPRO 2 UNITS: 100 INJECTION, SOLUTION INTRAVENOUS; SUBCUTANEOUS at 07:58

## 2023-01-01 RX ADMIN — SODIUM CHLORIDE, PRESERVATIVE FREE 10 ML: 5 INJECTION INTRAVENOUS at 10:22

## 2023-01-01 RX ADMIN — FUROSEMIDE 20 MG: 10 INJECTION, SOLUTION INTRAVENOUS at 08:31

## 2023-01-01 RX ADMIN — FUROSEMIDE 20 MG: 10 INJECTION, SOLUTION INTRAVENOUS at 08:36

## 2023-01-01 RX ADMIN — MIDODRINE HYDROCHLORIDE 5 MG: 5 TABLET ORAL at 15:26

## 2023-01-01 RX ADMIN — APIXABAN 5 MG: 5 TABLET, FILM COATED ORAL at 09:04

## 2023-01-01 RX ADMIN — APIXABAN 5 MG: 5 TABLET, FILM COATED ORAL at 19:58

## 2023-01-01 RX ADMIN — IPRATROPIUM BROMIDE AND ALBUTEROL SULFATE 1 AMPULE: 2.5; .5 SOLUTION RESPIRATORY (INHALATION) at 19:09

## 2023-01-01 RX ADMIN — IPRATROPIUM BROMIDE AND ALBUTEROL SULFATE 1 AMPULE: 2.5; .5 SOLUTION RESPIRATORY (INHALATION) at 08:41

## 2023-01-01 RX ADMIN — MEROPENEM 1000 MG: 1 INJECTION, POWDER, FOR SOLUTION INTRAVENOUS at 13:31

## 2023-01-01 RX ADMIN — INSULIN LISPRO 2 UNITS: 100 INJECTION, SOLUTION INTRAVENOUS; SUBCUTANEOUS at 08:44

## 2023-01-01 RX ADMIN — PROPOFOL 35 MCG/KG/MIN: 10 INJECTION, EMULSION INTRAVENOUS at 20:11

## 2023-01-01 RX ADMIN — DEXAMETHASONE SODIUM PHOSPHATE 20 MG: 4 INJECTION, SOLUTION INTRAMUSCULAR; INTRAVENOUS at 10:28

## 2023-01-01 RX ADMIN — VANCOMYCIN HYDROCHLORIDE 1500 MG: 5 INJECTION, POWDER, LYOPHILIZED, FOR SOLUTION INTRAVENOUS at 09:23

## 2023-01-01 RX ADMIN — ASPIRIN 81 MG CHEWABLE TABLET 81 MG: 81 TABLET CHEWABLE at 07:57

## 2023-01-01 RX ADMIN — IPRATROPIUM BROMIDE AND ALBUTEROL SULFATE 1 AMPULE: 2.5; .5 SOLUTION RESPIRATORY (INHALATION) at 16:03

## 2023-01-01 RX ADMIN — GUAIFENESIN 200 MG: 100 SOLUTION ORAL at 01:30

## 2023-01-01 RX ADMIN — PROPOFOL 10 MCG/KG/MIN: 10 INJECTION, EMULSION INTRAVENOUS at 08:17

## 2023-01-01 RX ADMIN — ASPIRIN 81 MG CHEWABLE TABLET 81 MG: 81 TABLET CHEWABLE at 10:20

## 2023-01-01 RX ADMIN — BUDESONIDE AND FORMOTEROL FUMARATE DIHYDRATE 2 PUFF: 160; 4.5 AEROSOL RESPIRATORY (INHALATION) at 20:08

## 2023-01-01 RX ADMIN — TRAZODONE HYDROCHLORIDE 50 MG: 50 TABLET ORAL at 20:12

## 2023-01-01 RX ADMIN — IPRATROPIUM BROMIDE AND ALBUTEROL SULFATE 1 AMPULE: 2.5; .5 SOLUTION RESPIRATORY (INHALATION) at 11:26

## 2023-01-01 RX ADMIN — BUDESONIDE AND FORMOTEROL FUMARATE DIHYDRATE 2 PUFF: 160; 4.5 AEROSOL RESPIRATORY (INHALATION) at 20:50

## 2023-01-01 RX ADMIN — BUDESONIDE AND FORMOTEROL FUMARATE DIHYDRATE 2 PUFF: 160; 4.5 AEROSOL RESPIRATORY (INHALATION) at 07:44

## 2023-01-01 RX ADMIN — IPRATROPIUM BROMIDE AND ALBUTEROL SULFATE 1 AMPULE: 2.5; .5 SOLUTION RESPIRATORY (INHALATION) at 11:40

## 2023-01-01 RX ADMIN — VANCOMYCIN HYDROCHLORIDE 1500 MG: 10 INJECTION, POWDER, LYOPHILIZED, FOR SOLUTION INTRAVENOUS at 14:16

## 2023-01-01 RX ADMIN — PROPOFOL 40 MCG/KG/MIN: 10 INJECTION, EMULSION INTRAVENOUS at 17:21

## 2023-01-01 RX ADMIN — IPRATROPIUM BROMIDE AND ALBUTEROL SULFATE 1 AMPULE: 2.5; .5 SOLUTION RESPIRATORY (INHALATION) at 15:25

## 2023-01-01 RX ADMIN — GUAIFENESIN 600 MG: 600 TABLET, EXTENDED RELEASE ORAL at 08:35

## 2023-01-01 RX ADMIN — PROPOFOL 35 MCG/KG/MIN: 10 INJECTION, EMULSION INTRAVENOUS at 06:25

## 2023-01-01 RX ADMIN — ALBUTEROL SULFATE 10 MG: 2.5 SOLUTION RESPIRATORY (INHALATION) at 11:29

## 2023-01-01 RX ADMIN — SODIUM CHLORIDE, PRESERVATIVE FREE 10 ML: 5 INJECTION INTRAVENOUS at 08:11

## 2023-01-01 RX ADMIN — FUROSEMIDE 40 MG: 10 INJECTION, SOLUTION INTRAMUSCULAR; INTRAVENOUS at 16:27

## 2023-01-01 RX ADMIN — ASPIRIN 81 MG CHEWABLE TABLET 81 MG: 81 TABLET CHEWABLE at 08:11

## 2023-01-01 RX ADMIN — TRAZODONE HYDROCHLORIDE 50 MG: 50 TABLET ORAL at 20:02

## 2023-01-01 RX ADMIN — METOCLOPRAMIDE 10 MG: 5 INJECTION, SOLUTION INTRAMUSCULAR; INTRAVENOUS at 08:26

## 2023-01-01 RX ADMIN — ASPIRIN 81 MG: 81 TABLET, COATED ORAL at 10:23

## 2023-01-01 RX ADMIN — AMIODARONE HYDROCHLORIDE 200 MG: 200 TABLET ORAL at 08:35

## 2023-01-01 RX ADMIN — MILRINONE LACTATE 4000 MCG: 1 INJECTION, SOLUTION INTRAVENOUS at 11:51

## 2023-01-01 RX ADMIN — IPRATROPIUM BROMIDE AND ALBUTEROL SULFATE 1 AMPULE: 2.5; .5 SOLUTION RESPIRATORY (INHALATION) at 20:07

## 2023-01-01 RX ADMIN — ASPIRIN 81 MG CHEWABLE TABLET 81 MG: 81 TABLET CHEWABLE at 09:21

## 2023-01-01 RX ADMIN — CHLORHEXIDINE GLUCONATE 0.12% ORAL RINSE 15 ML: 1.2 LIQUID ORAL at 09:27

## 2023-01-01 RX ADMIN — MIDAZOLAM 4 MG/HR: 5 INJECTION INTRAMUSCULAR; INTRAVENOUS at 12:21

## 2023-01-01 RX ADMIN — POLYVINYL ALCOHOL 1 DROP: 14 SOLUTION/ DROPS OPHTHALMIC at 08:44

## 2023-01-01 RX ADMIN — FENTANYL CITRATE 50 MCG: 50 INJECTION, SOLUTION INTRAMUSCULAR; INTRAVENOUS at 08:11

## 2023-01-01 RX ADMIN — GUAIFENESIN 200 MG: 100 SOLUTION ORAL at 04:40

## 2023-01-01 RX ADMIN — IPRATROPIUM BROMIDE AND ALBUTEROL SULFATE 1 AMPULE: 2.5; .5 SOLUTION RESPIRATORY (INHALATION) at 11:34

## 2023-01-01 RX ADMIN — SENNOSIDES 8.6 MG: 8.6 TABLET, FILM COATED ORAL at 09:04

## 2023-01-01 RX ADMIN — GUAIFENESIN 200 MG: 100 SOLUTION ORAL at 16:55

## 2023-01-01 RX ADMIN — METHYLPREDNISOLONE SODIUM SUCCINATE 60 MG: 125 INJECTION, POWDER, FOR SOLUTION INTRAMUSCULAR; INTRAVENOUS at 16:27

## 2023-01-01 RX ADMIN — PROPOFOL 40 MCG/KG/MIN: 10 INJECTION, EMULSION INTRAVENOUS at 21:07

## 2023-01-01 RX ADMIN — POLYETHYLENE GLYCOL (3350) 17 G: 17 POWDER, FOR SOLUTION ORAL at 09:04

## 2023-01-01 RX ADMIN — AMIODARONE HYDROCHLORIDE 200 MG: 200 TABLET ORAL at 08:33

## 2023-01-01 RX ADMIN — ESCITALOPRAM OXALATE 20 MG: 10 TABLET ORAL at 07:57

## 2023-01-01 RX ADMIN — MIDODRINE HYDROCHLORIDE 10 MG: 5 TABLET ORAL at 17:48

## 2023-01-01 RX ADMIN — DOCUSATE SODIUM 100 MG: 50 LIQUID ORAL at 19:57

## 2023-01-01 RX ADMIN — IPRATROPIUM BROMIDE AND ALBUTEROL SULFATE 1 AMPULE: 2.5; .5 SOLUTION RESPIRATORY (INHALATION) at 07:37

## 2023-01-01 RX ADMIN — APIXABAN 5 MG: 5 TABLET, FILM COATED ORAL at 21:05

## 2023-01-01 RX ADMIN — GUAIFENESIN 200 MG: 100 SOLUTION ORAL at 12:25

## 2023-01-01 RX ADMIN — TRAZODONE HYDROCHLORIDE 50 MG: 50 TABLET ORAL at 20:53

## 2023-01-01 RX ADMIN — GUAIFENESIN 200 MG: 100 SOLUTION ORAL at 12:54

## 2023-01-01 RX ADMIN — DOCUSATE SODIUM 100 MG: 50 LIQUID ORAL at 21:00

## 2023-01-01 RX ADMIN — APIXABAN 5 MG: 5 TABLET, FILM COATED ORAL at 08:08

## 2023-01-01 RX ADMIN — SULFAMETHOXAZOLE AND TRIMETHOPRIM 432 MG: 80; 16 INJECTION, SOLUTION, CONCENTRATE INTRAVENOUS at 08:26

## 2023-01-01 RX ADMIN — CHLORHEXIDINE GLUCONATE 0.12% ORAL RINSE 15 ML: 1.2 LIQUID ORAL at 20:01

## 2023-01-01 RX ADMIN — METHYLPREDNISOLONE SODIUM SUCCINATE 60 MG: 125 INJECTION, POWDER, FOR SOLUTION INTRAMUSCULAR; INTRAVENOUS at 22:24

## 2023-01-01 RX ADMIN — CHLORHEXIDINE GLUCONATE 0.12% ORAL RINSE 15 ML: 1.2 LIQUID ORAL at 09:00

## 2023-01-01 RX ADMIN — GUAIFENESIN 200 MG: 100 SOLUTION ORAL at 01:15

## 2023-01-01 RX ADMIN — MEROPENEM 1000 MG: 1 INJECTION, POWDER, FOR SOLUTION INTRAVENOUS at 20:49

## 2023-01-01 RX ADMIN — SODIUM CHLORIDE, PRESERVATIVE FREE 10 ML: 5 INJECTION INTRAVENOUS at 10:24

## 2023-01-01 RX ADMIN — GUAIFENESIN 200 MG: 100 SOLUTION ORAL at 16:27

## 2023-01-01 RX ADMIN — CHLORHEXIDINE GLUCONATE 0.12% ORAL RINSE 15 ML: 1.2 LIQUID ORAL at 20:53

## 2023-01-01 RX ADMIN — IPRATROPIUM BROMIDE AND ALBUTEROL SULFATE 1 AMPULE: 2.5; .5 SOLUTION RESPIRATORY (INHALATION) at 11:41

## 2023-01-01 RX ADMIN — INSULIN LISPRO 2 UNITS: 100 INJECTION, SOLUTION INTRAVENOUS; SUBCUTANEOUS at 09:07

## 2023-01-01 RX ADMIN — METOCLOPRAMIDE 10 MG: 5 INJECTION, SOLUTION INTRAMUSCULAR; INTRAVENOUS at 02:14

## 2023-01-01 RX ADMIN — AMIODARONE HYDROCHLORIDE 200 MG: 200 TABLET ORAL at 07:57

## 2023-01-01 RX ADMIN — MEROPENEM 1000 MG: 1 INJECTION, POWDER, FOR SOLUTION INTRAVENOUS at 20:41

## 2023-01-01 RX ADMIN — VASOPRESSIN 0.03 UNITS/MIN: 0.2 INJECTION INTRAVENOUS at 16:37

## 2023-01-01 RX ADMIN — ESCITALOPRAM OXALATE 20 MG: 10 TABLET ORAL at 09:04

## 2023-01-01 RX ADMIN — INSULIN LISPRO 2 UNITS: 100 INJECTION, SOLUTION INTRAVENOUS; SUBCUTANEOUS at 23:56

## 2023-01-01 RX ADMIN — ESCITALOPRAM OXALATE 20 MG: 10 TABLET ORAL at 10:20

## 2023-01-01 RX ADMIN — GUAIFENESIN 200 MG: 100 SOLUTION ORAL at 18:47

## 2023-01-01 RX ADMIN — DEXMEDETOMIDINE HYDROCHLORIDE 0.4 MCG/KG/HR: 4 INJECTION, SOLUTION INTRAVENOUS at 01:18

## 2023-01-01 RX ADMIN — MINOCYCLINE HYDROCHLORIDE 200 MG: 100 CAPSULE ORAL at 10:42

## 2023-01-01 RX ADMIN — CHLORHEXIDINE GLUCONATE 0.12% ORAL RINSE 15 ML: 1.2 LIQUID ORAL at 08:08

## 2023-01-01 RX ADMIN — GUAIFENESIN 200 MG: 100 SOLUTION ORAL at 09:27

## 2023-01-01 RX ADMIN — FENTANYL CITRATE 50 MCG: 50 INJECTION INTRAMUSCULAR; INTRAVENOUS at 23:12

## 2023-01-01 RX ADMIN — SODIUM CHLORIDE, PRESERVATIVE FREE 10 ML: 5 INJECTION INTRAVENOUS at 21:02

## 2023-01-01 RX ADMIN — LANSOPRAZOLE 30 MG: KIT at 08:44

## 2023-01-01 RX ADMIN — MIDODRINE HYDROCHLORIDE 5 MG: 5 TABLET ORAL at 17:12

## 2023-01-01 RX ADMIN — BUDESONIDE AND FORMOTEROL FUMARATE DIHYDRATE 2 PUFF: 160; 4.5 AEROSOL RESPIRATORY (INHALATION) at 20:06

## 2023-01-01 RX ADMIN — CHLORHEXIDINE GLUCONATE 0.12% ORAL RINSE 15 ML: 1.2 LIQUID ORAL at 10:20

## 2023-01-01 RX ADMIN — AMIODARONE HYDROCHLORIDE 200 MG: 200 TABLET ORAL at 09:22

## 2023-01-01 RX ADMIN — SODIUM CHLORIDE, PRESERVATIVE FREE 10 ML: 5 INJECTION INTRAVENOUS at 21:10

## 2023-01-01 RX ADMIN — MIDODRINE HYDROCHLORIDE 5 MG: 5 TABLET ORAL at 09:27

## 2023-01-01 RX ADMIN — PROPOFOL 40 MCG/KG/MIN: 10 INJECTION, EMULSION INTRAVENOUS at 12:39

## 2023-01-01 RX ADMIN — GUAIFENESIN 200 MG: 100 SOLUTION ORAL at 20:38

## 2023-01-01 RX ADMIN — CHLORHEXIDINE GLUCONATE 0.12% ORAL RINSE 15 ML: 1.2 LIQUID ORAL at 20:44

## 2023-01-01 RX ADMIN — SODIUM ZIRCONIUM CYCLOSILICATE 10 G: 10 POWDER, FOR SUSPENSION ORAL at 10:42

## 2023-01-01 RX ADMIN — SODIUM CHLORIDE, PRESERVATIVE FREE 10 ML: 5 INJECTION INTRAVENOUS at 19:59

## 2023-01-01 RX ADMIN — MIDODRINE HYDROCHLORIDE 10 MG: 5 TABLET ORAL at 00:04

## 2023-01-01 RX ADMIN — MIDODRINE HYDROCHLORIDE 5 MG: 5 TABLET ORAL at 09:19

## 2023-01-01 RX ADMIN — INSULIN LISPRO 2 UNITS: 100 INJECTION, SOLUTION INTRAVENOUS; SUBCUTANEOUS at 23:57

## 2023-01-01 RX ADMIN — PROPOFOL 30 MCG/KG/MIN: 10 INJECTION, EMULSION INTRAVENOUS at 21:07

## 2023-01-01 RX ADMIN — GUAIFENESIN 200 MG: 100 SOLUTION ORAL at 00:39

## 2023-01-01 RX ADMIN — MIDODRINE HYDROCHLORIDE 10 MG: 5 TABLET ORAL at 18:30

## 2023-01-01 RX ADMIN — SODIUM CHLORIDE, PRESERVATIVE FREE 10 ML: 5 INJECTION INTRAVENOUS at 21:03

## 2023-01-01 RX ADMIN — SENNOSIDES 8.6 MG: 8.6 TABLET, FILM COATED ORAL at 21:28

## 2023-01-01 RX ADMIN — IPRATROPIUM BROMIDE AND ALBUTEROL SULFATE 1 AMPULE: 2.5; .5 SOLUTION RESPIRATORY (INHALATION) at 07:36

## 2023-01-01 RX ADMIN — GUAIFENESIN 200 MG: 100 SOLUTION ORAL at 00:35

## 2023-01-01 RX ADMIN — METHYLPREDNISOLONE SODIUM SUCCINATE 60 MG: 125 INJECTION, POWDER, FOR SOLUTION INTRAMUSCULAR; INTRAVENOUS at 04:11

## 2023-01-01 RX ADMIN — PROPOFOL 25 MCG/KG/MIN: 10 INJECTION, EMULSION INTRAVENOUS at 11:54

## 2023-01-01 RX ADMIN — IPRATROPIUM BROMIDE AND ALBUTEROL SULFATE 1 AMPULE: 2.5; .5 SOLUTION RESPIRATORY (INHALATION) at 12:25

## 2023-01-01 RX ADMIN — DEXAMETHASONE SODIUM PHOSPHATE 10 MG: 4 INJECTION, SOLUTION INTRAMUSCULAR; INTRAVENOUS at 09:22

## 2023-01-01 RX ADMIN — GUAIFENESIN 200 MG: 100 SOLUTION ORAL at 17:51

## 2023-01-01 RX ADMIN — VANCOMYCIN HYDROCHLORIDE 2000 MG: 1 INJECTION, POWDER, LYOPHILIZED, FOR SOLUTION INTRAVENOUS at 12:31

## 2023-01-01 RX ADMIN — ESCITALOPRAM OXALATE 20 MG: 10 TABLET ORAL at 08:33

## 2023-01-01 RX ADMIN — TRAZODONE HYDROCHLORIDE 50 MG: 50 TABLET ORAL at 21:05

## 2023-01-01 RX ADMIN — MEROPENEM 1000 MG: 1 INJECTION, POWDER, FOR SOLUTION INTRAVENOUS at 21:12

## 2023-01-01 RX ADMIN — MIDODRINE HYDROCHLORIDE 5 MG: 5 TABLET ORAL at 16:28

## 2023-01-01 RX ADMIN — TRAZODONE HYDROCHLORIDE 50 MG: 50 TABLET ORAL at 20:03

## 2023-01-01 RX ADMIN — PROPOFOL 25 MCG/KG/MIN: 10 INJECTION, EMULSION INTRAVENOUS at 07:38

## 2023-01-01 RX ADMIN — TRAZODONE HYDROCHLORIDE 50 MG: 50 TABLET ORAL at 20:38

## 2023-01-01 RX ADMIN — INSULIN LISPRO 2 UNITS: 100 INJECTION, SOLUTION INTRAVENOUS; SUBCUTANEOUS at 15:29

## 2023-01-01 RX ADMIN — IPRATROPIUM BROMIDE AND ALBUTEROL SULFATE 1 AMPULE: 2.5; .5 SOLUTION RESPIRATORY (INHALATION) at 15:50

## 2023-01-01 RX ADMIN — IPRATROPIUM BROMIDE AND ALBUTEROL SULFATE 1 AMPULE: 2.5; .5 SOLUTION RESPIRATORY (INHALATION) at 08:02

## 2023-01-01 RX ADMIN — GUAIFENESIN 200 MG: 100 SOLUTION ORAL at 04:12

## 2023-01-01 RX ADMIN — PROPOFOL 35 MCG/KG/MIN: 10 INJECTION, EMULSION INTRAVENOUS at 04:30

## 2023-01-01 RX ADMIN — PANTOPRAZOLE SODIUM 40 MG: 40 TABLET, DELAYED RELEASE ORAL at 05:13

## 2023-01-01 RX ADMIN — GUAIFENESIN 200 MG: 100 SOLUTION ORAL at 10:20

## 2023-01-01 RX ADMIN — MAGNESIUM SULFATE IN DEXTROSE 1000 MG: 10 INJECTION, SOLUTION INTRAVENOUS at 12:31

## 2023-01-01 RX ADMIN — CHLORHEXIDINE GLUCONATE 0.12% ORAL RINSE 15 ML: 1.2 LIQUID ORAL at 20:10

## 2023-01-01 RX ADMIN — IPRATROPIUM BROMIDE AND ALBUTEROL SULFATE 1 AMPULE: 2.5; .5 SOLUTION RESPIRATORY (INHALATION) at 20:49

## 2023-01-01 RX ADMIN — GUAIFENESIN 200 MG: 100 SOLUTION ORAL at 04:33

## 2023-01-01 RX ADMIN — INSULIN LISPRO 2 UNITS: 100 INJECTION, SOLUTION INTRAVENOUS; SUBCUTANEOUS at 21:27

## 2023-01-01 RX ADMIN — MINOCYCLINE HYDROCHLORIDE 200 MG: 100 CAPSULE ORAL at 08:16

## 2023-01-01 RX ADMIN — IPRATROPIUM BROMIDE AND ALBUTEROL SULFATE 1 AMPULE: 2.5; .5 SOLUTION RESPIRATORY (INHALATION) at 11:59

## 2023-01-01 RX ADMIN — PROPOFOL 50 MCG/KG/MIN: 10 INJECTION, EMULSION INTRAVENOUS at 17:12

## 2023-01-01 RX ADMIN — DOCUSATE SODIUM 100 MG: 50 LIQUID ORAL at 21:28

## 2023-01-01 RX ADMIN — MIDAZOLAM 1 MG: 1 INJECTION INTRAMUSCULAR; INTRAVENOUS at 14:03

## 2023-01-01 RX ADMIN — PANTOPRAZOLE SODIUM 40 MG: 40 INJECTION, POWDER, FOR SOLUTION INTRAVENOUS at 09:27

## 2023-01-01 RX ADMIN — GUAIFENESIN 200 MG: 100 SOLUTION ORAL at 13:31

## 2023-01-01 RX ADMIN — IPRATROPIUM BROMIDE AND ALBUTEROL SULFATE 1 AMPULE: 2.5; .5 SOLUTION RESPIRATORY (INHALATION) at 15:06

## 2023-01-01 RX ADMIN — MEROPENEM 1000 MG: 1 INJECTION, POWDER, FOR SOLUTION INTRAVENOUS at 05:15

## 2023-01-01 RX ADMIN — IPRATROPIUM BROMIDE AND ALBUTEROL SULFATE 1 AMPULE: 2.5; .5 SOLUTION RESPIRATORY (INHALATION) at 17:02

## 2023-01-01 RX ADMIN — CHLORHEXIDINE GLUCONATE 0.12% ORAL RINSE 15 ML: 1.2 LIQUID ORAL at 20:37

## 2023-01-01 RX ADMIN — GUAIFENESIN 200 MG: 100 SOLUTION ORAL at 04:35

## 2023-01-01 RX ADMIN — Medication 10 MG: at 10:42

## 2023-01-01 RX ADMIN — POLYETHYLENE GLYCOL (3350) 17 G: 17 POWDER, FOR SOLUTION ORAL at 08:16

## 2023-01-01 RX ADMIN — BUDESONIDE AND FORMOTEROL FUMARATE DIHYDRATE 2 PUFF: 160; 4.5 AEROSOL RESPIRATORY (INHALATION) at 20:20

## 2023-01-01 RX ADMIN — SODIUM ZIRCONIUM CYCLOSILICATE 10 G: 10 POWDER, FOR SUSPENSION ORAL at 21:00

## 2023-01-01 RX ADMIN — APIXABAN 5 MG: 5 TABLET, FILM COATED ORAL at 21:28

## 2023-01-01 RX ADMIN — INSULIN HUMAN 10 UNITS: 100 INJECTION, SOLUTION PARENTERAL at 11:44

## 2023-01-01 RX ADMIN — SODIUM CHLORIDE, PRESERVATIVE FREE 10 ML: 5 INJECTION INTRAVENOUS at 20:38

## 2023-01-01 RX ADMIN — PROPOFOL 40 MCG/KG/MIN: 10 INJECTION, EMULSION INTRAVENOUS at 21:33

## 2023-01-01 RX ADMIN — GUAIFENESIN 200 MG: 100 SOLUTION ORAL at 08:09

## 2023-01-01 RX ADMIN — AMIODARONE HYDROCHLORIDE 200 MG: 200 TABLET ORAL at 10:23

## 2023-01-01 RX ADMIN — LANSOPRAZOLE 30 MG: KIT at 09:04

## 2023-01-01 RX ADMIN — SODIUM CHLORIDE, PRESERVATIVE FREE 10 ML: 5 INJECTION INTRAVENOUS at 09:02

## 2023-01-01 RX ADMIN — METHYLPREDNISOLONE SODIUM SUCCINATE 60 MG: 125 INJECTION, POWDER, FOR SOLUTION INTRAMUSCULAR; INTRAVENOUS at 21:45

## 2023-01-01 RX ADMIN — APIXABAN 5 MG: 5 TABLET, FILM COATED ORAL at 20:38

## 2023-01-01 RX ADMIN — INSULIN LISPRO 6 UNITS: 100 INJECTION, SOLUTION INTRAVENOUS; SUBCUTANEOUS at 20:59

## 2023-01-01 RX ADMIN — MIDAZOLAM 2 MG/HR: 5 INJECTION INTRAMUSCULAR; INTRAVENOUS at 15:17

## 2023-01-01 RX ADMIN — POLYVINYL ALCOHOL 1 DROP: 14 SOLUTION/ DROPS OPHTHALMIC at 09:56

## 2023-01-01 RX ADMIN — IPRATROPIUM BROMIDE AND ALBUTEROL SULFATE 1 AMPULE: 2.5; .5 SOLUTION RESPIRATORY (INHALATION) at 08:29

## 2023-01-01 RX ADMIN — GUAIFENESIN 600 MG: 600 TABLET, EXTENDED RELEASE ORAL at 21:06

## 2023-01-01 RX ADMIN — BUDESONIDE AND FORMOTEROL FUMARATE DIHYDRATE 2 PUFF: 160; 4.5 AEROSOL RESPIRATORY (INHALATION) at 08:02

## 2023-01-01 RX ADMIN — BUDESONIDE AND FORMOTEROL FUMARATE DIHYDRATE 2 PUFF: 160; 4.5 AEROSOL RESPIRATORY (INHALATION) at 20:13

## 2023-01-01 RX ADMIN — LANSOPRAZOLE 30 MG: KIT at 08:33

## 2023-01-01 RX ADMIN — AMIODARONE HYDROCHLORIDE 200 MG: 200 TABLET ORAL at 10:20

## 2023-01-01 RX ADMIN — GUAIFENESIN 200 MG: 100 SOLUTION ORAL at 20:10

## 2023-01-01 RX ADMIN — GUAIFENESIN 200 MG: 100 SOLUTION ORAL at 04:38

## 2023-01-01 RX ADMIN — MEROPENEM 1000 MG: 1 INJECTION, POWDER, FOR SOLUTION INTRAVENOUS at 13:12

## 2023-01-01 RX ADMIN — ASPIRIN 81 MG CHEWABLE TABLET 81 MG: 81 TABLET CHEWABLE at 08:08

## 2023-01-01 RX ADMIN — MEROPENEM 1000 MG: 1 INJECTION, POWDER, FOR SOLUTION INTRAVENOUS at 12:54

## 2023-01-01 RX ADMIN — PROPOFOL 35 MCG/KG/MIN: 10 INJECTION, EMULSION INTRAVENOUS at 13:44

## 2023-01-01 RX ADMIN — MIDODRINE HYDROCHLORIDE 5 MG: 5 TABLET ORAL at 00:10

## 2023-01-01 RX ADMIN — GUAIFENESIN 200 MG: 100 SOLUTION ORAL at 00:51

## 2023-01-01 RX ADMIN — PROPOFOL 25 MCG/KG/MIN: 10 INJECTION, EMULSION INTRAVENOUS at 16:36

## 2023-01-01 RX ADMIN — SODIUM CHLORIDE 25 ML: 9 INJECTION, SOLUTION INTRAVENOUS at 17:00

## 2023-01-01 RX ADMIN — INSULIN LISPRO 4 UNITS: 100 INJECTION, SOLUTION INTRAVENOUS; SUBCUTANEOUS at 04:05

## 2023-01-01 RX ADMIN — CHLORHEXIDINE GLUCONATE 0.12% ORAL RINSE 15 ML: 1.2 LIQUID ORAL at 07:57

## 2023-01-01 RX ADMIN — IPRATROPIUM BROMIDE AND ALBUTEROL SULFATE 1 AMPULE: 2.5; .5 SOLUTION RESPIRATORY (INHALATION) at 11:50

## 2023-01-01 RX ADMIN — MEROPENEM 1000 MG: 1 INJECTION, POWDER, FOR SOLUTION INTRAVENOUS at 04:50

## 2023-01-01 RX ADMIN — GUAIFENESIN 200 MG: 100 SOLUTION ORAL at 20:00

## 2023-01-01 RX ADMIN — DEXAMETHASONE SODIUM PHOSPHATE 10 MG: 4 INJECTION, SOLUTION INTRAMUSCULAR; INTRAVENOUS at 08:33

## 2023-01-01 RX ADMIN — GUAIFENESIN 600 MG: 600 TABLET, EXTENDED RELEASE ORAL at 08:31

## 2023-01-01 RX ADMIN — MIDODRINE HYDROCHLORIDE 10 MG: 5 TABLET ORAL at 16:41

## 2023-01-01 RX ADMIN — POLYETHYLENE GLYCOL (3350) 17 G: 17 POWDER, FOR SOLUTION ORAL at 21:00

## 2023-01-01 RX ADMIN — TRAZODONE HYDROCHLORIDE 50 MG: 50 TABLET ORAL at 19:58

## 2023-01-01 RX ADMIN — PANTOPRAZOLE SODIUM 40 MG: 40 INJECTION, POWDER, FOR SOLUTION INTRAVENOUS at 20:02

## 2023-01-01 RX ADMIN — VANCOMYCIN HYDROCHLORIDE 1000 MG: 1 INJECTION, POWDER, LYOPHILIZED, FOR SOLUTION INTRAVENOUS at 16:20

## 2023-01-01 RX ADMIN — VASOPRESSIN 0.03 UNITS/MIN: 0.2 INJECTION INTRAVENOUS at 03:08

## 2023-01-01 RX ADMIN — CALCIUM GLUCONATE 1000 MG: 20 INJECTION, SOLUTION INTRAVENOUS at 17:10

## 2023-01-01 RX ADMIN — SENNOSIDES 8.6 MG: 8.6 TABLET, FILM COATED ORAL at 08:16

## 2023-01-01 RX ADMIN — VANCOMYCIN HYDROCHLORIDE 1500 MG: 10 INJECTION, POWDER, LYOPHILIZED, FOR SOLUTION INTRAVENOUS at 12:55

## 2023-01-01 RX ADMIN — BUDESONIDE AND FORMOTEROL FUMARATE DIHYDRATE 2 PUFF: 160; 4.5 AEROSOL RESPIRATORY (INHALATION) at 19:28

## 2023-01-01 RX ADMIN — MEROPENEM 1000 MG: 1 INJECTION, POWDER, FOR SOLUTION INTRAVENOUS at 04:43

## 2023-01-01 RX ADMIN — INSULIN LISPRO 2 UNITS: 100 INJECTION, SOLUTION INTRAVENOUS; SUBCUTANEOUS at 03:54

## 2023-01-01 RX ADMIN — SODIUM CHLORIDE, PRESERVATIVE FREE 10 ML: 5 INJECTION INTRAVENOUS at 09:01

## 2023-01-01 RX ADMIN — GUAIFENESIN 200 MG: 100 SOLUTION ORAL at 08:33

## 2023-01-01 RX ADMIN — PROPOFOL 40 MCG/KG/MIN: 10 INJECTION, EMULSION INTRAVENOUS at 11:18

## 2023-01-01 RX ADMIN — MEROPENEM 1000 MG: 1 INJECTION, POWDER, FOR SOLUTION INTRAVENOUS at 20:11

## 2023-01-01 RX ADMIN — DOCUSATE SODIUM 100 MG: 50 LIQUID ORAL at 07:57

## 2023-01-01 RX ADMIN — LANSOPRAZOLE 30 MG: KIT at 08:09

## 2023-01-01 RX ADMIN — IPRATROPIUM BROMIDE AND ALBUTEROL SULFATE 1 AMPULE: 2.5; .5 SOLUTION RESPIRATORY (INHALATION) at 20:21

## 2023-01-01 RX ADMIN — DEXAMETHASONE SODIUM PHOSPHATE 10 MG: 4 INJECTION, SOLUTION INTRAMUSCULAR; INTRAVENOUS at 10:20

## 2023-01-01 RX ADMIN — IPRATROPIUM BROMIDE AND ALBUTEROL SULFATE 1 AMPULE: 2.5; .5 SOLUTION RESPIRATORY (INHALATION) at 09:45

## 2023-01-01 RX ADMIN — MEROPENEM 1000 MG: 1 INJECTION, POWDER, FOR SOLUTION INTRAVENOUS at 12:56

## 2023-01-01 RX ADMIN — APIXABAN 5 MG: 5 TABLET, FILM COATED ORAL at 08:09

## 2023-01-01 RX ADMIN — APIXABAN 5 MG: 5 TABLET, FILM COATED ORAL at 10:21

## 2023-01-01 RX ADMIN — METHYLPREDNISOLONE SODIUM SUCCINATE 60 MG: 125 INJECTION, POWDER, FOR SOLUTION INTRAMUSCULAR; INTRAVENOUS at 17:08

## 2023-01-01 RX ADMIN — ESCITALOPRAM OXALATE 20 MG: 10 TABLET ORAL at 08:08

## 2023-01-01 RX ADMIN — SENNOSIDES 8.6 MG: 8.6 TABLET, FILM COATED ORAL at 21:01

## 2023-01-01 RX ADMIN — GUAIFENESIN 200 MG: 100 SOLUTION ORAL at 20:56

## 2023-01-01 RX ADMIN — METOCLOPRAMIDE 10 MG: 5 INJECTION, SOLUTION INTRAMUSCULAR; INTRAVENOUS at 02:54

## 2023-01-01 RX ADMIN — ACETAZOLAMIDE 250 MG: 500 INJECTION, POWDER, LYOPHILIZED, FOR SOLUTION INTRAVENOUS at 11:19

## 2023-01-01 RX ADMIN — ASPIRIN 81 MG CHEWABLE TABLET 81 MG: 81 TABLET CHEWABLE at 08:16

## 2023-01-01 RX ADMIN — SENNOSIDES 8.6 MG: 8.6 TABLET, FILM COATED ORAL at 07:57

## 2023-01-01 RX ADMIN — APIXABAN 5 MG: 5 TABLET, FILM COATED ORAL at 20:03

## 2023-01-01 RX ADMIN — GUAIFENESIN 200 MG: 100 SOLUTION ORAL at 12:05

## 2023-01-01 RX ADMIN — SULFAMETHOXAZOLE AND TRIMETHOPRIM 432 MG: 80; 16 INJECTION, SOLUTION, CONCENTRATE INTRAVENOUS at 11:14

## 2023-01-01 RX ADMIN — GUAIFENESIN 200 MG: 100 SOLUTION ORAL at 20:01

## 2023-01-01 RX ADMIN — MIDAZOLAM 2 MG: 1 INJECTION INTRAMUSCULAR; INTRAVENOUS at 22:47

## 2023-01-01 RX ADMIN — GUAIFENESIN 200 MG: 100 SOLUTION ORAL at 04:41

## 2023-01-01 RX ADMIN — DOCUSATE SODIUM 100 MG: 50 LIQUID ORAL at 08:26

## 2023-01-01 RX ADMIN — METOCLOPRAMIDE 10 MG: 5 INJECTION, SOLUTION INTRAMUSCULAR; INTRAVENOUS at 15:21

## 2023-01-01 RX ADMIN — IPRATROPIUM BROMIDE AND ALBUTEROL SULFATE 1 AMPULE: 2.5; .5 SOLUTION RESPIRATORY (INHALATION) at 12:09

## 2023-01-01 RX ADMIN — APIXABAN 5 MG: 5 TABLET, FILM COATED ORAL at 20:53

## 2023-01-01 RX ADMIN — IPRATROPIUM BROMIDE AND ALBUTEROL SULFATE 1 AMPULE: 2.5; .5 SOLUTION RESPIRATORY (INHALATION) at 19:12

## 2023-01-01 RX ADMIN — MIDODRINE HYDROCHLORIDE 5 MG: 5 TABLET ORAL at 23:54

## 2023-01-01 RX ADMIN — AMIODARONE HYDROCHLORIDE 200 MG: 200 TABLET ORAL at 08:16

## 2023-01-01 RX ADMIN — PROPOFOL 50 MCG/KG/MIN: 10 INJECTION, EMULSION INTRAVENOUS at 14:14

## 2023-01-01 RX ADMIN — PROPOFOL 35 MCG/KG/MIN: 10 INJECTION, EMULSION INTRAVENOUS at 03:18

## 2023-01-01 RX ADMIN — SODIUM CHLORIDE, PRESERVATIVE FREE 10 ML: 5 INJECTION INTRAVENOUS at 20:12

## 2023-01-01 RX ADMIN — IPRATROPIUM BROMIDE AND ALBUTEROL SULFATE 1 AMPULE: 2.5; .5 SOLUTION RESPIRATORY (INHALATION) at 07:43

## 2023-01-01 RX ADMIN — FENTANYL CITRATE 50 MCG: 50 INJECTION INTRAMUSCULAR; INTRAVENOUS at 16:45

## 2023-01-01 RX ADMIN — BUDESONIDE AND FORMOTEROL FUMARATE DIHYDRATE 2 PUFF: 160; 4.5 AEROSOL RESPIRATORY (INHALATION) at 07:19

## 2023-01-01 RX ADMIN — GUAIFENESIN 200 MG: 100 SOLUTION ORAL at 17:08

## 2023-01-01 RX ADMIN — GUAIFENESIN 200 MG: 100 SOLUTION ORAL at 08:07

## 2023-01-01 RX ADMIN — METHYLPREDNISOLONE SODIUM SUCCINATE 60 MG: 125 INJECTION, POWDER, FOR SOLUTION INTRAMUSCULAR; INTRAVENOUS at 10:36

## 2023-01-01 RX ADMIN — GUAIFENESIN 200 MG: 100 SOLUTION ORAL at 20:02

## 2023-01-01 RX ADMIN — METHYLPREDNISOLONE SODIUM SUCCINATE 60 MG: 125 INJECTION, POWDER, FOR SOLUTION INTRAMUSCULAR; INTRAVENOUS at 04:06

## 2023-01-01 RX ADMIN — DEXMEDETOMIDINE HYDROCHLORIDE 0.2 MCG/KG/HR: 4 INJECTION, SOLUTION INTRAVENOUS at 13:46

## 2023-01-01 RX ADMIN — ESCITALOPRAM OXALATE 20 MG: 10 TABLET ORAL at 08:35

## 2023-01-01 RX ADMIN — SULFAMETHOXAZOLE AND TRIMETHOPRIM 432 MG: 80; 16 INJECTION, SOLUTION, CONCENTRATE INTRAVENOUS at 16:37

## 2023-01-01 RX ADMIN — APIXABAN 5 MG: 5 TABLET, FILM COATED ORAL at 08:31

## 2023-01-01 RX ADMIN — PROPOFOL 40 MCG/KG/MIN: 10 INJECTION, EMULSION INTRAVENOUS at 20:22

## 2023-01-01 RX ADMIN — GUAIFENESIN 600 MG: 600 TABLET, EXTENDED RELEASE ORAL at 20:03

## 2023-01-01 RX ADMIN — ESCITALOPRAM OXALATE 20 MG: 10 TABLET ORAL at 09:22

## 2023-01-01 RX ADMIN — Medication 5 MCG/MIN: at 16:34

## 2023-01-01 RX ADMIN — ESCITALOPRAM OXALATE 20 MG: 10 TABLET ORAL at 10:23

## 2023-01-01 RX ADMIN — METHYLPREDNISOLONE SODIUM SUCCINATE 60 MG: 125 INJECTION, POWDER, FOR SOLUTION INTRAMUSCULAR; INTRAVENOUS at 16:08

## 2023-01-01 RX ADMIN — PROPOFOL 50 MCG/KG/MIN: 10 INJECTION, EMULSION INTRAVENOUS at 06:08

## 2023-01-01 RX ADMIN — APIXABAN 5 MG: 5 TABLET, FILM COATED ORAL at 10:22

## 2023-01-01 RX ADMIN — GUAIFENESIN 200 MG: 100 SOLUTION ORAL at 20:12

## 2023-01-01 RX ADMIN — BUDESONIDE AND FORMOTEROL FUMARATE DIHYDRATE 2 PUFF: 160; 4.5 AEROSOL RESPIRATORY (INHALATION) at 20:26

## 2023-01-01 RX ADMIN — GUAIFENESIN 200 MG: 100 SOLUTION ORAL at 01:21

## 2023-01-01 RX ADMIN — TRAZODONE HYDROCHLORIDE 50 MG: 50 TABLET ORAL at 20:45

## 2023-01-01 RX ADMIN — POLYETHYLENE GLYCOL (3350) 17 G: 17 POWDER, FOR SOLUTION ORAL at 08:26

## 2023-01-01 RX ADMIN — CHLORHEXIDINE GLUCONATE 0.12% ORAL RINSE 15 ML: 1.2 LIQUID ORAL at 08:10

## 2023-01-01 RX ADMIN — GUAIFENESIN 200 MG: 100 SOLUTION ORAL at 09:05

## 2023-01-01 RX ADMIN — IPRATROPIUM BROMIDE AND ALBUTEROL SULFATE 1 AMPULE: 2.5; .5 SOLUTION RESPIRATORY (INHALATION) at 15:12

## 2023-01-01 RX ADMIN — MEROPENEM 1000 MG: 1 INJECTION, POWDER, FOR SOLUTION INTRAVENOUS at 12:27

## 2023-01-01 RX ADMIN — GUAIFENESIN 200 MG: 100 SOLUTION ORAL at 00:06

## 2023-01-01 RX ADMIN — MIDAZOLAM 2 MG: 1 INJECTION INTRAMUSCULAR; INTRAVENOUS at 17:43

## 2023-01-01 RX ADMIN — IPRATROPIUM BROMIDE AND ALBUTEROL SULFATE 1 AMPULE: 2.5; .5 SOLUTION RESPIRATORY (INHALATION) at 20:12

## 2023-01-01 RX ADMIN — PROPOFOL 40 MCG/KG/MIN: 10 INJECTION, EMULSION INTRAVENOUS at 23:24

## 2023-01-01 RX ADMIN — PANTOPRAZOLE SODIUM 40 MG: 40 INJECTION, POWDER, FOR SOLUTION INTRAVENOUS at 10:23

## 2023-01-01 RX ADMIN — MIDODRINE HYDROCHLORIDE 5 MG: 5 TABLET ORAL at 00:06

## 2023-01-01 RX ADMIN — PROPOFOL 35 MCG/KG/MIN: 10 INJECTION, EMULSION INTRAVENOUS at 08:57

## 2023-01-01 RX ADMIN — PHENOL 1 SPRAY: 1.4 LIQUID ORAL at 19:53

## 2023-01-01 RX ADMIN — METOCLOPRAMIDE 5 MG: 5 INJECTION, SOLUTION INTRAMUSCULAR; INTRAVENOUS at 15:26

## 2023-01-01 RX ADMIN — GUAIFENESIN 200 MG: 100 SOLUTION ORAL at 05:22

## 2023-01-01 RX ADMIN — PROPOFOL 40 MCG/KG/MIN: 10 INJECTION, EMULSION INTRAVENOUS at 03:07

## 2023-01-01 RX ADMIN — SODIUM ZIRCONIUM CYCLOSILICATE 10 G: 10 POWDER, FOR SUSPENSION ORAL at 11:36

## 2023-01-01 RX ADMIN — LANSOPRAZOLE 30 MG: KIT at 09:26

## 2023-01-01 RX ADMIN — ESCITALOPRAM OXALATE 20 MG: 10 TABLET ORAL at 09:27

## 2023-01-01 RX ADMIN — PROPOFOL 50 MCG/KG/MIN: 10 INJECTION, EMULSION INTRAVENOUS at 10:29

## 2023-01-01 RX ADMIN — APIXABAN 5 MG: 5 TABLET, FILM COATED ORAL at 20:12

## 2023-01-01 RX ADMIN — GUAIFENESIN 200 MG: 100 SOLUTION ORAL at 16:41

## 2023-01-01 RX ADMIN — SODIUM CHLORIDE, PRESERVATIVE FREE 10 ML: 5 INJECTION INTRAVENOUS at 20:14

## 2023-01-01 RX ADMIN — Medication 5 MCG/MIN: at 20:15

## 2023-01-01 RX ADMIN — GUAIFENESIN 200 MG: 100 SOLUTION ORAL at 12:38

## 2023-01-01 RX ADMIN — MIDODRINE HYDROCHLORIDE 10 MG: 5 TABLET ORAL at 10:22

## 2023-01-01 RX ADMIN — MIDAZOLAM 2 MG: 1 INJECTION INTRAMUSCULAR; INTRAVENOUS at 09:00

## 2023-01-01 RX ADMIN — METOCLOPRAMIDE 10 MG: 5 INJECTION, SOLUTION INTRAMUSCULAR; INTRAVENOUS at 09:05

## 2023-01-01 RX ADMIN — MIDODRINE HYDROCHLORIDE 5 MG: 5 TABLET ORAL at 08:16

## 2023-01-01 RX ADMIN — METHYLPREDNISOLONE SODIUM SUCCINATE 60 MG: 125 INJECTION, POWDER, FOR SOLUTION INTRAMUSCULAR; INTRAVENOUS at 22:07

## 2023-01-01 RX ADMIN — CHLORHEXIDINE GLUCONATE 0.12% ORAL RINSE 15 ML: 1.2 LIQUID ORAL at 09:23

## 2023-01-01 RX ADMIN — CHLORHEXIDINE GLUCONATE 0.12% ORAL RINSE 15 ML: 1.2 LIQUID ORAL at 08:16

## 2023-01-01 RX ADMIN — INSULIN LISPRO 6 UNITS: 100 INJECTION, SOLUTION INTRAVENOUS; SUBCUTANEOUS at 20:01

## 2023-01-01 RX ADMIN — METHYLPREDNISOLONE SODIUM SUCCINATE 60 MG: 125 INJECTION, POWDER, FOR SOLUTION INTRAMUSCULAR; INTRAVENOUS at 11:11

## 2023-01-01 RX ADMIN — IPRATROPIUM BROMIDE AND ALBUTEROL SULFATE 1 AMPULE: 2.5; .5 SOLUTION RESPIRATORY (INHALATION) at 04:01

## 2023-01-01 RX ADMIN — BUDESONIDE AND FORMOTEROL FUMARATE DIHYDRATE 2 PUFF: 160; 4.5 AEROSOL RESPIRATORY (INHALATION) at 07:56

## 2023-01-01 RX ADMIN — MIDODRINE HYDROCHLORIDE 5 MG: 5 TABLET ORAL at 16:27

## 2023-01-01 RX ADMIN — VASOPRESSIN 0.03 UNITS/MIN: 0.2 INJECTION INTRAVENOUS at 17:42

## 2023-01-01 RX ADMIN — LANSOPRAZOLE 30 MG: KIT at 08:15

## 2023-01-01 RX ADMIN — CHLORHEXIDINE GLUCONATE 0.12% ORAL RINSE 15 ML: 1.2 LIQUID ORAL at 21:28

## 2023-01-01 RX ADMIN — MEROPENEM 1000 MG: 1 INJECTION, POWDER, FOR SOLUTION INTRAVENOUS at 20:12

## 2023-01-01 RX ADMIN — APIXABAN 5 MG: 5 TABLET, FILM COATED ORAL at 20:09

## 2023-01-01 RX ADMIN — APIXABAN 5 MG: 5 TABLET, FILM COATED ORAL at 09:27

## 2023-01-01 RX ADMIN — DOCUSATE SODIUM 100 MG: 50 LIQUID ORAL at 09:04

## 2023-01-01 RX ADMIN — CHLORHEXIDINE GLUCONATE 0.12% ORAL RINSE 15 ML: 1.2 LIQUID ORAL at 08:33

## 2023-01-01 RX ADMIN — APIXABAN 5 MG: 5 TABLET, FILM COATED ORAL at 20:45

## 2023-01-01 RX ADMIN — PROPOFOL 35 MCG/KG/MIN: 10 INJECTION, EMULSION INTRAVENOUS at 20:41

## 2023-01-01 RX ADMIN — IPRATROPIUM BROMIDE AND ALBUTEROL SULFATE 1 AMPULE: 2.5; .5 SOLUTION RESPIRATORY (INHALATION) at 09:05

## 2023-01-01 RX ADMIN — IPRATROPIUM BROMIDE AND ALBUTEROL SULFATE 1 AMPULE: 2.5; .5 SOLUTION RESPIRATORY (INHALATION) at 17:11

## 2023-01-01 ASSESSMENT — ENCOUNTER SYMPTOMS
NAUSEA: 0
PHOTOPHOBIA: 0
BACK PAIN: 0
BLOOD IN STOOL: 0
EYE REDNESS: 0
CHEST TIGHTNESS: 0
EYE PAIN: 0
BACK PAIN: 0
TROUBLE SWALLOWING: 0
EYE ITCHING: 0
SORE THROAT: 0
CHEST TIGHTNESS: 0
EYE DISCHARGE: 0
CONSTIPATION: 0
TROUBLE SWALLOWING: 0
DIARRHEA: 0
EYE DISCHARGE: 0
DIARRHEA: 0
VOICE CHANGE: 0
VOMITING: 0
SHORTNESS OF BREATH: 1
ABDOMINAL DISTENTION: 0
COUGH: 1
NAUSEA: 0
EYE PAIN: 0
ABDOMINAL DISTENTION: 0
CONSTIPATION: 0
SHORTNESS OF BREATH: 1
RHINORRHEA: 0
ABDOMINAL PAIN: 0
PHOTOPHOBIA: 0
VOMITING: 0
EYE ITCHING: 0
SORE THROAT: 0
SINUS PAIN: 0
EYE REDNESS: 0
RHINORRHEA: 0
ABDOMINAL PAIN: 0
COUGH: 1
VOICE CHANGE: 0
BLOOD IN STOOL: 0
SINUS PAIN: 0

## 2023-01-01 ASSESSMENT — PAIN SCALES - WONG BAKER

## 2023-01-01 ASSESSMENT — PULMONARY FUNCTION TESTS
PIF_VALUE: 41
PIF_VALUE: 44
PIF_VALUE: 41
PIF_VALUE: 41
PIF_VALUE: 45
PIF_VALUE: 41
PIF_VALUE: 46
PIF_VALUE: 45
PIF_VALUE: 41
PIF_VALUE: 45
PIF_VALUE: 41
PIF_VALUE: 43
PIF_VALUE: 41
PIF_VALUE: 42
PIF_VALUE: 43
PIF_VALUE: 41
PIF_VALUE: 43
PIF_VALUE: 41
PIF_VALUE: 43
PIF_VALUE: 41
PIF_VALUE: 46
PIF_VALUE: 44
PIF_VALUE: 39
PIF_VALUE: 39
PIF_VALUE: 45
PIF_VALUE: 41
PIF_VALUE: 45
PIF_VALUE: 41
PIF_VALUE: 46
PIF_VALUE: 41
PIF_VALUE: 41
PIF_VALUE: 46
PIF_VALUE: 45
PIF_VALUE: 43
PIF_VALUE: 41
PIF_VALUE: 44
PIF_VALUE: 44
PIF_VALUE: 41
PIF_VALUE: 45
PIF_VALUE: 41
PIF_VALUE: 44
PIF_VALUE: 41
PIF_VALUE: 41
PIF_VALUE: 45
PIF_VALUE: 41
PIF_VALUE: 44
PIF_VALUE: 45
PIF_VALUE: 43
PIF_VALUE: 41
PIF_VALUE: 41
PIF_VALUE: 45
PIF_VALUE: 41
PIF_VALUE: 45
PIF_VALUE: 41
PIF_VALUE: 45
PIF_VALUE: 43
PIF_VALUE: 55
PIF_VALUE: 41
PIF_VALUE: 39
PIF_VALUE: 45
PIF_VALUE: 47
PIF_VALUE: 39
PIF_VALUE: 41
PIF_VALUE: 41
PIF_VALUE: 45
PIF_VALUE: 41
PIF_VALUE: 31
PIF_VALUE: 39
PIF_VALUE: 41
PIF_VALUE: 42
PIF_VALUE: 45
PIF_VALUE: 41
PIF_VALUE: 44
PIF_VALUE: 41
PIF_VALUE: 45
PIF_VALUE: 41
PIF_VALUE: 41
PIF_VALUE: 39
PIF_VALUE: 45
PIF_VALUE: 45
PIF_VALUE: 30
PIF_VALUE: 46
PIF_VALUE: 41
PIF_VALUE: 41
PIF_VALUE: 46
PIF_VALUE: 41
PIF_VALUE: 43
PIF_VALUE: 46
PIF_VALUE: 44
PIF_VALUE: 43
PIF_VALUE: 45
PIF_VALUE: 41
PIF_VALUE: 45
PIF_VALUE: 41
PIF_VALUE: 43
PIF_VALUE: 41
PIF_VALUE: 45
PIF_VALUE: 41
PIF_VALUE: 43
PIF_VALUE: 45
PIF_VALUE: 41
PIF_VALUE: 44
PIF_VALUE: 41
PIF_VALUE: 43
PIF_VALUE: 40
PIF_VALUE: 43
PIF_VALUE: 41
PIF_VALUE: 46
PIF_VALUE: 41
PIF_VALUE: 41
PIF_VALUE: 43
PIF_VALUE: 43
PIF_VALUE: 41
PIF_VALUE: 41
PIF_VALUE: 39
PIF_VALUE: 46
PIF_VALUE: 41
PIF_VALUE: 45
PIF_VALUE: 46
PIF_VALUE: 39
PIF_VALUE: 41
PIF_VALUE: 45
PIF_VALUE: 46
PIF_VALUE: 41
PIF_VALUE: 41
PIF_VALUE: 43
PIF_VALUE: 41
PIF_VALUE: 41
PIF_VALUE: 43
PIF_VALUE: 41
PIF_VALUE: 41
PIF_VALUE: 45
PIF_VALUE: 41
PIF_VALUE: 47
PIF_VALUE: 39
PIF_VALUE: 44
PIF_VALUE: 39
PIF_VALUE: 45
PIF_VALUE: 41
PIF_VALUE: 44
PIF_VALUE: 39
PIF_VALUE: 41
PIF_VALUE: 39
PIF_VALUE: 41
PIF_VALUE: 45
PIF_VALUE: 4
PIF_VALUE: 41
PIF_VALUE: 41
PIF_VALUE: 44
PIF_VALUE: 46
PIF_VALUE: 45
PIF_VALUE: 41
PIF_VALUE: 41
PIF_VALUE: 43
PIF_VALUE: 44
PIF_VALUE: 41
PIF_VALUE: 46
PIF_VALUE: 42
PIF_VALUE: 43
PIF_VALUE: 41
PIF_VALUE: 43
PIF_VALUE: 43
PIF_VALUE: 44
PIF_VALUE: 41
PIF_VALUE: 43
PIF_VALUE: 45
PIF_VALUE: 41
PIF_VALUE: 41
PIF_VALUE: 43
PIF_VALUE: 45
PIF_VALUE: 41
PIF_VALUE: 41
PIF_VALUE: 45
PIF_VALUE: 41
PIF_VALUE: 39
PIF_VALUE: 44
PIF_VALUE: 41
PIF_VALUE: 43
PIF_VALUE: 43
PIF_VALUE: 45
PIF_VALUE: 44
PIF_VALUE: 41
PIF_VALUE: 43
PIF_VALUE: 44
PIF_VALUE: 41
PIF_VALUE: 41
PIF_VALUE: 46
PIF_VALUE: 42
PIF_VALUE: 45
PIF_VALUE: 41
PIF_VALUE: 43
PIF_VALUE: 45
PIF_VALUE: 43
PIF_VALUE: 41
PIF_VALUE: 47
PIF_VALUE: 41
PIF_VALUE: 44
PIF_VALUE: 41
PIF_VALUE: 39
PIF_VALUE: 45
PIF_VALUE: 45
PIF_VALUE: 41
PIF_VALUE: 45
PIF_VALUE: 41
PIF_VALUE: 39
PIF_VALUE: 55
PIF_VALUE: 41
PIF_VALUE: 43
PIF_VALUE: 41
PIF_VALUE: 45
PIF_VALUE: 45
PIF_VALUE: 41
PIF_VALUE: 44
PIF_VALUE: 41
PIF_VALUE: 41
PIF_VALUE: 39
PIF_VALUE: 41
PIF_VALUE: 41
PIF_VALUE: 46
PIF_VALUE: 41
PIF_VALUE: 45
PIF_VALUE: 41
PIF_VALUE: 45
PIF_VALUE: 43
PIF_VALUE: 39
PIF_VALUE: 42
PIF_VALUE: 46
PIF_VALUE: 41
PIF_VALUE: 39
PIF_VALUE: 46
PIF_VALUE: 44
PIF_VALUE: 39
PIF_VALUE: 39
PIF_VALUE: 41
PIF_VALUE: 46
PIF_VALUE: 41
PIF_VALUE: 45
PIF_VALUE: 46
PIF_VALUE: 41
PIF_VALUE: 45
PIF_VALUE: 41
PIF_VALUE: 33
PIF_VALUE: 39
PIF_VALUE: 41
PIF_VALUE: 43
PIF_VALUE: 41
PIF_VALUE: 41
PIF_VALUE: 45
PIF_VALUE: 45
PIF_VALUE: 41
PIF_VALUE: 41
PIF_VALUE: 44
PIF_VALUE: 39
PIF_VALUE: 45
PIF_VALUE: 46
PIF_VALUE: 45
PIF_VALUE: 41
PIF_VALUE: 45
PIF_VALUE: 44
PIF_VALUE: 41
PIF_VALUE: 45

## 2023-01-01 ASSESSMENT — PAIN SCALES - GENERAL
PAINLEVEL_OUTOF10: 0
PAINLEVEL_OUTOF10: 5
PAINLEVEL_OUTOF10: 0
PAINLEVEL_OUTOF10: 2
PAINLEVEL_OUTOF10: 0
PAINLEVEL_OUTOF10: 3
PAINLEVEL_OUTOF10: 0
PAINLEVEL_OUTOF10: 6
PAINLEVEL_OUTOF10: 0
PAINLEVEL_OUTOF10: 10
PAINLEVEL_OUTOF10: 2
PAINLEVEL_OUTOF10: 0
PAINLEVEL_OUTOF10: 1
PAINLEVEL_OUTOF10: 0
PAINLEVEL_OUTOF10: 3
PAINLEVEL_OUTOF10: 0

## 2023-01-01 ASSESSMENT — PAIN DESCRIPTION - PAIN TYPE
TYPE: ACUTE PAIN

## 2023-01-01 ASSESSMENT — PAIN DESCRIPTION - FREQUENCY
FREQUENCY: CONTINUOUS
FREQUENCY: INTERMITTENT
FREQUENCY: CONTINUOUS
FREQUENCY: INTERMITTENT

## 2023-01-01 ASSESSMENT — PAIN DESCRIPTION - DESCRIPTORS
DESCRIPTORS: ACHING
DESCRIPTORS: SORE
DESCRIPTORS: SORE
DESCRIPTORS: ACHING
DESCRIPTORS: SORE

## 2023-01-01 ASSESSMENT — PAIN DESCRIPTION - LOCATION
LOCATION: THROAT

## 2023-01-01 ASSESSMENT — PAIN - FUNCTIONAL ASSESSMENT
PAIN_FUNCTIONAL_ASSESSMENT: ACTIVITIES ARE NOT PREVENTED
PAIN_FUNCTIONAL_ASSESSMENT: ACTIVITIES ARE NOT PREVENTED

## 2023-01-01 ASSESSMENT — PAIN DESCRIPTION - ONSET
ONSET: ON-GOING

## 2023-01-01 ASSESSMENT — PAIN DESCRIPTION - ORIENTATION
ORIENTATION: INNER

## 2023-01-01 NOTE — PROGRESS NOTES
In-Patient Progress Note    Patient:  Madai Cole 81 y.o. female MRN: [id number]     Date of Service: 1/1/2023    Hospital Day: 3      Chief complaint: had no chief complaint listed for this encounter. Assessment and Plan   Madai Cole, a 81 y.o. female, with a history of hypertension (amlodipine 2.5 Mg daily), A. fib (amiodarone 200 Mg daily, apixaban 5 Mg twice daily), depression/anxiety (Lexapro 20 Mg daily), osteoporosis (alendronate 70 Mg weekly), GERD (pantoprazole 40 Mg daily), was initially admitted to [facility] on 12/22/2022 with complaints of shortness of breath, lethargy, wheezing; with diagnosis of acute on chronic respiratory failure with hypoxia secondary to acute bilateral lower lobe pneumonia. She required supplemental oxygen on admission at 3 LPM NC. Throughout the course admission, continue to require increased amounts of oxygen. Antibiotics were broadened to cefepime/ doxy, started on steroids, and scheduled bronchodilators/ICS. She appeared overall comfortable w/o distress however biochemical work-up/imaging suggested worsening condition. Repeat CTA on 12/28 negative for PE but extensive mixed groundglass and consolidative opacities that were increasing. Viral panel, urinary antigens, MRSA swab, BLC and sputum culture were all negative. Overnight reported increased distress with hypoxia and subsequent anxiety. Oxygen titrated up to 15 LPM High flow. Discussed need for increased care/ evaluation and going to pursue transfer to higher level of care. She was accepted and transferred to [facility] ICU for intensivist evaluation. She was given 20mg IV decadron and started on BiPAP prior to departure to [facility] on  12/30/2022.     Assessment and plan    Acute hypoxic respiratory failure  Bilateral diffuse groundglass opacities, concern for ARDS  Likely multifocal viral pneumonia, suspected superimposed bacterial pneumonia  CTA chest 12/28 shows no evidence of PE but does show extensive mixed groundglass and consolidative opacities bilaterally, increased from 12/23. Respiratory viral panel -12/28. Urine strep pneumoniae and Legionella antigen negative-12/23. Procalcitonin trending up since admission from 0.09-0.176 as of 12/30/2022. .2.  -Azithromycin, vancomycin and meropenem  -Decadron 20 Mg for 5 days followed by 10 Mg  -Positive pressure ventilation  -Wean FiO2 as able    Anxiety/depression  -Continue escitalopram    History of A. fib/a flutter  -Continue amiodarone and Eliquis    History of heart failure preserved ejection fraction  EF 55-60% per echo on 12/28. proBNP 790 on 12/29. Stress test was done back in 2019 which was negative for reversible perfusion defect.  -Continue Lasix 20 Mg daily      # Peptic ulcer prophylaxis: Pantoprazole  # DVT Prophylaxis: Eliquis  #CODE STATUS: Full code      Current living situation: -  Expected Disposition: -  Estimated discharge date: -      Review of System     Review of Systems   Constitutional:  Positive for fatigue. Negative for activity change, appetite change, chills and fever. HENT:  Negative for congestion, ear discharge, ear pain, hearing loss, nosebleeds, postnasal drip, rhinorrhea, sinus pain, sore throat, trouble swallowing and voice change. Eyes:  Negative for photophobia, pain, discharge, redness and itching. Respiratory:  Positive for cough and shortness of breath. Negative for chest tightness. Cardiovascular:  Negative for chest pain, palpitations and leg swelling. Gastrointestinal:  Negative for abdominal distention, abdominal pain, blood in stool, constipation, diarrhea, nausea and vomiting. Endocrine: Negative for cold intolerance, heat intolerance, polydipsia, polyphagia and polyuria. Genitourinary:  Negative for difficulty urinating, dysuria, flank pain, frequency, hematuria and urgency. Musculoskeletal:  Negative for arthralgias, back pain, gait problem, joint swelling and myalgias.    Skin:  Negative for pallor, rash and wound. Allergic/Immunologic: Negative for environmental allergies and food allergies. Neurological:  Negative for dizziness, tremors, seizures, syncope, speech difficulty, weakness, light-headedness, numbness and headaches. Hematological:  Negative for adenopathy. Does not bruise/bleed easily. Psychiatric/Behavioral:  Negative for agitation, confusion, decreased concentration, hallucinations, self-injury, sleep disturbance and suicidal ideas. The patient is not nervous/anxious and is not hyperactive. I have reviewed all pertinent PMHx, PSHx, FamHx, SocialHx, medications, and allergies and updated history as appropriate. Physical Exam   VITAL SIGNS:  /70   Pulse 72   Temp 97.6 °F (36.4 °C) (Oral)   Resp 25   Ht 5' 8.11\" (1.73 m)   Wt 183 lb 13.8 oz (83.4 kg)   SpO2 (!) 89%   BMI 27.87 kg/m²   Tmax over 24 hours:  Temp (24hrs), Av °F (36.7 °C), Min:97.2 °F (36.2 °C), Max:98.8 °F (37.1 °C)      Patient Vitals for the past 6 hrs:   BP Temp Temp src Pulse Resp SpO2 Weight   23 0930 107/70 -- -- 72 25 (!) 89 % --   23 0900 (!) 135/52 -- -- 69 25 94 % --   23 0830 (!) 103/49 -- -- 73 19 98 % --   23 0800 120/65 97.6 °F (36.4 °C) Oral (!) 110 21 94 % --   23 0600 -- -- -- 86 19 (!) 89 % 183 lb 13.8 oz (83.4 kg)   23 0500 (!) 109/45 -- -- 65 22 92 % --   23 0400 (!) 106/51 97.5 °F (36.4 °C) Oral 63 16 95 % --           Intake/Output Summary (Last 24 hours) at 2023 0937  Last data filed at 2022 1830  Gross per 24 hour   Intake 200 ml   Output 550 ml   Net -350 ml       Wt Readings from Last 2 Encounters:   23 183 lb 13.8 oz (83.4 kg)   22 172 lb 6.4 oz (78.2 kg)     Body mass index is 27.87 kg/m². Physical Exam  Constitutional:       General: She is not in acute distress. Appearance: She is well-developed. She is ill-appearing. HENT:      Head: Normocephalic and atraumatic.       Right Ear: External ear normal.      Left Ear: External ear normal.      Nose: Nose normal.   Eyes:      General: No scleral icterus. Right eye: No discharge. Left eye: No discharge. Conjunctiva/sclera: Conjunctivae normal.      Pupils: Pupils are equal, round, and reactive to light. Neck:      Thyroid: No thyromegaly. Vascular: No JVD. Trachea: No tracheal deviation. Cardiovascular:      Rate and Rhythm: Normal rate and regular rhythm. Heart sounds: Normal heart sounds. No murmur heard. No friction rub. No gallop. Pulmonary:      Effort: Pulmonary effort is normal. No respiratory distress. Breath sounds: Normal breath sounds. No stridor. No wheezing or rales. Chest:      Chest wall: No tenderness. Abdominal:      General: Bowel sounds are normal. There is no distension. Palpations: Abdomen is soft. There is no mass. Tenderness: There is no abdominal tenderness. There is no guarding or rebound. Hernia: No hernia is present. Genitourinary:     Vagina: Normal. No vaginal discharge. Rectum: Guaiac result negative. Musculoskeletal:         General: No tenderness or deformity. Normal range of motion. Cervical back: Normal range of motion and neck supple. Lymphadenopathy:      Cervical: No cervical adenopathy. Skin:     General: Skin is warm and dry. Capillary Refill: Capillary refill takes less than 2 seconds. Coloration: Skin is not pale. Findings: No erythema or rash. Neurological:      Mental Status: She is alert and oriented to person, place, and time. Cranial Nerves: No cranial nerve deficit. Motor: No abnormal muscle tone. Coordination: Coordination normal.      Deep Tendon Reflexes: Reflexes normal.   Psychiatric:         Behavior: Behavior normal.         Thought Content:  Thought content normal.         Judgment: Judgment normal.         Current Medications      vancomycin  1,000 mg IntraVENous Q24H amiodarone  200 mg Oral Daily    [Held by provider] amLODIPine  2.5 mg Oral Daily    apixaban  5 mg Oral BID    escitalopram  20 mg Oral Daily    pantoprazole  40 mg Oral QAM AC    sodium chloride flush  10 mL IntraVENous 2 times per day    aspirin  81 mg Oral Daily    ipratropium-albuterol  1 ampule Inhalation Q4H WA    guaiFENesin  600 mg Oral BID    mometasone-formoterol  2 puff Inhalation BID    furosemide  20 mg IntraVENous Daily    traZODone  50 mg Oral Nightly    dexamethasone  20 mg IntraVENous Daily    [START ON 1/4/2023] dexamethasone  10 mg IntraVENous Daily    meropenem  1,000 mg IntraVENous Q8H    azithromycin  500 mg IntraVENous Q24H         Labs and Imaging Studies   Laboratory findings:  XR CHEST PORTABLE    Result Date: 12/31/2022  EXAMINATION: ONE XRAY VIEW OF THE CHEST 12/31/2022 4:57 am COMPARISON: 12/27/2022 HISTORY: ORDERING SYSTEM PROVIDED HISTORY: f/u ARDS TECHNOLOGIST PROVIDED HISTORY: Reason for exam:->f/u ARDS Reason for Exam: f/u ARDS FINDINGS: Cardiomediastinal silhouette is stable. Similar bilateral airspace opacities. No pleural effusion or pneumothorax. No gross bony abnormality.      Stable chest.       Recent Results (from the past 24 hour(s))   Critical Care Panel    Collection Time: 01/01/23  5:59 AM   Result Value Ref Range    Sodium 139 135 - 145 MMOL/L    Potassium 3.4 (L) 3.5 - 5.1 MMOL/L    Chloride 98 (L) 99 - 110 mMol/L    CO2 31 21 - 32 MMOL/L    Anion Gap 10 4 - 16    BUN 20 6 - 23 MG/DL    Creatinine 0.9 0.6 - 1.1 MG/DL    Est, Glom Filt Rate >60 >60 mL/min/1.73m2    Glucose 154 (H) 70 - 99 MG/DL    Calcium 7.6 (L) 8.3 - 10.6 MG/DL    Phosphorus 2.8 2.5 - 4.9 MG/DL    Magnesium 2.6 (H) 1.8 - 2.4 mg/dl   CBC    Collection Time: 01/01/23  5:59 AM   Result Value Ref Range    WBC 16.3 (H) 4.0 - 10.5 K/CU MM    RBC 3.24 (L) 4.2 - 5.4 M/CU MM    Hemoglobin 10.8 (L) 12.5 - 16.0 GM/DL    Hematocrit 32.0 (L) 37 - 47 %    MCV 98.8 78 - 100 FL    MCH 33.3 (H) 27 - 31 PG    MCHC 33.8 32.0 - 36.0 %    RDW 12.1 11.7 - 14.9 %    Platelets 673 184 - 866 K/CU MM    MPV 9.1 7.5 - 11.1 FL   Vancomycin Level, Random    Collection Time: 01/01/23  5:59 AM   Result Value Ref Range    Vancomycin Rm 14.4 UG/ML    DOSE AMOUNT DOSE AMT. GIVEN - UNKNOWN     DOSE TIME DOSE TIME GIVEN - UNKNOWN    Blood Gas, Venous    Collection Time: 01/01/23  6:00 AM   Result Value Ref Range    pH, Russell 7.45 (H) 7.32 - 7.42    pCO2, Russell 50 38 - 52 mmHG    pO2, Russell 63 (H) 28 - 48 mmHG    Base Exc, Mixed 9.2 (H) 0 - 2.3    HCO3, Venous 34.8 (H) 19 - 25 MMOL/L    O2 Sat, Russell 90.5 (H) 50 - 70 %    Comment VBG            Electronically signed by Bebe York MD on 1/1/2023 at 9:37 AM      Comment: Please note this report has been produced using speech recognition software and may contain errors related to that system including errors in grammar, punctuation, and spelling, as well as words and phrases that may be inappropriate. If there are any questions or concerns please feel free to contact the dictating provider for clarification.

## 2023-01-01 NOTE — PROGRESS NOTES
[facility]  consulted by Dr. Rico Ospina for monitoring and adjustment. Indication for treatment: Vancomycin indication: HAP  Goal trough: Trough Goal: 15-20 mcg/mL  AUC/DEBORAH: 400-600    Risk Factors for MRSA Identified:   Prior hospitalization on 12/22/22    Pertinent Laboratory Values:   Temp Readings from Last 3 Encounters:   01/01/23 97.5 °F (36.4 °C) (Oral)   12/30/22 99.8 °F (37.7 °C) (Oral)   09/30/22 97.3 °F (36.3 °C)     Recent Labs     12/30/22  0515 12/31/22  0600   WBC 19.1* 15.9*       Recent Labs     12/30/22  0515 12/31/22  0600   BUN 18 22   CREATININE 0.8 1.1       Estimated Creatinine Clearance: 42 mL/min (based on SCr of 1.1 mg/dL). Intake/Output Summary (Last 24 hours) at 1/1/2023 0741  Last data filed at 12/31/2022 1830  Gross per 24 hour   Intake 350 ml   Output 750 ml   Net -400 ml         Pertinent Cultures:   Date    Source    Results  12/22   Blood    No growth at 5 days  12/22   MRSA Nasal   Negative on 12/26 12/28   Respiratory Panel  Negative  1/1    MRSA Nasal   Ordered      Vancomycin level:   TROUGH:  No results for input(s): VANCOTROUGH in the last 72 hours. RANDOM:    Recent Labs     01/01/23  0559   VANCORANDOM 14.4       Assessment:  HPI: Presented at [facility] with worsening hypoxic respiratory failure and admitted on 12/22 with bilateral lower lobe pneumonia. Respiratory status worsened, started on BIPAP and transferred to [facility] ICU. Day(s) of therapy: Day 2  Vancomycin concentration:  1/1 - 14.4 ~13 hours post dose    Plan: Will reduce vancomycin to 1000 mg IV every 24 hours to target an AUC of 474 at steady state   Will obtain vancomycin level on 1/3/23  Pharmacy will continue to monitor patient and adjust therapy as indicated    Adiel 3 1/3/2023 @0600    Thank you for the consult.   Gurinder Cazares [facility]  1/1/2023 7:41 AM

## 2023-01-01 NOTE — PROGRESS NOTES
Anxious, restless. , irregular. A-fib. Sao2 dropping into mid 80's. Resp rate 28. Placed back on Bipap. RT called to lynnust  Aervo.   Patient requesting  to go back on  it

## 2023-01-01 NOTE — PROGRESS NOTES
SAO2 dropping  and is now 74% on Aervo. Placed Back on Bipap. 12/8 100%.   Slowing  sao2 returned to 91%

## 2023-01-01 NOTE — PROGRESS NOTES
Physician Progress Note      Orlando Beebe  CSN #:                  [id number]  :                       1935  ADMIT DATE:       2022 1:09 PM  [address] [place] DATE:  RESPONDING  PROVIDER #:        Cj King MD          QUERY TEXT:    Hospitalists,    Pt admitted with sepsis due to PNA  and has diastolic CHF documented. If   possible, please document in progress notes and discharge summary further   specificity regarding the type and acuity of CHF:    The medical record reflects the following:  Risk Factors:  Clinical Indicators: documentation of diastolic CHF, cardiogenic edema and   pleural effusions documented  Treatment: labs, imaging, IV lasix    Thank you,  Reno Russell CARBAJAL CDS  [phone]  Options provided:  -- Acute on Chronic Diastolic CHF/HFpEF  -- Chronic Diastolic CHF/HFpEF  -- Other - I will add my own diagnosis  -- Disagree - Not applicable / Not valid  -- Disagree - Clinically unable to determine / Unknown  -- Refer to Clinical Documentation Reviewer    PROVIDER RESPONSE TEXT:    This patient is in acute on chronic diastolic CHF/HFpEF.     Query created by: Babar Parker on 2022 6:08 PM      Electronically signed by:  Cj King MD 2022 9:13 PM

## 2023-01-01 NOTE — PROGRESS NOTES
V2.0  [facility] Critical Care Progress Note      Name:  Skyler Breaux /Age/Sex: 1935  (81 y.o. female)   MRN & CSN:  [id number] & [id number] Encounter Date/Time: 2023 3:22 PM EST    Location:  -A PCP: Clari Veliz, [facility] Day: 3    Assessment and Plan:   Skyler Breaux is a 81 y.o. female who presents with Respiratory failure with hypoxia ([facility] [address]      Plan:  Acute hypoxic respiratory failure, requiring BiPAP support  ARDS  Multifocal bacterial vs viral pneumonia  Sepsis  Respiratory PCR panel-on -negative  Reviewed CTA chest and CXR from - no PE- e/o mixed groundglass & consolidative opacity  C-reactive protein-253.2, Procal 0.130  WBC count downtrending-15.9 from 19.1  Continue azithromycin, meropenem, vancomycin  Cont decadron ARDS protocol- 20 mg x total 5 days, followed by 10 mg x 5 days  Continue DuoNebs, Dulera, albuterol as needed  Patient can switch from BiPAP to heated high flow as needed for comfort; low threshold for intubation  Monitor in ICU     Chronic hyponatremia, hypervolemic  Hypophosphatemia  Replete electrolytes as needed  Possibly from CHF  Daily BMP     History of diastolic congestive heart failure  Echocardiogram on -EF 55 to 60%  proBNP-790 on   Stress test negative for ischemia in 2019  Cont lasix 20 mg daily     History of A. fib/a flutter  Continue home amiodarone and Eliquis Eliquis     Chronic Conditions: Continue all home medications except as stated above or contraindicated. Diet ADULT DIET;  Regular; Low Fat/Low Chol/High Fiber/JAVI  ADULT ORAL NUTRITION SUPPLEMENT; Breakfast, Lunch, Dinner; Standard High Calorie/High Protein Oral Supplement   DVT Prophylaxis [] Lovenox, []  Heparin, [] SCDs, [] Ambulation,  [x] Eliquis, [] Xarelto  [] Coumadin   Code Status Full Code   Disposition From: Home  Expected Disposition: Home  Estimated Date of Discharge: TBD  Patient requires continued admission due to acute respiratory failure Surrogate Decision Maker/ POA Son     Subjective:     Patient seen and examined this morning. She has had intermittent episodes of hypoxia. I did discuss with nursing to have respiratory bring heated high flow to the bedside for patient to attempt to wear. Review of Systems:    Review of Systems    10 point review of systems completed. Negative unless otherwise stated above. Objective: Intake/Output Summary (Last 24 hours) at 1/1/2023 1446  Last data filed at 1/1/2023 1200  Gross per 24 hour   Intake 100 ml   Output 1050 ml   Net -950 ml          Vitals:   Vitals:    01/01/23 1300   BP: (!) 106/48   Pulse: 73   Resp: 20   Temp:    SpO2:        Physical Exam:     General: Age-appropriate female resting in bed, NAD  Eyes: EOMI  ENT: neck supple  Cardiovascular: Irregular rate  Respiratory: Rhonchi throughout  Gastrointestinal: Soft, non tender  Genitourinary: no suprapubic tenderness  Musculoskeletal: No edema  Skin: warm, dry  Neuro: Alert. Psych: Mood appropriate.      Medications:   Medications:    vancomycin  1,000 mg IntraVENous Q24H    potassium phosphate IVPB  30 mmol IntraVENous Once    amiodarone  200 mg Oral Daily    [Held by provider] amLODIPine  2.5 mg Oral Daily    apixaban  5 mg Oral BID    escitalopram  20 mg Oral Daily    pantoprazole  40 mg Oral QAM AC    sodium chloride flush  10 mL IntraVENous 2 times per day    aspirin  81 mg Oral Daily    ipratropium-albuterol  1 ampule Inhalation Q4H WA    guaiFENesin  600 mg Oral BID    mometasone-formoterol  2 puff Inhalation BID    furosemide  20 mg IntraVENous Daily    traZODone  50 mg Oral Nightly    dexamethasone  20 mg IntraVENous Daily    [START ON 1/4/2023] dexamethasone  10 mg IntraVENous Daily    meropenem  1,000 mg IntraVENous Q8H    azithromycin  500 mg IntraVENous Q24H      Infusions:    sodium chloride       PRN Meds: phenol, 1 spray, Q2H PRN  sodium chloride flush, 10 mL, PRN  sodium chloride, , PRN  polyethylene glycol, 17 g, Daily PRN  acetaminophen, 650 mg, Q6H PRN   Or  acetaminophen, 650 mg, Q6H PRN  albuterol sulfate HFA, 2 puff, Q4H PRN  guaiFENesin, 200 mg, Q4H PRN  ondansetron, 4 mg, Q6H PRN      Labs      Recent Results (from the past 24 hour(s))   Critical Care Panel    Collection Time: 01/01/23  5:59 AM   Result Value Ref Range    Sodium 139 135 - 145 MMOL/L    Potassium 3.4 (L) 3.5 - 5.1 MMOL/L    Chloride 98 (L) 99 - 110 mMol/L    CO2 31 21 - 32 MMOL/L    Anion Gap 10 4 - 16    BUN 20 6 - 23 MG/DL    Creatinine 0.9 0.6 - 1.1 MG/DL    Est, Glom Filt Rate >60 >60 mL/min/1.73m2    Glucose 154 (H) 70 - 99 MG/DL    Calcium 7.6 (L) 8.3 - 10.6 MG/DL    Phosphorus 2.8 2.5 - 4.9 MG/DL    Magnesium 2.6 (H) 1.8 - 2.4 mg/dl   CBC    Collection Time: 01/01/23  5:59 AM   Result Value Ref Range    WBC 16.3 (H) 4.0 - 10.5 K/CU MM    RBC 3.24 (L) 4.2 - 5.4 M/CU MM    Hemoglobin 10.8 (L) 12.5 - 16.0 GM/DL    Hematocrit 32.0 (L) 37 - 47 %    MCV 98.8 78 - 100 FL    MCH 33.3 (H) 27 - 31 PG    MCHC 33.8 32.0 - 36.0 %    RDW 12.1 11.7 - 14.9 %    Platelets 636 892 - 276 K/CU MM    MPV 9.1 7.5 - 11.1 FL   Vancomycin Level, Random    Collection Time: 01/01/23  5:59 AM   Result Value Ref Range    Vancomycin Rm 14.4 UG/ML    DOSE AMOUNT DOSE AMT. GIVEN - UNKNOWN     DOSE TIME DOSE TIME GIVEN - UNKNOWN    Blood Gas, Venous    Collection Time: 01/01/23  6:00 AM   Result Value Ref Range    pH, Russell 7.45 (H) 7.32 - 7.42    pCO2, Russell 50 38 - 52 mmHG    pO2, Russell 63 (H) 28 - 48 mmHG    Base Exc, Mixed 9.2 (H) 0 - 2.3    HCO3, Venous 34.8 (H) 19 - 25 MMOL/L    O2 Sat, Russell 90.5 (H) 50 - 70 %    Comment VBG         Imaging/Diagnostics Last 24 Hours   XR CHEST PORTABLE    Result Date: 12/31/2022  EXAMINATION: ONE XRAY VIEW OF THE CHEST 12/31/2022 4:57 am COMPARISON: 12/27/2022 HISTORY: ORDERING SYSTEM PROVIDED HISTORY: f/u ARDS TECHNOLOGIST PROVIDED HISTORY: Reason for exam:->f/u ARDS Reason for Exam: f/u ARDS FINDINGS: Cardiomediastinal silhouette is stable.   Similar bilateral airspace opacities.  No pleural effusion or pneumothorax.  No gross bony abnormality.     Stable chest.     Total critical care time 34 minutes excluding all billable procedures.    Electronically signed by STEVENSON Smith CNP on 1/1/2023 at 2:46 PM

## 2023-01-02 NOTE — PROGRESS NOTES
Patient restless. Has removed BIPAP x2 with desaturation happening quickly. Provider notified and order for precedex infusion received.

## 2023-01-02 NOTE — PROGRESS NOTES
[address] [facility]  consulted by Dr. Alfred Nunn for monitoring and adjustment. Indication for treatment: Vancomycin indication: HAP  Goal trough: Trough Goal: 15-20 mcg/mL  AUC/DEBORAH: 400-600    Risk Factors for MRSA Identified:   Prior hospitalization on 12/22/22    Pertinent Laboratory Values:   Temp Readings from Last 3 Encounters:   01/02/23 98.4 °F (36.9 °C) (Oral)   12/30/22 99.8 °F (37.7 °C) (Oral)   09/30/22 97.3 °F (36.3 °C)     Recent Labs     12/31/22  0600 01/01/23  0559 01/02/23  0720   WBC 15.9* 16.3* 21.7*       Recent Labs     12/31/22  0600 01/01/23  0559 01/02/23  0720   BUN 22 20 26*   CREATININE 1.1 0.9 0.9       Estimated Creatinine Clearance: 51 mL/min (based on SCr of 0.9 mg/dL). Intake/Output Summary (Last 24 hours) at 1/2/2023 1513  Last data filed at 1/2/2023 1000  Gross per 24 hour   Intake --   Output 900 ml   Net -900 ml         Pertinent Cultures:   Date    Source    Results  12/22   Blood    No growth at 5 days  12/22   MRSA Nasal   Negative (12/26)  12/28   Respiratory Panel  Negative  1/1    MRSA Nasal   Pending      Vancomycin level:   TROUGH:  No results for input(s): VANCOTROUGH in the last 72 hours. RANDOM:    Recent Labs     01/01/23  0559   VANCORANDOM 14.4       Assessment:  HPI: Presented at [facility] with worsening hypoxic respiratory failure and admitted on 12/22 with bilateral lower lobe pneumonia. Respiratory status worsened, started on BIPAP and transferred to [facility] ICU. Antibiotics now escalated to meropenem and vancomycin.   Day(s) of therapy: 3  Vancomycin concentration:  1/1 - 14.4 ~13 hours post dose    Plan:  Continue vancomycin to 1000 mg IV every 24 hours to target an AUC of 474 at steady state   Will obtain vancomycin level on 1/3/23  Follow-up on repeat MRSA nasal, collected yesterday  Pharmacy will continue to monitor patient and adjust therapy as indicated    Adiel 3 1/3/2023 @0600    Thank you for the consult,  Cristopher MOTTA D HOSP - [place], PharmD  1/2/2023 3:13 PM

## 2023-01-02 NOTE — PROGRESS NOTES
Ananth Perry NP at bedside to s/w patient and family about intubation, given patient's respiratory status without bipap support and increased bipap settings. Family would like to hold off on intubation for the next 12 hours to see if patient makes any improvement.

## 2023-01-02 NOTE — ACP (ADVANCE CARE PLANNING)
Advance care planning:  Discussion was had with patient's son at the bedside. He was updated on patient's condition and BiPAP settings. He was informed that BiPAP settings were at max support, with 100% FiO2. I informed him that when we do to attempt to transition patient to Vapotherm she desats considerably and has difficulty recovering. We discussed the need for intubation. He states he sees that her oxygen saturations at 94% and assumed she was improving. I informed him again of the max settings on BiPAP and need for intubation. I discussed with him findings of fibrosis and emphysematous changes on CT and x-ray. Films were actually reviewed with patient's son at the bedside. When asked patient about intubation she reports assessment I need, that is okay. Son requests that we give her till overnight, and if no improvement may intubate at that time. Certainly, if patient decompensates between now and then intubation will be required. They are in agreement with this plan. We will continue to follow patient closely. Patient is alert and oriented and able to make decisions in her own capacity at this time. All questions answered. A total of 17 minutes of advance care planning was spent with patient and family.     Hiren ARGUETA-ACNP  Critical Care Nurse Practitioner   [facility]

## 2023-01-02 NOTE — PROGRESS NOTES
B/p  running lOW 85/42, 79/41. Low 82/41, 79/41  maps in 50's. Talked with Norm Fuentes.   New order for Parvez

## 2023-01-02 NOTE — FLOWSHEET NOTE
Hr 60 B/p map 52. Discussed low MAP with acute care Hospitalist .  Will assess.   No new orders at this time

## 2023-01-02 NOTE — PROGRESS NOTES
V2.0  [facility] Critical Care Progress Note      Name:  Brisa Cabrera /Age/Sex: 1935  (81 y.o. female)   MRN & CSN:  [id number] & [id number] Encounter Date/Time: 2023 3:22 PM EST    Location:  -A PCP: Soledad De Jesus, [facility] Day: 4    Assessment and Plan:   Brisa Cabrera is a 81 y.o. female who presents with Respiratory failure with hypoxia ([address]      Plan:  Acute hypoxic respiratory failure, requiring BiPAP support  ARDS  Multifocal bacterial vs viral pneumonia  Sepsis  Respiratory PCR panel-on -negative  Reviewed CTA chest and CXR from - no PE- e/o mixed groundglass & consolidative opacity  WBC up to 21.7 today, no fevers. Continue  meropenem, vancomycin  Cont decadron ARDS protocol- 20 mg x total 5 days, followed by 10 mg x 5 days  Continue DuoNebs, Dulera, albuterol as needed  Patient now unable to switch from BiPAP to heated high flow as needed for comfort due to hypoxia. Family discussion had, see separate note. Chronic hyponatremia, hypervolemic  Hypophosphatemia  Replete electrolytes as needed  Daily BMP     History of diastolic congestive heart failure  Echocardiogram on -EF 55 to 60%  proBNP-790 on   Stress test negative for ischemia in 2019  Cont lasix 20 mg daily     History of A. fib/a flutter  Continue home amiodarone and Eliquis      Chronic Conditions: Continue all home medications except as stated above or contraindicated. Diet ADULT DIET;  Regular; Low Fat/Low Chol/High Fiber/JAVI  ADULT ORAL NUTRITION SUPPLEMENT; Breakfast, Lunch, Dinner; Standard High Calorie/High Protein Oral Supplement   DVT Prophylaxis [] Lovenox, []  Heparin, [] SCDs, [] Ambulation,  [x] Eliquis, [] Xarelto  [] Coumadin   Code Status Full Code   Disposition From: Home  Expected Disposition: Home  Estimated Date of Discharge: TBD  Patient requires continued admission due to acute respiratory failure   Surrogate Decision Maker/ POA Son     Subjective:     Patient seen and examined this morning. She has had intermittent episodes of hypoxia. She denies chest pain or shortness of breath. She does endorse shortness of breath when off BiPAP. She denies any abdominal pain or nausea. She continues on Precedex infusion at this time. She denies any new weaknesses. Plan of care discussed with bedside RN. Review of Systems:    Review of Systems    10 point review of systems completed. Negative unless otherwise stated above. Objective: Intake/Output Summary (Last 24 hours) at 1/2/2023 1626  Last data filed at 1/2/2023 1000  Gross per 24 hour   Intake --   Output 650 ml   Net -650 ml          Vitals:   Vitals:    01/02/23 1600   BP: (!) 92/48   Pulse: 58   Resp: 23   Temp: 98.3 °F (36.8 °C)   SpO2: 93%       Physical Exam:     General: Age-appropriate female resting in bed, NAD on BIPAP  Eyes: EOMI  ENT: neck supple  Cardiovascular: Irregular rate  Respiratory: Rhonchi throughout  Gastrointestinal: Soft, non tender  Genitourinary: no suprapubic tenderness  Musculoskeletal: No edema  Skin: warm, dry  Neuro: Alert. Psych: Mood appropriate.      Medications:   Medications:    vancomycin  1,000 mg IntraVENous Q24H    midodrine  10 mg Oral TID WC    budesonide-formoterol  2 puff Inhalation BID    amiodarone  200 mg Oral Daily    [Held by provider] amLODIPine  2.5 mg Oral Daily    apixaban  5 mg Oral BID    escitalopram  20 mg Oral Daily    pantoprazole  40 mg Oral QAM AC    sodium chloride flush  10 mL IntraVENous 2 times per day    aspirin  81 mg Oral Daily    ipratropium-albuterol  1 ampule Inhalation Q4H WA    guaiFENesin  600 mg Oral BID    furosemide  20 mg IntraVENous Daily    traZODone  50 mg Oral Nightly    dexamethasone  20 mg IntraVENous Daily    [START ON 1/4/2023] dexamethasone  10 mg IntraVENous Daily    meropenem  1,000 mg IntraVENous Q8H      Infusions:    dexmedetomidine HCl in NaCl 0.4 mcg/kg/hr (01/02/23 1347)    sodium chloride 25 mL (01/01/23 1700)     PRN Meds: lidocaine viscous hcl, 15 mL, Q6H PRN  phenol, 1 spray, Q2H PRN  sodium chloride flush, 10 mL, PRN  sodium chloride, , PRN  polyethylene glycol, 17 g, Daily PRN  acetaminophen, 650 mg, Q6H PRN   Or  acetaminophen, 650 mg, Q6H PRN  albuterol sulfate HFA, 2 puff, Q4H PRN  guaiFENesin, 200 mg, Q4H PRN  ondansetron, 4 mg, Q6H PRN      Labs      Recent Results (from the past 24 hour(s))   Blood Gas, Venous    Collection Time: 01/02/23  6:00 AM   Result Value Ref Range    pH, Russell 7.45 (H) 7.32 - 7.42    pCO2, Russell 55 (H) 38 - 52 mmHG    pO2, Russell 40 28 - 48 mmHG    Base Exc, Mixed 11.9 (H) 0 - 2.3    HCO3, Venous 38.2 (H) 19 - 25 MMOL/L    O2 Sat, Russell 71.9 (H) 50 - 70 %    Comment VBG    Critical Care Panel    Collection Time: 01/02/23  7:20 AM   Result Value Ref Range    Sodium 131 (L) 135 - 145 MMOL/L    Potassium 4.4 3.5 - 5.1 MMOL/L    Chloride 90 (L) 99 - 110 mMol/L    CO2 32 21 - 32 MMOL/L    Anion Gap 9 4 - 16    BUN 26 (H) 6 - 23 MG/DL    Creatinine 0.9 0.6 - 1.1 MG/DL    Est, Glom Filt Rate >60 >60 mL/min/1.73m2    Glucose 141 (H) 70 - 99 MG/DL    Calcium 7.8 (L) 8.3 - 10.6 MG/DL    Phosphorus 3.3 2.5 - 4.9 MG/DL    Magnesium 2.6 (H) 1.8 - 2.4 mg/dl   CBC with Auto Differential    Collection Time: 01/02/23  7:20 AM   Result Value Ref Range    WBC 21.7 (H) 4.0 - 10.5 K/CU MM    RBC 3.54 (L) 4.2 - 5.4 M/CU MM    Hemoglobin 11.5 (L) 12.5 - 16.0 GM/DL    Hematocrit 34.7 (L) 37 - 47 %    MCV 98.0 78 - 100 FL    MCH 32.5 (H) 27 - 31 PG    MCHC 33.1 32.0 - 36.0 %    RDW 12.2 11.7 - 14.9 %    Platelets 959 673 - 078 K/CU MM    MPV 8.9 7.5 - 11.1 FL    Differential Type AUTOMATED DIFFERENTIAL     Segs Relative 92.7 (H) 36 - 66 %    Lymphocytes % 2.9 (L) 24 - 44 %    Monocytes % 1.9 0 - 4 %    Eosinophils % 0.0 0 - 3 %    Basophils % 0.1 0 - 1 %    Segs Absolute 20.1 K/CU MM    Lymphocytes Absolute 0.6 K/CU MM    Monocytes Absolute 0.4 K/CU MM    Eosinophils Absolute 0.0 K/CU MM    Basophils Absolute 0.0 K/CU MM Nucleated RBC % 0.0 %    Total Nucleated RBC 0.0 K/CU MM    Total Immature Neutrophil 0.53 K/CU MM    Immature Neutrophil % 2.4 (H) 0 - 0.43 %   Blood gas, arterial    Collection Time: 01/02/23  1:00 PM   Result Value Ref Range    pH, Bld 7.49 (H) 7.34 - 7.45    pCO2, Arterial 43.0 32 - 45 MMHG    pO2, Arterial 57 (L) 75 - 100 MMHG    Base Exc, Mixed 8.5 (H) 0 - 2.3    HCO3, Arterial 32.8 (H) 18 - 23 MMOL/L    CO2 Content 34.1 (H) 19 - 24 MMOL/L    O2 Sat 86.8 (L) 96 - 97 %    Carbon Monoxide, Blood 1.9 0 - 5 %    Methemoglobin, Arterial 1.8 (H) <1.5 %    Comment 18/10.100         Imaging/Diagnostics Last 24 Hours   XR CHEST PORTABLE    Result Date: 12/31/2022  EXAMINATION: ONE XRAY VIEW OF THE CHEST 12/31/2022 4:57 am COMPARISON: 12/27/2022 HISTORY: ORDERING SYSTEM PROVIDED HISTORY: f/u ARDS TECHNOLOGIST PROVIDED HISTORY: Reason for exam:->f/u ARDS Reason for Exam: f/u ARDS FINDINGS: Cardiomediastinal silhouette is stable. Similar bilateral airspace opacities. No pleural effusion or pneumothorax. No gross bony abnormality. Stable chest.     Total critical care time 34 minutes excluding all billable procedures.     Electronically signed by STEVENSON Celis CNP on 1/2/2023 at 4:26 PM

## 2023-01-02 NOTE — PROGRESS NOTES
In-Patient Progress Note    Patient:  Katie Bolden 81 y.o. female MRN: [id number]     Date of Service: 1/2/2023    Hospital Day: 4      Chief complaint: had no chief complaint listed for this encounter. Assessment and Plan   Katie Bolden, a 81 y.o. female, with a history of hypertension (amlodipine 2.5 Mg daily), A. fib (amiodarone 200 Mg daily, apixaban 5 Mg twice daily), depression/anxiety (Lexapro 20 Mg daily), osteoporosis (alendronate 70 Mg weekly), GERD (pantoprazole 40 Mg daily), was initially admitted to [facility] on 12/22/2022 with complaints of shortness of breath, lethargy, wheezing; with diagnosis of acute on chronic respiratory failure with hypoxia secondary to acute bilateral lower lobe pneumonia. She required supplemental oxygen on admission at 3 LPM NC. Throughout the course admission, continue to require increased amounts of oxygen. Antibiotics were broadened to cefepime/ doxy, started on steroids, and scheduled bronchodilators/ICS. She appeared overall comfortable w/o distress however biochemical work-up/imaging suggested worsening condition. Repeat CTA on 12/28 negative for PE but extensive mixed groundglass and consolidative opacities that were increasing. Viral panel, urinary antigens, MRSA swab, BLC and sputum culture were all negative. Overnight reported increased distress with hypoxia and subsequent anxiety. Oxygen titrated up to 15 LPM High flow. Discussed need for increased care/ evaluation and going to pursue transfer to higher level of care. She was accepted and transferred to [facility] ICU for intensivist evaluation. She was given 20mg IV decadron and started on BiPAP prior to departure to [facility] on  12/30/2022.     Assessment and plan    Acute hypoxic respiratory failure  Bilateral diffuse groundglass opacities, concern for ARDS  Likely multifocal viral pneumonia, suspected superimposed bacterial pneumonia  Hypotension  CTA chest 12/28 shows no evidence of PE but does show extensive mixed groundglass and consolidative opacities bilaterally, increased from 12/23. Respiratory viral panel -12/28. Urine strep pneumoniae and Legionella antigen negative-12/23. Procalcitonin trending up since admission from 0.09-0.176 as of 12/30/2022. .2.  -Vancomycin and meropenem  -Decadron 20 Mg for 5 days followed by 10 Mg  -Positive pressure ventilation  -Wean FiO2 as able  -might need escalation to invasive mechanical ventilation  -started on midodrine    Anxiety/depression  -Continue escitalopram    History of A. fib/a flutter  -Continue amiodarone and Eliquis    History of heart failure preserved ejection fraction  EF 55-60% per echo on 12/28. proBNP 790 on 12/29. Stress test was done back in 2019 which was negative for reversible perfusion defect. -on Lasix 20 Mg daily - continue to monitor volume status closely      # Peptic ulcer prophylaxis: Pantoprazole  # DVT Prophylaxis: Eliquis  #CODE STATUS: Full code      Current living situation: -  Expected Disposition: -  Estimated discharge date: -      Review of System     Review of Systems   Constitutional:  Positive for fatigue. Negative for activity change, appetite change, chills and fever. HENT:  Negative for congestion, ear discharge, ear pain, hearing loss, nosebleeds, postnasal drip, rhinorrhea, sinus pain, sore throat, trouble swallowing and voice change. Eyes:  Negative for photophobia, pain, discharge, redness and itching. Respiratory:  Positive for cough and shortness of breath. Negative for chest tightness. Cardiovascular:  Negative for chest pain, palpitations and leg swelling. Gastrointestinal:  Negative for abdominal distention, abdominal pain, blood in stool, constipation, diarrhea, nausea and vomiting. Endocrine: Negative for cold intolerance, heat intolerance, polydipsia, polyphagia and polyuria.    Genitourinary:  Negative for difficulty urinating, dysuria, flank pain, frequency, hematuria and urgency. Musculoskeletal:  Negative for arthralgias, back pain, gait problem, joint swelling and myalgias. Skin:  Negative for pallor, rash and wound. Allergic/Immunologic: Negative for environmental allergies and food allergies. Neurological:  Negative for dizziness, tremors, seizures, syncope, speech difficulty, weakness, light-headedness, numbness and headaches. Hematological:  Negative for adenopathy. Does not bruise/bleed easily. Psychiatric/Behavioral:  Negative for agitation, confusion, decreased concentration, hallucinations, self-injury, sleep disturbance and suicidal ideas. The patient is not nervous/anxious and is not hyperactive. I have reviewed all pertinent PMHx, PSHx, FamHx, SocialHx, medications, and allergies and updated history as appropriate. Physical Exam   VITAL SIGNS:  BP (!) 91/47   Pulse 56   Temp 98.3 °F (36.8 °C) (Axillary)   Resp 21   Ht 5' 8.11\" (1.73 m)   Wt 183 lb 13.8 oz (83.4 kg)   SpO2 (!) 89%   BMI 27.87 kg/m²   Tmax over 24 hours:  Temp (24hrs), Av.2 °F (36.8 °C), Min:97.9 °F (36.6 °C), Max:98.4 °F (36.9 °C)      Patient Vitals for the past 6 hrs:   BP Temp Temp src Pulse Resp SpO2   23 0739 -- -- -- -- 21 --   23 0700 (!) 91/47 -- -- 56 18 (!) 89 %   23 0600 (!) 92/52 -- -- 55 14 90 %   23 0500 (!) 92/47 -- -- 54 14 92 %   23 0400 (!) 90/48 98.3 °F (36.8 °C) Axillary 57 17 91 %   23 0343 -- -- -- 60 21 91 %   23 0300 (!) 99/46 -- -- 58 16 93 %           Intake/Output Summary (Last 24 hours) at 2023 0857  Last data filed at 2023 1600  Gross per 24 hour   Intake --   Output 1000 ml   Net -1000 ml       Wt Readings from Last 2 Encounters:   23 183 lb 13.8 oz (83.4 kg)   22 172 lb 6.4 oz (78.2 kg)     Body mass index is 27.87 kg/m². Physical Exam  Constitutional:       General: She is not in acute distress. Appearance: She is well-developed. She is ill-appearing.    HENT:      Head: Normocephalic and atraumatic. Right Ear: External ear normal.      Left Ear: External ear normal.      Nose: Nose normal.   Eyes:      General: No scleral icterus. Right eye: No discharge. Left eye: No discharge. Conjunctiva/sclera: Conjunctivae normal.      Pupils: Pupils are equal, round, and reactive to light. Neck:      Thyroid: No thyromegaly. Vascular: No JVD. Trachea: No tracheal deviation. Cardiovascular:      Rate and Rhythm: Normal rate and regular rhythm. Heart sounds: Normal heart sounds. No murmur heard. No friction rub. No gallop. Pulmonary:      Effort: Pulmonary effort is normal. No respiratory distress. Breath sounds: Normal breath sounds. No stridor. No wheezing or rales. Chest:      Chest wall: No tenderness. Abdominal:      General: Bowel sounds are normal. There is no distension. Palpations: Abdomen is soft. There is no mass. Tenderness: There is no abdominal tenderness. There is no guarding or rebound. Hernia: No hernia is present. Genitourinary:     Vagina: Normal. No vaginal discharge. Rectum: Guaiac result negative. Musculoskeletal:         General: No tenderness or deformity. Normal range of motion. Cervical back: Normal range of motion and neck supple. Lymphadenopathy:      Cervical: No cervical adenopathy. Skin:     General: Skin is warm and dry. Capillary Refill: Capillary refill takes less than 2 seconds. Coloration: Skin is not pale. Findings: No erythema or rash. Neurological:      Mental Status: She is alert and oriented to person, place, and time. Cranial Nerves: No cranial nerve deficit. Motor: No abnormal muscle tone. Coordination: Coordination normal.      Deep Tendon Reflexes: Reflexes normal.   Psychiatric:         Behavior: Behavior normal.         Thought Content:  Thought content normal.         Judgment: Judgment normal.         Current Medications budesonide-formoterol  2 puff Inhalation BID    amiodarone  200 mg Oral Daily    [Held by provider] amLODIPine  2.5 mg Oral Daily    apixaban  5 mg Oral BID    escitalopram  20 mg Oral Daily    pantoprazole  40 mg Oral QAM AC    sodium chloride flush  10 mL IntraVENous 2 times per day    aspirin  81 mg Oral Daily    ipratropium-albuterol  1 ampule Inhalation Q4H WA    guaiFENesin  600 mg Oral BID    furosemide  20 mg IntraVENous Daily    traZODone  50 mg Oral Nightly    dexamethasone  20 mg IntraVENous Daily    [START ON 1/4/2023] dexamethasone  10 mg IntraVENous Daily    meropenem  1,000 mg IntraVENous Q8H         Labs and Imaging Studies   Laboratory findings:  XR CHEST PORTABLE    Result Date: 12/31/2022  EXAMINATION: ONE XRAY VIEW OF THE CHEST 12/31/2022 4:57 am COMPARISON: 12/27/2022 HISTORY: ORDERING SYSTEM PROVIDED HISTORY: f/u ARDS TECHNOLOGIST PROVIDED HISTORY: Reason for exam:->f/u ARDS Reason for Exam: f/u ARDS FINDINGS: Cardiomediastinal silhouette is stable. Similar bilateral airspace opacities. No pleural effusion or pneumothorax. No gross bony abnormality.      Stable chest.       Recent Results (from the past 24 hour(s))   Critical Care Panel    Collection Time: 01/02/23  7:20 AM   Result Value Ref Range    Sodium 131 (L) 135 - 145 MMOL/L    Potassium 4.4 3.5 - 5.1 MMOL/L    Chloride 90 (L) 99 - 110 mMol/L    CO2 32 21 - 32 MMOL/L    Anion Gap 9 4 - 16    BUN 26 (H) 6 - 23 MG/DL    Creatinine 0.9 0.6 - 1.1 MG/DL    Est, Glom Filt Rate >60 >60 mL/min/1.73m2    Glucose 141 (H) 70 - 99 MG/DL    Calcium 7.8 (L) 8.3 - 10.6 MG/DL    Phosphorus 3.3 2.5 - 4.9 MG/DL    Magnesium 2.6 (H) 1.8 - 2.4 mg/dl   CBC with Auto Differential    Collection Time: 01/02/23  7:20 AM   Result Value Ref Range    WBC 21.7 (H) 4.0 - 10.5 K/CU MM    RBC 3.54 (L) 4.2 - 5.4 M/CU MM    Hemoglobin 11.5 (L) 12.5 - 16.0 GM/DL    Hematocrit 34.7 (L) 37 - 47 %    MCV 98.0 78 - 100 FL    MCH 32.5 (H) 27 - 31 PG    MCHC 33.1 32.0 - 36.0 %    RDW 12.2 11.7 - 14.9 %    Platelets 486 329 - 684 K/CU MM    MPV 8.9 7.5 - 11.1 FL    Differential Type AUTOMATED DIFFERENTIAL     Segs Relative 92.7 (H) 36 - 66 %    Lymphocytes % 2.9 (L) 24 - 44 %    Monocytes % 1.9 0 - 4 %    Eosinophils % 0.0 0 - 3 %    Basophils % 0.1 0 - 1 %    Segs Absolute 20.1 K/CU MM    Lymphocytes Absolute 0.6 K/CU MM    Monocytes Absolute 0.4 K/CU MM    Eosinophils Absolute 0.0 K/CU MM    Basophils Absolute 0.0 K/CU MM    Nucleated RBC % 0.0 %    Total Nucleated RBC 0.0 K/CU MM    Total Immature Neutrophil 0.53 K/CU MM    Immature Neutrophil % 2.4 (H) 0 - 0.43 %           Electronically signed by Garry Yost MD on 1/2/2023 at 8:57 AM      Comment: Please note this report has been produced using speech recognition software and may contain errors related to that system including errors in grammar, punctuation, and spelling, as well as words and phrases that may be inappropriate. If there are any questions or concerns please feel free to contact the dictating provider for clarification.

## 2023-01-02 NOTE — PROGRESS NOTES
Bipap removed and vaportherm applied to administer PO lidocaine solution. O2 saturation dropped to 61%. Bipap reapplied once Lidocaine administration completed. O2 saturation recovered to 80% after 2 min.

## 2023-01-03 NOTE — PROGRESS NOTES
Per discussion with kelly Dillard to switch patient back to [facility] AND [facility] ventilation. Patient has been peek pressuring. Patient is more comfortable back on pressure control.

## 2023-01-03 NOTE — PROGRESS NOTES
V2.0  [facility] Critical Care Progress Note      Name:  Kimberly Avalos /Age/Sex: 1935  (81 y.o. female)   MRN & CSN:  [id number] & [id number] Encounter Date/Time: 1/3/2023 3:22 PM EST    Location:  -A PCP: Perla Galloway, [facility] Day: 5    Assessment and Plan:   Kimberly Avalos is a 81 y.o. female who presents with Respiratory failure with hypoxia ([facility] [address]      Plan:  Acute hypoxic respiratory failure  ARDS  Multifocal bacterial vs viral pneumonia  Sepsis  -Patient required intubation overnight. She continues on full mechanical ventilation at this time. Not a candidate for SBT today, however we will continue to follow. -WBC up to 21.- today, no fevers.   -Continue  meropenem, will DC vancomycin  -Cont decadron ARDS protocol- 20 mg x total 5 days, followed by 10 mg x 5 days  -Continue DuoNebs, Dulera, albuterol as needed  -Started on midodrine, continue. Chronic hyponatremia, hypervolemic  Hypophosphatemia  Replete electrolytes as needed  Daily BMP     History of diastolic congestive heart failure  -Echocardiogram on -EF 55 to 60%  proBNP-790 on   -Stress test negative for ischemia in 2019  -Cont lasix 20 mg daily     History of A. fib/a flutter  Continue home amiodarone and Eliquis      Chronic Conditions: Continue all home medications except as stated above or contraindicated. Diet Diet NPO  ADULT TUBE FEEDING; Orogastric; Peptide Based High Protein; Continuous; 10; Yes; 10; Q 4 hours; 20; 30; Q 4 hours   DVT Prophylaxis [] Lovenox, []  Heparin, [] SCDs, [] Ambulation,  [x] Eliquis, [] Xarelto  [] Coumadin   Code Status Full Code   Disposition From: Home  Expected Disposition: Home  Estimated Date of Discharge: TBD  Patient requires continued admission due to acute respiratory failure   Surrogate Decision Maker/ POA Son     Subjective:     Patient requiring intubation overnight. Labs and vitals reviewed.   When I am seeing her this morning she is on the ventilator, currently sedated on propofol, however she is awake and agitated at the time of my assessment. She does not follow any commands but she is hitting the bed rails. Unable to complete review of systems given mental status. Plan of care discussed with bedside RN. Review of Systems:    Review of Systems    10 point review of systems completed. Negative unless otherwise stated above. Objective: Intake/Output Summary (Last 24 hours) at 1/3/2023 1759  Last data filed at 1/3/2023 1700  Gross per 24 hour   Intake --   Output 2095 ml   Net -2095 ml          Vitals:   Vitals:    01/03/23 1517   BP:    Pulse: 54   Resp: 16   Temp:    SpO2: 97%       Physical Exam:     General: Age-appropriate female on full mechanical ventilation  Eyes: EOMI  ENT: neck supple. ETT in place  Cardiovascular: Irregular rate  Respiratory: Rhonchi throughout, no wheezing. Equal chest expansion on vent. Gastrointestinal: Soft, non tender  Genitourinary: no suprapubic tenderness. Quinones cath in place.   Musculoskeletal: No edema  Skin: warm, dry  Neuro: Sedated  Psych: Unable to assess    Medications:   Medications:    chlorhexidine  15 mL Mouth/Throat BID    lidocaine PF  5 mL IntraDERmal Once    sodium chloride flush  5-40 mL IntraVENous 2 times per day    guaiFENesin  200 mg Oral Q4H    [START ON 1/4/2023] aspirin  81 mg Per NG tube Daily    [START ON 1/4/2023] lansoprazole  30 mg Per NG tube Daily    lidocaine PF  5 mL IntraDERmal Once    sodium chloride flush  5-40 mL IntraVENous 2 times per day    midodrine  10 mg Oral TID WC    budesonide-formoterol  2 puff Inhalation BID    amiodarone  200 mg Oral Daily    [Held by provider] amLODIPine  2.5 mg Oral Daily    apixaban  5 mg Oral BID    escitalopram  20 mg Oral Daily    sodium chloride flush  10 mL IntraVENous 2 times per day    ipratropium-albuterol  1 ampule Inhalation Q4H WA    furosemide  20 mg IntraVENous Daily    traZODone  50 mg Oral Nightly    [START ON 1/4/2023] dexamethasone  10 mg IntraVENous Daily    meropenem  1,000 mg IntraVENous Q8H      Infusions:    propofol 30 mcg/kg/min (01/03/23 1430)    sodium chloride      sodium chloride      sodium chloride 25 mL (01/01/23 1700)     PRN Meds: sodium chloride flush, 5-40 mL, PRN  sodium chloride, , PRN  fentanNYL, 50 mcg, Q1H PRN  midazolam, 1 mg, Q1H PRN  sodium chloride flush, 5-40 mL, PRN  sodium chloride, , PRN  lidocaine viscous hcl, 15 mL, Q6H PRN  phenol, 1 spray, Q2H PRN  sodium chloride flush, 10 mL, PRN  sodium chloride, , PRN  polyethylene glycol, 17 g, Daily PRN  acetaminophen, 650 mg, Q6H PRN   Or  acetaminophen, 650 mg, Q6H PRN  albuterol sulfate HFA, 2 puff, Q4H PRN  guaiFENesin, 200 mg, Q4H PRN  ondansetron, 4 mg, Q6H PRN      Labs      Recent Results (from the past 24 hour(s))   Blood gas, arterial    Collection Time: 01/03/23  5:00 AM   Result Value Ref Range    pH, Bld 7.50 (H) 7.34 - 7.45    pCO2, Arterial 46.0 (H) 32 - 45 MMHG    pO2, Arterial 55 (L) 75 - 100 MMHG    Base Exc, Mixed 11.2 (H) 0 - 2.3    HCO3, Arterial 35.9 (H) 18 - 23 MMOL/L    CO2 Content 37.3 (H) 19 - 24 MMOL/L    O2 Sat 88.2 (L) 96 - 97 %    Carbon Monoxide, Blood 1.6 0 - 5 %    Methemoglobin, Arterial 1.3 <1.5 %    Comment PC 34 R24 +10 100%    Vancomycin Level, Random    Collection Time: 01/03/23  5:20 AM   Result Value Ref Range    Vancomycin Rm 12.5 UG/ML    DOSE AMOUNT DOSE AMT.  GIVEN - 1000     DOSE TIME DOSE TIME GIVEN - 1600    CBC with Auto Differential    Collection Time: 01/03/23  5:20 AM   Result Value Ref Range    WBC 21.9 (H) 4.0 - 10.5 K/CU MM    RBC 3.47 (L) 4.2 - 5.4 M/CU MM    Hemoglobin 11.4 (L) 12.5 - 16.0 GM/DL    Hematocrit 33.7 (L) 37 - 47 %    MCV 97.1 78 - 100 FL    MCH 32.9 (H) 27 - 31 PG    MCHC 33.8 32.0 - 36.0 %    RDW 12.0 11.7 - 14.9 %    Platelets 420 742 - 886 K/CU MM    MPV 9.0 7.5 - 11.1 FL    Differential Type AUTOMATED DIFFERENTIAL     Segs Relative 91.9 (H) 36 - 66 %    Lymphocytes % 2.8 (L) 24 - 44 %    Monocytes % 3.0 0 - 4 %    Eosinophils % 0.0 0 - 3 %    Basophils % 0.1 0 - 1 %    Segs Absolute 20.1 K/CU MM    Lymphocytes Absolute 0.6 K/CU MM    Monocytes Absolute 0.7 K/CU MM    Eosinophils Absolute 0.0 K/CU MM    Basophils Absolute 0.0 K/CU MM    Nucleated RBC % 0.0 %    Total Nucleated RBC 0.0 K/CU MM    Total Immature Neutrophil 0.48 K/CU MM    Immature Neutrophil % 2.2 (H) 0 - 0.43 %   Renal Function Panel    Collection Time: 01/03/23  5:20 AM   Result Value Ref Range    Sodium 132 (L) 135 - 145 MMOL/L    Potassium 4.3 3.5 - 5.1 MMOL/L    Chloride 92 (L) 99 - 110 mMol/L    CO2 32 21 - 32 MMOL/L    Anion Gap 8 4 - 16    BUN 31 (H) 6 - 23 MG/DL    Creatinine 0.7 0.6 - 1.1 MG/DL    Est, Glom Filt Rate >60 >60 mL/min/1.73m2    Glucose 176 (H) 70 - 99 MG/DL    Calcium 7.6 (L) 8.3 - 10.6 MG/DL    Albumin 2.5 (L) 3.4 - 5.0 GM/DL    Phosphorus 2.8 2.5 - 4.9 MG/DL   Triglyceride    Collection Time: 01/03/23  5:20 AM   Result Value Ref Range    Triglycerides 119 <150 MG/DL   Blood gas, arterial    Collection Time: 01/03/23  8:15 AM   Result Value Ref Range    pH, Bld 7.47 (H) 7.34 - 7.45    pCO2, Arterial 47.0 (H) 32 - 45 MMHG    pO2, Arterial 69 (L) 75 - 100 MMHG    Base Exc, Mixed 9.1 (H) 0 - 2.3    HCO3, Arterial 34.2 (H) 18 - 23 MMOL/L    CO2 Content 35.6 (H) 19 - 24 MMOL/L    O2 Sat 91.9 (L) 96 - 97 %    Carbon Monoxide, Blood 1.5 0 - 5 %    Methemoglobin, Arterial 1.3 <1.5 %    Comment PC 34 RR 18 100 PEEP 10    Blood gas, arterial    Collection Time: 01/03/23  1:30 PM   Result Value Ref Range    pH, Bld 7.46 (H) 7.34 - 7.45    pCO2, Arterial 46.0 (H) 32 - 45 MMHG    pO2, Arterial 67 (L) 75 - 100 MMHG    Base Exc, Mixed 7.7 (H) 0 - 2.3    HCO3, Arterial 32.7 (H) 18 - 23 MMOL/L    CO2 Content 34.1 (H) 19 - 24 MMOL/L    O2 Sat 93.8 (L) 96 - 97 %    Carbon Monoxide, Blood 3.4 0 - 5 %    Methemoglobin, Arterial 0.0 <1.5 %    Comment AC 18 400 100% +10         Imaging/Diagnostics Last 24 Hours   XR CHEST PORTABLE    Result Date: 12/31/2022  EXAMINATION: ONE XRAY VIEW OF THE CHEST 12/31/2022 4:57 am COMPARISON: 12/27/2022 HISTORY: ORDERING SYSTEM PROVIDED HISTORY: f/u ARDS TECHNOLOGIST PROVIDED HISTORY: Reason for exam:->f/u ARDS Reason for Exam: f/u ARDS FINDINGS: Cardiomediastinal silhouette is stable. Similar bilateral airspace opacities. No pleural effusion or pneumothorax. No gross bony abnormality. Stable chest.     Total critical care time 38  minutes was spent on developing plan of care, excluding all billable procedures.     Electronically signed by STEVENSON Barbour CNP on 1/3/2023 at 5:59 PM

## 2023-01-03 NOTE — PROGRESS NOTES
Dr. Julio Escalante notified of pt's 02sat of 87-88%. Pedro Escalante talked to patient and informed regarding the need for intubation. 9192Y: Rocuronium and Etomidate given by Dr. Julio Escalante  0200H: Pt was intubated with size of 7.5 ETT, 23cm at the lip  Dr. Julio Escalante then placed arterial line on left radial and OGT and Quinones was inserted thereafter.

## 2023-01-03 NOTE — PROGRESS NOTES
Primary RN Aiden Grigsby occupied in another pt room at this time, pt was actively fighting ETTube and desating, spoke with primary RN, increased proprofol to 20mg/kg, pt still awake +1 to +2, spoke with primary rn, pt at that time MAPS >70, okay to give 1mg iv push versed.

## 2023-01-03 NOTE — PROGRESS NOTES
In-Patient Progress Note    Patient:  Linda Bernardo 81 y.o. female MRN: [id number]     Date of Service: 1/3/2023    Hospital Day: 5      Chief complaint: had no chief complaint listed for this encounter. Assessment and Plan   Linda Bernardo, a 81 y.o. female, with a history of hypertension (amlodipine 2.5 Mg daily), A. fib (amiodarone 200 Mg daily, apixaban 5 Mg twice daily), depression/anxiety (Lexapro 20 Mg daily), osteoporosis (alendronate 70 Mg weekly), GERD (pantoprazole 40 Mg daily), was initially admitted to [facility] on 12/22/2022 with complaints of shortness of breath, lethargy, wheezing; with diagnosis of acute on chronic respiratory failure with hypoxia secondary to acute bilateral lower lobe pneumonia. She required supplemental oxygen on admission at 3 LPM NC. Throughout the course admission, continue to require increased amounts of oxygen. Antibiotics were broadened to cefepime/ doxy, started on steroids, and scheduled bronchodilators/ICS. She appeared overall comfortable w/o distress however biochemical work-up/imaging suggested worsening condition. Repeat CTA on 12/28 negative for PE but extensive mixed groundglass and consolidative opacities that were increasing. Viral panel, urinary antigens, MRSA swab, BLC and sputum culture were all negative. Overnight reported increased distress with hypoxia and subsequent anxiety. Oxygen titrated up to 15 LPM High flow. Discussed need for increased care/ evaluation and going to pursue transfer to higher level of care. She was accepted and transferred to [facility] ICU for intensivist evaluation. She was given 20mg IV decadron and started on BiPAP prior to departure to [facility] on  12/30/2022.     Assessment and plan    Acute hypoxic respiratory failure  ARDS 2/2 multifocal pneumonia  Multifocal pneumonia, likely viral, suspected superimposed bacterial pneumonia  Mechanical ventilation since 1/3/2023  CTA chest 12/28 shows no evidence of PE but does show extensive mixed groundglass and consolidative opacities bilaterally, increased from . Respiratory viral panel -. Urine strep pneumoniae and Legionella antigen negative-. Procalcitonin trending up since admission from 0.09-0.176 as of 2022. .2. Vancomycin stopped after MRSA nares screen came negative.   -continued on meropenem  -Decadron 20 Mg for 5 days followed by 10 Mg  -sedated and intubated 1/3/2023 early morning around 1am  -ARDS compliant vent management as able  -Wean FiO2 as able    Hypotension, likely multifactorial, PAP, sedation  -on midodrine    Anxiety/depression  -Continue escitalopram    History of A. fib/a flutter  -Continue amiodarone and Eliquis    History of heart failure preserved ejection fraction  EF 55-60% per echo on . proBNP 790 on . Stress test was done back in 2019 which was negative for reversible perfusion defect. -on Lasix 20 Mg daily - continue to monitor volume status closely      # Peptic ulcer prophylaxis: Pantoprazole  # DVT Prophylaxis: Eliquis  #CODE STATUS: Full code      Current living situation: -  Expected Disposition: -  Estimated discharge date: -      Review of System     Review of Systems  -could not be reviewed     I have reviewed all pertinent PMHx, PSHx, FamHx, SocialHx, medications, and allergies and updated history as appropriate.     Physical Exam   VITAL SIGNS:  BP (!) 103/48   Pulse 54   Temp 98.4 °F (36.9 °C) (Axillary)   Resp (S) 18   Ht 5' 8.11\" (1.73 m)   Wt 183 lb 13.8 oz (83.4 kg)   SpO2 95%   BMI 27.87 kg/m²   Tmax over 24 hours:  Temp (24hrs), Av °F (36.7 °C), Min:96.8 °F (36 °C), Max:98.4 °F (36.9 °C)      Patient Vitals for the past 6 hrs:   Temp Temp src Pulse Resp SpO2   23 0832 -- -- -- -- 95 %   23 0601 -- -- 54 18 90 %   23 0600 -- -- 56 24 (!) 89 %   23 0500 -- -- 60 22 90 %   23 0454 -- -- 57 18 91 %   23 0436 98.4 °F (36.9 °C) Axillary 57 24 90 % 01/03/23 0401 -- -- 52 24 (!) 88 %   01/03/23 0348 -- -- 52 24 (!) 87 %   01/03/23 0309 -- -- 55 21 (!) 89 %           Intake/Output Summary (Last 24 hours) at 1/3/2023 0847  Last data filed at 1/3/2023 0528  Gross per 24 hour   Intake --   Output 1900 ml   Net -1900 ml       Wt Readings from Last 2 Encounters:   01/01/23 183 lb 13.8 oz (83.4 kg)   12/28/22 172 lb 6.4 oz (78.2 kg)     Body mass index is 27.87 kg/m². Physical Exam  Constitutional:       General: She is not in acute distress. Appearance: She is well-developed. She is ill-appearing. HENT:      Head: Normocephalic and atraumatic. Right Ear: External ear normal.      Left Ear: External ear normal.      Nose: Nose normal.   Eyes:      General: No scleral icterus. Right eye: No discharge. Left eye: No discharge. Conjunctiva/sclera: Conjunctivae normal.      Pupils: Pupils are equal, round, and reactive to light. Neck:      Thyroid: No thyromegaly. Vascular: No JVD. Trachea: No tracheal deviation. Cardiovascular:      Rate and Rhythm: Normal rate and regular rhythm. Heart sounds: Normal heart sounds. No murmur heard. No friction rub. No gallop. Pulmonary:      Effort: Pulmonary effort is normal. No respiratory distress. Breath sounds: Normal breath sounds. No stridor. No wheezing or rales. Chest:      Chest wall: No tenderness. Abdominal:      General: Bowel sounds are normal. There is no distension. Palpations: Abdomen is soft. There is no mass. Tenderness: There is no abdominal tenderness. There is no guarding or rebound. Hernia: No hernia is present. Genitourinary:     Vagina: Normal. No vaginal discharge. Rectum: Guaiac result negative. Musculoskeletal:         General: No tenderness or deformity. Normal range of motion. Cervical back: Normal range of motion and neck supple. Lymphadenopathy:      Cervical: No cervical adenopathy.    Skin:     General: Skin is warm and dry. Capillary Refill: Capillary refill takes less than 2 seconds. Coloration: Skin is not pale. Findings: No erythema or rash. Neurological:      Mental Status: She is alert. Motor: No abnormal muscle tone. Comments: Sedated and mechanically ventilated   Psychiatric:      Comments: Sedated and mechanically ventilated         Current Medications      chlorhexidine  15 mL Mouth/Throat BID    midazolam  2 mg IntraVENous Once    pantoprazole  40 mg IntraVENous Daily    lidocaine PF  5 mL IntraDERmal Once    sodium chloride flush  5-40 mL IntraVENous 2 times per day    vancomycin  1,000 mg IntraVENous Q24H    midodrine  10 mg Oral TID WC    budesonide-formoterol  2 puff Inhalation BID    amiodarone  200 mg Oral Daily    [Held by provider] amLODIPine  2.5 mg Oral Daily    apixaban  5 mg Oral BID    escitalopram  20 mg Oral Daily    sodium chloride flush  10 mL IntraVENous 2 times per day    aspirin  81 mg Oral Daily    ipratropium-albuterol  1 ampule Inhalation Q4H WA    guaiFENesin  600 mg Oral BID    furosemide  20 mg IntraVENous Daily    traZODone  50 mg Oral Nightly    dexamethasone  20 mg IntraVENous Daily    [START ON 1/4/2023] dexamethasone  10 mg IntraVENous Daily    meropenem  1,000 mg IntraVENous Q8H         Labs and Imaging Studies   Laboratory findings:  XR CHEST PORTABLE    Result Date: 12/31/2022  EXAMINATION: ONE XRAY VIEW OF THE CHEST 12/31/2022 4:57 am COMPARISON: 12/27/2022 HISTORY: ORDERING SYSTEM PROVIDED HISTORY: f/u ARDS TECHNOLOGIST PROVIDED HISTORY: Reason for exam:->f/u ARDS Reason for Exam: f/u ARDS FINDINGS: Cardiomediastinal silhouette is stable. Similar bilateral airspace opacities. No pleural effusion or pneumothorax. No gross bony abnormality.      Stable chest.       Recent Results (from the past 24 hour(s))   Blood gas, arterial    Collection Time: 01/02/23  1:00 PM   Result Value Ref Range    pH, Bld 7.49 (H) 7.34 - 7.45    pCO2, Arterial 43.0 32 - 45 MMHG    pO2, Arterial 57 (L) 75 - 100 MMHG    Base Exc, Mixed 8.5 (H) 0 - 2.3    HCO3, Arterial 32.8 (H) 18 - 23 MMOL/L    CO2 Content 34.1 (H) 19 - 24 MMOL/L    O2 Sat 86.8 (L) 96 - 97 %    Carbon Monoxide, Blood 1.9 0 - 5 %    Methemoglobin, Arterial 1.8 (H) <1.5 %    Comment 18/10.100    Blood gas, arterial    Collection Time: 01/03/23  5:00 AM   Result Value Ref Range    pH, Bld 7.50 (H) 7.34 - 7.45    pCO2, Arterial 46.0 (H) 32 - 45 MMHG    pO2, Arterial 55 (L) 75 - 100 MMHG    Base Exc, Mixed 11.2 (H) 0 - 2.3    HCO3, Arterial 35.9 (H) 18 - 23 MMOL/L    CO2 Content 37.3 (H) 19 - 24 MMOL/L    O2 Sat 88.2 (L) 96 - 97 %    Carbon Monoxide, Blood 1.6 0 - 5 %    Methemoglobin, Arterial 1.3 <1.5 %    Comment PC 34 R24 +10 100%    Vancomycin Level, Random    Collection Time: 01/03/23  5:20 AM   Result Value Ref Range    Vancomycin Rm 12.5 UG/ML    DOSE AMOUNT DOSE AMT.  GIVEN - 1000     DOSE TIME DOSE TIME GIVEN - 1600    CBC with Auto Differential    Collection Time: 01/03/23  5:20 AM   Result Value Ref Range    WBC 21.9 (H) 4.0 - 10.5 K/CU MM    RBC 3.47 (L) 4.2 - 5.4 M/CU MM    Hemoglobin 11.4 (L) 12.5 - 16.0 GM/DL    Hematocrit 33.7 (L) 37 - 47 %    MCV 97.1 78 - 100 FL    MCH 32.9 (H) 27 - 31 PG    MCHC 33.8 32.0 - 36.0 %    RDW 12.0 11.7 - 14.9 %    Platelets 674 261 - 796 K/CU MM    MPV 9.0 7.5 - 11.1 FL    Differential Type AUTOMATED DIFFERENTIAL     Segs Relative 91.9 (H) 36 - 66 %    Lymphocytes % 2.8 (L) 24 - 44 %    Monocytes % 3.0 0 - 4 %    Eosinophils % 0.0 0 - 3 %    Basophils % 0.1 0 - 1 %    Segs Absolute 20.1 K/CU MM    Lymphocytes Absolute 0.6 K/CU MM    Monocytes Absolute 0.7 K/CU MM    Eosinophils Absolute 0.0 K/CU MM    Basophils Absolute 0.0 K/CU MM    Nucleated RBC % 0.0 %    Total Nucleated RBC 0.0 K/CU MM    Total Immature Neutrophil 0.48 K/CU MM    Immature Neutrophil % 2.2 (H) 0 - 0.43 %   Renal Function Panel    Collection Time: 01/03/23  5:20 AM   Result Value Ref Range Sodium 132 (L) 135 - 145 MMOL/L    Potassium 4.3 3.5 - 5.1 MMOL/L    Chloride 92 (L) 99 - 110 mMol/L    CO2 32 21 - 32 MMOL/L    Anion Gap 8 4 - 16    BUN 31 (H) 6 - 23 MG/DL    Creatinine 0.7 0.6 - 1.1 MG/DL    Est, Glom Filt Rate >60 >60 mL/min/1.73m2    Glucose 176 (H) 70 - 99 MG/DL    Calcium 7.6 (L) 8.3 - 10.6 MG/DL    Albumin 2.5 (L) 3.4 - 5.0 GM/DL    Phosphorus 2.8 2.5 - 4.9 MG/DL           Electronically signed by Garry Yost MD on 1/3/2023 at 8:47 AM      Comment: Please note this report has been produced using speech recognition software and may contain errors related to that system including errors in grammar, punctuation, and spelling, as well as words and phrases that may be inappropriate. If there are any questions or concerns please feel free to contact the dictating provider for clarification.

## 2023-01-03 NOTE — PROGRESS NOTES
Comprehensive Nutrition Assessment    Type and Reason for Visit:  Consult    Nutrition Recommendations/Plan:   EN Order: Vital HP @ 20 ml/hr  Will closely monitor GI tolerance, nutrition status, poc     Malnutrition Assessment:  Malnutrition Status:  No malnutrition (01/03/23 1228)    Context:  Acute Illness       Nutrition Assessment:    Pt admitted for ARDS, PMH: HTN, Afib, pt now sedated on vent, +OGT, varied MAP, dietiitan consult for tube feed order and manage, will follow at high nutrition risk    Nutrition Related Findings:    Na 132, Glucose 176, WBC 21.9 Wound Type: None       Current Nutrition Intake & Therapies:    Average Meal Intake: NPO  Average Supplements Intake: NPO  Diet NPO  Additional Calorie Sources:  Pt is receiving ~264 kcal from current propofol rate    Anthropometric Measures:  Height: 5' 8.11\" (173 cm)  Ideal Body Weight (IBW): 141 lbs (64 kg)       Current Body Weight: 183 lb 13.8 oz (83.4 kg), 130.4 % IBW. Weight Source: Bed Scale  Current BMI (kg/m2): 27.9        Weight Adjustment For: No Adjustment                 BMI Categories: Overweight (BMI 25.0-29. 9)    Estimated Daily Nutrient Needs:        Energy (kcal/day): 1537 ([place])  Weight Used for Protein Requirements: Ideal  Protein (g/day): 128 (2.0 g/kg)  Method Used for Fluid Requirements: 1 ml/kcal  Nutrition Diagnosis:   Inadequate oral intake related to acute injury/trauma as evidenced by NPO or clear liquid status due to medical condition    Nutrition Interventions:   Food and/or Nutrient Delivery: Start Tube Feeding  Nutrition Education/Counseling: No recommendation at this time  Coordination of Nutrition Care: Continue to monitor while inpatient       Goals:     Goals: Initiate nutrition support, within 2 days       Nutrition Monitoring and Evaluation:   Behavioral-Environmental Outcomes: None Identified  Food/Nutrient Intake Outcomes: Enteral Nutrition Intake/Tolerance  Physical Signs/Symptoms Outcomes: Biochemical Data, GI Status, Hemodynamic Status, Fluid Status or Edema, Weight, Skin    Discharge Planning:     Too soon to determine     Waleska Estes [address], [address], [place]  Contact: [phone]

## 2023-01-03 NOTE — ADT AUTH CERT
Pneumonia - Care Day 8 (12/29/2022) by Rob Clarke RN       Review Status Review Entered   Completed 1/3/2023 0942       Created By   Rob Clarke RN      Criteria Review      Care Day: 8 Care Date: 12/29/2022 Level of Care: Inpatient Floor    Guideline Day 2    Level Of Care    (X) Floor    1/3/2023 9:42 AM EST by Sherita Quiroga      INPATIENT    Clinical Status    (X) * No CO2 retention or acidosis    1/3/2023 9:42 AM EST by Sherita Quiroga      CO2: 27    ( ) * No requirement for mechanical ventilation    1/3/2023 9:42 AM EST by Sherita Quiroga      ON HFNC    (X) * Hypotension absent    1/3/2023 9:42 AM EST by Sherita Neves      /62  134/49  144/61    (X) * Afebrile or fever improved    1/3/2023 9:42 AM EST by Sherita Neves      T 98.3 (36.8)    ( ) * No hypoxia on room air or oxygenation improved    1/3/2023 9:42 AM EST by Sherita Quiroga      SPO2 76 @2015  88 89   High flow nasal cannula    ( ) * Mental status improved or at baseline    1/3/2023 9:42 AM EST by Sherita Quiroga      Patient is anxious. IV ativan given    Activity    ( ) * Increased activity    1/3/2023 9:42 AM EST by Sherita Quiroga      imited by SOB ,and hypoxia    Routes    (X) Oral hydration    1/3/2023 9:42 AM EST by Sherita Quiroga      [id number] ml    (X) Oral medications    1/3/2023 9:42 AM EST by Sherita Quiroga      (CORDARONE) tablet 200 mg  Freq: DAILY Route: PO     (NORVASC) tablet 2.5 mg  Freq: DAILY Route: PO     (ELIQUIS) tablet 5 mg  Freq: 2 TIMES DAILY Route: PO     (PROTONIX) tablet 40 mg  Freq: DAILY BEFORE BREAKFAST Route: PO    (X) Usual diet    1/3/2023 9:42 AM EST by Sherita Quiroga      ADULT DIET;  Regular; Low Fat/Low Chol/High Fiber/JAVI    Interventions    (X) Incentive spirometry    1/3/2023 9:42 AM EST by Sherita Quiroga      YES    (X) Pulse oximetry    1/3/2023 9:42 AM EST by Sherita Quiroga      pulse ox with routine VS    (X) Possible oxygen    1/3/2023 9:42 AM EST by Sherita Quiroga      O2 8 13L Medications    (X) IV or oral antibiotics    1/3/2023 9:42 AM EST by de Sherita Baker      cefepime (MAXIPIME) 2000 mg IVPB   Freq: EVERY 12 HOURS Route: IV       Definitions for Care Day 8    Hypotension absent    (X) Hypotension absent, as indicated by  1 or more  of the following  (1) (2) (3) (4):       (X) SBP greater than or equal to 90 mm Hg and without recent decrease greater than 40 mm Hg from       baseline in adult or child 10 years or older       * Milestone   Additional Notes   DATE:   12/29/22      Pertinent Updates:   Patient is anxious. She becomes hypoxic dropping to 75% when she gets up. Continue high flow and will not titrate up on oxygen. IV ativan did not help, tele sitter ordered,    Currently on IV benadryl and po trazodone 50mg   On Telemetry monitoring    Patient continues to require the need for IV antibiotics, IV diuretics,  and frequent aerosols. Vitals:   T 98.3 (36.8)   R 22   P 80    /62  134/49  144/61   O2 8 13L   SPO2 76 @2015  88 89   High flow nasal cannula       Abnl/Pertinent Labs/Radiology/Diagnostic Studies:   Sodium: 131 (L)   Chloride: 94 (L)   Glucose, Random: 118 (H)   CRP, High Sensitivity: 157.8 (H)   Pro-BNP: 790.9 (H)   WBC: 16.0 (H)   RBC: 3.60 (L)   Hemoglobin Quant: 11.8 (L)   Hematocrit: 35.5 (L)   pH, Russell: 7.44 (H)      Physical Exam:   RESP  -improved aeration. Occasional rhonchi. Symmetric chest movement while on high flow supplemental oxygen. MD Consults/ Assessments & Plans:   Internal Medicine:   Assessment and Plan:   Acute hypoxemic respiratory failure 2/2 multifocal pneumonia               Repeat CXR (12/25/2022) Worsening aeration throughout both lungs predominantly involving the left lower lung zone. CTA chest (12/28) no evidence of PE. Extensive mixed groundglass and consolidative opacities, significant increase from 12/23.   Small bilateral pleural effusions   Sputum culture NGTD, BLC NGTD, urine antigens negative, viral panel negative   Procal slightly trended up again(0.103)  but overall stable /CRP increased to 157.8,                Continues to require high flow supplemental oxygen               Antibiotics broadened 12/25: Cefepime day 5 (doxycycline stopped 12/27-day 3)               Vancomycin held due to low MRSA risk factors. MRSA nasal screening-negative               Continue prednisone-day 4               Continue incentive spirometry/Acapella/pulmonary hygiene   Continue bronchodilators/ICS               PRN symptom control                Increase activity as tolerated                  Hypervolemia suspected. Possible right sided HF given slightly elevated RVSP 40 mmHg and imaging   Lasix 20 mg daily. BNP improving                TTE (12/27) EF 55 to 60%, mild LVH, trace AR, mild SC               Still considering transfer to [facility] if not improving however, patient does not appear distressed and overall clinically improved.   Discussed with attending physician who concurs               -1100 output overnight                  Hyponatremia (131)                continue to trend       Hypertension               Continue home antihypertensive regimen               Trends appear controlled today       Paroxysmal atrial fibrillation               [id number] Score: 4   Continue amiodarone/Eliquis               Continue telemetry       DEYANIRA/depression               Continue Lexapro        Medications   diphenhydrAMINE (BENADRYL) injection 50 mg   Freq: ONCE Route: IV      furosemide (LASIX) injection 20 mg   Freq: DAILY Route: IV      guaiFENesin (MUCINEX) extended release tablet 600 mg   Freq: 2 TIMES DAILY Route: PO      ipratropium-albuterol (DUONEB) nebulizer solution 1 ampule   Freq: EVERY 4 HOURS WHILE AWAKE Route: IN      LORazepam (ATIVAN) injection 0.5 mg   Freq: ONCE Route: IV      mometasone-formoterol (DULERA) 200-5 MCG/ACT inhaler 2 puff   Freq: 2 TIMES DAILY Route: IN      predniSONE (DELTASONE) tablet 40 mg   Freq: DAILY Route: PO      traZODone (DESYREL) tablet 50 mg   Freq: NIGHTLY Route: PO      polyethylene glycol (GLYCOLAX) packet 17 gx1   Freq: DAILY PRN Route: PO      Order   Up with assistance    ADULT DIET; Regular; Low Fat/Low Chol/High Fiber/JAVI          PT/OT/SLP/CM Assessments or Notes   PT:   AM-PAC Score: Raw Score:  19   Patient's O2 sats ranged from 63% to 83% with PT activity   -------------------------------------------------------------------------           Pneumonia - Care Day 7 (12/28/2022) by Angie Marin RN       Review Status Review Entered   Completed 1/3/2023 0916       Created By   Angie Marin RN      Criteria Review      Care Day: 7 Care Date: 12/28/2022 Level of Care: Inpatient Floor    Guideline Day 2    Level Of Care    (X) Floor    1/3/2023 9:16 AM EST by Sherita Salcido      INPATIENT    Clinical Status    (X) * No CO2 retention or acidosis    1/3/2023 9:16 AM EST by Sherita Salcido      CO2: 27    ( ) * No requirement for mechanical ventilation    1/3/2023 9:16 AM EST by Sherita Salcido      On HFNC    (X) * Hypotension absent    1/3/2023 9:16 AM EST by Sherita Florez      /43    (X) * Afebrile or fever improved    1/3/2023 9:16 AM EST by Sherita Florez      /43    ( ) * No hypoxia on room air or oxygenation improved    1/3/2023 9:16 AM EST by Sherita Smith      SPO2 88 High flow nasal cannula    (X) * Mental status improved or at baseline    1/3/2023 9:16 AM EST by Sherita Salcido      Oriented to person, place and time.     Activity    ( ) * Increased activity    1/3/2023 9:16 AM EST by Sherita Salcido      limited by shortness of breath with exertion    Routes    (X) Oral hydration    1/3/2023 9:16 AM EST by Sherita Salcido      [id number] ml    (X) Oral medications    1/3/2023 9:16 AM EST by Sherita Salcido      (PROTONIX) tablet 40 mg  Freq: DAILY BEFORE BREAKFAST Route: PO    (DELTASONE) tablet 40 mg  Freq: DAILY Route: PO    (CORDARONE) tablet 200 mg  Freq: DAILY Route: PO    (ELIQUIS) tablet 5 mg  Freq: 2 TIMES DAILY Route: PO    (X) Usual diet    1/3/2023 9:16 AM EST by Sherita Crouch      ADULT DIET; Regular; Low Fat/Low Chol/High Fiber/JAVI    Interventions    (X) Incentive spirometry    1/3/2023 9:16 AM EST by Sherita Crouch      YES    (X) Pulse oximetry    1/3/2023 9:16 AM EST by Sherita Crouch      Pulse ox with routine VS    (X) Possible oxygen    1/3/2023 9:16 AM EST by Sherita Crouch      O2 9L/min    Medications    (X) IV or oral antibiotics    1/3/2023 9:16 AM EST by Sherita Flores      cefepime (MAXIPIME) 2000 mg IVPB  Freq: EVERY 12 HOURS Route: IV       Definitions for Care Day 7    Hypotension absent    (X) Hypotension absent, as indicated by  1 or more  of the following  (1) (2) (3) (4):       (X) SBP greater than or equal to 90 mm Hg and without recent decrease greater than 40 mm Hg from       baseline in adult or child 10 years or older       * Milestone   Additional Notes   DATE:   12/28/22      Pertinent Updates:   Reports she does have a productive cough but able to expectorate sputum. Still says short of breath with exertion   In need of 9L o2 via HFNC   On Telemetry monitoring    Patient continues to require the need for IV antibiotics, IV diuretics, and frequent aerosols.        Vitals:   T  98.1 (36.7)    R 16    P 80    /43   O2 9L/min   SPO2 88 High flow nasal cannula     lb 6.4 oz (78.2 kg)      Abnl/Pertinent Labs/Radiology/Diagnostic Studies:   Sodium: 134 (L)   Chloride: 97 (L)   CALCIUM, SERUM,: 8.2 (L)   Total Protein: 5.6 (L)   CRP, High Sensitivity: 103.7 (H)   Albumin: 3.0 (L)   WBC: 15.4 (H)   RBC: 3.68 (L)   Hemoglobin Quant: 12.3 (L)   Hematocrit: 36.7 (L)   CO2 Content: 25.5 (H)   pH, Russell: 7.45 (H)   pCO2, Russell: 34.8 (L)   pO2, Russell: 58.0 (H)   O2 Sat, Russell: 91.2 (H)      ECHO:    Summary    Left ventricular systolic function is normal.    Ejection fraction is visually estimated at 55-60%. Mild left ventricular hypertrophy. Trace aortic regurgitation noted. Sclerotic, but non-stenotic aortic valve. Mild pulmonic regurgitation present. No evidence of any pericardial effusion. CTA PULMONARY W CONTRAST    Soft Tissues/Bones: No acute bone or soft tissue abnormality. Impression:     1. No evidence of pulmonary embolism. 2. Extensive mixed ground-glass and consolidative opacities, significantly    increased from 12/23/2022. The differential includes atypical/viral    pneumonia, an infectious process with superimposed cardiogenic edema, and    ARDS. 3. Small bilateral pleural effusions. 4. Coronary atherosclerosis. Physical Exam:   RESP  -rhonchi with end expiratory wheezing. Symmetric chest movement while on high flow supplemental oxygen. MD Consults/ Assessments & Plans:   Internal Medicine:   Assessment and Plan:   Acute hypoxemic respiratory failure 2/2 multifocal pneumonia   Procal 0.093/.7, BLC NGTD, urine antigens negative, viral panel negative               Continues to require increased oxygen               Antibiotics broadened 12/25: Cefepime day 4 (doxycycline stopped 12/27-day 3)               Vancomycin held due to low MRSA risk factors. MRSA nasal screening-negative               Continue prednisone               Continue incentive spirometry//Acapella/pulmonary hygiene   Continue bronchodilators/ICS               PRN symptom control                Increase activity as tolerated               Concern for hypervolemia Lasix 20 mg daily. BNP trending up since admission               TTE (12/27) EF 55 to 60%, mild LVH, trace AR, mild VT               Will consider transfer to [facility] if not improving however, patient does not appear distressed                   Hyponatremia (134)                Improved.   Continue to trend       Hypertension               Continue home antihypertensive regimen Trends appear controlled today       Paroxysmal atrial fibrillation               [id number] Score: 4   Continue amiodarone/Eliquis               Continue telemetry       DEYANIRA/depression               Continue Lexapro          Medications   furosemide (LASIX) injection 20 mg   Freq: DAILY Route: IV      guaiFENesin (MUCINEX) extended release tablet 600 mg   Freq: 2 TIMES DAILY Route: PO      ipratropium-albuterol (DUONEB) nebulizer solution 1 ampule   Freq: EVERY 4 HOURS WHILE AWAKE Route: IN      mometasone-formoterol (DULERA) 200-5 MCG/ACT inhaler 2 puff   Freq: 2 TIMES DAILY Route: IN      Order   Up with assistance    ADULT DIET;  Regular; Low Fat/Low Chol/High Fiber/JAVI

## 2023-01-03 NOTE — PROGRESS NOTES
Central Line Placement Procedure Note    Indication: poor peripheral access and need for frequent blood draws    Consent: Unable to be obtained due to the emergent nature of this procedure. Procedure: The patient was positioned appropriately and the skin over the right internal jugular vein was prepped with betadine and draped in a sterile fashion and prepped with Chloraprep. Local anesthesia was obtained by infiltration using 1% Lidocaine without epinephrine. A large bore needle was used to identify the vein. A guide wire was then inserted into the vein through the needle. A triple lumen catheter was then inserted into the vessel over the guide wire using the Seldinger technique. All ports showed good, free flowing blood return and were flushed with saline solution. The catheter was then securely fastened to the skin with sutures and with an adhesive dressing and covered with a sterile dressing. A post procedure X-ray was ordered and is still pending at this time. The patient tolerated the procedure well.     Complications: None    Qing ARGUETA-ACNP  Critical Care Nurse Practitioner   [facility]

## 2023-01-03 NOTE — PROGRESS NOTES
Patient wants to get a rapid covid test done.   Pt pulse ox 84% and PIP 55 with low tidal volumes, pt lavaged and suction, prn tx given.  Pt switched to AC/PC with PIP down to 45, 450 tidal volumes and improved pulse ox to 88-92%

## 2023-01-03 NOTE — PROCEDURES
Intubation    Date/Time: 1/3/2023 4:37 AM  Performed by: Nathalie Campos MD  Authorized by: Nathalie Campos MD   Consent: Verbal consent obtained. Consent given by: patient  Patient identity confirmed: verbally with patient and arm band  Indications: respiratory failure  Intubation method: direct  Patient status: paralyzed (RSI)  Sedatives: etomidate  Paralytic: rocuronium  Tube size: 7.5 mm  Number of attempts: 1  Post-procedure assessment: chest rise and ETCO2 monitor  Breath sounds: equal  ETT to lip: 23 cm  Tube secured with: ETT christiansen      Insert Arterial Line    Date/Time: 1/3/2023 4:40 AM  Performed by: Nathalie Campos MD  Authorized by: Nathalie Campos MD   Consent: Verbal consent obtained.   Consent given by: patient  Indications: respiratory failure and hemodynamic monitoring  Location: right radial  Number of attempts: 1

## 2023-01-04 NOTE — PROGRESS NOTES
In-Patient Progress Note    Patient:  Merri Fleischer 81 y.o. female MRN: [id number]     Date of Service: 1/4/2023    Hospital Day: 6      Chief complaint: had no chief complaint listed for this encounter. Assessment and Plan   Merri Fleischer, a 81 y.o. female, with a history of hypertension (amlodipine 2.5 Mg daily), A. fib (amiodarone 200 Mg daily, apixaban 5 Mg twice daily), depression/anxiety (Lexapro 20 Mg daily), osteoporosis (alendronate 70 Mg weekly), GERD (pantoprazole 40 Mg daily), was initially admitted to [facility] on 12/22/2022 with complaints of shortness of breath, lethargy, wheezing; with diagnosis of acute on chronic respiratory failure with hypoxia secondary to acute bilateral lower lobe pneumonia. She required supplemental oxygen on admission at 3 LPM NC. Throughout the course admission, continue to require increased amounts of oxygen. Antibiotics were broadened to cefepime/ doxy, started on steroids, and scheduled bronchodilators/ICS. She appeared overall comfortable w/o distress however biochemical work-up/imaging suggested worsening condition. Repeat CTA on 12/28 negative for PE but extensive mixed groundglass and consolidative opacities that were increasing. Viral panel, urinary antigens, MRSA swab, BLC and sputum culture were all negative. Overnight reported increased distress with hypoxia and subsequent anxiety. Oxygen titrated up to 15 LPM High flow. Discussed need for increased care/ evaluation and going to pursue transfer to higher level of care. She was accepted and transferred to [facility] ICU for intensivist evaluation. She was given 20mg IV decadron and started on BiPAP prior to departure to [facility] on  12/30/2022. Assessment and plan    Acute hypoxic respiratory failure  ARDS 2/2 multifocal pneumonia  Multifocal pneumonia, likely viral, suspected superimposed bacterial pneumonia  Mechanical ventilation since 1/3/2023  Patient presented with hypoxic respiratory failure. CTA chest  shows no evidence of PE but did show extensive mixed groundglass and consolidative opacities bilaterally, increased from . Respiratory viral panel -. Urine strep pneumoniae and Legionella antigen negative-. Elevated inflammatory markers. .2. Vancomycin stopped after MRSA nares screen came negative. Continued on HFNC initially but O2 saturation could not be maintained and patient had be intubated for severe hypoxia on 1/3/2023 around 1am.  -continued on meropenem  -Decadron 20 Mg for 5 days followed by 10 Mg  -ARDS compliant vent management as able  -Wean FiO2 as able    Hypotension, likely multifactorial, PAP, sedation  -on midodrine  -can consider switching propofol to other sedatives if needed    Anxiety/depression  -Continue escitalopram    History of A. fib/a flutter  -Continue amiodarone and Eliquis    History of heart failure preserved ejection fraction  EF 55-60% per echo on . proBNP 790 on . Stress test was done back in  which was negative for reversible perfusion defect. -on Lasix 20 Mg daily - continue to monitor volume status closely      # Peptic ulcer prophylaxis: Pantoprazole  # DVT Prophylaxis: Eliquis  #CODE STATUS: Full code      Current living situation: -  Expected Disposition: -  Estimated discharge date: -      Review of System     Review of Systems  -could not be reviewed     I have reviewed all pertinent PMHx, PSHx, FamHx, SocialHx, medications, and allergies and updated history as appropriate.     Physical Exam   VITAL SIGNS:  BP (!) 104/51   Pulse 74   Temp 98.4 °F (36.9 °C)   Resp 19   Ht 5' 8.11\" (1.73 m)   Wt 183 lb 13.8 oz (83.4 kg)   SpO2 97%   BMI 27.87 kg/m²   Tmax over 24 hours:  Temp (24hrs), Av.1 °F (36.7 °C), Min:97.7 °F (36.5 °C), Max:98.4 °F (36.9 °C)      Patient Vitals for the past 6 hrs:   BP Temp Temp src Pulse Resp SpO2   23 0817 (!) 104/51 98.4 °F (36.9 °C) -- 74 19 97 %   23 0438 -- -- -- 68 18 97 %   01/04/23 0400 -- 98.3 °F (36.8 °C) Oral 71 19 97 %           Intake/Output Summary (Last 24 hours) at 1/4/2023 0833  Last data filed at 1/4/2023 0557  Gross per 24 hour   Intake 2024.31 ml   Output 1415 ml   Net 609.31 ml       Wt Readings from Last 2 Encounters:   01/01/23 183 lb 13.8 oz (83.4 kg)   12/28/22 172 lb 6.4 oz (78.2 kg)     Body mass index is 27.87 kg/m². Physical Exam  Constitutional:       General: She is not in acute distress. Appearance: She is well-developed. She is ill-appearing. HENT:      Head: Normocephalic and atraumatic. Right Ear: External ear normal.      Left Ear: External ear normal.      Nose: Nose normal.   Eyes:      General: No scleral icterus. Right eye: No discharge. Left eye: No discharge. Conjunctiva/sclera: Conjunctivae normal.      Pupils: Pupils are equal, round, and reactive to light. Neck:      Thyroid: No thyromegaly. Vascular: No JVD. Trachea: No tracheal deviation. Cardiovascular:      Rate and Rhythm: Normal rate and regular rhythm. Heart sounds: Normal heart sounds. No murmur heard. No friction rub. No gallop. Pulmonary:      Effort: Pulmonary effort is normal. No respiratory distress. Breath sounds: Normal breath sounds. No stridor. No wheezing or rales. Chest:      Chest wall: No tenderness. Abdominal:      General: Bowel sounds are normal. There is no distension. Palpations: Abdomen is soft. There is no mass. Tenderness: There is no abdominal tenderness. There is no guarding or rebound. Hernia: No hernia is present. Genitourinary:     Vagina: Normal. No vaginal discharge. Rectum: Guaiac result negative. Musculoskeletal:         General: No tenderness or deformity. Normal range of motion. Cervical back: Normal range of motion and neck supple. Lymphadenopathy:      Cervical: No cervical adenopathy. Skin:     General: Skin is warm and dry.       Capillary Refill: Capillary refill takes less than 2 seconds. Coloration: Skin is not pale. Findings: No erythema or rash. Neurological:      Mental Status: She is alert. Motor: No abnormal muscle tone. Comments: Sedated and mechanically ventilated   Psychiatric:      Comments: Sedated and mechanically ventilated         Current Medications      chlorhexidine  15 mL Mouth/Throat BID    lidocaine PF  5 mL IntraDERmal Once    sodium chloride flush  5-40 mL IntraVENous 2 times per day    guaiFENesin  200 mg Oral Q4H    aspirin  81 mg Per NG tube Daily    lansoprazole  30 mg Per NG tube Daily    lidocaine PF  5 mL IntraDERmal Once    sodium chloride flush  5-40 mL IntraVENous 2 times per day    midodrine  10 mg Oral TID WC    budesonide-formoterol  2 puff Inhalation BID    amiodarone  200 mg Oral Daily    [Held by provider] amLODIPine  2.5 mg Oral Daily    apixaban  5 mg Oral BID    escitalopram  20 mg Oral Daily    sodium chloride flush  10 mL IntraVENous 2 times per day    ipratropium-albuterol  1 ampule Inhalation Q4H WA    furosemide  20 mg IntraVENous Daily    traZODone  50 mg Oral Nightly    dexamethasone  10 mg IntraVENous Daily    meropenem  1,000 mg IntraVENous Q8H         Labs and Imaging Studies   Laboratory findings:  XR CHEST PORTABLE    Result Date: 12/31/2022  EXAMINATION: ONE XRAY VIEW OF THE CHEST 12/31/2022 4:57 am COMPARISON: 12/27/2022 HISTORY: ORDERING SYSTEM PROVIDED HISTORY: f/u ARDS TECHNOLOGIST PROVIDED HISTORY: Reason for exam:->f/u ARDS Reason for Exam: f/u ARDS FINDINGS: Cardiomediastinal silhouette is stable. Similar bilateral airspace opacities. No pleural effusion or pneumothorax. No gross bony abnormality.      Stable chest.       Recent Results (from the past 24 hour(s))   Blood gas, arterial    Collection Time: 01/03/23  1:30 PM   Result Value Ref Range    pH, Bld 7.46 (H) 7.34 - 7.45    pCO2, Arterial 46.0 (H) 32 - 45 MMHG    pO2, Arterial 67 (L) 75 - 100 MMHG    Base Exc, Mixed 7.7 (H) 0 - 2.3    HCO3, Arterial 32.7 (H) 18 - 23 MMOL/L    CO2 Content 34.1 (H) 19 - 24 MMOL/L    O2 Sat 93.8 (L) 96 - 97 %    Carbon Monoxide, Blood 3.4 0 - 5 %    Methemoglobin, Arterial 0.0 <1.5 %    Comment AC 18 400 100% +10    Procalcitonin    Collection Time: 01/03/23  4:00 PM   Result Value Ref Range    Procalcitonin 0.166    CBC with Auto Differential    Collection Time: 01/04/23  5:20 AM   Result Value Ref Range    WBC 27.1 (H) 4.0 - 10.5 K/CU MM    RBC 3.42 (L) 4.2 - 5.4 M/CU MM    Hemoglobin 11.4 (L) 12.5 - 16.0 GM/DL    Hematocrit 33.3 (L) 37 - 47 %    MCV 97.4 78 - 100 FL    MCH 33.3 (H) 27 - 31 PG    MCHC 34.2 32.0 - 36.0 %    RDW 12.3 11.7 - 14.9 %    Platelets 446 448 - 311 K/CU MM    MPV 8.9 7.5 - 11.1 FL    Myelocyte Percent 1 (H) 0.0 %    Metamyelocytes Relative 2 (H) 0.0 %    Bands Relative 6 5 - 11 %    Segs Relative 81.0 (H) 36 - 66 %    Lymphocytes % 5.0 (L) 24 - 44 %    Monocytes % 5.0 (H) 0 - 4 %    Myelocytes Absolute 0.27 K/CU MM    Metamyelocytes Absolute 0.54 K/CU MM    Bands Absolute 1.63 K/CU MM    Segs Absolute 21.9 K/CU MM    Lymphocytes Absolute 1.4 K/CU MM    Monocytes Absolute 1.4 K/CU MM    Differential Type MANUAL DIFFERENTIAL     Dohle Bodies PRESENT    Basic Metabolic Panel w/ Reflex to MG    Collection Time: 01/04/23  5:20 AM   Result Value Ref Range    Sodium 135 135 - 145 MMOL/L    Potassium 3.9 3.5 - 5.1 MMOL/L    Chloride 95 (L) 99 - 110 mMol/L    CO2 32 21 - 32 MMOL/L    Anion Gap 8 4 - 16    BUN 35 (H) 6 - 23 MG/DL    Creatinine 0.8 0.6 - 1.1 MG/DL    Est, Glom Filt Rate >60 >60 mL/min/1.73m2    Glucose 128 (H) 70 - 99 MG/DL    Calcium 7.9 (L) 8.3 - 10.6 MG/DL           Electronically signed by Lynsey Berger MD on 1/4/2023 at 8:33 AM      Comment: Please note this report has been produced using speech recognition software and may contain errors related to that system including errors in grammar, punctuation, and spelling, as well as words and phrases that may be inappropriate. If there are any questions or concerns please feel free to contact the dictating provider for clarification.

## 2023-01-04 NOTE — PROGRESS NOTES
Comprehensive Nutrition Assessment    Type and Reason for Visit:  Reassess    Nutrition Recommendations/Plan:   EN Order: Vital HP @ 50 ml/hr which will provide ~1662 kcal (including kcal from propofol) and 105 g protein  Add Proteinex 1x daily  Advance to goal rate as tolerated  Will monitor GI tolerance, nutrition status, poc     Malnutrition Assessment:  Malnutrition Status:  No malnutrition (01/03/23 1228)    Context:  Acute Illness       Nutrition Assessment:    pt remains sedated on vent, TF running @ 20 ml/hr, appears to be tolerating TF, discussed during IDR, per attending CC okay to advance EN, will follow at high nutrition risk    Nutrition Related Findings:    Glucose 128, WBC 27.1 Wound Type: None       Current Nutrition Intake & Therapies:    Average Meal Intake: NPO  Average Supplements Intake: NPO  Current Tube Feeding (TF) Orders:  Feeding Route: Orogastric  Formula: Peptide Based High Protein  Schedule: Continuous  Feeding Regimen: 20 ml/hr  Current TF & Flush Orders Provides: 480 kcal and 42 g protien    Additional Calorie Sources:  Pt is receiving ~462 kcal from current propofol rate    Anthropometric Measures:  Height: 5' 8.11\" (173 cm)  Ideal Body Weight (IBW): 141 lbs (64 kg)       Current Body Weight: 183 lb 13.8 oz (83.4 kg), 130.4 % IBW. Weight Source: Bed Scale  Current BMI (kg/m2): 27.9        Weight Adjustment For: No Adjustment                 BMI Categories: Overweight (BMI 25.0-29. 9)    Estimated Daily Nutrient Needs:        Energy (kcal/day): 1537 ([place])  Weight Used for Protein Requirements: Ideal  Protein (g/day): 128 (2.0 g/kg)  Method Used for Fluid Requirements: 1 ml/kcal    Nutrition Diagnosis:   Inadequate oral intake related to acute injury/trauma as evidenced by NPO or clear liquid status due to medical condition    Nutrition Interventions:   Food and/or Nutrient Delivery: Modify Tube Feeding  Nutrition Education/Counseling: No recommendation at this time  Coordination of Nutrition Care: Continue to monitor while inpatient  Plan of Care discussed with: IDR    Goals:  Previous Goal Met: Progressing toward Goal(s)  Goals: Tolerate nutrition support at goal rate, within 2 days       Nutrition Monitoring and Evaluation:   Behavioral-Environmental Outcomes: None Identified  Food/Nutrient Intake Outcomes: Enteral Nutrition Intake/Tolerance  Physical Signs/Symptoms Outcomes: Biochemical Data, GI Status, Hemodynamic Status, Fluid Status or Edema, Weight, Skin    Discharge Planning:     Too soon to determine     Waleska Gonzalez [address], [address] [place] [address],   Contact: [phone]

## 2023-01-04 NOTE — PROGRESS NOTES
Called phlebot to draw blood cultures, they will be up to draw them when they can. Labs and urine culture sent to lab. ABG sent to Respiratory. Trap placed on suction line to capture sputum culture.

## 2023-01-04 NOTE — PROGRESS NOTES
Patient very agitated, trying to pull out ET tube , trying to climb out of bed. Yelling at staff- while on vent. Sao2 down to 82%. New orders to start Propofol drip and to give  Fentanyl 50 mcg. Given at this time. repositioned patient. GIVEN HOT BLANKETS , AND PATIENT IS STARTING TO CALM DOWN.   Took 7-8 mins  for  sao2 to reach 90% on 100 %

## 2023-01-04 NOTE — PROGRESS NOTES
V2.0  [facility] Critical Care Progress Note      Name:  Pushpa Contreras /Age/Sex: 1935  (81 y.o. female)   MRN & CSN:  [id number] & [id number] Encounter Date/Time: 2023 3:22 PM EST    Location:  -A PCP: Aime Steele, [place] [facility] Day: 6    Assessment and Plan:   Pushpa Contreras is a 81 y.o. female who presents with Respiratory failure with hypoxia ([address]      Plan:  Acute hypoxic respiratory failure  ARDS  Multifocal bacterial vs viral pneumonia  Sepsis  -Patient required intubation overnight-1/3  Requiring full ventilatory support-FiO2 90%, PEEP-10 mm   -WBC uptrending 21- 27 K today- today, no fevers.   -Continue  meropenem  -Cont decadron ARDS protocol- 20 mg x total 5 days, followed by 10 mg x 5 days  -Continue DuoNebs, Dulera, albuterol as needed  -Started on midodrine,   Repeat pancultures and serum markers ordered for uptrending WBC count-Ua-unremarkable, lactic acid, procalcitonin-WNL, respiratory cultures blood cultures-pending, bilateral lower extremity Doppler-pending       History of diastolic congestive heart failure  -Echocardiogram on -EF 55 to 60%  proBNP-790 on   -Stress test negative for ischemia in 2019    Hypotension  Continue midodrine 10 mg 3 times daily    History of A. fib/a flutter  Continue home amiodarone and Eliquis      Chronic Conditions: Continue all home medications except as stated above or contraindicated.           Diet Diet NPO  ADULT TUBE FEEDING; Orogastric; Peptide Based High Protein; Continuous; 20; Yes; 10; Q 4 hours; 50; 30; Q 4 hours; Protein; Proteinex 1x daily   DVT Prophylaxis [] Lovenox, []  Heparin, [] SCDs, [] Ambulation,  [x] Eliquis, [] Xarelto  [] Coumadin   Code Status Full Code   Disposition From: Home  Expected Disposition: Home  Estimated Date of Discharge: TBD  Patient requires continued admission due to acute respiratory failure   Surrogate Decision Maker/ POA Son     Subjective:   Patient is requiring FiO2 90%, PEEP 10 mm  Repeat pancultures for uptrending WBC, patient is afebrile, continue Merrem    Review of Systems:    Review of Systems  Patient is on mechanical ventilator, sedated, withdraws to pain in all 4 EXTR tries to open eyes to noxious stimulus. On sedation vacation, per RN-follows commands in all 4 extremities      Objective: Intake/Output Summary (Last 24 hours) at 1/4/2023 1801  Last data filed at 1/4/2023 1722  Gross per 24 hour   Intake 655.58 ml   Output 1420 ml   Net -764.42 ml          Vitals:   Vitals:    01/04/23 1600   BP: (!) 118/58   Pulse: 92   Resp: 24   Temp: 100 °F (37.8 °C)   SpO2: 94%       Physical Exam:     General: Age-appropriate female on full mechanical ventilation  Eyes: EOMI  ENT: neck supple. ETT in place  Cardiovascular: Irregular rate  Respiratory: Rhonchi throughout,  wheezing+  Equal chest expansion on vent. Gastrointestinal: Soft, non tender  Genitourinary: no suprapubic tenderness. Quinones cath in place.   Musculoskeletal: No edema  Skin: warm, dry  Neuro: Sedated  Psych: Unable to assess    Medications:   Medications:    midodrine  10 mg Oral q8h    chlorhexidine  15 mL Mouth/Throat BID    lidocaine PF  5 mL IntraDERmal Once    sodium chloride flush  5-40 mL IntraVENous 2 times per day    guaiFENesin  200 mg Oral Q4H    aspirin  81 mg Per NG tube Daily    lansoprazole  30 mg Per NG tube Daily    lidocaine PF  5 mL IntraDERmal Once    sodium chloride flush  5-40 mL IntraVENous 2 times per day    budesonide-formoterol  2 puff Inhalation BID    amiodarone  200 mg Oral Daily    [Held by provider] amLODIPine  2.5 mg Oral Daily    apixaban  5 mg Oral BID    escitalopram  20 mg Oral Daily    sodium chloride flush  10 mL IntraVENous 2 times per day    ipratropium-albuterol  1 ampule Inhalation Q4H WA    traZODone  50 mg Oral Nightly    dexamethasone  10 mg IntraVENous Daily    meropenem  1,000 mg IntraVENous Q8H      Infusions:    propofol 35 mcg/kg/min (01/04/23 1722)    sodium chloride sodium chloride      sodium chloride Stopped (01/01/23 1841)     PRN Meds: sodium chloride flush, 5-40 mL, PRN  sodium chloride, , PRN  fentanNYL, 50 mcg, Q1H PRN  midazolam, 1 mg, Q1H PRN  sodium chloride flush, 5-40 mL, PRN  sodium chloride, , PRN  lidocaine viscous hcl, 15 mL, Q6H PRN  phenol, 1 spray, Q2H PRN  sodium chloride flush, 10 mL, PRN  sodium chloride, , PRN  polyethylene glycol, 17 g, Daily PRN  acetaminophen, 650 mg, Q6H PRN   Or  acetaminophen, 650 mg, Q6H PRN  albuterol sulfate HFA, 2 puff, Q4H PRN  guaiFENesin, 200 mg, Q4H PRN  ondansetron, 4 mg, Q6H PRN      Labs      Recent Results (from the past 24 hour(s))   CBC with Auto Differential    Collection Time: 01/04/23  5:20 AM   Result Value Ref Range    WBC 27.1 (H) 4.0 - 10.5 K/CU MM    RBC 3.42 (L) 4.2 - 5.4 M/CU MM    Hemoglobin 11.4 (L) 12.5 - 16.0 GM/DL    Hematocrit 33.3 (L) 37 - 47 %    MCV 97.4 78 - 100 FL    MCH 33.3 (H) 27 - 31 PG    MCHC 34.2 32.0 - 36.0 %    RDW 12.3 11.7 - 14.9 %    Platelets 544 714 - 990 K/CU MM    MPV 8.9 7.5 - 11.1 FL    Myelocyte Percent 1 (H) 0.0 %    Metamyelocytes Relative 2 (H) 0.0 %    Bands Relative 6 5 - 11 %    Segs Relative 81.0 (H) 36 - 66 %    Lymphocytes % 5.0 (L) 24 - 44 %    Monocytes % 5.0 (H) 0 - 4 %    Myelocytes Absolute 0.27 K/CU MM    Metamyelocytes Absolute 0.54 K/CU MM    Bands Absolute 1.63 K/CU MM    Segs Absolute 21.9 K/CU MM    Lymphocytes Absolute 1.4 K/CU MM    Monocytes Absolute 1.4 K/CU MM    Differential Type MANUAL DIFFERENTIAL     Dohle Bodies PRESENT    Basic Metabolic Panel w/ Reflex to MG    Collection Time: 01/04/23  5:20 AM   Result Value Ref Range    Sodium 135 135 - 145 MMOL/L    Potassium 3.9 3.5 - 5.1 MMOL/L    Chloride 95 (L) 99 - 110 mMol/L    CO2 32 21 - 32 MMOL/L    Anion Gap 8 4 - 16    BUN 35 (H) 6 - 23 MG/DL    Creatinine 0.8 0.6 - 1.1 MG/DL    Est, Glom Filt Rate >60 >60 mL/min/1.73m2    Glucose 128 (H) 70 - 99 MG/DL    Calcium 7.9 (L) 8.3 - 10.6 MG/DL   Blood gas, arterial    Collection Time: 01/04/23 10:00 AM   Result Value Ref Range    pH, Bld 7.52 (H) 7.34 - 7.45    pCO2, Arterial 46.0 (H) 32 - 45 MMHG    pO2, Arterial 73 (L) 75 - 100 MMHG    Base Exc, Mixed 13 (H) 0 - 2.3    HCO3, Arterial 37.6 (H) 18 - 23 MMOL/L    CO2 Content 39.0 (H) 19 - 24 MMOL/L    O2 Sat 93.0 (L) 96 - 97 %    Carbon Monoxide, Blood 1.6 0 - 5 %    Methemoglobin, Arterial 1.9 (H) <1.5 %    Comment PC R18/P34/PEEP 10/90%    Procalcitonin    Collection Time: 01/04/23 10:16 AM   Result Value Ref Range    Procalcitonin 0.139    Lactic Acid    Collection Time: 01/04/23 10:16 AM   Result Value Ref Range    Lactate 1.8 0.5 - 1.9 mMOL/L   Urinalysis with Reflex to Culture    Collection Time: 01/04/23 10:16 AM    Specimen: Urine   Result Value Ref Range    Color, UA YELLOW YELLOW    Clarity, UA SLIGHTLY CLOUDY (A) CLEAR    Glucose, Urine NEGATIVE NEGATIVE MG/DL    Bilirubin Urine NEGATIVE NEGATIVE MG/DL    Ketones, Urine NEGATIVE NEGATIVE MG/DL    Specific Gravity, UA 1.010 1.001 - 1.035    Blood, Urine LARGE NUMBER OR AMOUNT OBSERVED (A) NEGATIVE    pH, Urine 5.5 5.0 - 8.0    Protein, UA TRACE (A) NEGATIVE MG/DL    Urobilinogen, Urine 0.2 0.2 - 1.0 MG/DL    Nitrite Urine, Quantitative NEGATIVE NEGATIVE    Leukocyte Esterase, Urine NEGATIVE NEGATIVE    RBC,  (H) 0 - 6 /HPF    WBC, UA <1 0 - 5 /HPF    Bacteria, UA NEGATIVE NEGATIVE /HPF    Mucus, UA RARE (A) NEGATIVE HPF    Trichomonas, UA NONE SEEN NONE SEEN /HPF    Granular Casts, UA 2 /LPF        Imaging/Diagnostics Last 24 Hours   XR CHEST PORTABLE    Result Date: 12/31/2022  EXAMINATION: ONE XRAY VIEW OF THE CHEST 12/31/2022 4:57 am COMPARISON: 12/27/2022 HISTORY: ORDERING SYSTEM PROVIDED HISTORY: f/u ARDS TECHNOLOGIST PROVIDED HISTORY: Reason for exam:->f/u ARDS Reason for Exam: f/u ARDS FINDINGS: Cardiomediastinal silhouette is stable. Similar bilateral airspace opacities. No pleural effusion or pneumothorax. No gross bony abnormality. Stable chest.     Total critical care time 38  minutes   Patient is critical with acute respiratory failure  Urine is stable reviewed labs, imaging, and discussed with treatment care plan with attending    Critical care time excludes separately billable procedure    Electronically signed by Debra Stout PA-C on 1/4/2023 at 6:01 PM

## 2023-01-05 NOTE — CARE COORDINATION
Patient remains intubated. No family present. Will reach out to family for update. Louise Stallings RN     1030 Attempted to contact son Austen Hermosillo at [phone]; [facility] for return call.  Louise Stallings RN

## 2023-01-05 NOTE — PROGRESS NOTES
01/05/23 0620   Encounter Summary   Encounter Overview/Reason  Spiritual/Emotional Needs   Service Provided For: Patient   Referral/Consult From: [facility]   Last Encounter  01/05/23  (Patient on a vent but  had bedside prayer.  no family in the room)   Complexity of Encounter Low   Begin Time 0618   End Time  0622   Total Time Calculated 4 min   Encounter    Type Follow up   Spiritual/Emotional needs   Type Spiritual Support   Assessment/Intervention/Outcome   Assessment Peaceful   Intervention Prayer (assurance of)/[place];Sustaining Presence/Ministry of presence   Outcome Comfort   Plan and Referrals   Plan/Referrals Continue to visit, (comment)

## 2023-01-05 NOTE — CONSULTS
[facility]  consulted by Dr. Gonzales Au for monitoring and adjustment. Indication for treatment: Vancomycin indication: Sepsis unknown source likely secondary to pna? Pt with significant worsening leukocytosis and  oxygenation  Goal trough: Trough Goal: 15-20 mcg/mL  AUC/DEBORAH: 400-600    Risk Factors for MRSA Identified:   Hospitalization within the past 90 days, Received IV antibiotics within the past 90 days    Pertinent Laboratory Values:   Temp Readings from Last 3 Encounters:   01/05/23 98.8 °F (37.1 °C) (Rectal)   12/30/22 99.8 °F (37.7 °C) (Oral)   09/30/22 97.3 °F (36.3 °C)     Recent Labs     01/03/23  0520 01/04/23  0520 01/04/23  1016 01/05/23  0315   WBC 21.9* 27.1*  --  33.5*   LACTATE  --   --  1.8  --      Recent Labs     01/03/23  0520 01/04/23  0520 01/05/23  0315   BUN 31* 35* 39*   CREATININE 0.7 0.8 0.8     Estimated Creatinine Clearance: 57 mL/min (based on SCr of 0.8 mg/dL). Intake/Output Summary (Last 24 hours) at 1/5/2023 1141  Last data filed at 1/5/2023 0544  Gross per 24 hour   Intake 2100.26 ml   Output 1220 ml   Net 880.26 ml       Pertinent Cultures:   Date    Source    Results  1/4   Blood    ordered  1/4   Sputum/Respiratory  In process  1/1   MRSA Nasal   Negative    Vancomycin level:   TROUGH:  No results for input(s): VANCOTROUGH in the last 72 hours. RANDOM:    Recent Labs     01/03/23  0520   VANCORANDOM 12.5       Assessment:  HPI: Pt is 81 yof currently intubated and sedated in ICU with ARDS. Pt was initially started on  ceftriaxone, then escalated to cefepime x 6 days, then escalated to vanco  and meropenem. Vanco stopped after improvement and negative MRSA nares. Pt with worsening leukocytosis. Pt afebrile with negative PCT. ICU doing complete workup to evaluate leukocytosis. Discussed giving vanco x 1 pending workup. SCr, BUN, and urine output: stable, good UOP  Day(s) of therapy: 1  Vancomycin concentration: planned 1/6    Plan:   Will give vanco 2000mg IV x 1 and plan for level in AM to assess clearance and need to continue vanco.  Pharmacy will continue to monitor patient and adjust therapy as indicated    VANCOMYCIN CONCENTRATION SCHEDULED FOR 1/6 @0600    Thank you for the consult.   Reina Paul, [facility]  1/5/2023 11:41 AM

## 2023-01-05 NOTE — PROGRESS NOTES
01/05/23 0751   Patient Observation   Heart Rate 78   Resp 23   SpO2 91 %   Vent Information   $Ventilation $Subsequent Day   Ventilator Settings   FiO2  75 %   Resp Rate (Set) 18 bmp   PEEP/CPAP (cmH2O) 10   Pressure Support (cm H2O) 0 cm H2O   Vent Patient Data (Readings)   Vt (Measured) 558 mL   Peak Inspiratory Pressure (cmH2O) 45 cmH2O   Rate Measured 23 br/min   Minute Volume (L/min) 13.1 Liters   Mean Airway Pressure (cmH2O) 19 cmH20   Plateau Pressure (cm H2O) 34 cm H2O   I:E Ratio 1:3.00   I Time/ I Time % 0 s   Vent Alarm Settings   High Pressure (cmH2O) 55 cmH2O   Low Minute Volume (lpm) 2.5 L/min   High Minute Volume (lpm) 20 L/min   Low Exhaled Vt (ml) 250 mL   High Exhaled Vt (ml) 1000 mL   RR Low (bpm) 13   RR High (bpm) 40 br/min   Apnea (secs) 20 secs   Additional Respiratoray Assessments   Humidification Source HME   Ambu Bag With Mask At Bedside Yes   ETT    Placement Date/Time: 01/03/23 0200   Present on Admission/Arrival: No  Placed By: (c) Licensed provider;RT;RN  Placement Verified By: Capnometry; Auscultation; Chest X-ray  Preoxygenation: Yes  Mask Ventilation: Ventilated by mask (1)  Technique: Stylet. ..    Secured At 22 cm   Measured From Lips   ETT Placement Right   Secured By Commercial tube [email or web address]   Site Assessment Cool;Dry   Cuff Pressure   (mov)

## 2023-01-05 NOTE — PROGRESS NOTES
In-Patient Progress Note    Patient:  Pushpa Contreras 81 y.o. female MRN: [id number]     Date of Service: 1/5/2023    Hospital Day: 7      Chief complaint: had no chief complaint listed for this encounter. Assessment and Plan   Pushpa Contreras, a 81 y.o. female, with a history of hypertension (amlodipine 2.5 Mg daily), A. fib (amiodarone 200 Mg daily, apixaban 5 Mg twice daily), depression/anxiety (Lexapro 20 Mg daily), osteoporosis (alendronate 70 Mg weekly), GERD (pantoprazole 40 Mg daily), was initially admitted to [facility] on 12/22/2022 with complaints of shortness of breath, lethargy, wheezing; with diagnosis of acute on chronic respiratory failure with hypoxia secondary to acute bilateral lower lobe pneumonia. She required supplemental oxygen on admission at 3 LPM NC. Throughout the course admission, continue to require increased amounts of oxygen. Antibiotics were broadened to cefepime/ doxy, started on steroids, and scheduled bronchodilators/ICS. She appeared overall comfortable w/o distress however biochemical work-up/imaging suggested worsening condition. Repeat CTA on 12/28 negative for PE but extensive mixed groundglass and consolidative opacities that were increasing. Viral panel, urinary antigens, MRSA swab, BLC and sputum culture were all negative. Overnight reported increased distress with hypoxia and subsequent anxiety. Oxygen titrated up to 15 LPM High flow. Discussed need for increased care/ evaluation and going to pursue transfer to higher level of care. She was accepted and transferred to [facility] ICU for intensivist evaluation. She was given 20mg IV decadron and started on BiPAP prior to departure to [facility] on  12/30/2022. Assessment and plan    Acute hypoxic respiratory failure  ARDS 2/2 multifocal pneumonia  Multifocal pneumonia, likely viral, suspected superimposed bacterial pneumonia  Mechanical ventilation since 1/3/2023  Patient presented with hypoxic respiratory failure. CTA chest  shows no evidence of PE but did show extensive mixed groundglass and consolidative opacities bilaterally, increased from . Respiratory viral panel -. Urine strep pneumoniae and Legionella antigen negative-. Elevated inflammatory markers. .2. Vancomycin stopped after MRSA nares screen came negative. Continued on HFNC initially but O2 saturation could not be maintained and patient had be intubated for severe hypoxia on 1/3/2023 around 1am.  -Continued on meropenem  -Decadron: completed 20 Mg for 5 days, now on 10 Mg  -ARDS compliant vent management as able  -Wean FiO2 as able    Hypotension, likely multifactorial, PAP, sedation  -on midodrine  -can consider switching propofol to other sedatives if needed    Anxiety/depression  -Continue escitalopram    History of A. fib/a flutter  -Continue amiodarone and Eliquis    History of heart failure preserved ejection fraction  EF 55-60% per echo on . proBNP 790 on . Stress test was done back in  which was negative for reversible perfusion defect. -on Lasix 20 Mg daily - continue to monitor volume status closely      # Peptic ulcer prophylaxis: Pantoprazole  # DVT Prophylaxis: Eliquis  #CODE STATUS: Full code      Current living situation: -  Expected Disposition: -  Estimated discharge date: -      Review of System     Review of Systems  -could not be reviewed     I have reviewed all pertinent PMHx, PSHx, FamHx, SocialHx, medications, and allergies and updated history as appropriate.     Physical Exam   VITAL SIGNS:  BP (!) 144/47   Pulse 80   Temp 98.8 °F (37.1 °C) (Rectal)   Resp 21   Ht 5' 8.11\" (1.73 m)   Wt 183 lb 13.8 oz (83.4 kg)   SpO2 91%   BMI 27.87 kg/m²   Tmax over 24 hours:  Temp (24hrs), Av.1 °F (37.3 °C), Min:98.4 °F (36.9 °C), Max:100 °F (37.8 °C)      Patient Vitals for the past 6 hrs:   BP Temp Temp src Pulse Resp SpO2   23 0808 (!) 144/47 98.8 °F (37.1 °C) Rectal 80 21 91 % 01/05/23 0800 (!) 112/49 -- -- 81 25 91 %   01/05/23 0751 -- -- -- 78 23 91 %   01/05/23 0745 -- -- -- 74 22 94 %   01/05/23 0744 -- -- -- 75 20 92 %   01/05/23 0700 (!) 116/49 -- -- 75 22 90 %   01/05/23 0600 (!) 115/48 -- -- 77 22 92 %   01/05/23 0503 -- -- -- -- -- 93 %   01/05/23 0500 (!) 128/54 -- -- 88 25 (!) 81 %   01/05/23 0400 (!) 109/45 98.4 °F (36.9 °C) Rectal 74 15 92 %   01/05/23 0300 -- -- -- 80 21 93 %           Intake/Output Summary (Last 24 hours) at 1/5/2023 0855  Last data filed at 1/5/2023 0544  Gross per 24 hour   Intake 2100.26 ml   Output 1220 ml   Net 880.26 ml       Wt Readings from Last 2 Encounters:   01/01/23 183 lb 13.8 oz (83.4 kg)   12/28/22 172 lb 6.4 oz (78.2 kg)     Body mass index is 27.87 kg/m². Physical Exam  Constitutional:       General: She is not in acute distress. Appearance: She is well-developed. She is ill-appearing. HENT:      Head: Normocephalic and atraumatic. Right Ear: External ear normal.      Left Ear: External ear normal.      Nose: Nose normal.   Eyes:      General: No scleral icterus. Right eye: No discharge. Left eye: No discharge. Conjunctiva/sclera: Conjunctivae normal.      Pupils: Pupils are equal, round, and reactive to light. Neck:      Thyroid: No thyromegaly. Vascular: No JVD. Trachea: No tracheal deviation. Cardiovascular:      Rate and Rhythm: Normal rate and regular rhythm. Heart sounds: Normal heart sounds. No murmur heard. No friction rub. No gallop. Pulmonary:      Effort: Pulmonary effort is normal. No respiratory distress. Breath sounds: Normal breath sounds. No stridor. No wheezing or rales. Chest:      Chest wall: No tenderness. Abdominal:      General: Bowel sounds are normal. There is no distension. Palpations: Abdomen is soft. There is no mass. Tenderness: There is no abdominal tenderness. There is no guarding or rebound. Hernia: No hernia is present. Genitourinary:     Vagina: Normal. No vaginal discharge. Rectum: Guaiac result negative. Musculoskeletal:         General: No tenderness or deformity. Normal range of motion. Cervical back: Normal range of motion and neck supple. Lymphadenopathy:      Cervical: No cervical adenopathy. Skin:     General: Skin is warm and dry. Capillary Refill: Capillary refill takes less than 2 seconds. Coloration: Skin is not pale. Findings: No erythema or rash. Neurological:      Mental Status: She is alert. Motor: No abnormal muscle tone. Comments: Sedated and mechanically ventilated   Psychiatric:      Comments: Sedated and mechanically ventilated         Current Medications      midodrine  10 mg Oral q8h    chlorhexidine  15 mL Mouth/Throat BID    lidocaine PF  5 mL IntraDERmal Once    sodium chloride flush  5-40 mL IntraVENous 2 times per day    guaiFENesin  200 mg Oral Q4H    aspirin  81 mg Per NG tube Daily    lansoprazole  30 mg Per NG tube Daily    lidocaine PF  5 mL IntraDERmal Once    sodium chloride flush  5-40 mL IntraVENous 2 times per day    budesonide-formoterol  2 puff Inhalation BID    amiodarone  200 mg Oral Daily    [Held by provider] amLODIPine  2.5 mg Oral Daily    apixaban  5 mg Oral BID    escitalopram  20 mg Oral Daily    sodium chloride flush  10 mL IntraVENous 2 times per day    ipratropium-albuterol  1 ampule Inhalation Q4H WA    traZODone  50 mg Oral Nightly    dexamethasone  10 mg IntraVENous Daily    meropenem  1,000 mg IntraVENous Q8H         Labs and Imaging Studies   Laboratory findings:  XR CHEST PORTABLE    Result Date: 12/31/2022  EXAMINATION: ONE XRAY VIEW OF THE CHEST 12/31/2022 4:57 am COMPARISON: 12/27/2022 HISTORY: ORDERING SYSTEM PROVIDED HISTORY: f/u ARDS TECHNOLOGIST PROVIDED HISTORY: Reason for exam:->f/u ARDS Reason for Exam: f/u ARDS FINDINGS: Cardiomediastinal silhouette is stable. Similar bilateral airspace opacities.   No pleural effusion or pneumothorax. No gross bony abnormality.      Stable chest.       Recent Results (from the past 24 hour(s))   Blood gas, arterial    Collection Time: 01/04/23 10:00 AM   Result Value Ref Range    pH, Bld 7.52 (H) 7.34 - 7.45    pCO2, Arterial 46.0 (H) 32 - 45 MMHG    pO2, Arterial 73 (L) 75 - 100 MMHG    Base Exc, Mixed 13 (H) 0 - 2.3    HCO3, Arterial 37.6 (H) 18 - 23 MMOL/L    CO2 Content 39.0 (H) 19 - 24 MMOL/L    O2 Sat 93.0 (L) 96 - 97 %    Carbon Monoxide, Blood 1.6 0 - 5 %    Methemoglobin, Arterial 1.9 (H) <1.5 %    Comment PC R18/P34/PEEP 10/90%    Procalcitonin    Collection Time: 01/04/23 10:16 AM   Result Value Ref Range    Procalcitonin 0.139    Lactic Acid    Collection Time: 01/04/23 10:16 AM   Result Value Ref Range    Lactate 1.8 0.5 - 1.9 mMOL/L   Urinalysis with Reflex to Culture    Collection Time: 01/04/23 10:16 AM    Specimen: Urine   Result Value Ref Range    Color, UA YELLOW YELLOW    Clarity, UA SLIGHTLY CLOUDY (A) CLEAR    Glucose, Urine NEGATIVE NEGATIVE MG/DL    Bilirubin Urine NEGATIVE NEGATIVE MG/DL    Ketones, Urine NEGATIVE NEGATIVE MG/DL    Specific Gravity, UA 1.010 1.001 - 1.035    Blood, Urine LARGE NUMBER OR AMOUNT OBSERVED (A) NEGATIVE    pH, Urine 5.5 5.0 - 8.0    Protein, UA TRACE (A) NEGATIVE MG/DL    Urobilinogen, Urine 0.2 0.2 - 1.0 MG/DL    Nitrite Urine, Quantitative NEGATIVE NEGATIVE    Leukocyte Esterase, Urine NEGATIVE NEGATIVE    RBC,  (H) 0 - 6 /HPF    WBC, UA <1 0 - 5 /HPF    Bacteria, UA NEGATIVE NEGATIVE /HPF    Mucus, UA RARE (A) NEGATIVE HPF    Trichomonas, UA NONE SEEN NONE SEEN /HPF    Granular Casts, UA 2 /LPF   CBC with Auto Differential    Collection Time: 01/05/23  3:15 AM   Result Value Ref Range    WBC 33.5 (HH) 4.0 - 10.5 K/CU MM    RBC 3.36 (L) 4.2 - 5.4 M/CU MM    Hemoglobin 11.1 (L) 12.5 - 16.0 GM/DL    Hematocrit 33.0 (L) 37 - 47 %    MCV 98.2 78 - 100 FL    MCH 33.0 (H) 27 - 31 PG    MCHC 33.6 32.0 - 36.0 %    RDW 12.6 11.7 - 14.9 % Platelets 990 872 - 505 K/CU MM    MPV 9.0 7.5 - 11.1 FL    Differential Type AUTOMATED DIFFERENTIAL     Segs Relative 88.9 (H) 36 - 66 %    Lymphocytes % 3.0 (L) 24 - 44 %    Monocytes % 2.1 0 - 4 %    Eosinophils % 0.0 0 - 3 %    Basophils % 0.3 0 - 1 %    Segs Absolute 29.8 K/CU MM    Lymphocytes Absolute 1.0 K/CU MM    Monocytes Absolute 0.7 K/CU MM    Eosinophils Absolute 0.0 K/CU MM    Basophils Absolute 0.1 K/CU MM    Nucleated RBC % 0.0 %    Total Nucleated RBC 0.0 K/CU MM    Total Immature Neutrophil 1.92 K/CU MM    Immature Neutrophil % 5.7 (H) 0 - 0.43 %   Basic Metabolic Panel w/ Reflex to MG    Collection Time: 01/05/23  3:15 AM   Result Value Ref Range    Sodium 137 135 - 145 MMOL/L    Potassium 3.6 3.5 - 5.1 MMOL/L    Chloride 96 (L) 99 - 110 mMol/L    CO2 31 21 - 32 MMOL/L    Anion Gap 10 4 - 16    BUN 39 (H) 6 - 23 MG/DL    Creatinine 0.8 0.6 - 1.1 MG/DL    Est, Glom Filt Rate >60 >60 mL/min/1.73m2    Glucose 194 (H) 70 - 99 MG/DL    Calcium 7.5 (L) 8.3 - 10.6 MG/DL           Electronically signed by Matthew Germain MD on 1/5/2023 at 8:55 AM      Comment: Please note this report has been produced using speech recognition software and may contain errors related to that system including errors in grammar, punctuation, and spelling, as well as words and phrases that may be inappropriate. If there are any questions or concerns please feel free to contact the dictating provider for clarification.

## 2023-01-05 NOTE — PROGRESS NOTES
V2.0  [facility] Critical Care Progress Note      Name:  Lauren Gonzalez /Age/Sex: 1935  (81 y.o. female)   MRN & CSN:  [id number] & [id number] Encounter Date/Time: 2023 3:22 PM EST    Location:  -A PCP: Justin Pruett, [facility] Day: 7    Assessment and Plan:   Lauren Gonzalez is a 81 y.o. female who presents with Respiratory failure with hypoxia ([address]  : Added vancomycin for uptrending leukocytosis, wean FiO2 as able- 80% today, continue current management    Plan:  Acute hypoxic respiratory failure  ARDS  Multifocal bacterial vs viral pneumonia  Sepsis  -Patient required intubation overnight-1/3  Requiring full ventilatory support-FiO2 90%, PEEP-10 mm   -WBC uptrending 21- 27 K today- today, no fevers.   -Continue  meropenem  -Cont decadron ARDS protocol- 20 mg x total 5 days, followed by 10 mg x 5 days  -Continue DuoNebs, Dulera, albuterol as needed  -Started on midodrine,   Repeat pancultures and serum markers ordered for uptrending WBC count-Ua-unremarkable, lactic acid, procalcitonin-WNL, respiratory cultures blood cultures-pending, bilateral lower extremity Doppler-negative       History of diastolic congestive heart failure  -Echocardiogram on -EF 55 to 60%  proBNP-790 on   -Stress test negative for ischemia in 2019    Hypotension  Continue midodrine 10 mg 3 times daily    History of A. fib/a flutter  Continue home amiodarone and Eliquis      Chronic Conditions: Continue all home medications except as stated above or contraindicated.           Diet Diet NPO  ADULT TUBE FEEDING; Orogastric; Peptide Based High Protein; Continuous; 20; Yes; 10; Q 4 hours; 50; 30; Q 4 hours; Protein; Proteinex 1x daily   DVT Prophylaxis [] Lovenox, []  Heparin, [] SCDs, [] Ambulation,  [x] Eliquis, [] Xarelto  [] Coumadin   Code Status Full Code   Disposition From: Home  Expected Disposition: Home  Estimated Date of Discharge: TBD  Patient requires continued admission due to acute respiratory failure   Surrogate Decision Maker/ POA Son     Subjective:   Patient is requiring FiO2 80%, PEEP 10 mm  Repeat pancultures for uptrending WBC, patient is afebrile, continue Merrem, added vancomycin    Review of Systems:    Review of Systems  Patient is on mechanical ventilator, sedated, withdraws to pain in all 4 extremitries . On sedation vacation, per RN-follows commands in all 4 extremities      Objective: Intake/Output Summary (Last 24 hours) at 1/5/2023 1822  Last data filed at 1/5/2023 1818  Gross per 24 hour   Intake 2386.35 ml   Output 500 ml   Net 1886.35 ml          Vitals:   Vitals:    01/05/23 1800   BP: (!) 127/46   Pulse: 87   Resp: 22   Temp:    SpO2: 93%       Physical Exam:     General: Age-appropriate female on full mechanical ventilation  Eyes: EOMI  ENT: neck supple. ETT in place  Cardiovascular: Irregular rate  Respiratory: Rhonchi throughout,  wheezing+  Equal chest expansion on vent. Gastrointestinal: Soft, non tender  Genitourinary: no suprapubic tenderness. Quinones cath in place.   Musculoskeletal: No edema  Skin: warm, dry  Neuro: Sedated  Psych: Unable to assess    Medications:   Medications:    midodrine  10 mg Oral q8h    chlorhexidine  15 mL Mouth/Throat BID    lidocaine PF  5 mL IntraDERmal Once    sodium chloride flush  5-40 mL IntraVENous 2 times per day    guaiFENesin  200 mg Oral Q4H    aspirin  81 mg Per NG tube Daily    lansoprazole  30 mg Per NG tube Daily    lidocaine PF  5 mL IntraDERmal Once    sodium chloride flush  5-40 mL IntraVENous 2 times per day    budesonide-formoterol  2 puff Inhalation BID    amiodarone  200 mg Oral Daily    [Held by provider] amLODIPine  2.5 mg Oral Daily    apixaban  5 mg Oral BID    escitalopram  20 mg Oral Daily    sodium chloride flush  10 mL IntraVENous 2 times per day    ipratropium-albuterol  1 ampule Inhalation Q4H WA    traZODone  50 mg Oral Nightly    dexamethasone  10 mg IntraVENous Daily    meropenem  1,000 mg IntraVENous Q8H      Infusions:    propofol 35 mcg/kg/min (01/05/23 1818)    sodium chloride      sodium chloride      sodium chloride Stopped (01/01/23 1841)     PRN Meds: sodium chloride flush, 5-40 mL, PRN  sodium chloride, , PRN  fentanNYL, 50 mcg, Q1H PRN  midazolam, 1 mg, Q1H PRN  sodium chloride flush, 5-40 mL, PRN  sodium chloride, , PRN  lidocaine viscous hcl, 15 mL, Q6H PRN  phenol, 1 spray, Q2H PRN  sodium chloride flush, 10 mL, PRN  sodium chloride, , PRN  polyethylene glycol, 17 g, Daily PRN  acetaminophen, 650 mg, Q6H PRN   Or  acetaminophen, 650 mg, Q6H PRN  albuterol sulfate HFA, 2 puff, Q4H PRN  guaiFENesin, 200 mg, Q4H PRN  ondansetron, 4 mg, Q6H PRN      Labs      Recent Results (from the past 24 hour(s))   Culture, Respiratory    Collection Time: 01/05/23  3:15 AM    Specimen: Endotracheal   Result Value Ref Range    Specimen ENDOTRACHEAL     Special Requests NONE     Gram Smear RARE  NECROTIC POLYS       Gram Smear RARE  EPITHELIAL CELLS NOTED       Gram Smear RARE  YEAST       Gram Smear MUCOUS    CBC with Auto Differential    Collection Time: 01/05/23  3:15 AM   Result Value Ref Range    WBC 33.5 (HH) 4.0 - 10.5 K/CU MM    RBC 3.36 (L) 4.2 - 5.4 M/CU MM    Hemoglobin 11.1 (L) 12.5 - 16.0 GM/DL    Hematocrit 33.0 (L) 37 - 47 %    MCV 98.2 78 - 100 FL    MCH 33.0 (H) 27 - 31 PG    MCHC 33.6 32.0 - 36.0 %    RDW 12.6 11.7 - 14.9 %    Platelets 415 874 - 229 K/CU MM    MPV 9.0 7.5 - 11.1 FL    Differential Type AUTOMATED DIFFERENTIAL     Segs Relative 88.9 (H) 36 - 66 %    Lymphocytes % 3.0 (L) 24 - 44 %    Monocytes % 2.1 0 - 4 %    Eosinophils % 0.0 0 - 3 %    Basophils % 0.3 0 - 1 %    Segs Absolute 29.8 K/CU MM    Lymphocytes Absolute 1.0 K/CU MM    Monocytes Absolute 0.7 K/CU MM    Eosinophils Absolute 0.0 K/CU MM    Basophils Absolute 0.1 K/CU MM    Nucleated RBC % 0.0 %    Total Nucleated RBC 0.0 K/CU MM    Total Immature Neutrophil 1.92 K/CU MM    Immature Neutrophil % 5.7 (H) 0 - 0.43 % Basic Metabolic Panel w/ Reflex to MG    Collection Time: 01/05/23  3:15 AM   Result Value Ref Range    Sodium 137 135 - 145 MMOL/L    Potassium 3.6 3.5 - 5.1 MMOL/L    Chloride 96 (L) 99 - 110 mMol/L    CO2 31 21 - 32 MMOL/L    Anion Gap 10 4 - 16    BUN 39 (H) 6 - 23 MG/DL    Creatinine 0.8 0.6 - 1.1 MG/DL    Est, Glom Filt Rate >60 >60 mL/min/1.73m2    Glucose 194 (H) 70 - 99 MG/DL    Calcium 7.5 (L) 8.3 - 10.6 MG/DL   Magnesium    Collection Time: 01/05/23 11:04 AM   Result Value Ref Range    Magnesium 2.7 (H) 1.8 - 2.4 mg/dl        Imaging/Diagnostics Last 24 Hours   XR CHEST PORTABLE    Result Date: 12/31/2022  EXAMINATION: ONE XRAY VIEW OF THE CHEST 12/31/2022 4:57 am COMPARISON: 12/27/2022 HISTORY: ORDERING SYSTEM PROVIDED HISTORY: f/u ARDS TECHNOLOGIST PROVIDED HISTORY: Reason for exam:->f/u ARDS Reason for Exam: f/u ARDS FINDINGS: Cardiomediastinal silhouette is stable. Similar bilateral airspace opacities. No pleural effusion or pneumothorax. No gross bony abnormality.      Stable chest.     Total critical care time 38  minutes   Patient is critical with acute respiratory failure  Urine is stable reviewed labs, imaging, and discussed with treatment care plan with attending    Critical care time excludes separately billable procedure    Electronically signed by Marian Reyes PA-C on 1/5/2023 at 6:22 PM

## 2023-01-06 NOTE — PROGRESS NOTES
Pt noted to be at Sp02 88-89% on Fi02 75%. Dr. Xi Waggoner at bedside, titrated Fi02 to 100% and ordered for ABG after an hour. Ghazal VILLATORO  Will continue to monitor pt

## 2023-01-06 NOTE — PROGRESS NOTES
In-Patient Progress Note    Patient:  Merri Fleischer 81 y.o. female MRN: [id number]     Date of Service: 1/6/2023    Hospital Day: 8      Chief complaint: had no chief complaint listed for this encounter. Assessment and Plan   Merri Fleischer, a 81 y.o. female, with a history of hypertension (amlodipine 2.5 Mg daily), A. fib (amiodarone 200 Mg daily, apixaban 5 Mg twice daily), depression/anxiety (Lexapro 20 Mg daily), osteoporosis (alendronate 70 Mg weekly), GERD (pantoprazole 40 Mg daily), was initially admitted to [facility] on 12/22/2022 with complaints of shortness of breath, lethargy, wheezing; with diagnosis of acute on chronic respiratory failure with hypoxia secondary to acute bilateral lower lobe pneumonia. She required supplemental oxygen on admission at 3 LPM NC. Throughout the course admission, continue to require increased amounts of oxygen. Antibiotics were broadened to cefepime/ doxy, started on steroids, and scheduled bronchodilators/ICS. She appeared overall comfortable w/o distress however biochemical work-up/imaging suggested worsening condition. Repeat CTA on 12/28 negative for PE but extensive mixed groundglass and consolidative opacities that were increasing. Viral panel, urinary antigens, MRSA swab, BLC and sputum culture were all negative. Overnight reported increased distress with hypoxia and subsequent anxiety. Oxygen titrated up to 15 LPM High flow. Discussed need for increased care/ evaluation and going to pursue transfer to higher level of care. She was accepted and transferred to Psychiatric ICU for intensivist evaluation. She was given 20mg IV decadron and started on BiPAP prior to departure to Psychiatric on  12/30/2022. Assessment and plan    Acute hypoxic respiratory failure  ARDS 2/2 multifocal pneumonia  Multifocal pneumonia, likely viral, suspected superimposed bacterial pneumonia  Mechanical ventilation since 1/3/2023  Patient presented with hypoxic respiratory failure. CTA chest  shows no evidence of PE but did show extensive mixed groundglass and consolidative opacities bilaterally, increased from . Respiratory viral panel -. Urine strep pneumoniae and Legionella antigen negative-. Elevated inflammatory markers. .2. Vancomycin stopped after MRSA nares screen came negative. Continued on HFNC initially but O2 saturation could not be maintained and patient had be intubated for severe hypoxia on 1/3/2023 around 1am.  -Continued on meropenem  -Decadron: completed 20 Mg for 5 days, now on 10 Mg  -ARDS compliant vent management as able  -Wean FiO2 as able    Hypotension, likely multifactorial, PAP, sedation  -on midodrine  -can consider switching propofol to other sedatives if needed    Anxiety/depression  -Continue escitalopram    History of A. fib/a flutter  -Continue amiodarone and Eliquis    History of heart failure preserved ejection fraction  EF 55-60% per echo on . proBNP 790 on . Stress test was done back in  which was negative for reversible perfusion defect. -on Lasix 20 Mg daily - continue to monitor volume status closely      # Peptic ulcer prophylaxis: Pantoprazole  # DVT Prophylaxis: Eliquis  #CODE STATUS: Full code      Current living situation: -  Expected Disposition: -  Estimated discharge date: -      Review of System     Review of Systems  -could not be reviewed     I have reviewed all pertinent PMHx, PSHx, FamHx, SocialHx, medications, and allergies and updated history as appropriate.     Physical Exam   VITAL SIGNS:  BP (!) 105/38   Pulse 80   Temp 97.9 °F (36.6 °C) (Rectal)   Resp 18   Ht 5' 8.11\" (1.73 m)   Wt 183 lb 13.8 oz (83.4 kg)   SpO2 94%   BMI 27.87 kg/m²   Tmax over 24 hours:  Temp (24hrs), Av.4 °F (36.9 °C), Min:97.9 °F (36.6 °C), Max:99 °F (37.2 °C)      Patient Vitals for the past 6 hrs:   Temp Temp src Pulse Resp SpO2   23 0842 -- -- 80 18 94 %   23 0839 -- -- 81 17 94 %   23 0836 -- -- 80 18 94 %   01/06/23 0700 -- -- 82 18 95 %   01/06/23 0600 -- -- 77 16 96 %   01/06/23 0500 -- -- 77 15 96 %   01/06/23 0413 97.9 °F (36.6 °C) Rectal 74 12 93 %   01/06/23 0350 -- -- 74 12 94 %           Intake/Output Summary (Last 24 hours) at 1/6/2023 0901  Last data filed at 1/6/2023 0514  Gross per 24 hour   Intake 1895.67 ml   Output 1000 ml   Net 895.67 ml       Wt Readings from Last 2 Encounters:   01/01/23 183 lb 13.8 oz (83.4 kg)   12/28/22 172 lb 6.4 oz (78.2 kg)     Body mass index is 27.87 kg/m². Physical Exam  Constitutional:       General: She is not in acute distress. Appearance: She is well-developed. She is ill-appearing. HENT:      Head: Normocephalic and atraumatic. Right Ear: External ear normal.      Left Ear: External ear normal.      Nose: Nose normal.   Eyes:      General: No scleral icterus. Right eye: No discharge. Left eye: No discharge. Conjunctiva/sclera: Conjunctivae normal.      Pupils: Pupils are equal, round, and reactive to light. Neck:      Thyroid: No thyromegaly. Vascular: No JVD. Trachea: No tracheal deviation. Cardiovascular:      Rate and Rhythm: Normal rate and regular rhythm. Heart sounds: Normal heart sounds. No murmur heard. No friction rub. No gallop. Pulmonary:      Effort: Pulmonary effort is normal. No respiratory distress. Breath sounds: Normal breath sounds. No stridor. No wheezing or rales. Chest:      Chest wall: No tenderness. Abdominal:      General: Bowel sounds are normal. There is no distension. Palpations: Abdomen is soft. There is no mass. Tenderness: There is no abdominal tenderness. There is no guarding or rebound. Hernia: No hernia is present. Genitourinary:     Vagina: Normal. No vaginal discharge. Rectum: Guaiac result negative. Musculoskeletal:         General: No tenderness or deformity. Normal range of motion.       Cervical back: Normal range of motion and neck supple. Lymphadenopathy:      Cervical: No cervical adenopathy. Skin:     General: Skin is warm and dry. Capillary Refill: Capillary refill takes less than 2 seconds. Coloration: Skin is not pale. Findings: No erythema or rash. Neurological:      Mental Status: She is alert. Motor: No abnormal muscle tone. Comments: Sedated and mechanically ventilated   Psychiatric:      Comments: Sedated and mechanically ventilated         Current Medications      midodrine  10 mg Oral q8h    chlorhexidine  15 mL Mouth/Throat BID    lidocaine PF  5 mL IntraDERmal Once    sodium chloride flush  5-40 mL IntraVENous 2 times per day    guaiFENesin  200 mg Oral Q4H    aspirin  81 mg Per NG tube Daily    lansoprazole  30 mg Per NG tube Daily    lidocaine PF  5 mL IntraDERmal Once    sodium chloride flush  5-40 mL IntraVENous 2 times per day    budesonide-formoterol  2 puff Inhalation BID    amiodarone  200 mg Oral Daily    [Held by provider] amLODIPine  2.5 mg Oral Daily    apixaban  5 mg Oral BID    escitalopram  20 mg Oral Daily    sodium chloride flush  10 mL IntraVENous 2 times per day    ipratropium-albuterol  1 ampule Inhalation Q4H WA    traZODone  50 mg Oral Nightly    dexamethasone  10 mg IntraVENous Daily    meropenem  1,000 mg IntraVENous Q8H         Labs and Imaging Studies   Laboratory findings:  XR CHEST PORTABLE    Result Date: 12/31/2022  EXAMINATION: ONE XRAY VIEW OF THE CHEST 12/31/2022 4:57 am COMPARISON: 12/27/2022 HISTORY: ORDERING SYSTEM PROVIDED HISTORY: f/u ARDS TECHNOLOGIST PROVIDED HISTORY: Reason for exam:->f/u ARDS Reason for Exam: f/u ARDS FINDINGS: Cardiomediastinal silhouette is stable. Similar bilateral airspace opacities. No pleural effusion or pneumothorax. No gross bony abnormality.      Stable chest.       Recent Results (from the past 24 hour(s))   Magnesium    Collection Time: 01/05/23 11:04 AM   Result Value Ref Range    Magnesium 2.7 (H) 1.8 - 2.4 mg/dl   Blood gas, arterial    Collection Time: 01/05/23 11:15 PM   Result Value Ref Range    pH, Bld 7.53 (H) 7.34 - 7.45    pCO2, Arterial 46.0 (H) 32 - 45 MMHG    pO2, Arterial 71 (L) 75 - 100 MMHG    Base Exc, Mixed 13.9 (H) 0 - 2.3    HCO3, Arterial 38.4 (H) 18 - 23 MMOL/L    CO2 Content 39.8 (H) 19 - 24 MMOL/L    O2 Sat 92.0 (L) 96 - 97 %    Carbon Monoxide, Blood 1.5 0 - 5 %    Methemoglobin, Arterial 1.9 (H) <1.5 %    Comment P34 R15 +10 100%    CBC with Auto Differential    Collection Time: 01/06/23  5:45 AM   Result Value Ref Range    WBC 34.9 (HH) 4.0 - 10.5 K/CU MM    RBC 3.09 (L) 4.2 - 5.4 M/CU MM    Hemoglobin 10.2 (L) 12.5 - 16.0 GM/DL    Hematocrit 30.6 (L) 37 - 47 %    MCV 99.0 78 - 100 FL    MCH 33.0 (H) 27 - 31 PG    MCHC 33.3 32.0 - 36.0 %    RDW 12.9 11.7 - 14.9 %    Platelets 015 360 - 632 K/CU MM    MPV 9.2 7.5 - 11.1 FL    Differential Type AUTOMATED DIFFERENTIAL     Segs Relative 86.3 (H) 36 - 66 %    Lymphocytes % 3.3 (L) 24 - 44 %    Monocytes % 1.8 0 - 4 %    Eosinophils % 0.1 0 - 3 %    Basophils % 0.3 0 - 1 %    Segs Absolute 30.2 K/CU MM    Lymphocytes Absolute 1.2 K/CU MM    Monocytes Absolute 0.6 K/CU MM    Eosinophils Absolute 0.0 K/CU MM    Basophils Absolute 0.1 K/CU MM    Nucleated RBC % 0.0 %    Total Nucleated RBC 0.0 K/CU MM    Total Immature Neutrophil 2.85 K/CU MM    Immature Neutrophil % 8.2 (H) 0 - 0.43 %   Basic Metabolic Panel w/ Reflex to MG    Collection Time: 01/06/23  5:45 AM   Result Value Ref Range    Sodium 136 135 - 145 MMOL/L    Potassium 3.8 3.5 - 5.1 MMOL/L    Chloride 94 (L) 99 - 110 mMol/L    CO2 34 (H) 21 - 32 MMOL/L    Anion Gap 8 4 - 16    BUN 37 (H) 6 - 23 MG/DL    Creatinine 0.7 0.6 - 1.1 MG/DL    Est, Glom Filt Rate >60 >60 mL/min/1.73m2    Glucose 159 (H) 70 - 99 MG/DL    Calcium 7.2 (L) 8.3 - 10.6 MG/DL   Vancomycin Level, Random    Collection Time: 01/06/23  5:45 AM   Result Value Ref Range    Vancomycin Rm 13.9 UG/ML    DOSE AMOUNT DOSE AMT.  GIVEN - UNKNOWN     DOSE TIME DOSE TIME GIVEN - UNKNOWN            Electronically signed by Anastasiya Chen MD on 1/6/2023 at 9:01 AM      Comment: Please note this report has been produced using speech recognition software and may contain errors related to that system including errors in grammar, punctuation, and spelling, as well as words and phrases that may be inappropriate. If there are any questions or concerns please feel free to contact the dictating provider for clarification.

## 2023-01-06 NOTE — PROGRESS NOTES
Comprehensive Nutrition Assessment    Type and Reason for Visit:  Reassess    Nutrition Recommendations/Plan:   Restart TF as soon as possible  Will monitor GI tolerance, respiratory status, nutrition status, poc     Malnutrition Assessment:  Malnutrition Status:  No malnutrition (01/03/23 1228)    Context:  Acute Illness     Nutrition Assessment:    pt remains sedated on vent, noted TF off at time of visit, spoke with RN who states will restart TF, per RN pt was tolerating TF at 50 ml/hr prior to being shut off, will continue to follow at high nutrition risk    Nutrition Related Findings:    WBC 34.9, +BM 1/6 per flowsheet Wound Type: None       Current Nutrition Intake & Therapies:    Average Meal Intake: NPO  Average Supplements Intake: NPO  Diet NPO  ADULT TUBE FEEDING; Orogastric; Peptide Based High Protein; Continuous; 20; Yes; 10; Q 4 hours; 50; 30; Q 4 hours; Protein; Proteinex 1x daily (not running at this time)  Additional Calorie Sources:  pt is receiving ~452 kcal from current propofol rate    Anthropometric Measures:  Height: 5' 8.11\" (173 cm)  Ideal Body Weight (IBW): 141 lbs (64 kg)       Current Body Weight: 183 lb 13.8 oz (83.4 kg) ((1/1/23)), 130.4 % IBW. Weight Source: Bed Scale  Current BMI (kg/m2): 27.9        Weight Adjustment For: No Adjustment                 BMI Categories: Overweight (BMI 25.0-29. 9)    Estimated Daily Nutrient Needs:  Energy (kcal/day): 1537 ([place])  Weight Used for Protein Requirements: Ideal  Protein (g/day): 128 (2.0 g/kg)  Method Used for Fluid Requirements: 1 ml/kcal    Nutrition Diagnosis:   Inadequate oral intake related to acute injury/trauma as evidenced by NPO or clear liquid status due to medical condition    Nutrition Interventions:   Food and/or Nutrient Delivery: Continue Current Tube Feeding  Nutrition Education/Counseling: No recommendation at this time  Coordination of Nutrition Care: Continue to monitor while inpatient  Plan of Care discussed with: RN    Goals:  Previous Goal Met: Progressing toward Goal(s)  Goals: Meet at least 75% of estimated needs, Tolerate nutrition support at goal rate, by next RD assessment       Nutrition Monitoring and Evaluation:   Behavioral-Environmental Outcomes: None Identified  Food/Nutrient Intake Outcomes: Enteral Nutrition Intake/Tolerance  Physical Signs/Symptoms Outcomes: Biochemical Data, GI Status, Hemodynamic Status, Weight, Skin    Discharge Planning:     Too soon to determine     Waleska Moreno [address], [address], [place]  Contact: [phone]

## 2023-01-06 NOTE — PROGRESS NOTES
01/06/23 0836   Patient Observation   Heart Rate 80   Resp 18   SpO2 94 %   Vent Information   Vent Mode AC/PC   $Ventilation $Subsequent Day   Ventilator Settings   FiO2  100 %   Resp Rate (Set) 15 bmp   PEEP/CPAP (cmH2O) 10   Pressure Support (cm H2O) 0 cm H2O   Vent Patient Data (Readings)   Vt (Measured) 656 mL   Peak Inspiratory Pressure (cmH2O) 39 cmH2O   Rate Measured 18 br/min   Minute Volume (L/min) 11.6 Liters   Mean Airway Pressure (cmH2O) 18 cmH20   Plateau Pressure (cm H2O) 38 cm H2O   I:E Ratio 1:2.90   Flow Sensitivity 3 L/min   I Time/ I Time % 0 s   Backup Apnea On   Backup Rate 15 Breaths Per Minute   Vent Alarm Settings   High Pressure (cmH2O) 50 cmH2O   Low Minute Volume (lpm) 2.5 L/min   High Minute Volume (lpm) 20 L/min   Low Exhaled Vt (ml) 250 mL   High Exhaled Vt (ml) 1000 mL   RR High (bpm) 40 br/min   Apnea (secs) 20 secs   Additional Respiratoray Assessments   Humidification Source HME   ETT    Placement Date/Time: 01/03/23 0200   Present on Admission/Arrival: No  Placed By: (c) Licensed provider;RT;RN  Placement Verified By: Capnometry; Auscultation; Chest X-ray  Preoxygenation: Yes  Mask Ventilation: Ventilated by mask (1)  Technique: Stylet. ..    Secured At 22 cm   Measured From Lips   ETT Placement Center   Secured By Commercial tube [email or web address]   Cuff Pressure   (mov)

## 2023-01-06 NOTE — PLAN OF CARE
Problem: Discharge Planning  Goal: Discharge to home or other facility with appropriate resources  Outcome: Progressing  Flowsheets  Taken 1/6/2023 1600  Discharge to home or other facility with appropriate resources: Identify barriers to discharge with patient and caregiver  Taken 1/6/2023 1200  Discharge to home or other facility with appropriate resources: Identify discharge learning needs (meds, wound care, etc)  Taken 1/6/2023 0800  Discharge to home or other facility with appropriate resources: Identify barriers to discharge with patient and caregiver     Problem: Safety - Adult  Goal: Free from fall injury  Outcome: Progressing     Problem: ABCDS Injury Assessment  Goal: Absence of physical injury  Outcome: Progressing     Problem: Pain  Goal: Verbalizes/displays adequate comfort level or baseline comfort level  Outcome: Progressing     Problem: Skin/Tissue Integrity  Goal: Absence of new skin breakdown  Description: 1. Monitor for areas of redness and/or skin breakdown  2. Assess vascular access sites hourly  3. Every 4-6 hours minimum:  Change oxygen saturation probe site  4. Every 4-6 hours:  If on nasal continuous positive airway pressure, respiratory therapy assess nares and determine need for appliance change or resting period. Outcome: Progressing     Problem: Safety - Medical Restraint  Goal: Remains free of injury from restraints (Restraint for Interference with Medical Device)  Description: INTERVENTIONS:  1. Determine that other, less restrictive measures have been tried or would not be effective before applying the restraint  2. Evaluate the patient's condition at the time of restraint application  3. Inform patient/family regarding the reason for restraint  4.  Q2H: Monitor safety, psychosocial status, comfort, nutrition and hydration  Outcome: Progressing  Flowsheets  Taken 1/6/2023 1800 by Patricia Early RN  Remains free of injury from restraints (restraint for interference with medical device): Every 2 hours: Monitor safety, psychosocial status, comfort, nutrition and hydration  Taken 1/6/2023 1700 by Beatriz Dawson RN  Remains free of injury from restraints (restraint for interference with medical device): Every 2 hours: Monitor safety, psychosocial status, comfort, nutrition and hydration  Taken 1/6/2023 1600 by Beatriz Dawson RN  Remains free of injury from restraints (restraint for interference with medical device): Every 2 hours: Monitor safety, psychosocial status, comfort, nutrition and hydration  Taken 1/6/2023 1500 by Beatriz Dawson RN  Remains free of injury from restraints (restraint for interference with medical device): Every 2 hours: Monitor safety, psychosocial status, comfort, nutrition and hydration  Taken 1/6/2023 1400 by Beatriz Dawson RN  Remains free of injury from restraints (restraint for interference with medical device): Every 2 hours: Monitor safety, psychosocial status, comfort, nutrition and hydration  Taken 1/6/2023 1300 by Beatriz Dawson RN  Remains free of injury from restraints (restraint for interference with medical device): Every 2 hours: Monitor safety, psychosocial status, comfort, nutrition and hydration  Taken 1/6/2023 1200 by Beatriz Dawson RN  Remains free of injury from restraints (restraint for interference with medical device): Every 2 hours: Monitor safety, psychosocial status, comfort, nutrition and hydration  Taken 1/6/2023 1100 by Beatriz Dawson RN  Remains free of injury from restraints (restraint for interference with medical device): Every 2 hours: Monitor safety, psychosocial status, comfort, nutrition and hydration  Taken 1/6/2023 1000 by Beatriz Dawson RN  Remains free of injury from restraints (restraint for interference with medical device): Every 2 hours: Monitor safety, psychosocial status, comfort, nutrition and hydration  Taken 1/6/2023 0848 by Beatriz Dawson RN  Remains free of injury from restraints (restraint for interference with medical device): Every 2 hours: Monitor safety, psychosocial status, comfort, nutrition and hydration  Taken 1/6/2023 0700 by Hugo Rodriguez RN  Remains free of injury from restraints (restraint for interference with medical device): Every 2 hours: Monitor safety, psychosocial status, comfort, nutrition and hydration  Taken 1/6/2023 0600 by Hugo Rodriguez RN  Remains free of injury from restraints (restraint for interference with medical device): Every 2 hours: Monitor safety, psychosocial status, comfort, nutrition and hydration  Taken 1/6/2023 0500 by Hugo Rodriguez RN  Remains free of injury from restraints (restraint for interference with medical device): Every 2 hours: Monitor safety, psychosocial status, comfort, nutrition and hydration     Problem: Nutrition Deficit:  Goal: Optimize nutritional status  Outcome: Progressing  Flowsheets (Taken 1/6/2023 9416 by Juanita Andrews, MS, RD, LD)  Nutrient intake appropriate for improving, restoring, or maintaining nutritional needs: Recommend, monitor, and adjust tube feedings and TPN/PPN based on assessed needs

## 2023-01-06 NOTE — CONSULTS
[facility]  consulted by Dr. Ryann Guevara for monitoring and adjustment. Indication for treatment: Vancomycin indication: Sepsis unknown source likely secondary to pna? Pt with significant worsening leukocytosis and  oxygenation  Goal trough: Trough Goal: 15-20 mcg/mL  AUC/DEBORAH: 400-600    Risk Factors for MRSA Identified:   Hospitalization within the past 90 days, Received IV antibiotics within the past 90 days    Pertinent Laboratory Values:   Temp Readings from Last 3 Encounters:   01/06/23 97.9 °F (36.6 °C) (Rectal)   12/30/22 99.8 °F (37.7 °C) (Oral)   09/30/22 97.3 °F (36.3 °C)     Recent Labs     01/04/23  0520 01/04/23  1016 01/05/23 0315 01/06/23  0545   WBC 27.1*  --  33.5* 34.9*   LACTATE  --  1.8  --   --        Recent Labs     01/04/23  0520 01/05/23 0315 01/06/23  0545   BUN 35* 39* 37*   CREATININE 0.8 0.8 0.7       Estimated Creatinine Clearance: 65 mL/min (based on SCr of 0.7 mg/dL). Intake/Output Summary (Last 24 hours) at 1/6/2023 1224  Last data filed at 1/6/2023 0514  Gross per 24 hour   Intake 1895.67 ml   Output 1000 ml   Net 895.67 ml         Pertinent Cultures:   Date    Source    Results  1/4   Blood    Ordered  1/4   Sputum/Respiratory  In process  1/1   MRSA Nasal   Negative    Vancomycin level:   TROUGH:  No results for input(s): VANCOTROUGH in the last 72 hours. RANDOM:    Recent Labs     01/06/23  0545   VANCORANDOM 13.9         Assessment:  HPI: Pt is 81 yof currently intubated and sedated in ICU with ARDS. Pt was initially started on  ceftriaxone, then escalated to cefepime x 6 days, then escalated to vanco  and meropenem. Vanco stopped after improvement and negative MRSA nares. Pt with worsening leukocytosis. Pt afebrile with negative PCT. ICU doing complete workup to evaluate leukocytosis.  Culture are pending  SCr, BUN, and urine output: stable, good UOP  Day(s) of therapy: 2 (restarted)  Vancomycin concentration:  1/06: 13.9, collected 17h post-dose, AUC     Plan:  Patient received vancomycin 2000 mg IVPB x1, followed by 1500 mg IVPB q24h  Repeat next level in 48h  Cultures are ordered to aid in de-ecalation  Pharmacy will continue to monitor patient and adjust therapy as indicated    Adiel 3 1/8 @ 0600    Thank you for the consult,  Jessenia Jeronimo [facility], PharmD  1/6/2023 12:24 PM

## 2023-01-06 NOTE — PROGRESS NOTES
V2.0  [facility] [place] Critical Care Progress Note      Name:  Juma Hannah /Age/Sex: 1935  (81 y.o. female)   MRN & CSN:  [id number] & [id number] Encounter Date/Time: 2023 3:22 PM EST    Location:  -A PCP: Kristy Erwin, [facility], [place] Day: 8    Assessment and Plan:   Juma Hannah is a 81 y.o. female who presents with Respiratory failure with hypoxia ([address]    Plan:  Acute hypoxic respiratory failure  ARDS  Multifocal bacterial vs viral pneumonia  Sepsis  -Patient required intubation overnight-1/3  -Requiring full ventilatory support-FiO2 100%, PEEP-10 mm   -WBC uptrending 34.9K today-afebrile   -Continue meropenem/vancomycin  -Cont decadron ARDS protocol- 20 mg x total 5 days, currently on 10 mg x 5 days  -Continue DuoNebs, Dulera, albuterol as needed  -Sputum culture from  growing Candida albicans, blood cultures pending     History of diastolic congestive heart failure  -Echocardiogram on -EF 55 to 60%  -proBNP-790 on   -Stress test negative for ischemia in     Hypotension-resolved  -Continue midodrine 10 mg 3 times daily; hold for SBP greater than 110    History of A. fib/a flutter  -Continue home amiodarone and Eliquis      Chronic Conditions: Continue all home medications except as stated above or contraindicated. Diet Diet NPO  ADULT TUBE FEEDING; Orogastric; Peptide Based High Protein; Continuous; 20; Yes; 10; Q 4 hours; 50; 30; Q 4 hours; Protein; Proteinex 1x daily   DVT Prophylaxis [] Lovenox, []  Heparin, [] SCDs, [] Ambulation,  [x] Eliquis, [] Xarelto  [] Coumadin   Code Status Full Code   Disposition From: Home  Expected Disposition: Home  Estimated Date of Discharge: TBD  Patient requires continued admission due to acute respiratory failure   Surrogate Decision Maker/ POA Son     Subjective:   Patient seen and examined. She continues to require high ventilatory settings with FiO2 100%, PEEP 10 mm.   She is sedated though 35 on propofol became quite agitated with any interruption in sedation. Review of Systems:    Review of Systems  Unable to obtain secondary to mechanical ventilation    Objective: Intake/Output Summary (Last 24 hours) at 1/6/2023 0907  Last data filed at 1/6/2023 0514  Gross per 24 hour   Intake 1895.67 ml   Output 1000 ml   Net 895.67 ml          Vitals:   Vitals:    01/06/23 0842   BP:    Pulse: 80   Resp: 18   Temp:    SpO2: 94%       Physical Exam:     General: Age-appropriate female on full mechanical ventilation  Eyes: EOMI  ENT: neck supple. ETT in place  Cardiovascular: Irregular rate  Respiratory: Rhonchi throughout,  wheezing+  Equal chest expansion on vent. Gastrointestinal: Soft, non tender  Genitourinary: no suprapubic tenderness. Quinones cath in place.   Musculoskeletal: No edema  Skin: warm, dry  Neuro: Sedated  Psych: Unable to assess    Medications:   Medications:    midodrine  10 mg Oral q8h    chlorhexidine  15 mL Mouth/Throat BID    lidocaine PF  5 mL IntraDERmal Once    sodium chloride flush  5-40 mL IntraVENous 2 times per day    guaiFENesin  200 mg Oral Q4H    aspirin  81 mg Per NG tube Daily    lansoprazole  30 mg Per NG tube Daily    lidocaine PF  5 mL IntraDERmal Once    sodium chloride flush  5-40 mL IntraVENous 2 times per day    budesonide-formoterol  2 puff Inhalation BID    amiodarone  200 mg Oral Daily    [Held by provider] amLODIPine  2.5 mg Oral Daily    apixaban  5 mg Oral BID    escitalopram  20 mg Oral Daily    sodium chloride flush  10 mL IntraVENous 2 times per day    ipratropium-albuterol  1 ampule Inhalation Q4H WA    traZODone  50 mg Oral Nightly    dexamethasone  10 mg IntraVENous Daily    meropenem  1,000 mg IntraVENous Q8H      Infusions:    propofol 35 mcg/kg/min (01/06/23 0430)    sodium chloride      sodium chloride      sodium chloride Stopped (01/01/23 1841)     PRN Meds: sodium chloride flush, 5-40 mL, PRN  sodium chloride, , PRN  fentanNYL, 50 mcg, Q1H PRN  midazolam, 1 mg, Q1H PRN  sodium chloride flush, 5-40 mL, PRN  sodium chloride, , PRN  lidocaine viscous hcl, 15 mL, Q6H PRN  phenol, 1 spray, Q2H PRN  sodium chloride flush, 10 mL, PRN  sodium chloride, , PRN  polyethylene glycol, 17 g, Daily PRN  acetaminophen, 650 mg, Q6H PRN   Or  acetaminophen, 650 mg, Q6H PRN  albuterol sulfate HFA, 2 puff, Q4H PRN  guaiFENesin, 200 mg, Q4H PRN  ondansetron, 4 mg, Q6H PRN      Labs      Recent Results (from the past 24 hour(s))   Magnesium    Collection Time: 01/05/23 11:04 AM   Result Value Ref Range    Magnesium 2.7 (H) 1.8 - 2.4 mg/dl   Blood gas, arterial    Collection Time: 01/05/23 11:15 PM   Result Value Ref Range    pH, Bld 7.53 (H) 7.34 - 7.45    pCO2, Arterial 46.0 (H) 32 - 45 MMHG    pO2, Arterial 71 (L) 75 - 100 MMHG    Base Exc, Mixed 13.9 (H) 0 - 2.3    HCO3, Arterial 38.4 (H) 18 - 23 MMOL/L    CO2 Content 39.8 (H) 19 - 24 MMOL/L    O2 Sat 92.0 (L) 96 - 97 %    Carbon Monoxide, Blood 1.5 0 - 5 %    Methemoglobin, Arterial 1.9 (H) <1.5 %    Comment P34 R15 +10 100%    CBC with Auto Differential    Collection Time: 01/06/23  5:45 AM   Result Value Ref Range    WBC 34.9 (HH) 4.0 - 10.5 K/CU MM    RBC 3.09 (L) 4.2 - 5.4 M/CU MM    Hemoglobin 10.2 (L) 12.5 - 16.0 GM/DL    Hematocrit 30.6 (L) 37 - 47 %    MCV 99.0 78 - 100 FL    MCH 33.0 (H) 27 - 31 PG    MCHC 33.3 32.0 - 36.0 %    RDW 12.9 11.7 - 14.9 %    Platelets 690 582 - 390 K/CU MM    MPV 9.2 7.5 - 11.1 FL    Differential Type AUTOMATED DIFFERENTIAL     Segs Relative 86.3 (H) 36 - 66 %    Lymphocytes % 3.3 (L) 24 - 44 %    Monocytes % 1.8 0 - 4 %    Eosinophils % 0.1 0 - 3 %    Basophils % 0.3 0 - 1 %    Segs Absolute 30.2 K/CU MM    Lymphocytes Absolute 1.2 K/CU MM    Monocytes Absolute 0.6 K/CU MM    Eosinophils Absolute 0.0 K/CU MM    Basophils Absolute 0.1 K/CU MM    Nucleated RBC % 0.0 %    Total Nucleated RBC 0.0 K/CU MM    Total Immature Neutrophil 2.85 K/CU MM    Immature Neutrophil % 8.2 (H) 0 - 0.43 %   Basic Metabolic Panel w/ Reflex to [facility]    Collection Time: 01/06/23  5:45 AM   Result Value Ref Range    Sodium 136 135 - 145 MMOL/L    Potassium 3.8 3.5 - 5.1 MMOL/L    Chloride 94 (L) 99 - 110 mMol/L    CO2 34 (H) 21 - 32 MMOL/L    Anion Gap 8 4 - 16    BUN 37 (H) 6 - 23 MG/DL    Creatinine 0.7 0.6 - 1.1 MG/DL    Est, Glom Filt Rate >60 >60 mL/min/1.73m2    Glucose 159 (H) 70 - 99 MG/DL    Calcium 7.2 (L) 8.3 - 10.6 MG/DL   Vancomycin Level, Random    Collection Time: 01/06/23  5:45 AM   Result Value Ref Range    Vancomycin Rm 13.9 UG/ML    DOSE AMOUNT DOSE AMT. GIVEN - UNKNOWN     DOSE TIME DOSE TIME GIVEN - UNKNOWN         Imaging/Diagnostics Last 24 Hours   XR CHEST PORTABLE    Result Date: 12/31/2022  EXAMINATION: ONE XRAY VIEW OF THE CHEST 12/31/2022 4:57 am COMPARISON: 12/27/2022 HISTORY: ORDERING SYSTEM PROVIDED HISTORY: f/u ARDS TECHNOLOGIST PROVIDED HISTORY: Reason for exam:->f/u ARDS Reason for Exam: f/u ARDS FINDINGS: Cardiomediastinal silhouette is stable. Similar bilateral airspace opacities. No pleural effusion or pneumothorax. No gross bony abnormality.      Stable chest.     Total critical care time 38  minutes   Patient is critical with acute respiratory failure  Urine is stable reviewed labs, imaging, and discussed with treatment care plan with attending    Critical care time excludes separately billable procedure    Electronically signed by STEVENSON Patton CNP on 1/6/2023 at 9:07 AM

## 2023-01-07 NOTE — PROGRESS NOTES
V2.0  [facility] [place]      Name:  Roshan Garrison /Age/Sex: 1935  (81 y.o. female)   MRN & CSN:  [id number] & [id number] Encounter Date/Time: 2023 3:22 PM EST    Location:  -A PCP: Ruma Patel, [facility] Day: 9    Assessment and Plan:   Roshan Garrison is a 81 y.o. female who presents with Respiratory failure with hypoxia ([facility] [address]    Plan:  Acute hypoxic respiratory failure  ARDS  Multifocal bacterial vs viral pneumonia  Sepsis  -Patient required intubation overnight-1/3  -Requiring full ventilatory support-FiO2 90%, PEEP-12 mm   -WBC slightly down-trending 33.6K today-afebrile   -Continue meropenem/vancomycin  -Cont decadron ARDS protocol- 20 mg x total 5 days, currently on 10 mg x 5 days  -Continue DuoNebs, Dulera, albuterol as needed  -Sputum culture from  growing Candida albicans, blood cultures pending     History of diastolic congestive heart failure  -Echocardiogram on -EF 55 to 60%  -proBNP-790 on   -Stress test negative for ischemia in     Hypotension-resolved  -Decrease midodrine to 5 mg 3 times daily; hold for SBP greater than 110    History of A. fib/a flutter  -Continue home amiodarone and Eliquis      Chronic Conditions: Continue all home medications except as stated above or contraindicated. Diet Diet NPO  ADULT TUBE FEEDING; Orogastric; Peptide Based High Protein; Continuous; 20; Yes; 10; Q 4 hours; 50; 30; Q 4 hours; Protein; Proteinex 1x daily   DVT Prophylaxis [] Lovenox, []  Heparin, [] SCDs, [] Ambulation,  [x] Eliquis, [] Xarelto  [] Coumadin   Code Status Full Code   Disposition From: Home  Expected Disposition: Home  Estimated Date of Discharge: TBD  Patient requires continued admission due to acute respiratory failure   Surrogate Decision Maker/ POA Son     Subjective:   Patient seen and examined. She continues to require high ventilatory settings with FiO2 90%, PEEP 12 mm.   She is sedated though 35 on propofol became quite agitated with any interruption in sedation. Review of Systems:    Review of Systems  Unable to obtain secondary to mechanical ventilation    Objective: Intake/Output Summary (Last 24 hours) at 1/7/2023 0814  Last data filed at 1/7/2023 0645  Gross per 24 hour   Intake 948 ml   Output 2400 ml   Net -1452 ml          Vitals:   Vitals:    01/07/23 0600   BP:    Pulse: 78   Resp: 13   Temp:    SpO2: 95%       Physical Exam:     General: Age-appropriate female on full mechanical ventilation  Eyes: EOMI  ENT: neck supple. ETT in place  Cardiovascular: Irregular rate  Respiratory: Rhonchi throughout,  wheezing+  Equal chest expansion on vent. Gastrointestinal: Soft, non tender  Genitourinary: no suprapubic tenderness. Quinones cath in place.   Musculoskeletal: No edema  Skin: warm, dry  Neuro: Sedated  Psych: Unable to assess    Medications:   Medications:    vancomycin  1,500 mg IntraVENous Q24H    midodrine  10 mg Oral q8h    chlorhexidine  15 mL Mouth/Throat BID    lidocaine PF  5 mL IntraDERmal Once    sodium chloride flush  5-40 mL IntraVENous 2 times per day    guaiFENesin  200 mg Oral Q4H    aspirin  81 mg Per NG tube Daily    lansoprazole  30 mg Per NG tube Daily    lidocaine PF  5 mL IntraDERmal Once    sodium chloride flush  5-40 mL IntraVENous 2 times per day    budesonide-formoterol  2 puff Inhalation BID    amiodarone  200 mg Oral Daily    [Held by provider] amLODIPine  2.5 mg Oral Daily    apixaban  5 mg Oral BID    escitalopram  20 mg Oral Daily    sodium chloride flush  10 mL IntraVENous 2 times per day    ipratropium-albuterol  1 ampule Inhalation Q4H WA    traZODone  50 mg Oral Nightly    dexamethasone  10 mg IntraVENous Daily    meropenem  1,000 mg IntraVENous Q8H      Infusions:    propofol 35 mcg/kg/min (01/07/23 0318)    sodium chloride      sodium chloride      sodium chloride Stopped (01/01/23 1841)     PRN Meds: midazolam, 2 mg, Q1H PRN  sodium chloride flush, 5-40 mL, PRN  sodium chloride, , PRN  fentanNYL, 50 mcg, Q1H PRN  sodium chloride flush, 5-40 mL, PRN  sodium chloride, , PRN  lidocaine viscous hcl, 15 mL, Q6H PRN  phenol, 1 spray, Q2H PRN  sodium chloride flush, 10 mL, PRN  sodium chloride, , PRN  polyethylene glycol, 17 g, Daily PRN  acetaminophen, 650 mg, Q6H PRN   Or  acetaminophen, 650 mg, Q6H PRN  albuterol sulfate HFA, 2 puff, Q4H PRN  guaiFENesin, 200 mg, Q4H PRN  ondansetron, 4 mg, Q6H PRN      Labs      Recent Results (from the past 24 hour(s))   Blood gas, arterial    Collection Time: 01/06/23  9:15 AM   Result Value Ref Range    pH, Bld 7.52 (H) 7.34 - 7.45    pCO2, Arterial 50.0 (H) 32 - 45 MMHG    pO2, Arterial 58 (L) 75 - 100 MMHG    Base Exc, Mixed 15.6 (H) 0 - 2.3    HCO3, Arterial 40.8 (H) 18 - 23 MMOL/L    CO2 Content 42.3 (H) 19 - 24 MMOL/L    O2 Sat 84.9 (L) 96 - 97 %    Carbon Monoxide, Blood 1.8 0 - 5 %    Methemoglobin, Arterial 1.9 (H) <1.5 %    Comment AC/PC Pi 28 f 15 100% +10    Blood gas, arterial    Collection Time: 01/06/23  3:00 PM   Result Value Ref Range    pH, Bld 7.45 7.34 - 7.45    pCO2, Arterial 49.0 (H) 32 - 45 MMHG    pO2, Arterial 70 (L) 75 - 100 MMHG    Base Exc, Mixed 8.6 (H) 0 - 2.3    HCO3, Arterial 34.1 (H) 18 - 23 MMOL/L    CO2 Content 35.6 (H) 19 - 24 MMOL/L    O2 Sat 90.8 (L) 96 - 97 %    Carbon Monoxide, Blood 1.3 0 - 5 %    Methemoglobin, Arterial 1.5 (H) <1.5 %    Comment ACPC IP28 F15 P12 1.0    CBC with Auto Differential    Collection Time: 01/07/23  5:20 AM   Result Value Ref Range    WBC 33.6 (HH) 4.0 - 10.5 K/CU MM    RBC 3.01 (L) 4.2 - 5.4 M/CU MM    Hemoglobin 10.1 (L) 12.5 - 16.0 GM/DL    Hematocrit 30.6 (L) 37 - 47 %    .7 (H) 78 - 100 FL    MCH 33.6 (H) 27 - 31 PG    MCHC 33.0 32.0 - 36.0 %    RDW 13.0 11.7 - 14.9 %    Platelets 746 727 - 188 K/CU MM    MPV 9.4 7.5 - 11.1 FL    Myelocyte Percent 1 (H) 0.0 %    Metamyelocytes Relative 1 (H) 0.0 %    Bands Relative 3 (L) 5 - 11 %    Segs Relative 94.0 (H) 36 - 66 % Lymphocytes % 1.0 (L) 24 - 44 %    Myelocytes Absolute 0.34 K/CU MM    Metamyelocytes Absolute 0.34 K/CU MM    Bands Absolute 1.01 K/CU MM    Segs Absolute 31.6 K/CU MM    Lymphocytes Absolute 0.3 K/CU MM    Differential Type MANUAL DIFFERENTIAL    Basic Metabolic Panel w/ Reflex to MG    Collection Time: 01/07/23  5:20 AM   Result Value Ref Range    Sodium 135 135 - 145 MMOL/L    Potassium 4.1 3.5 - 5.1 MMOL/L    Chloride 96 (L) 99 - 110 mMol/L    CO2 29 21 - 32 MMOL/L    Anion Gap 10 4 - 16    BUN 41 (H) 6 - 23 MG/DL    Creatinine 0.7 0.6 - 1.1 MG/DL    Est, Glom Filt Rate >60 >60 mL/min/1.73m2    Glucose 176 (H) 70 - 99 MG/DL    Calcium 7.7 (L) 8.3 - 10.6 MG/DL        Imaging/Diagnostics Last 24 Hours   XR CHEST PORTABLE    Result Date: 12/31/2022  EXAMINATION: ONE XRAY VIEW OF THE CHEST 12/31/2022 4:57 am COMPARISON: 12/27/2022 HISTORY: ORDERING SYSTEM PROVIDED HISTORY: f/u ARDS TECHNOLOGIST PROVIDED HISTORY: Reason for exam:->f/u ARDS Reason for Exam: f/u ARDS FINDINGS: Cardiomediastinal silhouette is stable. Similar bilateral airspace opacities. No pleural effusion or pneumothorax. No gross bony abnormality.      Stable chest.     Total critical care time 38  minutes   Patient is critical with acute respiratory failure  Urine is stable reviewed labs, imaging, and discussed with treatment care plan with attending    Critical care time excludes separately billable procedure    Electronically signed by STEVENSON Burns CNP on 1/7/2023 at 8:14 AM

## 2023-01-07 NOTE — CONSULTS
[id number] [facility]  consulted by Dr. Payam Antunez for monitoring and adjustment. Indication for treatment: Vancomycin indication: Sepsis unknown source likely secondary to pna? Pt with significant worsening leukocytosis and  oxygenation  Goal trough: Trough Goal: 15-20 mcg/mL  AUC/DEBORAH: 400-600    Risk Factors for MRSA Identified:   Hospitalization within the past 90 days, Received IV antibiotics within the past 90 days    Pertinent Laboratory Values:   Temp Readings from Last 3 Encounters:   01/07/23 99.5 °F (37.5 °C) (Rectal)   12/30/22 99.8 °F (37.7 °C) (Oral)   09/30/22 97.3 °F (36.3 °C)     Recent Labs     01/05/23 0315 01/06/23  0545 01/07/23  0520   WBC 33.5* 34.9* 33.6*       Recent Labs     01/05/23 0315 01/06/23  0545 01/07/23  0520   BUN 39* 37* 41*   CREATININE 0.8 0.7 0.7       Estimated Creatinine Clearance: 65 mL/min (based on SCr of 0.7 mg/dL). Intake/Output Summary (Last 24 hours) at 1/7/2023 1612  Last data filed at 1/7/2023 1338  Gross per 24 hour   Intake 948 ml   Output 2000 ml   Net -1052 ml         Pertinent Cultures:   Date    Source    Results  1/4   Blood    Ordered  1/4   Sputum/Respiratory  C. Albicans  1/5   Endotracheal   C. Albicans  1/1   MRSA Nasal   Negative    Vancomycin level:   TROUGH:  No results for input(s): VANCOTROUGH in the last 72 hours. RANDOM:    Recent Labs     01/06/23  0545   VANCORANDOM 13.9         Assessment:  HPI: Pt is 81 yof currently intubated and sedated in ICU with ARDS. Pt was initially started on ceftriaxone, then escalated to cefepime x 6 days, then escalated to vanco and meropenem. Vanco stopped after improvement and negative MRSA nares. Pt with worsening leukocytosis. ICU doing complete workup to evaluate leukocytosis. Respiratory cultures showing normal jing and C. Albicans. Blood cultures are pending.   SCr, BUN, and urine output: stable, good UOP  Day(s) of therapy: 3 (restarted)  Vancomycin concentration:  1/06: 13.9, collected 17h post-dose    Plan:  Continue vancomycin 1500 mg IVPB q24h  Repeat next level tomorrow AM  Cultures are ordered to aid in de-escalation  Pharmacy will continue to monitor patient and adjust therapy as indicated    VANCOMYCIN CONCENTRATION SCHEDULED FOR 1/8 @ 0600    Thank you for the consult,  Tiffany Keyes [facility], PharmD  1/7/2023 4:12 PM

## 2023-01-07 NOTE — PROGRESS NOTES
01/07/23 0911   Patient Observation   Heart Rate 84   Resp 19   SpO2 92 %   Vent Information   $Ventilation $Subsequent Day   Ventilator Settings   FiO2  100 %   Resp Rate (Set) 15 bmp   PEEP/CPAP (cmH2O) 12   Pressure Support (cm H2O) 0 cm H2O   Vent Patient Data (Readings)   Vt (Measured) 748 mL   Peak Inspiratory Pressure (cmH2O) 41 cmH2O   Rate Measured 18 br/min   Minute Volume (L/min) 13.3 Liters   Mean Airway Pressure (cmH2O) 20 cmH20   Plateau Pressure (cm H2O) 42 cm H2O   I:E Ratio 1:3.00   I Time/ I Time % 0 s   Vent Alarm Settings   High Pressure (cmH2O) 50 cmH2O   Low Minute Volume (lpm) 2.5 L/min   High Minute Volume (lpm) 20 L/min   Low Exhaled Vt (ml) 250 mL   High Exhaled Vt (ml) 1000 mL   RR High (bpm) 40 br/min   Apnea (secs) 20 secs   Additional Respiratoray Assessments   Humidification Source HME   Ambu Bag With Mask At Bedside Yes   ETT    Placement Date/Time: 01/03/23 0200   Present on Admission/Arrival: No  Placed By: (c) Licensed provider;RT;RN  Placement Verified By: Capnometry; Auscultation; Chest X-ray  Preoxygenation: Yes  Mask Ventilation: Ventilated by mask (1)  Technique: Stylet. ..    Secured At 24 cm   Measured From Lips   ETT Placement Center   Secured By Commercial tube [email or web address]   Site Assessment Cool;Dry   Cuff Pressure   (mov)

## 2023-01-07 NOTE — PROGRESS NOTES
In-Patient Progress Note    Patient:  Nichole Reyez 81 y.o. female MRN: [id number]     Date of Service: 1/7/2023    Hospital Day: 9      Chief complaint: had no chief complaint listed for this encounter. Assessment and Plan   Nichole Reyez, a 81 y.o. female, with a history of hypertension (amlodipine 2.5 Mg daily), A. fib (amiodarone 200 Mg daily, apixaban 5 Mg twice daily), depression/anxiety (Lexapro 20 Mg daily), osteoporosis (alendronate 70 Mg weekly), GERD (pantoprazole 40 Mg daily), was initially admitted to [facility] on 12/22/2022 with complaints of shortness of breath, lethargy, wheezing; with diagnosis of acute on chronic respiratory failure with hypoxia secondary to acute bilateral lower lobe pneumonia. She required supplemental oxygen on admission at 3 LPM NC. Throughout the course admission, continue to require increased amounts of oxygen. Antibiotics were broadened to cefepime/ doxy, started on steroids, and scheduled bronchodilators/ICS. She appeared overall comfortable w/o distress however biochemical work-up/imaging suggested worsening condition. Repeat CTA on 12/28 negative for PE but extensive mixed groundglass and consolidative opacities that were increasing. Viral panel, urinary antigens, MRSA swab, BLC and sputum culture were all negative. Overnight reported increased distress with hypoxia and subsequent anxiety. Oxygen titrated up to 15 LPM High flow. Discussed need for increased care/ evaluation and going to pursue transfer to higher level of care. She was accepted and transferred to [address] [place] [address] ICU for intensivist evaluation. She was given 20mg IV decadron and started on BiPAP prior to departure to [address] [place] [address] on  12/30/2022. Assessment and plan    Acute hypoxic respiratory failure  ARDS 2/2 multifocal pneumonia  Multifocal pneumonia, likely viral, suspected superimposed bacterial pneumonia  Mechanical ventilation since 1/3/2023  Patient presented with hypoxic respiratory failure. CTA chest  shows no evidence of PE but did show extensive mixed groundglass and consolidative opacities bilaterally, increased from . Respiratory viral panel -. Urine strep pneumoniae and Legionella antigen negative-. Elevated inflammatory markers. .2. Vancomycin stopped after MRSA nares screen came negative. Continued on HFNC initially but O2 saturation could not be maintained and patient had be intubated for severe hypoxia on 1/3/2023 around 1am.  -Continued on meropenem  -Decadron: completed 20 Mg for 5 days, now on 10 Mg  -ARDS compliant vent management as able  -Wean FiO2 as able    Hypotension, likely multifactorial, PAP, sedation  -on midodrine  -can consider switching propofol to other sedatives if needed    Anxiety/depression  -Continue escitalopram    History of A. fib/a flutter  -Continue amiodarone and Eliquis    History of heart failure preserved ejection fraction  EF 55-60% per echo on . proBNP 790 on . Stress test was done back in  which was negative for reversible perfusion defect. -on Lasix 20 Mg daily - continue to monitor volume status closely      # Peptic ulcer prophylaxis: Pantoprazole  # DVT Prophylaxis: Eliquis  #CODE STATUS: Full code      Current living situation: -  Expected Disposition: -  Estimated discharge date: -      Review of System     Review of Systems  -could not be reviewed     I have reviewed all pertinent PMHx, PSHx, FamHx, SocialHx, medications, and allergies and updated history as appropriate.     Physical Exam   VITAL SIGNS:  BP (!) 152/46   Pulse 84   Temp 98.1 °F (36.7 °C) (Rectal)   Resp 19   Ht 5' 8.11\" (1.73 m)   Wt 183 lb 13.8 oz (83.4 kg)   SpO2 92%   BMI 27.87 kg/m²   Tmax over 24 hours:  Temp (24hrs), Av.2 °F (36.8 °C), Min:98.1 °F (36.7 °C), Max:98.2 °F (36.8 °C)      Patient Vitals for the past 6 hrs:   Temp Temp src Pulse Resp SpO2   23 0911 -- -- 84 19 92 %   23 0906 -- -- 80 16 95 % 01/07/23 0600 -- -- 78 13 95 %   01/07/23 0500 -- -- 79 16 96 %   01/07/23 0400 98.1 °F (36.7 °C) Rectal 78 14 97 %           Intake/Output Summary (Last 24 hours) at 1/7/2023 0940  Last data filed at 1/7/2023 0645  Gross per 24 hour   Intake 948 ml   Output 2400 ml   Net -1452 ml       Wt Readings from Last 2 Encounters:   01/01/23 183 lb 13.8 oz (83.4 kg)   12/28/22 172 lb 6.4 oz (78.2 kg)     Body mass index is 27.87 kg/m². Physical Exam  Constitutional:       General: She is not in acute distress. Appearance: She is well-developed. She is ill-appearing. HENT:      Head: Normocephalic and atraumatic. Right Ear: External ear normal.      Left Ear: External ear normal.      Nose: Nose normal.   Eyes:      General: No scleral icterus. Right eye: No discharge. Left eye: No discharge. Conjunctiva/sclera: Conjunctivae normal.      Pupils: Pupils are equal, round, and reactive to light. Neck:      Thyroid: No thyromegaly. Vascular: No JVD. Trachea: No tracheal deviation. Cardiovascular:      Rate and Rhythm: Normal rate and regular rhythm. Heart sounds: Normal heart sounds. No murmur heard. No friction rub. No gallop. Pulmonary:      Effort: Pulmonary effort is normal. No respiratory distress. Breath sounds: Normal breath sounds. No stridor. No wheezing or rales. Chest:      Chest wall: No tenderness. Abdominal:      General: Bowel sounds are normal. There is no distension. Palpations: Abdomen is soft. There is no mass. Tenderness: There is no abdominal tenderness. There is no guarding or rebound. Hernia: No hernia is present. Genitourinary:     Vagina: Normal. No vaginal discharge. Rectum: Guaiac result negative. Musculoskeletal:         General: No tenderness or deformity. Normal range of motion. Cervical back: Normal range of motion and neck supple. Lymphadenopathy:      Cervical: No cervical adenopathy.    Skin: General: Skin is warm and dry. Capillary Refill: Capillary refill takes less than 2 seconds. Coloration: Skin is not pale. Findings: No erythema or rash. Neurological:      Mental Status: She is alert. Motor: No abnormal muscle tone. Comments: Sedated and mechanically ventilated   Psychiatric:      Comments: Sedated and mechanically ventilated         Current Medications      vancomycin  1,500 mg IntraVENous Q24H    midodrine  10 mg Oral q8h    chlorhexidine  15 mL Mouth/Throat BID    lidocaine PF  5 mL IntraDERmal Once    sodium chloride flush  5-40 mL IntraVENous 2 times per day    guaiFENesin  200 mg Oral Q4H    aspirin  81 mg Per NG tube Daily    lansoprazole  30 mg Per NG tube Daily    lidocaine PF  5 mL IntraDERmal Once    sodium chloride flush  5-40 mL IntraVENous 2 times per day    budesonide-formoterol  2 puff Inhalation BID    amiodarone  200 mg Oral Daily    [Held by provider] amLODIPine  2.5 mg Oral Daily    apixaban  5 mg Oral BID    escitalopram  20 mg Oral Daily    sodium chloride flush  10 mL IntraVENous 2 times per day    ipratropium-albuterol  1 ampule Inhalation Q4H WA    traZODone  50 mg Oral Nightly    dexamethasone  10 mg IntraVENous Daily    meropenem  1,000 mg IntraVENous Q8H         Labs and Imaging Studies   Laboratory findings:  XR CHEST PORTABLE    Result Date: 12/31/2022  EXAMINATION: ONE XRAY VIEW OF THE CHEST 12/31/2022 4:57 am COMPARISON: 12/27/2022 HISTORY: ORDERING SYSTEM PROVIDED HISTORY: f/u ARDS TECHNOLOGIST PROVIDED HISTORY: Reason for exam:->f/u ARDS Reason for Exam: f/u ARDS FINDINGS: Cardiomediastinal silhouette is stable. Similar bilateral airspace opacities. No pleural effusion or pneumothorax. No gross bony abnormality.      Stable chest.       Recent Results (from the past 24 hour(s))   Blood gas, arterial    Collection Time: 01/06/23  3:00 PM   Result Value Ref Range    pH, Bld 7.45 7.34 - 7.45    pCO2, Arterial 49.0 (H) 32 - 45 MMHG    pO2, Arterial 70 (L) 75 - 100 MMHG    Base Exc, Mixed 8.6 (H) 0 - 2.3    HCO3, Arterial 34.1 (H) 18 - 23 MMOL/L    CO2 Content 35.6 (H) 19 - 24 MMOL/L    O2 Sat 90.8 (L) 96 - 97 %    Carbon Monoxide, Blood 1.3 0 - 5 %    Methemoglobin, Arterial 1.5 (H) <1.5 %    Comment ACPC IP28 F15 P12 1.0    CBC with Auto Differential    Collection Time: 01/07/23  5:20 AM   Result Value Ref Range    WBC 33.6 (HH) 4.0 - 10.5 K/CU MM    RBC 3.01 (L) 4.2 - 5.4 M/CU MM    Hemoglobin 10.1 (L) 12.5 - 16.0 GM/DL    Hematocrit 30.6 (L) 37 - 47 %    .7 (H) 78 - 100 FL    MCH 33.6 (H) 27 - 31 PG    MCHC 33.0 32.0 - 36.0 %    RDW 13.0 11.7 - 14.9 %    Platelets 105 710 - 385 K/CU MM    MPV 9.4 7.5 - 11.1 FL    Myelocyte Percent 1 (H) 0.0 %    Metamyelocytes Relative 1 (H) 0.0 %    Bands Relative 3 (L) 5 - 11 %    Segs Relative 94.0 (H) 36 - 66 %    Lymphocytes % 1.0 (L) 24 - 44 %    Myelocytes Absolute 0.34 K/CU MM    Metamyelocytes Absolute 0.34 K/CU MM    Bands Absolute 1.01 K/CU MM    Segs Absolute 31.6 K/CU MM    Lymphocytes Absolute 0.3 K/CU MM    Differential Type MANUAL DIFFERENTIAL    Basic Metabolic Panel w/ Reflex to MG    Collection Time: 01/07/23  5:20 AM   Result Value Ref Range    Sodium 135 135 - 145 MMOL/L    Potassium 4.1 3.5 - 5.1 MMOL/L    Chloride 96 (L) 99 - 110 mMol/L    CO2 29 21 - 32 MMOL/L    Anion Gap 10 4 - 16    BUN 41 (H) 6 - 23 MG/DL    Creatinine 0.7 0.6 - 1.1 MG/DL    Est, Glom Filt Rate >60 >60 mL/min/1.73m2    Glucose 176 (H) 70 - 99 MG/DL    Calcium 7.7 (L) 8.3 - 10.6 MG/DL           Electronically signed by Nancy Spears MD on 1/7/2023 at 9:40 AM      Comment: Please note this report has been produced using speech recognition software and may contain errors related to that system including errors in grammar, punctuation, and spelling, as well as words and phrases that may be inappropriate. If there are any questions or concerns please feel free to contact the dictating provider for clarification.

## 2023-01-07 NOTE — PROGRESS NOTES
Patient's residuals remain high despite holding tube feed from this AM. Okay to continue holding per Dr. Chapis Kaba

## 2023-01-08 NOTE — CONSULTS
[address] [facility]  consulted by Dr. Mariya Keys for monitoring and adjustment. Indication for treatment: Vancomycin indication: Sepsis unknown source likely secondary to pna? Pt with significant worsening leukocytosis and  oxygenation  Goal trough: Trough Goal: 15-20 mcg/mL  AUC/DEBORAH: 400-600    Risk Factors for MRSA Identified:   Hospitalization within the past 90 days, Received IV antibiotics within the past 90 days    Pertinent Laboratory Values:   Temp Readings from Last 3 Encounters:   01/08/23 99.3 °F (37.4 °C) (Rectal)   12/30/22 99.8 °F (37.7 °C) (Oral)   09/30/22 97.3 °F (36.3 °C)     Recent Labs     01/06/23  0545 01/07/23  0520 01/08/23  0444   WBC 34.9* 33.6* 30.6*       Recent Labs     01/06/23  0545 01/07/23  0520 01/08/23  0444   BUN 37* 41* 40*   CREATININE 0.7 0.7 0.7       Estimated Creatinine Clearance: 65 mL/min (based on SCr of 0.7 mg/dL). Intake/Output Summary (Last 24 hours) at 1/8/2023 1340  Last data filed at 1/8/2023 1257  Gross per 24 hour   Intake 2482.92 ml   Output 1720 ml   Net 762.92 ml         Pertinent Cultures:   Date    Source    Results  1/4   Blood    Ordered  1/4   Sputum/Respiratory  C. Albicans  1/5   Endotracheal   C. Albicans  1/1   MRSA Nasal   Negative    Vancomycin level:   TROUGH:  No results for input(s): VANCOTROUGH in the last 72 hours. RANDOM:    Recent Labs     01/06/23  0545 01/08/23  0444   VANCORANDOM 13.9 15.0         Assessment:  HPI: Pt is 81 yof currently intubated and sedated in ICU with ARDS. Pt was initially started on ceftriaxone, then escalated to cefepime x 6 days, then escalated to vanco and meropenem. Vanco stopped after improvement and negative MRSA nares. Pt with worsening leukocytosis. ICU doing complete workup to evaluate leukocytosis. Respiratory cultures showing normal jing and C. Albicans. Blood cultures are pending.   SCr, BUN, and urine output: stable, good UOP  Day(s) of therapy: 4 (restarted)  Vancomycin concentration:  1/06: 13.9, collected 17h post-dose  1/07: 15, collected 14h post-dose,     Plan:  Increase vancomycin dosing to 1500 mg IVPB q18h for trough level closer to 15  Predicted trough: 15.7,   Plan for a repeat level in 3 days  Pharmacy will continue to monitor patient and adjust therapy as indicated    Thank you for the consult,  Tiffany Keyes [facility], PharmD  1/8/2023 1:40 PM

## 2023-01-08 NOTE — PROGRESS NOTES
01/08/23 0844   Patient Observation   Heart Rate 86   Resp 20   SpO2 (!) 85 %   Vent Information   Equipment Changed HME   Vent Mode AC/PC   $Ventilation $Subsequent Day   Ventilator Settings   FiO2  90 %   Resp Rate (Set) 15 bmp   PEEP/CPAP (cmH2O) 12   Pressure Support (cm H2O) 0 cm H2O   Pressure Ordered 28   Vent Patient Data (Readings)   Vt (Measured) 744 mL   Peak Inspiratory Pressure (cmH2O) 41 cmH2O   Rate Measured 19 br/min   Minute Volume (L/min) 14.5 Liters   Mean Airway Pressure (cmH2O) 21 cmH20   Plateau Pressure (cm H2O) 42 cm H2O   Inspiratory Time 0.85 sec   I:E Ratio 1:2.70   Flow Sensitivity 3 L/min   I Time/ I Time % 0 s   Backup Apnea On   Backup Rate 15 Breaths Per Minute   Backup Vt   (450)   Vent Alarm Settings   High Pressure (cmH2O) 50 cmH2O   Low Minute Volume (lpm) 2.5 L/min   High Minute Volume (lpm) 20 L/min   Low Exhaled Vt (ml) 250 mL   High Exhaled Vt (ml) 1000 mL   RR High (bpm) 40 br/min   Apnea (secs) 20 secs   Additional Respiratoray Assessments   Humidification Source HME   Ambu Bag With Mask At Bedside Yes   Airway Clearance   Suction ET Tube   Subglottic Suction Done No   Suction Device Inline suction catheter   Sputum Method Obtained Endotracheal   Sputum Amount Small   Sputum Color/Odor Other (comment); Tan   Sputum Consistency Thick   $Obtained Sample $Induced Sputum (charge not used for Bronchoscopy)   ETT    Placement Date/Time: 01/03/23 0200   Present on Admission/Arrival: No  Placed By: (c) Licensed provider;RT;RN  Placement Verified By: Manjit Bellamy; Auscultation; Chest X-ray  Preoxygenation: Yes  Mask Ventilation: Ventilated by mask (1)  Technique: Stylet. ..    Secured At 24 cm   Measured From Lips   ETT Placement Center   Secured By Commercial tube [email or web address]   Site Assessment Dry

## 2023-01-08 NOTE — PROGRESS NOTES
In-Patient Progress Note    Patient:  Nichole Reyez 81 y.o. female MRN: [id number]     Date of Service: 1/8/2023    Hospital Day: 10      Chief complaint: had no chief complaint listed for this encounter. Assessment and Plan   Nichole Reyez, a 81 y.o. female, with a history of hypertension (amlodipine 2.5 Mg daily), A. fib (amiodarone 200 Mg daily, apixaban 5 Mg twice daily), depression/anxiety (Lexapro 20 Mg daily), osteoporosis (alendronate 70 Mg weekly), GERD (pantoprazole 40 Mg daily), was initially admitted to [facility] on 12/22/2022 with complaints of shortness of breath, lethargy, wheezing; with diagnosis of acute on chronic respiratory failure with hypoxia secondary to acute bilateral lower lobe pneumonia. She required supplemental oxygen on admission at 3 LPM NC. Throughout the course admission, continue to require increased amounts of oxygen. Antibiotics were broadened to cefepime/ doxy, started on steroids, and scheduled bronchodilators/ICS. She appeared overall comfortable w/o distress however biochemical work-up/imaging suggested worsening condition. Repeat CTA on 12/28 negative for PE but extensive mixed groundglass and consolidative opacities that were increasing. Viral panel, urinary antigens, MRSA swab, BLC and sputum culture were all negative. Overnight reported increased distress with hypoxia and subsequent anxiety. Oxygen titrated up to 15 LPM High flow. Discussed need for increased care/ evaluation and going to pursue transfer to higher level of care. She was accepted and transferred to [facility] ICU for intensivist evaluation. She was given 20mg IV decadron and started on BiPAP prior to departure to [facility] on  12/30/2022.     Assessment and plan    Acute hypoxic respiratory failure  ARDS 2/2 multifocal pneumonia  Multifocal pneumonia, likely viral, suspected superimposed bacterial pneumonia  Mechanical ventilation since 1/3/2023  Patient presented with hypoxic respiratory failure. CTA chest  shows no evidence of PE but did show extensive mixed groundglass and consolidative opacities bilaterally, increased from . Respiratory viral panel -. Urine strep pneumoniae and Legionella antigen negative-. Elevated inflammatory markers. .2. Vancomycin stopped after MRSA nares screen came negative. Continued on HFNC initially but O2 saturation could not be maintained and patient had be intubated for severe hypoxia on 1/3/2023 around 1am.  -Continued on meropenem  -Decadron: completed 20 Mg for 5 days, now on 10 Mg  -ARDS compliant vent management as able  -Wean FiO2 as able    Hypotension, likely multifactorial, PAP, sedation  -on midodrine  -can consider switching propofol to other sedatives if needed    Anxiety/depression  -Continue escitalopram    History of A. fib/a flutter  -Continue amiodarone and Eliquis    History of heart failure preserved ejection fraction  EF 55-60% per echo on . proBNP 790 on . Stress test was done back in  which was negative for reversible perfusion defect. -on Lasix 20 Mg daily - continue to monitor volume status closely      # Peptic ulcer prophylaxis: Pantoprazole  # DVT Prophylaxis: Eliquis  #CODE STATUS: Full code      Current living situation: -  Expected Disposition: -  Estimated discharge date: -      Review of System     Review of Systems  -could not be reviewed     I have reviewed all pertinent PMHx, PSHx, FamHx, SocialHx, medications, and allergies and updated history as appropriate.     Physical Exam   VITAL SIGNS:  BP (!) 141/46   Pulse 80   Temp 99.3 °F (37.4 °C) (Rectal)   Resp 24   Ht 5' 8.11\" (1.73 m)   Wt 183 lb 13.8 oz (83.4 kg)   SpO2 (!) 89%   BMI 27.87 kg/m²   Tmax over 24 hours:  Temp (24hrs), Av.9 °F (37.2 °C), Min:98.2 °F (36.8 °C), Max:99.3 °F (37.4 °C)      Patient Vitals for the past 6 hrs:   BP Temp Temp src Pulse Resp SpO2   23 1235 -- -- -- -- 24 --   23 1218 -- -- -- 80 22 (!) 89 %   01/08/23 1214 -- -- -- 81 18 --   01/08/23 1200 -- 99.3 °F (37.4 °C) Rectal 93 22 --   01/08/23 1020 (!) 141/46 -- -- (!) 132 -- --           Intake/Output Summary (Last 24 hours) at 1/8/2023 1531  Last data filed at 1/8/2023 1257  Gross per 24 hour   Intake 2482.92 ml   Output 1720 ml   Net 762.92 ml       Wt Readings from Last 2 Encounters:   01/01/23 183 lb 13.8 oz (83.4 kg)   12/28/22 172 lb 6.4 oz (78.2 kg)     Body mass index is 27.87 kg/m². Physical Exam  Constitutional:       General: She is not in acute distress. Appearance: She is well-developed. She is ill-appearing. HENT:      Head: Normocephalic and atraumatic. Right Ear: External ear normal.      Left Ear: External ear normal.      Nose: Nose normal.   Eyes:      General: No scleral icterus. Right eye: No discharge. Left eye: No discharge. Conjunctiva/sclera: Conjunctivae normal.      Pupils: Pupils are equal, round, and reactive to light. Neck:      Thyroid: No thyromegaly. Vascular: No JVD. Trachea: No tracheal deviation. Cardiovascular:      Rate and Rhythm: Normal rate and regular rhythm. Heart sounds: Normal heart sounds. No murmur heard. No friction rub. No gallop. Pulmonary:      Effort: Pulmonary effort is normal. No respiratory distress. Breath sounds: Normal breath sounds. No stridor. No wheezing or rales. Chest:      Chest wall: No tenderness. Abdominal:      General: Bowel sounds are normal. There is no distension. Palpations: Abdomen is soft. There is no mass. Tenderness: There is no abdominal tenderness. There is no guarding or rebound. Hernia: No hernia is present. Genitourinary:     Vagina: Normal. No vaginal discharge. Rectum: Guaiac result negative. Musculoskeletal:         General: No tenderness or deformity. Normal range of motion. Cervical back: Normal range of motion and neck supple.    Lymphadenopathy:      Cervical: No cervical adenopathy. Skin:     General: Skin is warm and dry. Capillary Refill: Capillary refill takes less than 2 seconds. Coloration: Skin is not pale. Findings: No erythema or rash. Neurological:      Mental Status: She is alert. Motor: No abnormal muscle tone. Comments: Sedated and mechanically ventilated   Psychiatric:      Comments: Sedated and mechanically ventilated         Current Medications      [START ON 1/9/2023] vancomycin  1,500 mg IntraVENous Q18H    midodrine  5 mg Oral q8h    chlorhexidine  15 mL Mouth/Throat BID    lidocaine PF  5 mL IntraDERmal Once    sodium chloride flush  5-40 mL IntraVENous 2 times per day    guaiFENesin  200 mg Oral Q4H    aspirin  81 mg Per NG tube Daily    lansoprazole  30 mg Per NG tube Daily    lidocaine PF  5 mL IntraDERmal Once    sodium chloride flush  5-40 mL IntraVENous 2 times per day    budesonide-formoterol  2 puff Inhalation BID    amiodarone  200 mg Oral Daily    [Held by provider] amLODIPine  2.5 mg Oral Daily    apixaban  5 mg Oral BID    escitalopram  20 mg Oral Daily    sodium chloride flush  10 mL IntraVENous 2 times per day    ipratropium-albuterol  1 ampule Inhalation Q4H WA    traZODone  50 mg Oral Nightly    meropenem  1,000 mg IntraVENous Q8H         Labs and Imaging Studies   Laboratory findings:  XR CHEST PORTABLE    Result Date: 12/31/2022  EXAMINATION: ONE XRAY VIEW OF THE CHEST 12/31/2022 4:57 am COMPARISON: 12/27/2022 HISTORY: ORDERING SYSTEM PROVIDED HISTORY: f/u ARDS TECHNOLOGIST PROVIDED HISTORY: Reason for exam:->f/u ARDS Reason for Exam: f/u ARDS FINDINGS: Cardiomediastinal silhouette is stable. Similar bilateral airspace opacities. No pleural effusion or pneumothorax. No gross bony abnormality.      Stable chest.       Recent Results (from the past 24 hour(s))   CBC with Auto Differential    Collection Time: 01/08/23  4:44 AM   Result Value Ref Range    WBC 30.6 (HH) 4.0 - 10.5 K/CU MM    RBC 3.01 (L) 4.2 - 5.4 M/CU MM    Hemoglobin 9.9 (L) 12.5 - 16.0 GM/DL    Hematocrit 30.2 (L) 37 - 47 %    .3 (H) 78 - 100 FL    MCH 32.9 (H) 27 - 31 PG    MCHC 32.8 32.0 - 36.0 %    RDW 12.9 11.7 - 14.9 %    Platelets 742 121 - 759 K/CU MM    MPV 9.4 7.5 - 11.1 FL    Myelocyte Percent 3 (H) 0.0 %    Metamyelocytes Relative 3 (H) 0.0 %    Bands Relative 2 (L) 5 - 11 %    Segs Relative 85.0 (H) 36 - 66 %    Lymphocytes % 3.0 (L) 24 - 44 %    Monocytes % 4.0 0 - 4 %    Myelocytes Absolute 0.92 K/CU MM    Metamyelocytes Absolute 0.92 K/CU MM    Bands Absolute 0.61 K/CU MM    Segs Absolute 26.0 K/CU MM    Lymphocytes Absolute 0.9 K/CU MM    Monocytes Absolute 1.2 K/CU MM    Differential Type MANUAL DIFFERENTIAL     Macrocytes 2+    Basic Metabolic Panel w/ Reflex to MG    Collection Time: 01/08/23  4:44 AM   Result Value Ref Range    Sodium 134 (L) 135 - 145 MMOL/L    Potassium 4.5 3.5 - 5.1 MMOL/L    Chloride 96 (L) 99 - 110 mMol/L    CO2 30 21 - 32 MMOL/L    Anion Gap 8 4 - 16    BUN 40 (H) 6 - 23 MG/DL    Creatinine 0.7 0.6 - 1.1 MG/DL    Est, Glom Filt Rate >60 >60 mL/min/1.73m2    Glucose 117 (H) 70 - 99 MG/DL    Calcium 7.8 (L) 8.3 - 10.6 MG/DL   Vancomycin Level, Random    Collection Time: 01/08/23  4:44 AM   Result Value Ref Range    Vancomycin Rm 15.0 UG/ML    DOSE AMOUNT DOSE AMT.  GIVEN - `     DOSE TIME DOSE TIME GIVEN - `    Blood gas, arterial    Collection Time: 01/08/23 12:15 PM   Result Value Ref Range    pH, Bld 7.45 7.34 - 7.45    pCO2, Arterial 51.0 (H) 32 - 45 MMHG    pO2, Arterial 54 (L) 75 - 100 MMHG    Base Exc, Mixed 9.7 (H) 0 - 2.3    HCO3, Arterial 35.4 (H) 18 - 23 MMOL/L    CO2 Content 37.0 (H) 19 - 24 MMOL/L    O2 Sat 84.7 (L) 96 - 97 %    Carbon Monoxide, Blood 1.5 0 - 5 %    Methemoglobin, Arterial 1.3 <1.5 %    Comment AC/PC 12 15 100%            Electronically signed by Vesta Sprague MD on 1/8/2023 at 3:31 PM      Comment: Please note this report has been produced using speech recognition software and may contain errors related to that system including errors in grammar, punctuation, and spelling, as well as words and phrases that may be inappropriate. If there are any questions or concerns please feel free to contact the dictating provider for clarification.

## 2023-01-08 NOTE — PROGRESS NOTES
Pt oxygen sat current 77% on the vent at a o2 of 100% and peep of 12. Dr. Mariya Keys notified on unit. States to sedate patient. Increased propofol to 35 per Dr. Mariya Keys and new orders placed.

## 2023-01-08 NOTE — PROGRESS NOTES
V2.0  [facility] Critical Care Progress Note      Name:  Logan Morgan /Age/Sex: 1935  (81 y.o. female)   MRN & CSN:  [id number] & [id number] Encounter Date/Time: 2023 3:22 PM EST    Location:  -A PCP: Brittney Lazaro, [facility] Day: 10    Assessment and Plan:   Logan Morgan is a 81 y.o. female who presents with Respiratory failure with hypoxia ([facility] [address]    Plan:  Acute hypoxic respiratory failure - s/p intubation 1/3  ARDS  Multifocal bacterial vs viral pneumonia  Sepsis  -Requiring full ventilatory support-FiO2 100%, PEEP-12 mm   -WBC slightly down-trending 30.6K today-afebrile; Continue meropenem/vancomycin  -Received a Decadron 20 mg x total 5 days, then 10 mg x 5 days; will start methylprednisone due to the improvement in respiratory status  -Continue DuoNebs, Dulera, albuterol as needed  -Sputum culture from  growing Candida albicans, blood cultures pending  -We will increase sedation for better vent compliance to improve oxygenation     History of diastolic congestive heart failure  -Echocardiogram on -EF 55 to 60%  -proBNP-790 on   -Stress test negative for ischemia in     Hypotension-resolved  -Decrease midodrine to 5 mg 3 times daily; hold for SBP greater than 110    History of A. fib/a flutter  -Continue home amiodarone and Eliquis      Chronic Conditions: Continue all home medications except as stated above or contraindicated. Diet Diet NPO  ADULT TUBE FEEDING; Orogastric; Peptide Based High Protein; Continuous; 20; Yes; 10; Q 4 hours; 50; 30; Q 4 hours; Protein; Proteinex 1x daily   DVT Prophylaxis [] Lovenox, []  Heparin, [] SCDs, [] Ambulation,  [x] Eliquis, [] Xarelto  [] Coumadin   Code Status Full Code   Disposition From: Home  Expected Disposition: Home  Estimated Date of Discharge: TBD  Patient requires continued admission due to acute respiratory failure   Surrogate Decision Maker/ POA Son     Subjective:   Patient seen and examined.   She continues to require high ventilatory settings with FiO2 100%, PEEP 12 mm. She is sedated on propofol and we have just added Versed. Review of Systems:    Review of Systems  Unable to obtain secondary to mechanical ventilation    Objective: Intake/Output Summary (Last 24 hours) at 1/8/2023 1821  Last data filed at 1/8/2023 1257  Gross per 24 hour   Intake 60 ml   Output 1395 ml   Net -1335 ml          Vitals:   Vitals:    01/08/23 1748   BP:    Pulse: 90   Resp: 14   Temp:    SpO2: 99%       Physical Exam:     General: Age-appropriate female on full mechanical ventilation  Eyes: EOMI  ENT: neck supple. ETT in place  Cardiovascular: Irregular rate  Respiratory: Rhonchi throughout,  wheezing+  Equal chest expansion on vent. Gastrointestinal: Soft, non tender  Genitourinary: no suprapubic tenderness. Quinones cath in place.   Musculoskeletal: No edema  Skin: warm, dry  Neuro: Sedated  Psych: Unable to assess    Medications:   Medications:    [START ON 1/9/2023] vancomycin  1,500 mg IntraVENous Q18H    methylPREDNISolone  60 mg IntraVENous Q6H    pantoprazole  40 mg IntraVENous BID    midodrine  5 mg Oral q8h    chlorhexidine  15 mL Mouth/Throat BID    lidocaine PF  5 mL IntraDERmal Once    sodium chloride flush  5-40 mL IntraVENous 2 times per day    guaiFENesin  200 mg Oral Q4H    aspirin  81 mg Per NG tube Daily    lidocaine PF  5 mL IntraDERmal Once    sodium chloride flush  5-40 mL IntraVENous 2 times per day    budesonide-formoterol  2 puff Inhalation BID    amiodarone  200 mg Oral Daily    [Held by provider] amLODIPine  2.5 mg Oral Daily    apixaban  5 mg Oral BID    escitalopram  20 mg Oral Daily    sodium chloride flush  10 mL IntraVENous 2 times per day    ipratropium-albuterol  1 ampule Inhalation Q4H WA    traZODone  50 mg Oral Nightly    meropenem  1,000 mg IntraVENous Q8H      Infusions:    midazolam 4 mg/hr (01/08/23 1745)    propofol 40 mcg/kg/min (01/08/23 1652)    norepinephrine 10 mcg/min (01/08/23 1642) sodium chloride      sodium chloride      sodium chloride Stopped (01/01/23 1841)     PRN Meds: midazolam, 2 mg, Q1H PRN  sodium chloride flush, 5-40 mL, PRN  sodium chloride, , PRN  fentanNYL, 50 mcg, Q1H PRN  sodium chloride flush, 5-40 mL, PRN  sodium chloride, , PRN  lidocaine viscous hcl, 15 mL, Q6H PRN  phenol, 1 spray, Q2H PRN  sodium chloride flush, 10 mL, PRN  sodium chloride, , PRN  polyethylene glycol, 17 g, Daily PRN  acetaminophen, 650 mg, Q6H PRN   Or  acetaminophen, 650 mg, Q6H PRN  albuterol sulfate HFA, 2 puff, Q4H PRN  guaiFENesin, 200 mg, Q4H PRN  ondansetron, 4 mg, Q6H PRN      Labs      Recent Results (from the past 24 hour(s))   CBC with Auto Differential    Collection Time: 01/08/23  4:44 AM   Result Value Ref Range    WBC 30.6 (HH) 4.0 - 10.5 K/CU MM    RBC 3.01 (L) 4.2 - 5.4 M/CU MM    Hemoglobin 9.9 (L) 12.5 - 16.0 GM/DL    Hematocrit 30.2 (L) 37 - 47 %    .3 (H) 78 - 100 FL    MCH 32.9 (H) 27 - 31 PG    MCHC 32.8 32.0 - 36.0 %    RDW 12.9 11.7 - 14.9 %    Platelets 400 733 - 421 K/CU MM    MPV 9.4 7.5 - 11.1 FL    Myelocyte Percent 3 (H) 0.0 %    Metamyelocytes Relative 3 (H) 0.0 %    Bands Relative 2 (L) 5 - 11 %    Segs Relative 85.0 (H) 36 - 66 %    Lymphocytes % 3.0 (L) 24 - 44 %    Monocytes % 4.0 0 - 4 %    Myelocytes Absolute 0.92 K/CU MM    Metamyelocytes Absolute 0.92 K/CU MM    Bands Absolute 0.61 K/CU MM    Segs Absolute 26.0 K/CU MM    Lymphocytes Absolute 0.9 K/CU MM    Monocytes Absolute 1.2 K/CU MM    Differential Type MANUAL DIFFERENTIAL     Macrocytes 2+    Basic Metabolic Panel w/ Reflex to MG    Collection Time: 01/08/23  4:44 AM   Result Value Ref Range    Sodium 134 (L) 135 - 145 MMOL/L    Potassium 4.5 3.5 - 5.1 MMOL/L    Chloride 96 (L) 99 - 110 mMol/L    CO2 30 21 - 32 MMOL/L    Anion Gap 8 4 - 16    BUN 40 (H) 6 - 23 MG/DL    Creatinine 0.7 0.6 - 1.1 MG/DL    Est, Glom Filt Rate >60 >60 mL/min/1.73m2    Glucose 117 (H) 70 - 99 MG/DL    Calcium 7.8 (L) 8.3 - 10.6 MG/DL   Vancomycin Level, Random    Collection Time: 01/08/23  4:44 AM   Result Value Ref Range    Vancomycin Rm 15.0 UG/ML    DOSE AMOUNT DOSE AMT. GIVEN - `     DOSE TIME DOSE TIME GIVEN - `    Blood gas, arterial    Collection Time: 01/08/23 12:15 PM   Result Value Ref Range    pH, Bld 7.45 7.34 - 7.45    pCO2, Arterial 51.0 (H) 32 - 45 MMHG    pO2, Arterial 54 (L) 75 - 100 MMHG    Base Exc, Mixed 9.7 (H) 0 - 2.3    HCO3, Arterial 35.4 (H) 18 - 23 MMOL/L    CO2 Content 37.0 (H) 19 - 24 MMOL/L    O2 Sat 84.7 (L) 96 - 97 %    Carbon Monoxide, Blood 1.5 0 - 5 %    Methemoglobin, Arterial 1.3 <1.5 %    Comment AC/PC 12 15 100%    Blood gas, arterial    Collection Time: 01/08/23  3:45 PM   Result Value Ref Range    pH, Bld 7.47 (H) 7.34 - 7.45    pCO2, Arterial 49.0 (H) 32 - 45 MMHG    pO2, Arterial 56 (L) 75 - 100 MMHG    Base Exc, Mixed 10.4 (H) 0 - 2.3    HCO3, Arterial 35.7 (H) 18 - 23 MMOL/L    CO2 Content 37.2 (H) 19 - 24 MMOL/L    O2 Sat 85.6 (L) 96 - 97 %    Carbon Monoxide, Blood 1.7 0 - 5 %    Methemoglobin, Arterial 1.5 (H) <1.5 %   Blood gas, arterial    Collection Time: 01/08/23  5:30 PM   Result Value Ref Range    pH, Bld 7.42 7.34 - 7.45    pCO2, Arterial 51.0 (H) 32 - 45 MMHG    pO2, Arterial 70 (L) 75 - 100 MMHG    Base Exc, Mixed 7.2 (H) 0 - 2.3    HCO3, Arterial 33.1 (H) 18 - 23 MMOL/L    CO2 Content 34.7 (H) 19 - 24 MMOL/L    O2 Sat 92.0 (L) 96 - 97 %    Carbon Monoxide, Blood 1.5 0 - 5 %    Methemoglobin, Arterial 1.2 <1.5 %        Imaging/Diagnostics Last 24 Hours   XR CHEST PORTABLE    Result Date: 12/31/2022  EXAMINATION: ONE XRAY VIEW OF THE CHEST 12/31/2022 4:57 am COMPARISON: 12/27/2022 HISTORY: ORDERING SYSTEM PROVIDED HISTORY: f/u ARDS TECHNOLOGIST PROVIDED HISTORY: Reason for exam:->f/u ARDS Reason for Exam: f/u ARDS FINDINGS: Cardiomediastinal silhouette is stable. Similar bilateral airspace opacities. No pleural effusion or pneumothorax. No gross bony abnormality.      Stable chest.     Total critical care time 38  minutes   Patient is critical with acute respiratory failure  Urine is stable reviewed labs, imaging, and discussed with treatment care plan with attending    Critical care time excludes separately billable procedure    Electronically signed by STEVENSON Rosario CNP on 1/8/2023 at 6:21 PM

## 2023-01-09 NOTE — PROGRESS NOTES
Comprehensive Nutrition Assessment    Type and Reason for Visit:  Reassess    Nutrition Recommendations/Plan:   Resume EN with current formula, modify goal rate to 40 ml/hr to provide 960 kcal (1488 tot with propofol) and 84 gm protein  Continue daily protein modular (Proteinex), to increase protein to 110 gm daily  Consider Reglan as needed     Malnutrition Assessment:  Malnutrition Status: At risk for malnutrition (01/09/23 1510)    Context:  Acute Illness       Nutrition Assessment:    She is sedated on propofol and versed, EN stopped for high residual of 120 ml (not a high residual). Full code noted. Spoke with RN, plan to restart. Will modify order to not overfeed Pt. Will continue to follow as high nutriton risk. Nutrition Related Findings:    Na 131, BUN 58, GFR 49, Mg 2.6, Glu 318, Ca 7.4   Wound Type: None       Current Nutrition Intake & Therapies:    Average Meal Intake: NPO  Average Supplements Intake: NPO  Diet NPO  ADULT TUBE FEEDING; Orogastric; Peptide Based High Protein; Continuous; 20; Yes; 10; Q 4 hours; 50; 30; Q 4 hours; Protein; Proteinex 1x daily  Current Tube Feeding (TF) Orders:  Feeding Route: Orogastric  Formula: Peptide Based High Protein  Schedule: Continuous  Feeding Regimen: 20 ml/hr  Additives/Modulars: Protein (Proteinex provides 26 gm protein)  Water Flushes:  30 ml Q4H  Current TF & Flush Orders Provides: not running  Goal TF & Flush Orders Provides: 1200 kcal (1728 tot with propofol), 105 gm protein (131 tot with protein modular)  Additional Calorie Sources:  528 lipid kcal from current propofol at 20 ml/hr    Anthropometric Measures:  Height: 5' 8.11\" (173 cm)  Ideal Body Weight (IBW): 141 lbs (64 kg)    Admission Body Weight: 172 lb 8 oz (78.2 kg)  Current Body Weight: 183 lb 13.8 oz (83.4 kg) ((1/1/23)), 130.4 % IBW.  Weight Source: Bed Scale  Current BMI (kg/m2): 27.9  Usual Body Weight: 172 lb 13 oz (78.4 kg) (9/30/22)     Weight Adjustment For: No Adjustment BMI Categories: Overweight (BMI 25.0-29. 9)    Estimated Daily Nutrient Needs:        Energy (kcal/day): 1537 ([place])  Weight Used for Protein Requirements: Ideal  Protein (g/day): 128 (2.0 g/kg)  Method Used for Fluid Requirements: 1 ml/kcal  Fluid (ml/day): 1550    Nutrition Diagnosis:   Inadequate enteral nutrition infusion related to acute injury/trauma as evidenced by NPO or clear liquid status due to medical condition, intubation    Nutrition Interventions:   Food and/or Nutrient Delivery:  (please resume)  Nutrition Education/Counseling: No recommendation at this time  Coordination of Nutrition Care: Continue to monitor while inpatient  Plan of Care discussed with: RN    Goals:  Previous Goal Met: Progressing toward Goal(s)  Goals: Tolerate nutrition support at goal rate, Initiate nutrition support       Nutrition Monitoring and Evaluation:   Behavioral-Environmental Outcomes: None Identified  Food/Nutrient Intake Outcomes: Enteral Nutrition Intake/Tolerance  Physical Signs/Symptoms Outcomes: Biochemical Data, GI Status, Hemodynamic Status, Weight, Skin    Discharge Planning:     Too soon to determine     Jose Marlow, [address] [place] [address],   Contact: [phone]

## 2023-01-09 NOTE — PROGRESS NOTES
I have personally evaluated and examined the patient independently. I have reviewed the patient's available data,including medical history and recent test results. Reviewed and discussed documentation as provided by Julienne Cotton CNP. I agree with the physical exam findings, assessment and plan. My documented complex medical decision making constitutes a substantive portion of the supervisory note. Acute respiratory failure with hypoxia  Acute respiratory distress syndrome, possibly idiopathic  Shock, presumably septic shock due to above  Chronic diastolic heart failure  History of paroxysmal atrial fibrillation  History of hypertension  History of GERD  Advanced age, frailty      Continue current high level ventilatory support lung protective strategy titration of FiO2 to achieve saturation value 88 to 92% via pulse oximetry  Level 3 sedation (propofol, Versed infusions)  Hemodynamic support Levophed infusion, enteral midodrine  Enteral nutritional support  Amiodarone plus anticoagulation for management of atrial fibrillation    Discontinuation of all antimicrobial therapies (all culture data negative to date) reculture when free from antimicrobial therapy over the next 48 hours, possible bronchoscopy with lavage as well    Continuation of systemic steroids for empiric treatment of ARDS (note that the patient does not have a well-established pre-existing pulmonary history per se, review of older CT scan of the chest from 2019 essentially unremarkable save for perhaps mild emphysematous changes). Very complex medical decisions were required for this evaluation and management. This patient suffers from critical illness related to respiratory failure hemodynamic instability without the current level of support progressive decline of clinical status organ dysfunction and untoward mortality.   I devoted 32 minutes of time directly to the ongoing management and care of this very ill individual, this time is independent of any time provided for the performance of procedures.     [facility]  [phone]

## 2023-01-09 NOTE — PROGRESS NOTES
V2.0  [facility] [place] Critical Care Progress Note      Name:  Tyrese Yost /Age/Sex: 1935  (81 y.o. female)   MRN & CSN:  [id number] & [id number] Encounter Date/Time: 2023 3:22 PM EST    Location:  -A PCP: Lorri Sarmiento, [facility] Day: 11    Assessment and Plan:   Tyrese Yost is a 81 y.o. female who presents with Respiratory failure with hypoxia ([facility] [address]    Plan:  Acute hypoxic respiratory failure - s/p intubation 1/3  ARDS  Multifocal bacterial vs viral pneumonia  Sepsis  -Requiring full ventilatory support-FiO2 100%, PEEP-12 mm   -WBC up to 40.0 today. Will re culture today. Will DC all antibiotics. -Received a Decadron 20 mg x total 5 days, then 10 mg x 5 days; will start methylprednisone due to the improvement in respiratory status  -Continue DuoNebs, Dulera, albuterol as needed  -Sputum culture from  growing Candida albicans, blood cultures pending drawn . -ABG pending.   -Currently on pressors, wean as able to keep MAP greater than 65. History of diastolic congestive heart failure  -Echocardiogram on -EF 55 to 60%  -proBNP-790 on   -Stress test negative for ischemia in     Hypotension  - on Levophed at 10 mcg, continue. Add Vaso.   -Continue Midodrine.  -Continue telemetry monitoring. History of A. fib/a flutter  -Continue home amiodarone and Eliquis      Chronic Conditions: Continue all home medications except as stated above or contraindicated.      Diet Diet NPO  ADULT TUBE FEEDING; Orogastric; Peptide Based High Protein; Continuous; 20; Yes; 10; Q 4 hours; 50; 30; Q 4 hours; Protein; Proteinex 1x daily   DVT Prophylaxis [] Lovenox, []  Heparin, [] SCDs, [] Ambulation,  [x] Eliquis, [] Xarelto  [] Coumadin   Code Status Full Code   Disposition From: Home  Expected Disposition: Home  Estimated Date of Discharge: TBD  Patient requires continued admission due to acute respiratory failure   Surrogate Decision Maker/ POA Son     Subjective:   Patient seen and examined. She continues to require high ventilatory settings with FiO2 100%, PEEP 12 mm. She is sedated on propofol and Versed. She does not awaken when name called, does not follow commands. Labs and vitals reviewed. Review of systems given mental status. Plan of care discussed with bedside RN. Review of Systems:    Review of Systems  Unable to obtain secondary to mechanical ventilation    Objective: Intake/Output Summary (Last 24 hours) at 1/9/2023 0906  Last data filed at 1/9/2023 0419  Gross per 24 hour   Intake --   Output 1495 ml   Net -1495 ml          Vitals:   Vitals:    01/09/23 0805   BP:    Pulse: 92   Resp: 17   Temp:    SpO2: 96%       Physical Exam:     General: Age-appropriate female on full mechanical ventilation  Eyes: EOMI  ENT: neck supple. ETT in place  Cardiovascular: Irregular rate  Respiratory: Rhonchi throughout,  wheezing+  Equal chest expansion on vent. Gastrointestinal: Soft, non tender  Genitourinary: no suprapubic tenderness. Quinones cath in place.   Musculoskeletal: No edema  Skin: warm, dry  Neuro: Sedated  Psych: Unable to assess    Medications:   Medications:    vancomycin  1,500 mg IntraVENous Q18H    methylPREDNISolone  60 mg IntraVENous Q6H    pantoprazole  40 mg IntraVENous BID    midodrine  5 mg Oral q8h    chlorhexidine  15 mL Mouth/Throat BID    lidocaine PF  5 mL IntraDERmal Once    sodium chloride flush  5-40 mL IntraVENous 2 times per day    guaiFENesin  200 mg Oral Q4H    aspirin  81 mg Per NG tube Daily    lidocaine PF  5 mL IntraDERmal Once    sodium chloride flush  5-40 mL IntraVENous 2 times per day    budesonide-formoterol  2 puff Inhalation BID    amiodarone  200 mg Oral Daily    [Held by provider] amLODIPine  2.5 mg Oral Daily    apixaban  5 mg Oral BID    escitalopram  20 mg Oral Daily    sodium chloride flush  10 mL IntraVENous 2 times per day    ipratropium-albuterol  1 ampule Inhalation Q4H WA    traZODone  50 mg Oral Nightly    meropenem 1,000 mg IntraVENous Q8H      Infusions:    vasopressin      midazolam 4 mg/hr (01/08/23 1745)    propofol 40 mcg/kg/min (01/09/23 0653)    norepinephrine 10 mcg/min (01/09/23 0140)    sodium chloride      sodium chloride      sodium chloride Stopped (01/01/23 1841)     PRN Meds: midazolam, 2 mg, Q1H PRN  sodium chloride flush, 5-40 mL, PRN  sodium chloride, , PRN  fentanNYL, 50 mcg, Q1H PRN  sodium chloride flush, 5-40 mL, PRN  sodium chloride, , PRN  lidocaine viscous hcl, 15 mL, Q6H PRN  phenol, 1 spray, Q2H PRN  sodium chloride flush, 10 mL, PRN  sodium chloride, , PRN  polyethylene glycol, 17 g, Daily PRN  acetaminophen, 650 mg, Q6H PRN   Or  acetaminophen, 650 mg, Q6H PRN  albuterol sulfate HFA, 2 puff, Q4H PRN  guaiFENesin, 200 mg, Q4H PRN  ondansetron, 4 mg, Q6H PRN      Labs      Recent Results (from the past 24 hour(s))   Blood gas, arterial    Collection Time: 01/08/23 12:15 PM   Result Value Ref Range    pH, Bld 7.45 7.34 - 7.45    pCO2, Arterial 51.0 (H) 32 - 45 MMHG    pO2, Arterial 54 (L) 75 - 100 MMHG    Base Exc, Mixed 9.7 (H) 0 - 2.3    HCO3, Arterial 35.4 (H) 18 - 23 MMOL/L    CO2 Content 37.0 (H) 19 - 24 MMOL/L    O2 Sat 84.7 (L) 96 - 97 %    Carbon Monoxide, Blood 1.5 0 - 5 %    Methemoglobin, Arterial 1.3 <1.5 %    Comment AC/PC 12 15 100%    Blood gas, arterial    Collection Time: 01/08/23  3:45 PM   Result Value Ref Range    pH, Bld 7.47 (H) 7.34 - 7.45    pCO2, Arterial 49.0 (H) 32 - 45 MMHG    pO2, Arterial 56 (L) 75 - 100 MMHG    Base Exc, Mixed 10.4 (H) 0 - 2.3    HCO3, Arterial 35.7 (H) 18 - 23 MMOL/L    CO2 Content 37.2 (H) 19 - 24 MMOL/L    O2 Sat 85.6 (L) 96 - 97 %    Carbon Monoxide, Blood 1.7 0 - 5 %    Methemoglobin, Arterial 1.5 (H) <1.5 %   Blood gas, arterial    Collection Time: 01/08/23  5:30 PM   Result Value Ref Range    pH, Bld 7.42 7.34 - 7.45    pCO2, Arterial 51.0 (H) 32 - 45 MMHG    pO2, Arterial 70 (L) 75 - 100 MMHG    Base Exc, Mixed 7.2 (H) 0 - 2.3    HCO3, Arterial 33.1 (H) 18 - 23 MMOL/L    CO2 Content 34.7 (H) 19 - 24 MMOL/L    O2 Sat 92.0 (L) 96 - 97 %    Carbon Monoxide, Blood 1.5 0 - 5 %    Methemoglobin, Arterial 1.2 <1.5 %   POCT Glucose    Collection Time: 01/08/23  8:23 PM   Result Value Ref Range    POC Glucose 270 (H) 70 - 99 MG/DL   CBC with Auto Differential    Collection Time: 01/09/23  4:45 AM   Result Value Ref Range    WBC 40.0 (HH) 4.0 - 10.5 K/CU MM    RBC 2.95 (L) 4.2 - 5.4 M/CU MM    Hemoglobin 9.7 (L) 12.5 - 16.0 GM/DL    Hematocrit 30.0 (L) 37 - 47 %    .7 (H) 78 - 100 FL    MCH 32.9 (H) 27 - 31 PG    MCHC 32.3 32.0 - 36.0 %    RDW 12.9 11.7 - 14.9 %    Platelets 674 525 - 874 K/CU MM    MPV 9.6 7.5 - 11.1 FL    Cells Counted 200     Myelocyte Percent 1 (H) 0.0 %    Bands Relative 7 5 - 11 %    Segs Relative 87.5 (H) 36 - 66 %    Lymphocytes % 2.0 (L) 24 - 44 %    Monocytes % 2.5 0 - 4 %    Myelocytes Absolute 0.40 K/CU MM    Bands Absolute 2.80 K/CU MM    Segs Absolute 35.0 K/CU MM    Lymphocytes Absolute 0.8 K/CU MM    Monocytes Absolute 1.0 K/CU MM    Differential Type MANUAL DIFFERENTIAL     Polychromasia 1+     Basophilic Stippling 1+         Imaging/Diagnostics Last 24 Hours   XR CHEST PORTABLE    Result Date: 12/31/2022  EXAMINATION: ONE XRAY VIEW OF THE CHEST 12/31/2022 4:57 am COMPARISON: 12/27/2022 HISTORY: ORDERING SYSTEM PROVIDED HISTORY: f/u ARDS TECHNOLOGIST PROVIDED HISTORY: Reason for exam:->f/u ARDS Reason for Exam: f/u ARDS FINDINGS: Cardiomediastinal silhouette is stable. Similar bilateral airspace opacities. No pleural effusion or pneumothorax. No gross bony abnormality.      Stable chest.     Total critical care time 38  minutes   Patient is critical with acute respiratory failure  Urine is stable reviewed labs, imaging, and discussed with treatment care plan with attending    Critical care time excludes separately billable procedure    Electronically signed by STEVENSON Martinez CNP on 1/9/2023 at 9:06 AM

## 2023-01-09 NOTE — PROGRESS NOTES
In-Patient Progress Note    Patient:  Katie Bolden 81 y.o. female MRN: [id number]     Date of Service: 1/9/2023    Hospital Day: 11      Chief complaint: had no chief complaint listed for this encounter. Assessment and Plan   Katie Bolden, a 81 y.o. female, with a history of hypertension (amlodipine 2.5 Mg daily), A. fib (amiodarone 200 Mg daily, apixaban 5 Mg twice daily), depression/anxiety (Lexapro 20 Mg daily), osteoporosis (alendronate 70 Mg weekly), GERD (pantoprazole 40 Mg daily), was initially admitted to [facility] on 12/22/2022 with complaints of shortness of breath, lethargy, wheezing; with diagnosis of acute on chronic respiratory failure with hypoxia secondary to acute bilateral lower lobe pneumonia. She required supplemental oxygen on admission at 3 LPM NC. Throughout the course admission, continue to require increased amounts of oxygen. Antibiotics were broadened to cefepime/ doxy, started on steroids, and scheduled bronchodilators/ICS. She appeared overall comfortable w/o distress however biochemical work-up/imaging suggested worsening condition. Repeat CTA on 12/28 negative for PE but extensive mixed groundglass and consolidative opacities that were increasing. Viral panel, urinary antigens, MRSA swab, BLC and sputum culture were all negative. Overnight reported increased distress with hypoxia and subsequent anxiety. Oxygen titrated up to 15 LPM High flow. Discussed need for increased care/ evaluation and going to pursue transfer to higher level of care. She was accepted and transferred to [facility] ICU for intensivist evaluation. She was given 20mg IV decadron and started on BiPAP prior to departure to [facility] on  12/30/2022.     Assessment and plan    Acute hypoxic respiratory failure  ARDS 2/2 multifocal pneumonia  Multifocal pneumonia, likely viral, suspected superimposed bacterial pneumonia  Mechanical ventilation since 1/3/2023  Patient presented with hypoxic respiratory failure. CTA chest  shows no evidence of PE but did show extensive mixed groundglass and consolidative opacities bilaterally, increased from . Respiratory viral panel -. Urine strep pneumoniae and Legionella antigen negative-. Elevated inflammatory markers. .2. Vancomycin stopped after MRSA nares screen came negative. Continued on HFNC initially but O2 saturation could not be maintained and patient had be intubated for severe hypoxia on 1/3/2023 around 1am.  -Continued on meropenem  -Decadron: completed 20 Mg for 5 days, now on 10 Mg  -ARDS compliant vent management as able  -Wean FiO2 as able    Hypotension, likely multifactorial, PAP, sedation  -on midodrine  -can consider switching propofol to other sedatives if needed    Anxiety/depression  -Continue escitalopram    History of A. fib/a flutter  -Continue amiodarone and Eliquis    History of heart failure preserved ejection fraction  EF 55-60% per echo on . proBNP 790 on . Stress test was done back in  which was negative for reversible perfusion defect. -on Lasix 20 Mg daily - continue to monitor volume status closely      # Peptic ulcer prophylaxis: Pantoprazole  # DVT Prophylaxis: Eliquis  #CODE STATUS: Full code      Current living situation: -  Expected Disposition: -  Estimated discharge date: -      Review of System     Review of Systems  -could not be reviewed     I have reviewed all pertinent PMHx, PSHx, FamHx, SocialHx, medications, and allergies and updated history as appropriate.     Physical Exam   VITAL SIGNS:  BP (!) 122/45   Pulse 92   Temp 100.4 °F (38 °C) (Rectal)   Resp 17   Ht 5' 8.11\" (1.73 m)   Wt 183 lb 13.8 oz (83.4 kg)   SpO2 96%   BMI 27.87 kg/m²   Tmax over 24 hours:  Temp (24hrs), Av.9 °F (37.2 °C), Min:97.2 °F (36.2 °C), Max:100.4 °F (38 °C)      Patient Vitals for the past 6 hrs:   BP Temp Temp src Pulse Resp SpO2   23 0805 -- -- -- 92 17 96 %   23 0802 -- -- -- 93 18 96 %   01/09/23 0800 -- -- -- 94 20 96 %   01/09/23 0742 -- 100.4 °F (38 °C) Rectal 95 13 98 %   01/09/23 0700 -- -- -- 100 13 97 %   01/09/23 0600 -- -- -- (!) 105 15 96 %   01/09/23 0500 -- -- -- 99 14 96 %   01/09/23 0400 (!) 122/45 100 °F (37.8 °C) Rectal 94 13 95 %           Intake/Output Summary (Last 24 hours) at 1/9/2023 0914  Last data filed at 1/9/2023 0419  Gross per 24 hour   Intake --   Output 1495 ml   Net -1495 ml       Wt Readings from Last 2 Encounters:   01/01/23 183 lb 13.8 oz (83.4 kg)   12/28/22 172 lb 6.4 oz (78.2 kg)     Body mass index is 27.87 kg/m². Physical Exam  Constitutional:       General: She is not in acute distress. Appearance: She is well-developed. She is ill-appearing. HENT:      Head: Normocephalic and atraumatic. Right Ear: External ear normal.      Left Ear: External ear normal.      Nose: Nose normal.   Eyes:      General: No scleral icterus. Right eye: No discharge. Left eye: No discharge. Conjunctiva/sclera: Conjunctivae normal.      Pupils: Pupils are equal, round, and reactive to light. Neck:      Thyroid: No thyromegaly. Vascular: No JVD. Trachea: No tracheal deviation. Cardiovascular:      Rate and Rhythm: Normal rate and regular rhythm. Heart sounds: Normal heart sounds. No murmur heard. No friction rub. No gallop. Pulmonary:      Effort: Pulmonary effort is normal. No respiratory distress. Breath sounds: Normal breath sounds. No stridor. No wheezing or rales. Chest:      Chest wall: No tenderness. Abdominal:      General: Bowel sounds are normal. There is no distension. Palpations: Abdomen is soft. There is no mass. Tenderness: There is no abdominal tenderness. There is no guarding or rebound. Hernia: No hernia is present. Genitourinary:     Vagina: Normal. No vaginal discharge. Rectum: Guaiac result negative. Musculoskeletal:         General: No tenderness or deformity. Normal range of motion. Cervical back: Normal range of motion and neck supple. Lymphadenopathy:      Cervical: No cervical adenopathy. Skin:     General: Skin is warm and dry. Capillary Refill: Capillary refill takes less than 2 seconds. Coloration: Skin is not pale. Findings: No erythema or rash. Neurological:      Mental Status: She is alert. Motor: No abnormal muscle tone. Comments: Sedated and mechanically ventilated   Psychiatric:      Comments: Sedated and mechanically ventilated         Current Medications      vancomycin  1,500 mg IntraVENous Q18H    methylPREDNISolone  60 mg IntraVENous Q6H    pantoprazole  40 mg IntraVENous BID    midodrine  5 mg Oral q8h    chlorhexidine  15 mL Mouth/Throat BID    lidocaine PF  5 mL IntraDERmal Once    sodium chloride flush  5-40 mL IntraVENous 2 times per day    guaiFENesin  200 mg Oral Q4H    aspirin  81 mg Per NG tube Daily    lidocaine PF  5 mL IntraDERmal Once    sodium chloride flush  5-40 mL IntraVENous 2 times per day    budesonide-formoterol  2 puff Inhalation BID    amiodarone  200 mg Oral Daily    [Held by provider] amLODIPine  2.5 mg Oral Daily    apixaban  5 mg Oral BID    escitalopram  20 mg Oral Daily    sodium chloride flush  10 mL IntraVENous 2 times per day    ipratropium-albuterol  1 ampule Inhalation Q4H WA    traZODone  50 mg Oral Nightly    meropenem  1,000 mg IntraVENous Q8H         Labs and Imaging Studies   Laboratory findings:  XR CHEST PORTABLE    Result Date: 12/31/2022  EXAMINATION: ONE XRAY VIEW OF THE CHEST 12/31/2022 4:57 am COMPARISON: 12/27/2022 HISTORY: ORDERING SYSTEM PROVIDED HISTORY: f/u ARDS TECHNOLOGIST PROVIDED HISTORY: Reason for exam:->f/u ARDS Reason for Exam: f/u ARDS FINDINGS: Cardiomediastinal silhouette is stable. Similar bilateral airspace opacities. No pleural effusion or pneumothorax. No gross bony abnormality.      Stable chest.       Recent Results (from the past 24 hour(s))   Blood gas, arterial    Collection Time: 01/08/23 12:15 PM   Result Value Ref Range    pH, Bld 7.45 7.34 - 7.45    pCO2, Arterial 51.0 (H) 32 - 45 MMHG    pO2, Arterial 54 (L) 75 - 100 MMHG    Base Exc, Mixed 9.7 (H) 0 - 2.3    HCO3, Arterial 35.4 (H) 18 - 23 MMOL/L    CO2 Content 37.0 (H) 19 - 24 MMOL/L    O2 Sat 84.7 (L) 96 - 97 %    Carbon Monoxide, Blood 1.5 0 - 5 %    Methemoglobin, Arterial 1.3 <1.5 %    Comment AC/PC 12 15 100%    Blood gas, arterial    Collection Time: 01/08/23  3:45 PM   Result Value Ref Range    pH, Bld 7.47 (H) 7.34 - 7.45    pCO2, Arterial 49.0 (H) 32 - 45 MMHG    pO2, Arterial 56 (L) 75 - 100 MMHG    Base Exc, Mixed 10.4 (H) 0 - 2.3    HCO3, Arterial 35.7 (H) 18 - 23 MMOL/L    CO2 Content 37.2 (H) 19 - 24 MMOL/L    O2 Sat 85.6 (L) 96 - 97 %    Carbon Monoxide, Blood 1.7 0 - 5 %    Methemoglobin, Arterial 1.5 (H) <1.5 %   Blood gas, arterial    Collection Time: 01/08/23  5:30 PM   Result Value Ref Range    pH, Bld 7.42 7.34 - 7.45    pCO2, Arterial 51.0 (H) 32 - 45 MMHG    pO2, Arterial 70 (L) 75 - 100 MMHG    Base Exc, Mixed 7.2 (H) 0 - 2.3    HCO3, Arterial 33.1 (H) 18 - 23 MMOL/L    CO2 Content 34.7 (H) 19 - 24 MMOL/L    O2 Sat 92.0 (L) 96 - 97 %    Carbon Monoxide, Blood 1.5 0 - 5 %    Methemoglobin, Arterial 1.2 <1.5 %   POCT Glucose    Collection Time: 01/08/23  8:23 PM   Result Value Ref Range    POC Glucose 270 (H) 70 - 99 MG/DL   CBC with Auto Differential    Collection Time: 01/09/23  4:45 AM   Result Value Ref Range    WBC 40.0 (HH) 4.0 - 10.5 K/CU MM    RBC 2.95 (L) 4.2 - 5.4 M/CU MM    Hemoglobin 9.7 (L) 12.5 - 16.0 GM/DL    Hematocrit 30.0 (L) 37 - 47 %    .7 (H) 78 - 100 FL    MCH 32.9 (H) 27 - 31 PG    MCHC 32.3 32.0 - 36.0 %    RDW 12.9 11.7 - 14.9 %    Platelets 944 241 - 543 K/CU MM    MPV 9.6 7.5 - 11.1 FL    Cells Counted 200     Myelocyte Percent 1 (H) 0.0 %    Bands Relative 7 5 - 11 %    Segs Relative 87.5 (H) 36 - 66 %    Lymphocytes % 2.0 (L) 24 - 44 % Monocytes % 2.5 0 - 4 %    Myelocytes Absolute 0.40 K/CU MM    Bands Absolute 2.80 K/CU MM    Segs Absolute 35.0 K/CU MM    Lymphocytes Absolute 0.8 K/CU MM    Monocytes Absolute 1.0 K/CU MM    Differential Type MANUAL DIFFERENTIAL     Polychromasia 1+     Basophilic Stippling 1+            Electronically signed by Bryan Edmondson MD on 1/9/2023 at 9:14 AM      Comment: Please note this report has been produced using speech recognition software and may contain errors related to that system including errors in grammar, punctuation, and spelling, as well as words and phrases that may be inappropriate. If there are any questions or concerns please feel free to contact the dictating provider for clarification.

## 2023-01-09 NOTE — PROGRESS NOTES
01/09/23 0805   Patient Observation   Heart Rate 92   Resp 17   SpO2 96 %   Vent Information   Ventilator ID [id number]   Equipment Changed HME   Vent Mode AC/PC   $Ventilation $Subsequent Day   Ventilator Settings   FiO2  100 %   Resp Rate (Set) 15 bmp   PEEP/CPAP (cmH2O) 12   Pressure Support (cm H2O) 0 cm H2O   Vent Patient Data (Readings)   Vt (Measured) 663 mL   Peak Inspiratory Pressure (cmH2O) 41 cmH2O   Rate Measured 16 br/min   Minute Volume (L/min) 10.6 Liters   Mean Airway Pressure (cmH2O) 19 cmH20   Plateau Pressure (cm H2O) 42 cm H2O   I:E Ratio 1:3.50   I Time/ I Time % 0 s   Vent Alarm Settings   High Pressure (cmH2O) 50 cmH2O   Low Minute Volume (lpm) 2.5 L/min   High Minute Volume (lpm) 20 L/min   Low Exhaled Vt (ml) 250 mL   High Exhaled Vt (ml) 1000 mL   RR High (bpm) 40 br/min   Apnea (secs) 20 secs   ETT    Placement Date/Time: 01/03/23 0200   Present on Admission/Arrival: No  Placed By: (c) Licensed provider;RT;RN  Placement Verified By: Capnometry; Auscultation; Chest X-ray  Preoxygenation: Yes  Mask Ventilation: Ventilated by mask (1)  Technique: Stylet. ..    Secured At 24 cm   Measured From Lips   ETT Placement Center   Secured By Commercial tube [email or web address]   Treatment   $Treatment Type $Inhaled Therapy/Meds

## 2023-01-10 NOTE — PROGRESS NOTES
V2.0  [facility] [place] Note      Name:  Ena Najjar /Age/Sex: 1935  (81 y.o. female)   MRN & CSN:  [id number] & [id number] Encounter Date/Time: 1/10/2023 3:22 PM EST    Location:  -A PCP: Isaak Pierre, [facility] Day: 12    Assessment and Plan:   Ena Najjar is a 81 y.o. female who presents with Respiratory failure with hypoxia ([facility] [address]    Plan:  Acute hypoxic respiratory failure - s/p intubation 1/3  ARDS  Multifocal bacterial vs viral pneumonia  Sepsis  -Requiring full ventilatory support-FiO2 100%, PEEP-12 mm. Unable to change vent settings given ABG findings. She has been on high vent support settings for few days now. -WBC 39.4 today. Re cultured . Jose Alberto Patella Antibiotics discontinued . -Received a Decadron 20 mg x total 5 days, then 10 mg x 5 days  -Continue Solu-Medrol 60 mg every 6.-Continue DuoNebs, Dulera, albuterol as needed  -Sputum culture from  growing Candida albicans, blood cultures pending drawn . -ABG pending. Plan to titrate vent settings, if able  -Currently on pressors, Levo and Vaso, wean as able to keep MAP greater than 65. History of diastolic congestive heart failure  -Echocardiogram on -EF 55 to 60%  -proBNP-790 on   -Stress test negative for ischemia in     Hypotension  - on Levophed at 10 mcg, continue. Add Vaso.   -Continue Midodrine.  -Continue telemetry monitoring. Elevated gastric residual  -Bowel regimen and Reglan ordered.  -Continue to follow. History of A. fib/a flutter  -Continue home amiodarone and Eliquis      Chronic Conditions: Continue all home medications except as stated above or contraindicated. Will attempt goals of care discussions with family today when they arrived. Unfortunately, patient has not had any progression and respiratory status and she remains on pressors. Prognosis is guarded.      Diet Diet NPO  ADULT TUBE FEEDING; Orogastric; Peptide Based High Protein; Continuous; 20; Yes; 10; Q 4 hours; 40; 30; Q 4 hours; Protein; Proteinex 1x daily   DVT Prophylaxis [] Lovenox, []  Heparin, [] SCDs, [] Ambulation,  [x] Eliquis, [] Xarelto  [] Coumadin   Code Status Full Code   Disposition From: Home  Expected Disposition: Home  Estimated Date of Discharge: TBD  Patient requires continued admission due to acute respiratory failure   Surrogate Decision Maker/ POA Son     Subjective:   Patient seen and examined. She continues to require high ventilatory settings with FiO2 100%, PEEP 12 mm. She is sedated on propofol and Versed. She does not awaken when name called, does not follow commands. Labs and vitals reviewed. Review of systems given mental status. Plan of care discussed with bedside RN. Review of Systems:    Review of Systems  Unable to obtain secondary to mechanical ventilation    Objective: Intake/Output Summary (Last 24 hours) at 1/10/2023 0820  Last data filed at 1/10/2023 0737  Gross per 24 hour   Intake 3397.52 ml   Output 1425 ml   Net 1972.52 ml          Vitals:   Vitals:    01/10/23 0756   BP:    Pulse:    Resp: 14   Temp:    SpO2:        Physical Exam:     General: Age-appropriate female on full mechanical ventilation  Eyes: EOMI  ENT: neck supple. ETT in place  Cardiovascular: Irregular rate  Respiratory: Rhonchi throughout,  wheezing+  Equal chest expansion on vent. Gastrointestinal: Soft, non tender  Genitourinary: no suprapubic tenderness. Quinones cath in place.   Musculoskeletal: No edema  Skin: warm, dry  Neuro: Sedated  Psych: Unable to assess    Medications:   Medications:    docusate  100 mg Oral BID    polyethylene glycol  17 g Oral Daily    metoclopramide  10 mg IntraVENous Q6H    lansoprazole  30 mg Per NG tube Daily    methylPREDNISolone  60 mg IntraVENous Q6H    midodrine  5 mg Oral q8h    chlorhexidine  15 mL Mouth/Throat BID    lidocaine PF  5 mL IntraDERmal Once    sodium chloride flush  5-40 mL IntraVENous 2 times per day    guaiFENesin  200 mg Oral Q4H    aspirin  81 mg Per NG tube Daily    lidocaine PF  5 mL IntraDERmal Once    sodium chloride flush  5-40 mL IntraVENous 2 times per day    budesonide-formoterol  2 puff Inhalation BID    amiodarone  200 mg Oral Daily    [Held by provider] amLODIPine  2.5 mg Oral Daily    apixaban  5 mg Oral BID    escitalopram  20 mg Oral Daily    sodium chloride flush  10 mL IntraVENous 2 times per day    ipratropium-albuterol  1 ampule Inhalation Q4H WA    traZODone  50 mg Oral Nightly      Infusions:    vasopressin 0.03 Units/min (01/10/23 0634)    midazolam 4 mg/hr (01/10/23 0634)    propofol 25 mcg/kg/min (01/10/23 0738)    norepinephrine 4 mcg/min (01/10/23 0634)    sodium chloride      sodium chloride      sodium chloride Stopped (01/01/23 1841)     PRN Meds: midazolam, 2 mg, Q1H PRN  sodium chloride flush, 5-40 mL, PRN  sodium chloride, , PRN  fentanNYL, 50 mcg, Q1H PRN  sodium chloride flush, 5-40 mL, PRN  sodium chloride, , PRN  lidocaine viscous hcl, 15 mL, Q6H PRN  phenol, 1 spray, Q2H PRN  sodium chloride flush, 10 mL, PRN  sodium chloride, , PRN  polyethylene glycol, 17 g, Daily PRN  acetaminophen, 650 mg, Q6H PRN   Or  acetaminophen, 650 mg, Q6H PRN  albuterol sulfate HFA, 2 puff, Q4H PRN  guaiFENesin, 200 mg, Q4H PRN  ondansetron, 4 mg, Q6H PRN      Labs      Recent Results (from the past 24 hour(s))   Blood gas, arterial    Collection Time: 01/09/23  9:15 AM   Result Value Ref Range    pH, Bld 7.36 7.34 - 7.45    pCO2, Arterial 58.0 (H) 32 - 45 MMHG    pO2, Arterial 76 75 - 100 MMHG    Base Exc, Mixed 5.6 (H) 0 - 2.3    HCO3, Arterial 32.8 (H) 18 - 23 MMOL/L    CO2 Content 34.6 (H) 19 - 24 MMOL/L    O2 Sat 92.8 (L) 96 - 97 %    Carbon Monoxide, Blood 3.2 0 - 5 %    Methemoglobin, Arterial 0.5 <1.5 %    Comment MCKAY    Critical Care Panel    Collection Time: 01/09/23 11:10 AM   Result Value Ref Range    Sodium 131 (L) 135 - 145 MMOL/L    Potassium 5.0 3.5 - 5.1 MMOL/L    Chloride 94 (L) 99 - 110 mMol/L    CO2 29 21 - 32 MMOL/L    Anion Gap 8 4 - 16    BUN 58 (H) 6 - 23 MG/DL    Creatinine 1.1 0.6 - 1.1 MG/DL    Est, Glom Filt Rate 49 (L) >60 mL/min/1.73m2    Glucose 318 (H) 70 - 99 MG/DL    Calcium 7.4 (L) 8.3 - 10.6 MG/DL    Phosphorus 4.8 2.5 - 4.9 MG/DL    Magnesium 2.6 (H) 1.8 - 2.4 mg/dl   Urinalysis with Reflex to Culture    Collection Time: 01/09/23 11:20 AM    Specimen: Urine   Result Value Ref Range    Color, UA YELLOW YELLOW    Clarity, UA CLOUDY (A) CLEAR    Glucose, Urine NEGATIVE NEGATIVE MG/DL    Bilirubin Urine NEGATIVE NEGATIVE MG/DL    Ketones, Urine NEGATIVE NEGATIVE MG/DL    Specific Gravity, UA 1.020 1.001 - 1.035    Blood, Urine LARGE NUMBER OR AMOUNT OBSERVED (A) NEGATIVE    pH, Urine 5.5 5.0 - 8.0    Protein, UA TRACE (A) NEGATIVE MG/DL    Urobilinogen, Urine 0.2 0.2 - 1.0 MG/DL    Nitrite Urine, Quantitative NEGATIVE NEGATIVE    Leukocyte Esterase, Urine NEGATIVE NEGATIVE    RBC,  (H) 0 - 6 /HPF    WBC, UA 10 (H) 0 - 5 /HPF    Bacteria, UA RARE (A) NEGATIVE /HPF    Yeast, UA MANY /HPF    Hyphenated Yeast RARE /HPF    Mucus, UA RARE (A) NEGATIVE HPF    Trichomonas, UA NONE SEEN NONE SEEN /HPF    Granular Casts, UA 9 /LPF   CBC with Auto Differential    Collection Time: 01/10/23  4:15 AM   Result Value Ref Range    WBC 39.4 (HH) 4.0 - 10.5 K/CU MM    RBC 2.60 (L) 4.2 - 5.4 M/CU MM    Hemoglobin 8.6 (L) 12.5 - 16.0 GM/DL    Hematocrit 26.0 (L) 37 - 47 %    .0 78 - 100 FL    MCH 33.1 (H) 27 - 31 PG    MCHC 33.1 32.0 - 36.0 %    RDW 12.7 11.7 - 14.9 %    Platelets 043 919 - 623 K/CU MM    MPV 9.8 7.5 - 11.1 FL    Metamyelocytes Relative 3 (H) 0.0 %    Bands Relative 4 (L) 5 - 11 %    Segs Relative 89.0 (H) 36 - 66 %    Lymphocytes % 2.0 (L) 24 - 44 %    Monocytes % 2.0 0 - 4 %    nRBC 1     Metamyelocytes Absolute 1.18 K/CU MM    Bands Absolute 1.58 K/CU MM    Segs Absolute 35.0 K/CU MM    Lymphocytes Absolute 0.8 K/CU MM    Monocytes Absolute 0.8 K/CU MM    Differential Type MANUAL DIFFERENTIAL     RBC Morphology OCCASIONAL     Anisocytosis 1+     Dohle Bodies PRESENT    Critical Care Panel    Collection Time: 01/10/23  4:15 AM   Result Value Ref Range    Sodium 136 135 - 145 MMOL/L    Potassium 5.0 3.5 - 5.1 MMOL/L    Chloride 95 (L) 99 - 110 mMol/L    CO2 28 21 - 32 MMOL/L    Anion Gap 13 4 - 16    BUN 62 (H) 6 - 23 MG/DL    Creatinine 1.1 0.6 - 1.1 MG/DL    Est, Glom Filt Rate 49 (L) >60 mL/min/1.73m2    Glucose 232 (H) 70 - 99 MG/DL    Calcium 7.5 (L) 8.3 - 10.6 MG/DL    Phosphorus 4.4 2.5 - 4.9 MG/DL    Magnesium 2.7 (H) 1.8 - 2.4 mg/dl        Imaging/Diagnostics Last 24 Hours   XR CHEST PORTABLE    Result Date: 12/31/2022  EXAMINATION: ONE XRAY VIEW OF THE CHEST 12/31/2022 4:57 am COMPARISON: 12/27/2022 HISTORY: ORDERING SYSTEM PROVIDED HISTORY: f/u ARDS TECHNOLOGIST PROVIDED HISTORY: Reason for exam:->f/u ARDS Reason for Exam: f/u ARDS FINDINGS: Cardiomediastinal silhouette is stable. Similar bilateral airspace opacities. No pleural effusion or pneumothorax. No gross bony abnormality.      Stable chest.     Total critical care time 38  minutes   Patient is critical with acute respiratory failure  Urine is stable reviewed labs, imaging, and discussed with treatment care plan with attending    Critical care time excludes separately billable procedure    Electronically signed by STEVENSON Cat CNP on 1/10/2023 at 8:20 AM

## 2023-01-10 NOTE — ACP (ADVANCE CARE PLANNING)
Advanced care planning progress note:  Discussion had with 2 sons at the bedside. I relayed information of patient's high ventilator support in addition to pressor support. We discussed hospital course including antibiotic therapy, use of BiPAP, and use of ventilator. I discussed with them that the patient unfortunately,was not showing progress in terms of treatment. Patient is on day #8 of ventilator support. I discussed with him potential need for tracheostomy and PEG tube. They are undecided at this time if this is the course they would like to proceed with. We discussed at length the difference between bacteria and virus. Antibiotics DC'd on a 01/09 due to nonresponse and no growth in cultures. Patient did not worsen when antibiotics were DC'd. Respiratory status has been unchanged for days. Family would like to stay the course at this time. With hopes patient will make some sort of recovery. I did inform them that the likelihood of her coming off the ventilator to extubation would be highly unlikely. And, she would likely need a tracheostomy if that is the direction they would like to proceed. No decisions made at this visit for goals of care. Support and information provided. Bedside RN also present during the time of these discussions. Patient remains unresponsive, on sedation. Vent settings remain 100% FiO2, 12 of PEEP. Patient remains full code. We will continue current measures at this time. Elena ARGUETA-ACNP  Critical Care Nurse Practitioner   [facility]      A total of 18 minutes was spent with patient and family to discuss advance care planning. None known

## 2023-01-10 NOTE — PROGRESS NOTES
In-Patient Progress Note    Patient:  Jt Sewell 81 y.o. female MRN: [id number]     Date of Service: 1/10/2023    Hospital Day: 12      Chief complaint: had no chief complaint listed for this encounter. Assessment and Plan   Jt Sewell, a 81 y.o. female, with a history of hypertension (amlodipine 2.5 Mg daily), A. fib (amiodarone 200 Mg daily, apixaban 5 Mg twice daily), depression/anxiety (Lexapro 20 Mg daily), osteoporosis (alendronate 70 Mg weekly), GERD (pantoprazole 40 Mg daily), was initially admitted to [facility] on 12/22/2022 with complaints of shortness of breath, lethargy, wheezing; with diagnosis of acute on chronic respiratory failure with hypoxia secondary to acute bilateral lower lobe pneumonia. She required supplemental oxygen on admission at 3 LPM NC. Throughout the course admission, continue to require increased amounts of oxygen. Antibiotics were broadened to cefepime/ doxy, started on steroids, and scheduled bronchodilators/ICS. She appeared overall comfortable w/o distress however biochemical work-up/imaging suggested worsening condition. Repeat CTA on 12/28 negative for PE but extensive mixed groundglass and consolidative opacities that were increasing. Viral panel, urinary antigens, MRSA swab, BLC and sputum culture were all negative. Overnight reported increased distress with hypoxia and subsequent anxiety. Oxygen titrated up to 15 LPM High flow. Discussed need for increased care/ evaluation and going to pursue transfer to higher level of care. She was accepted and transferred to [facility] ICU for intensivist evaluation. She was given 20mg IV decadron and started on BiPAP prior to departure to [facility] on  12/30/2022.     Assessment and plan    Acute hypoxic respiratory failure  ARDS 2/2 multifocal pneumonia  Multifocal pneumonia, likely viral, suspected superimposed bacterial pneumonia  Mechanical ventilation since 1/3/2023  Patient presented with hypoxic respiratory failure. CTA chest  shows no evidence of PE but did show extensive mixed groundglass and consolidative opacities bilaterally, increased from . Respiratory viral panel -. Urine strep pneumoniae and Legionella antigen negative-. Elevated inflammatory markers. .2. Vancomycin stopped after MRSA nares screen came negative. Continued on HFNC initially but O2 saturation could not be maintained and patient had be intubated for severe hypoxia on 1/3/2023 around 1am.  -Decadron: completed 20 Mg for 5 days, now on 10 Mg  -ARDS compliant vent management as able  -Wean FiO2 as able  -Off antimicrobials    Sepsis, likely multifactorial, ARDS, sedation  -on pressors  -can consider switching propofol to other sedatives if needed    Anxiety/depression  -Continue escitalopram    History of A. fib/a flutter  -Continue amiodarone and Eliquis    History of heart failure preserved ejection fraction  EF 55-60% per echo on . proBNP 790 on . Stress test was done back in  which was negative for reversible perfusion defect. -S/p IV Lasix 40 mg x 1 today. # Peptic ulcer prophylaxis: Pantoprazole  # DVT Prophylaxis: Eliquis  #CODE STATUS: Full code      Current living situation: -  Expected Disposition: -  Estimated discharge date: -      Review of System     Review of Systems  -could not be reviewed     I have reviewed all pertinent PMHx, PSHx, FamHx, SocialHx, medications, and allergies and updated history as appropriate.     Physical Exam   VITAL SIGNS:  BP (!) 125/45   Pulse 88   Temp 99 °F (37.2 °C) (Rectal)   Resp 12   Ht 5' 8.11\" (1.73 m)   Wt 185 lb 3 oz (84 kg)   SpO2 93%   BMI 28.07 kg/m²   Tmax over 24 hours:  Temp (24hrs), Av.7 °F (37.1 °C), Min:96.6 °F (35.9 °C), Max:99.5 °F (37.5 °C)      Patient Vitals for the past 6 hrs:   BP Temp Temp src Pulse Resp SpO2   01/10/23 1400 -- -- -- 88 12 93 %   01/10/23 1345 -- -- -- 87 12 93 %   01/10/23 1330 -- -- -- 87 12 92 % 01/10/23 1315 -- -- -- 89 17 92 %   01/10/23 1302 -- -- -- 86 12 92 %   01/10/23 1300 -- -- -- 86 15 92 %   01/10/23 1250 -- -- -- 87 14 92 %   01/10/23 1245 -- -- -- 87 16 92 %   01/10/23 1230 -- -- -- 87 16 92 %   01/10/23 1215 -- -- -- 88 13 92 %   01/10/23 1200 -- -- -- 87 13 92 %   01/10/23 1145 -- -- -- 86 11 93 %   01/10/23 1140 (!) 125/45 99 °F (37.2 °C) Rectal 86 12 92 %   01/10/23 1133 -- -- -- 85 15 93 %   01/10/23 1130 -- -- -- 88 12 92 %   01/10/23 1115 -- -- -- 88 15 93 %   01/10/23 1100 -- -- -- 87 17 91 %   01/10/23 1045 -- -- -- 87 15 91 %   01/10/23 1030 -- -- -- 86 13 91 %   01/10/23 1015 -- -- -- 87 19 92 %   01/10/23 1000 -- -- -- 88 13 91 %   01/10/23 0945 -- -- -- 88 15 92 %           Intake/Output Summary (Last 24 hours) at 1/10/2023 1544  Last data filed at 1/10/2023 1250  Gross per 24 hour   Intake 3427.52 ml   Output 1910 ml   Net 1517.52 ml       Wt Readings from Last 2 Encounters:   01/10/23 185 lb 3 oz (84 kg)   12/28/22 172 lb 6.4 oz (78.2 kg)     Body mass index is 28.07 kg/m². Physical Exam  Constitutional:       Appearance: She is well-developed. Comments: Intubated and sedated    HENT:      Head: Normocephalic and atraumatic. Right Ear: External ear normal.      Left Ear: External ear normal.      Nose: Nose normal.   Eyes:      Conjunctiva/sclera: Conjunctivae normal.      Pupils: Pupils are equal, round, and reactive to light. Neck:      Thyroid: No thyromegaly. Vascular: No JVD. Trachea: No tracheal deviation. Cardiovascular:      Rate and Rhythm: Normal rate and regular rhythm. Heart sounds: No murmur heard. No friction rub. Pulmonary:      Breath sounds: Rales present. Abdominal:      General: Bowel sounds are normal.      Palpations: Abdomen is soft. Genitourinary:     Vagina: Normal.   Musculoskeletal:         General: Normal range of motion. Cervical back: Normal range of motion and neck supple.    Skin:     General: Skin is warm and dry. Capillary Refill: Capillary refill takes less than 2 seconds. Neurological:      Motor: No abnormal muscle tone. Comments: Sedated and mechanically ventilated   Psychiatric:      Comments: Sedated and mechanically ventilated         Current Medications      docusate  100 mg Oral BID    polyethylene glycol  17 g Oral Daily    metoclopramide  10 mg IntraVENous Q6H    furosemide  40 mg IntraVENous Once    lansoprazole  30 mg Per NG tube Daily    methylPREDNISolone  60 mg IntraVENous Q6H    midodrine  5 mg Oral q8h    chlorhexidine  15 mL Mouth/Throat BID    lidocaine PF  5 mL IntraDERmal Once    sodium chloride flush  5-40 mL IntraVENous 2 times per day    guaiFENesin  200 mg Oral Q4H    aspirin  81 mg Per NG tube Daily    lidocaine PF  5 mL IntraDERmal Once    sodium chloride flush  5-40 mL IntraVENous 2 times per day    budesonide-formoterol  2 puff Inhalation BID    amiodarone  200 mg Oral Daily    [Held by provider] amLODIPine  2.5 mg Oral Daily    apixaban  5 mg Oral BID    escitalopram  20 mg Oral Daily    sodium chloride flush  10 mL IntraVENous 2 times per day    ipratropium-albuterol  1 ampule Inhalation Q4H WA    traZODone  50 mg Oral Nightly         Labs and Imaging Studies   Laboratory findings:  XR CHEST PORTABLE    Result Date: 12/31/2022  EXAMINATION: ONE XRAY VIEW OF THE CHEST 12/31/2022 4:57 am COMPARISON: 12/27/2022 HISTORY: ORDERING SYSTEM PROVIDED HISTORY: f/u ARDS TECHNOLOGIST PROVIDED HISTORY: Reason for exam:->f/u ARDS Reason for Exam: f/u ARDS FINDINGS: Cardiomediastinal silhouette is stable. Similar bilateral airspace opacities. No pleural effusion or pneumothorax. No gross bony abnormality.      Stable chest.       Recent Results (from the past 24 hour(s))   CBC with Auto Differential    Collection Time: 01/10/23  4:15 AM   Result Value Ref Range    WBC 39.4 (HH) 4.0 - 10.5 K/CU MM    RBC 2.60 (L) 4.2 - 5.4 M/CU MM    Hemoglobin 8.6 (L) 12.5 - 16.0 GM/DL Hematocrit 26.0 (L) 37 - 47 %    .0 78 - 100 FL    MCH 33.1 (H) 27 - 31 PG    MCHC 33.1 32.0 - 36.0 %    RDW 12.7 11.7 - 14.9 %    Platelets 383 239 - 231 K/CU MM    MPV 9.8 7.5 - 11.1 FL    Metamyelocytes Relative 3 (H) 0.0 %    Bands Relative 4 (L) 5 - 11 %    Segs Relative 89.0 (H) 36 - 66 %    Lymphocytes % 2.0 (L) 24 - 44 %    Monocytes % 2.0 0 - 4 %    nRBC 1     Metamyelocytes Absolute 1.18 K/CU MM    Bands Absolute 1.58 K/CU MM    Segs Absolute 35.0 K/CU MM    Lymphocytes Absolute 0.8 K/CU MM    Monocytes Absolute 0.8 K/CU MM    Differential Type MANUAL DIFFERENTIAL     RBC Morphology OCCASIONAL     Anisocytosis 1+     Dohle Bodies PRESENT    Critical Care Panel    Collection Time: 01/10/23  4:15 AM   Result Value Ref Range    Sodium 136 135 - 145 MMOL/L    Potassium 5.0 3.5 - 5.1 MMOL/L    Chloride 95 (L) 99 - 110 mMol/L    CO2 28 21 - 32 MMOL/L    Anion Gap 13 4 - 16    BUN 62 (H) 6 - 23 MG/DL    Creatinine 1.1 0.6 - 1.1 MG/DL    Est, Glom Filt Rate 49 (L) >60 mL/min/1.73m2    Glucose 232 (H) 70 - 99 MG/DL    Calcium 7.5 (L) 8.3 - 10.6 MG/DL    Phosphorus 4.4 2.5 - 4.9 MG/DL    Magnesium 2.7 (H) 1.8 - 2.4 mg/dl   Blood gas, arterial    Collection Time: 01/10/23  8:15 AM   Result Value Ref Range    pH, Bld 7.40 7.34 - 7.45    pCO2, Arterial 55.0 (H) 32 - 45 MMHG    pO2, Arterial 97 75 - 100 MMHG    Base Exc, Mixed 7.5 (H) 0 - 2.3    HCO3, Arterial 34.1 (H) 18 - 23 MMOL/L    CO2 Content 35.8 (H) 19 - 24 MMOL/L    O2 Sat 95.9 (L) 96 - 97 %    Carbon Monoxide, Blood 1.3 0 - 5 %    Methemoglobin, Arterial 1.0 <1.5 %    Comment ACPC 15 P12 1.0    Procalcitonin    Collection Time: 01/10/23 10:12 AM   Result Value Ref Range    Procalcitonin 0.269            Electronically signed by Martha Paz MD on 1/10/2023 at 3:44 PM      Comment: Please note this report has been produced using speech recognition software and may contain errors related to that system including errors in grammar, punctuation, and spelling, as well as words and phrases that may be inappropriate. If there are any questions or concerns please feel free to contact the dictating provider for clarification.

## 2023-01-10 NOTE — PROGRESS NOTES
I have personally evaluated and examined the patient independently. I have reviewed the patient's available data,including medical history and recent test results. Reviewed and discussed documentation as provided by Louis Lovelace CNP. I agree with the physical exam findings, assessment and plan. My documented complex medical decision making constitutes a substantive portion of the supervisory note. Acute respiratory failure with hypoxia, intubated 1/3/2023  Acute respiratory distress syndrome, possibly idiopathic, progressive and severe  Shock, presumably septic shock due to above  Chronic diastolic heart failure  History of paroxysmal atrial fibrillation  History of hypertension  History of GERD  Advanced age, frailty    Note worsening respiratory status as evidenced by excalating FIO2 requirement over past 24 hours (currently 100% FIO2, marginal SPO2)      Continue current high level ventilatory support lung protective strategy titration of FiO2 to achieve saturation value 88 to 92% via pulse oximetry  Level 3 sedation (propofol, Versed infusions)  Hemodynamic support Levophed infusion, enteral midodrine  Enteral nutritional support  Amiodarone plus anticoagulation for management of atrial fibrillation  Periodic diuresis      Continuation of systemic steroids for empiric treatment of ARDS (note that the patient does not have a well-established pre-existing pulmonary history per se, review of older CT scan of the chest from 2019 essentially unremarkable save for perhaps mild emphysematous changes). Very complex medical decisions were required for this evaluation and management. This patient suffers from critical illness related to respiratory failure hemodynamic instability without the current level of support progressive decline of clinical status organ dysfunction and untoward mortality.   I devoted 31 minutes of time directly to the ongoing management and care of this very ill individual, this time is independent of any time provided for the performance of procedures. Discussion regarding goals of care with family, POA advocate conservative approach transitional to comfort measures.     Naseem Stapleton  [phone]

## 2023-01-11 NOTE — PROGRESS NOTES
Following my conversation with family, members wish to further discussion among themselves goals of care moving forward. I provided my opinion that continued aggressive medical care, life support will not change outcome regarding ARDS, respiratory failure and I advocated for transition to comfort care measures only.     Yanet Stallworth  [phone]

## 2023-01-11 NOTE — PROGRESS NOTES
I have personally evaluated and examined the patient independently. I have reviewed the patient's available data,including medical history and recent test results. Reviewed and discussed documentation as provided by Chuyita Marie CNP. I agree with the physical exam findings, assessment and plan. My documented complex medical decision making constitutes a substantive portion of the supervisory note. Acute respiratory failure with hypoxia, intubated 1/3/2023  Acute respiratory distress syndrome, possibly idiopathic, progressive and severe  Shock, presumably septic shock due to above  Chronic diastolic heart failure  History of paroxysmal atrial fibrillation  History of hypertension  History of GERD  Advanced age, frailty    Note worsening respiratory status as evidenced by excalating FIO2 requirement over past 36 hours (currently 100% FIO2, marginal SPO2)      Continue current high level ventilatory support lung protective strategy titration of FiO2 to achieve saturation value 88 to 92% via pulse oximetry  Level 3 sedation (propofol, Versed infusions)  Hemodynamic support Levophed infusion, enteral midodrine  Enteral nutritional support  Amiodarone plus anticoagulation for management of atrial fibrillation  Periodic diuresis      Continuation of systemic steroids for empiric treatment of ARDS (note that the patient does not have a well-established pre-existing pulmonary history per se, review of older CT scan of the chest from 2019 essentially unremarkable save for perhaps mild emphysematous changes). Repeat cultures including sputum negative for growth of pathogens      Very complex medical decisions were required for this evaluation and management. This patient suffers from critical illness related to respiratory failure hemodynamic instability without the current level of support progressive decline of clinical status organ dysfunction and untoward mortality.   I devoted 33 minutes of time directly to the ongoing management and care of this very ill individual, this time is independent of any time provided for the performance of procedures. Discussion regarding goals of care with family, POA advocate conservative approach transitional to comfort measures. Reviewed in detail with the two sons and daughter-in-law at the bedside after critical care rounds. E Fina  [phone]    Addendum:    Sputum culture form 1/10/2023 now reporting growth of moderate Stenotrophomonas, hence given current ill state, respiratory failure, will provide course of IV Bactrim for treatment.

## 2023-01-11 NOTE — PROGRESS NOTES
01/11/23 0721   Vent Information   Vent Mode AC/VC   $Ventilation $Subsequent Day   Ventilator Settings   FiO2  100 %   Resp Rate (Set) 15 bmp   PEEP/CPAP (cmH2O) 12   Pressure Support (cm H2O) 0 cm H2O   Vent Patient Data (Readings)   Vt (Measured) 318 mL   Peak Inspiratory Pressure (cmH2O) 41 cmH2O   Rate Measured 18 br/min   Minute Volume (L/min) 7.4 Liters   Mean Airway Pressure (cmH2O) 21 cmH20   Plateau Pressure (cm H2O) 42 cm H2O   I:E Ratio 1:3.70   I Time/ I Time % 0 s   Vent Alarm Settings   High Pressure (cmH2O) 50 cmH2O   Low Minute Volume (lpm) 2.5 L/min   High Minute Volume (lpm) 20 L/min   Low Exhaled Vt (ml) 250 mL   High Exhaled Vt (ml) 1000 mL   RR High (bpm) 40 br/min   Apnea (secs) 20 secs   Airway Clearance   Suction ET Tube   Subglottic Suction Done No   Suction Device Inline suction catheter   Sputum Method Obtained Endotracheal   Sputum Amount Scant   Sputum Color/Odor Other (comment)   Sputum Consistency Thin   $Obtained Sample $Induced Sputum (charge not used for Bronchoscopy)   ETT    Placement Date/Time: 01/03/23 0200   Present on Admission/Arrival: No  Placed By: (c) Licensed provider;RT;RN  Placement Verified By: Capnometry; Auscultation; Chest X-ray  Preoxygenation: Yes  Mask Ventilation: Ventilated by mask (1)  Technique: Stylet. ..    Secured At 23 cm   Measured From Lips   ETT Placement Right   Secured By Commercial tube [email or web address]   Site Assessment Cool;Dry

## 2023-01-11 NOTE — PROGRESS NOTES
Residual checked prior to TF increase. Pt had 580 residual. Dr. Nneka Andres aware for now TF on hold, recheck residual and restart @ 0900.

## 2023-01-11 NOTE — PROGRESS NOTES
In-Patient Progress Note    Patient:  Katie Bolden 81 y.o. female MRN: [id number]     Date of Service: 1/11/2023    Hospital Day: 15      Chief complaint: had no chief complaint listed for this encounter. Assessment and Plan   Katie Bolden, a 81 y.o. female, with a history of hypertension (amlodipine 2.5 Mg daily), A. fib (amiodarone 200 Mg daily, apixaban 5 Mg twice daily), depression/anxiety (Lexapro 20 Mg daily), osteoporosis (alendronate 70 Mg weekly), GERD (pantoprazole 40 Mg daily), was initially admitted to [facility] on 12/22/2022 with complaints of shortness of breath, lethargy, wheezing; with diagnosis of acute on chronic respiratory failure with hypoxia secondary to acute bilateral lower lobe pneumonia. She required supplemental oxygen on admission at 3 LPM NC. Throughout the course admission, continue to require increased amounts of oxygen. Antibiotics were broadened to cefepime/ doxy, started on steroids, and scheduled bronchodilators/ICS. She appeared overall comfortable w/o distress however biochemical work-up/imaging suggested worsening condition. Repeat CTA on 12/28 negative for PE but extensive mixed groundglass and consolidative opacities that were increasing. Viral panel, urinary antigens, MRSA swab, BLC and sputum culture were all negative. Overnight reported increased distress with hypoxia and subsequent anxiety. Oxygen titrated up to 15 LPM High flow. Discussed need for increased care/ evaluation and going to pursue transfer to higher level of care. She was accepted and transferred to [facility] ICU for intensivist evaluation. She was given 20mg IV decadron and started on BiPAP prior to departure to [facility] on  12/30/2022.     Assessment and plan    Acute hypoxic respiratory failure  ARDS 2/2 multifocal pneumonia  Multifocal pneumonia, likely viral, suspected superimposed bacterial pneumonia  Mechanical ventilation since 1/3/2023  Patient presented with hypoxic respiratory failure. CTA chest  shows no evidence of PE but did show extensive mixed groundglass and consolidative opacities bilaterally, increased from . Respiratory viral panel -. Urine strep pneumoniae and Legionella antigen negative-. Elevated inflammatory markers. .2. Vancomycin stopped after MRSA nares screen came negative. Continued on HFNC initially but O2 saturation could not be maintained and patient had be intubated for severe hypoxia on 1/3/2023 around 1am.  -Decadron: completed 20 Mg for 5 days, now on 10 Mg  -ARDS compliant vent management as able  -Wean FiO2 as able  - Still on high FiO2 and PEEP for several days   -Off antimicrobials  -Respiratory culture positive for moderate growth Stenotrophomonas maltophilia. -Guarded prognosis. Critical care having ongoing discussion with family regarding goals of care     Septic shock, likely multifactorial, ARDS, bacterial pneumonia   -on pressors  -can consider switching propofol to other sedatives if needed    H/o Anxiety/depression  -On  escitalopram    History of A. fib/a flutter  -Continue amiodarone and Eliquis    History of heart failure preserved ejection fraction  EF 55-60% per echo on . proBNP 790 on . Stress test was done back in  which was negative for reversible perfusion defect.  -lasix PRN       # Peptic ulcer prophylaxis: Pantoprazole  # DVT Prophylaxis: Eliquis  #CODE STATUS: Full code      Current living situation: -  Expected Disposition: -TBD   Estimated discharge date: -TBD       Review of System     Review of Systems  -could not be reviewed     I have reviewed all pertinent PMHx, PSHx, FamHx, SocialHx, medications, and allergies and updated history as appropriate.     Physical Exam   VITAL SIGNS:  BP (!) 106/41   Pulse 86   Temp 97.3 °F (36.3 °C) (Rectal)   Resp 14   Ht 5' 8.11\" (1.73 m)   Wt 190 lb 4.1 oz (86.3 kg)   SpO2 94%   BMI 28.84 kg/m²   Tmax over 24 hours:  Temp (24hrs), Av.8 °F (36.6 °C), Min:97.3 °F (36.3 °C), Max:98.2 °F (36.8 °C)      Patient Vitals for the past 6 hrs:   BP Temp Temp src Pulse Resp SpO2   01/11/23 1400 (!) 106/41 97.3 °F (36.3 °C) Rectal 86 14 94 %   01/11/23 1300 -- -- -- 88 13 94 %   01/11/23 1245 -- -- -- 88 25 --   01/11/23 1230 -- -- -- 88 17 --   01/11/23 1215 -- -- -- 89 15 --   01/11/23 1200 (!) 106/41 97.5 °F (36.4 °C) Rectal 87 15 91 %   01/11/23 1149 -- -- -- 87 16 90 %   01/11/23 1148 -- -- -- 88 15 91 %   01/11/23 1100 -- -- -- 88 15 92 %   01/11/23 1000 -- -- -- 92 15 96 %   01/11/23 0945 -- -- -- 91 14 95 %   01/11/23 0930 -- -- -- 90 14 95 %   01/11/23 0915 -- -- -- 93 17 --           Intake/Output Summary (Last 24 hours) at 1/11/2023 1502  Last data filed at 1/11/2023 1200  Gross per 24 hour   Intake 1636.42 ml   Output 1935 ml   Net -298.58 ml       Wt Readings from Last 2 Encounters:   01/11/23 190 lb 4.1 oz (86.3 kg)   12/28/22 172 lb 6.4 oz (78.2 kg)     Body mass index is 28.84 kg/m². Physical Exam  Constitutional:       Appearance: She is well-developed. Comments: Intubated and sedated    HENT:      Head: Normocephalic and atraumatic. Right Ear: External ear normal.      Left Ear: External ear normal.      Nose: Nose normal.   Eyes:      Conjunctiva/sclera: Conjunctivae normal.      Pupils: Pupils are equal, round, and reactive to light. Neck:      Thyroid: No thyromegaly. Vascular: No JVD. Trachea: No tracheal deviation. Cardiovascular:      Rate and Rhythm: Normal rate and regular rhythm. Heart sounds: No murmur heard. No friction rub. Pulmonary:      Breath sounds: Rales present. Abdominal:      General: Bowel sounds are normal.      Palpations: Abdomen is soft. Genitourinary:     Vagina: Normal.   Musculoskeletal:         General: Normal range of motion. Cervical back: Normal range of motion and neck supple. Skin:     General: Skin is warm and dry.       Capillary Refill: Capillary refill takes less than 2 seconds. Neurological:      Motor: No abnormal muscle tone. Comments: Sedated and mechanically ventilated   Psychiatric:      Comments: Sedated and mechanically ventilated         Current Medications      polyethylene glycol  17 g Oral BID    senna  1 tablet Oral BID    docusate  100 mg Oral BID    metoclopramide  10 mg IntraVENous Q6H    insulin lispro  0-8 Units SubCUTAneous Q4H    lansoprazole  30 mg Per NG tube Daily    methylPREDNISolone  60 mg IntraVENous Q6H    midodrine  5 mg Oral q8h    chlorhexidine  15 mL Mouth/Throat BID    lidocaine PF  5 mL IntraDERmal Once    sodium chloride flush  5-40 mL IntraVENous 2 times per day    guaiFENesin  200 mg Oral Q4H    aspirin  81 mg Per NG tube Daily    lidocaine PF  5 mL IntraDERmal Once    sodium chloride flush  5-40 mL IntraVENous 2 times per day    budesonide-formoterol  2 puff Inhalation BID    amiodarone  200 mg Oral Daily    [Held by provider] amLODIPine  2.5 mg Oral Daily    apixaban  5 mg Oral BID    escitalopram  20 mg Oral Daily    sodium chloride flush  10 mL IntraVENous 2 times per day    ipratropium-albuterol  1 ampule Inhalation Q4H WA    traZODone  50 mg Oral Nightly         Labs and Imaging Studies   Laboratory findings:  XR CHEST PORTABLE    Result Date: 12/31/2022  EXAMINATION: ONE XRAY VIEW OF THE CHEST 12/31/2022 4:57 am COMPARISON: 12/27/2022 HISTORY: ORDERING SYSTEM PROVIDED HISTORY: f/u ARDS TECHNOLOGIST PROVIDED HISTORY: Reason for exam:->f/u ARDS Reason for Exam: f/u ARDS FINDINGS: Cardiomediastinal silhouette is stable. Similar bilateral airspace opacities. No pleural effusion or pneumothorax. No gross bony abnormality.      Stable chest.       Recent Results (from the past 24 hour(s))   Critical Care Panel    Collection Time: 01/10/23  7:00 PM   Result Value Ref Range    Sodium 135 135 - 145 MMOL/L    Potassium 4.3 3.5 - 5.1 MMOL/L    Chloride 93 (L) 99 - 110 mMol/L    CO2 31 21 - 32 MMOL/L    Anion Gap 11 4 - 16    BUN 70 (H) 6 - 23 MG/DL    Creatinine 1.1 0.6 - 1.1 MG/DL    Est, Glom Filt Rate 49 (L) >60 mL/min/1.73m2    Glucose 315 (H) 70 - 99 MG/DL    Calcium 7.6 (L) 8.3 - 10.6 MG/DL    Phosphorus 4.1 2.5 - 4.9 MG/DL    Magnesium 2.7 (H) 1.8 - 2.4 mg/dl   POCT Glucose    Collection Time: 01/10/23  7:51 PM   Result Value Ref Range    POC Glucose 322 (H) 70 - 99 MG/DL   EKG 12 Lead    Collection Time: 01/10/23  8:30 PM   Result Value Ref Range    Ventricular Rate 147 BPM    Atrial Rate 294 BPM    QRS Duration 84 ms    Q-T Interval 288 ms    QTc Calculation (Bazett) 450 ms    R Axis -10 degrees    T Axis 210 degrees    Diagnosis       Suspect atypical atrial flutter with 2:1 AV conduction  Inferior infarct , age undetermined  Anterolateral infarct , age undetermined  Abnormal ECG  When compared with ECG of 24-DEC-2022 23:27,  Significant changes have occurred  Confirmed by [facility] Zaina FINN ([id number]) on 1/11/2023 12:10:34 PM     Hemoglobin A1c    Collection Time: 01/10/23 10:10 PM   Result Value Ref Range    Hemoglobin A1C 6.7 (H) 4.2 - 6.3 %    eAG 146 mg/dL   POCT Glucose    Collection Time: 01/10/23 11:53 PM   Result Value Ref Range    POC Glucose 241 (H) 70 - 99 MG/DL   POCT Glucose    Collection Time: 01/11/23  3:50 AM   Result Value Ref Range    POC Glucose 242 (H) 70 - 99 MG/DL   Triglyceride    Collection Time: 01/11/23  3:56 AM   Result Value Ref Range    Triglycerides 168 (H) <150 MG/DL   CBC with Auto Differential    Collection Time: 01/11/23  3:56 AM   Result Value Ref Range    WBC 46.6 (HH) 4.0 - 10.5 K/CU MM    RBC 2.41 (L) 4.2 - 5.4 M/CU MM    Hemoglobin 8.1 (L) 12.5 - 16.0 GM/DL    Hematocrit 24.6 (L) 37 - 47 %    .1 (H) 78 - 100 FL    MCH 33.6 (H) 27 - 31 PG    MCHC 32.9 32.0 - 36.0 %    RDW 13.0 11.7 - 14.9 %    Platelets 409 004 - 179 K/CU MM    MPV 9.8 7.5 - 11.1 FL    Myelocyte Percent 1 (H) 0.0 %    Metamyelocytes Relative 1 (H) 0.0 %    Bands Relative 7 5 - 11 %    Segs Relative 87.0 (H) 36 - 66 % Lymphocytes % 2.0 (L) 24 - 44 %    Monocytes % 2.0 0 - 4 %    Myelocytes Absolute 0.47 K/CU MM    Metamyelocytes Absolute 0.47 K/CU MM    Bands Absolute 3.26 K/CU MM    Segs Absolute 40.6 K/CU MM    Lymphocytes Absolute 0.9 K/CU MM    Monocytes Absolute 0.9 K/CU MM    Differential Type MANUAL DIFFERENTIAL     RBC Morphology OCCASIONAL     Anisocytosis 1+     WBC Morphology OCCASIONAL    Critical Care Panel    Collection Time: 01/11/23  3:56 AM   Result Value Ref Range    Sodium 137 135 - 145 MMOL/L    Potassium 4.5 3.5 - 5.1 MMOL/L    Chloride 97 (L) 99 - 110 mMol/L    CO2 31 21 - 32 MMOL/L    Anion Gap 9 4 - 16    BUN 78 (H) 6 - 23 MG/DL    Creatinine 1.1 0.6 - 1.1 MG/DL    Est, Glom Filt Rate 49 (L) >60 mL/min/1.73m2    Glucose 201 (H) 70 - 99 MG/DL    Calcium 7.7 (L) 8.3 - 10.6 MG/DL    Phosphorus 4.7 2.5 - 4.9 MG/DL    Magnesium 2.7 (H) 1.8 - 2.4 mg/dl   POCT Glucose    Collection Time: 01/11/23  8:49 AM   Result Value Ref Range    POC Glucose 200 (H) 70 - 99 MG/DL   POCT Glucose    Collection Time: 01/11/23 12:32 PM   Result Value Ref Range    POC Glucose 185 (H) 70 - 99 MG/DL           Electronically signed by Sherley Garcia MD on 1/11/2023 at 3:02 PM      Comment: Please note this report has been produced using speech recognition software and may contain errors related to that system including errors in grammar, punctuation, and spelling, as well as words and phrases that may be inappropriate. If there are any questions or concerns please feel free to contact the dictating provider for clarification.

## 2023-01-11 NOTE — PROGRESS NOTES
Spoke with patient's sons and daughter-in-law at bedside and updated on patient's progress. Requesting to speak with Dr. Kimberly Escobar regarding pt's plan of care. Dr. Kimberly Escobar at bedside and meeting with patients family. Questions answered and family pleased with conversation. Will speak among themselves and come up with plan of care. Orders from Mauricio Keen NP to not restart pt's tube feed d/t extremely high residuals and ngt output overnight and this morning. Will continue to monitor closely.

## 2023-01-11 NOTE — PROGRESS NOTES
V2.0  [facility] [place]      Name:  Lesly Macias /Age/Sex: 1935  (81 y.o. female)   MRN & CSN:  [id number] & [id number] Encounter Date/Time: 2023 3:22 PM EST    Location:  -A PCP: Uriel Guallpa, [facility] Day: 13    Assessment and Plan:   Lesly Macias is a 81 y.o. female who presents with Respiratory failure with hypoxia ([facility] [address]    Plan:  Acute hypoxic respiratory failure - s/p intubation 1/3  ARDS  Multifocal bacterial vs viral pneumonia  Sepsis  -Requiring full ventilatory support-FiO2 90%, PEEP-12 mm. Unable to change vent settings given ABG findings. She has been on high vent support settings for few days now. -WBC up to 46.6 today. Re cultured . Antibiotics discontinued . -Received a Decadron 20 mg x total 5 days, then 10 mg x 5 days  -Continue Solu-Medrol 60 mg every 6 hours  -Continue DuoNebs, Dulera, albuterol as needed  -Sputum culture from  growing Candida albicans, blood cultures pending drawn .   -Currently on pressors, Levo,  wean as able to keep MAP greater than 65. History of diastolic congestive heart failure  -Echocardiogram on -EF 55 to 60%  -proBNP-790 on   -Stress test negative for ischemia in     Hypotension  - on Levophed at 4 mcg, continue.   -Continue Midodrine.  -Continue telemetry monitoring. Elevated gastric residual  -Bowel regimen and Reglan ordered.  -Continue to follow. -KUB ordered, reviewed. History of A. fib/a flutter  -Continue home amiodarone and Eliquis      Chronic Conditions: Continue all home medications except as stated above or contraindicated. Continue goals of care discussions with family today. Prognosis is guarded.      Diet Diet NPO  ADULT TUBE FEEDING; Orogastric; Peptide Based High Protein; Continuous; 20; Yes; 10; Q 4 hours; 40; 30; Q 4 hours; Protein; Proteinex 1x daily   DVT Prophylaxis [] Lovenox, []  Heparin, [] SCDs, [] Ambulation,  [x] Eliquis, [] Xarelto  [] Coumadin   Code Status Full Code   Disposition From: Home  Expected Disposition: Home  Estimated Date of Discharge: TBD  Patient requires continued admission due to acute respiratory failure   Surrogate Decision Maker/ POA Son     Subjective:   Patient seen and examined. She continues to require high ventilatory settings with FiO2 90%, PEEP 12 mm. She is sedated on propofol and Versed. She does not awaken when name called, does not follow commands. Labs and vitals reviewed. Review of systems given mental status. Plan of care discussed with bedside RN. Review of Systems:    Review of Systems  Unable to obtain secondary to mechanical ventilation    Objective: Intake/Output Summary (Last 24 hours) at 1/11/2023 0946  Last data filed at 1/11/2023 0800  Gross per 24 hour   Intake 1696.42 ml   Output 2185 ml   Net -488. 58 ml          Vitals:   Vitals:    01/11/23 0900   BP: (!) 108/40   Pulse: 97   Resp: 15   Temp: 97.9 °F (36.6 °C)   SpO2: 93%       Physical Exam:     General: Age-appropriate female on full mechanical ventilation  Eyes: EOMI  ENT: neck supple. ETT in place  Cardiovascular: Irregular rate  Respiratory: Rhonchi throughout,  wheezing+  Equal chest expansion on vent. Gastrointestinal: Soft, non tender  Genitourinary: no suprapubic tenderness. Quinones cath in place.   Musculoskeletal: No edema  Skin: warm, dry  Neuro: Sedated  Psych: Unable to assess    Medications:   Medications:    polyethylene glycol  17 g Oral BID    senna  1 tablet Oral BID    docusate  100 mg Oral BID    metoclopramide  10 mg IntraVENous Q6H    insulin lispro  0-8 Units SubCUTAneous Q4H    lansoprazole  30 mg Per NG tube Daily    methylPREDNISolone  60 mg IntraVENous Q6H    midodrine  5 mg Oral q8h    chlorhexidine  15 mL Mouth/Throat BID    lidocaine PF  5 mL IntraDERmal Once    sodium chloride flush  5-40 mL IntraVENous 2 times per day    guaiFENesin  200 mg Oral Q4H    aspirin  81 mg Per NG tube Daily    lidocaine PF  5 mL IntraDERmal Once    sodium chloride flush  5-40 mL IntraVENous 2 times per day    budesonide-formoterol  2 puff Inhalation BID    amiodarone  200 mg Oral Daily    [Held by provider] amLODIPine  2.5 mg Oral Daily    apixaban  5 mg Oral BID    escitalopram  20 mg Oral Daily    sodium chloride flush  10 mL IntraVENous 2 times per day    ipratropium-albuterol  1 ampule Inhalation Q4H WA    traZODone  50 mg Oral Nightly      Infusions:    dextrose      vasopressin Stopped (01/10/23 1638)    midazolam 4 mg/hr (01/11/23 0537)    propofol 40 mcg/kg/min (01/11/23 0654)    norepinephrine 3 mcg/min (01/11/23 0537)    sodium chloride      sodium chloride      sodium chloride Stopped (01/01/23 1841)     PRN Meds: polyvinyl alcohol, 1 drop, Q4H PRN  glucose, 4 tablet, PRN  dextrose bolus, 125 mL, PRN   Or  dextrose bolus, 250 mL, PRN  glucagon (rDNA), 1 mg, PRN  dextrose, , Continuous PRN  midazolam, 2 mg, Q1H PRN  sodium chloride flush, 5-40 mL, PRN  sodium chloride, , PRN  fentanNYL, 50 mcg, Q1H PRN  sodium chloride flush, 5-40 mL, PRN  sodium chloride, , PRN  lidocaine viscous hcl, 15 mL, Q6H PRN  phenol, 1 spray, Q2H PRN  sodium chloride flush, 10 mL, PRN  sodium chloride, , PRN  polyethylene glycol, 17 g, Daily PRN  acetaminophen, 650 mg, Q6H PRN   Or  acetaminophen, 650 mg, Q6H PRN  albuterol sulfate HFA, 2 puff, Q4H PRN  guaiFENesin, 200 mg, Q4H PRN  ondansetron, 4 mg, Q6H PRN      Labs      Recent Results (from the past 24 hour(s))   Procalcitonin    Collection Time: 01/10/23 10:12 AM   Result Value Ref Range    Procalcitonin 0.269    Critical Care Panel    Collection Time: 01/10/23  7:00 PM   Result Value Ref Range    Sodium 135 135 - 145 MMOL/L    Potassium 4.3 3.5 - 5.1 MMOL/L    Chloride 93 (L) 99 - 110 mMol/L    CO2 31 21 - 32 MMOL/L    Anion Gap 11 4 - 16    BUN 70 (H) 6 - 23 MG/DL    Creatinine 1.1 0.6 - 1.1 MG/DL    Est, Glom Filt Rate 49 (L) >60 mL/min/1.73m2    Glucose 315 (H) 70 - 99 MG/DL    Calcium 7.6 (L) 8.3 - 10.6 MG/DL    Phosphorus 4.1 2.5 - 4.9 MG/DL    Magnesium 2.7 (H) 1.8 - 2.4 mg/dl   POCT Glucose    Collection Time: 01/10/23  7:51 PM   Result Value Ref Range    POC Glucose 322 (H) 70 - 99 MG/DL   EKG 12 Lead    Collection Time: 01/10/23  8:30 PM   Result Value Ref Range    Ventricular Rate 147 BPM    Atrial Rate 73 BPM    QRS Duration 84 ms    Q-T Interval 288 ms    QTc Calculation (Bazett) 450 ms    R Axis -10 degrees    T Axis 210 degrees    Diagnosis       Supraventricular tachycardia  Inferior infarct , age undetermined  Anterolateral infarct , age undetermined  Abnormal ECG  When compared with ECG of 24-DEC-2022 23:27,  Significant changes have occurred     Hemoglobin A1c    Collection Time: 01/10/23 10:10 PM   Result Value Ref Range    Hemoglobin A1C 6.7 (H) 4.2 - 6.3 %    eAG 146 mg/dL   POCT Glucose    Collection Time: 01/10/23 11:53 PM   Result Value Ref Range    POC Glucose 241 (H) 70 - 99 MG/DL   POCT Glucose    Collection Time: 01/11/23  3:50 AM   Result Value Ref Range    POC Glucose 242 (H) 70 - 99 MG/DL   Triglyceride    Collection Time: 01/11/23  3:56 AM   Result Value Ref Range    Triglycerides 168 (H) <150 MG/DL   CBC with Auto Differential    Collection Time: 01/11/23  3:56 AM   Result Value Ref Range    WBC 46.6 (HH) 4.0 - 10.5 K/CU MM    RBC 2.41 (L) 4.2 - 5.4 M/CU MM    Hemoglobin 8.1 (L) 12.5 - 16.0 GM/DL    Hematocrit 24.6 (L) 37 - 47 %    .1 (H) 78 - 100 FL    MCH 33.6 (H) 27 - 31 PG    MCHC 32.9 32.0 - 36.0 %    RDW 13.0 11.7 - 14.9 %    Platelets 456 286 - 415 K/CU MM    MPV 9.8 7.5 - 11.1 FL    Myelocyte Percent 1 (H) 0.0 %    Metamyelocytes Relative 1 (H) 0.0 %    Bands Relative 7 5 - 11 %    Segs Relative 87.0 (H) 36 - 66 %    Lymphocytes % 2.0 (L) 24 - 44 %    Monocytes % 2.0 0 - 4 %    Myelocytes Absolute 0.47 K/CU MM    Metamyelocytes Absolute 0.47 K/CU MM    Bands Absolute 3.26 K/CU MM    Segs Absolute 40.6 K/CU MM    Lymphocytes Absolute 0.9 K/CU MM    Monocytes Absolute 0.9 K/CU MM    Differential Type MANUAL DIFFERENTIAL     RBC Morphology OCCASIONAL     Anisocytosis 1+     WBC Morphology OCCASIONAL    Critical Care Panel    Collection Time: 01/11/23  3:56 AM   Result Value Ref Range    Sodium 137 135 - 145 MMOL/L    Potassium 4.5 3.5 - 5.1 MMOL/L    Chloride 97 (L) 99 - 110 mMol/L    CO2 31 21 - 32 MMOL/L    Anion Gap 9 4 - 16    BUN 78 (H) 6 - 23 MG/DL    Creatinine 1.1 0.6 - 1.1 MG/DL    Est, Glom Filt Rate 49 (L) >60 mL/min/1.73m2    Glucose 201 (H) 70 - 99 MG/DL    Calcium 7.7 (L) 8.3 - 10.6 MG/DL    Phosphorus 4.7 2.5 - 4.9 MG/DL    Magnesium 2.7 (H) 1.8 - 2.4 mg/dl   POCT Glucose    Collection Time: 01/11/23  8:49 AM   Result Value Ref Range    POC Glucose 200 (H) 70 - 99 MG/DL        Imaging/Diagnostics Last 24 Hours   XR CHEST PORTABLE    Result Date: 12/31/2022  EXAMINATION: ONE XRAY VIEW OF THE CHEST 12/31/2022 4:57 am COMPARISON: 12/27/2022 HISTORY: ORDERING SYSTEM PROVIDED HISTORY: f/u ARDS TECHNOLOGIST PROVIDED HISTORY: Reason for exam:->f/u ARDS Reason for Exam: f/u ARDS FINDINGS: Cardiomediastinal silhouette is stable. Similar bilateral airspace opacities. No pleural effusion or pneumothorax. No gross bony abnormality.      Stable chest.     Total critical care time 38  minutes   Patient is critical with acute respiratory failure  Urine is stable reviewed labs, imaging, and discussed with treatment care plan with attending    Critical care time excludes separately billable procedure    Electronically signed by STEVENSON Madera CNP on 1/11/2023 at 9:46 AM

## 2023-01-12 NOTE — PROGRESS NOTES
V2.0  [facility] Progress Note      Name:  Venita Alonzo /Age/Sex: 1935  (81 y.o. female)   MRN & CSN:  [id number] & [id number] Encounter Date/Time: 2023 3:22 PM EST    Location:  -A PCP: Darlin Zuleta, [facility] Day: 14    Assessment and Plan:   Venita Alonzo is a 81 y.o. female who presents with Respiratory failure with hypoxia ([facility], [place] [address]    Plan:  Acute hypoxic respiratory failure - s/p intubation 1/3  ARDS  Multifocal bacterial vs viral pneumonia  Septic shock  -Requiring full ventilatory support-FiO2 100%, PEEP-12 mm. Unable to change vent settings given ABG findings. She has been on high vent support settings for few days now. -WBC up to 43.6k   Re cultured . Antibiotics discontinued . -Received a Decadron 20 mg x total 5 days, then 10 mg x 5 days  -Continue Solu-Medrol 60 mg every 6 hours  -Continue DuoNebs, Dulera, albuterol as needed  -Sputum culture from  growing Candida albicans, blood cultures -NGTD, Resp Cx growing stenotrophomonas-started on Bactrim, added minocycline today  -Currently on small dose of levo,  wean as able to keep MAP greater than 65. ANGELA, drug induced  Most likely secondary to Bactrim  Mild hyperkalemia-Lokelma 10 g 3 times daily x1 day, repeat check at 1300 hr  Monitor daily BMP    Constipation  Aggressive bowel regimen  KUB on -no E/O of stool impaction, ileus/obstruction  Continue current regimen    Bleeding from oral mucosa  Possible oral mucosa/ palate laceration- unable to see the [facility] site  Hb 7.7, held Eliquis      History of diastolic congestive heart failure  -Echocardiogram on -EF 55 to 60%  -proBNP-790 on   -Stress test negative for ischemia in 2019  Elevated gastric residual  -Bowel regimen and Reglan ordered.  -Continue to follow. -KUB ordered, reviewed.     History of A. fib/a flutter  -Continue home amiodarone and (held Eliquis today )     Chronic Conditions: Continue all home medications except as stated above or contraindicated. Continue goals of care discussions with family today. Prognosis is guarded. Diet Diet NPO  ADULT TUBE FEEDING; Orogastric; Peptide Based High Protein; Continuous; 20; Yes; 10; Q 4 hours; 40; 30; Q 4 hours; Protein; Proteinex 1x daily   DVT Prophylaxis [] Lovenox, []  Heparin, [] SCDs, [] Ambulation,  [] Eliquis, [] Xarelto  [] Coumadin, held Eliquis today for oral bleeding   Code Status Full Code   Disposition From: Home  Expected Disposition: Home  Estimated Date of Discharge: TBD  Patient requires continued admission due to acute respiratory failure   Surrogate Decision Maker/ POA Son     Subjective:   Patient seen and examined. She continues to require high ventilatory settings with FiO2 100%, PEEP 12 mm. She is sedated on propofol. Held Eliquis today for oral bleeding, added minocycline for stenotrophomonas along with Bactrim, Josette Mago for hyperkalemia    Review of Systems:    Review of Systems  Unable to obtain secondary to mechanical ventilation    Objective: Intake/Output Summary (Last 24 hours) at 1/12/2023 0956  Last data filed at 1/12/2023 0900  Gross per 24 hour   Intake 1885.03 ml   Output 1660 ml   Net 225.03 ml          Vitals:   Vitals:    01/12/23 0900   BP:    Pulse: 96   Resp: 15   Temp:    SpO2: 94%       Physical Exam:     General: Age-appropriate female on full mechanical ventilation  Eyes: EOMI  ENT: neck supple. ETT in place  Cardiovascular: Irregular rate  Respiratory: Rhonchi throughout,  Equal chest expansion on vent. Gastrointestinal: Soft, non tender  Genitourinary: no suprapubic tenderness. Quinones cath in place.   Musculoskeletal: No edema  Skin: warm, dry  Neuro: Sedated  Psych: Unable to assess    Medications:   Medications:    metoclopramide  5 mg IntraVENous Q6H    sodium zirconium cyclosilicate  10 g Oral TID    bisacodyl  10 mg Rectal Once    minocycline  200 mg Per NG tube 2 times per day    polyethylene glycol  17 g Oral BID    senna  1 tablet Oral BID    sulfamethoxazole-trimethoprim (BACTRIM) IVPB  5 mg/kg IntraVENous Q8H    docusate  100 mg Oral BID    insulin lispro  0-8 Units SubCUTAneous Q4H    lansoprazole  30 mg Per NG tube Daily    methylPREDNISolone  60 mg IntraVENous Q6H    midodrine  5 mg Oral q8h    chlorhexidine  15 mL Mouth/Throat BID    lidocaine PF  5 mL IntraDERmal Once    sodium chloride flush  5-40 mL IntraVENous 2 times per day    aspirin  81 mg Per NG tube Daily    lidocaine PF  5 mL IntraDERmal Once    sodium chloride flush  5-40 mL IntraVENous 2 times per day    budesonide-formoterol  2 puff Inhalation BID    amiodarone  200 mg Oral Daily    [Held by provider] amLODIPine  2.5 mg Oral Daily    apixaban  5 mg Oral BID    escitalopram  20 mg Oral Daily    sodium chloride flush  10 mL IntraVENous 2 times per day    ipratropium-albuterol  1 ampule Inhalation Q4H WA    traZODone  50 mg Oral Nightly      Infusions:    propofol 50 mcg/kg/min (01/12/23 1594)    dextrose      norepinephrine 3 mcg/min (01/12/23 0606)    sodium chloride      sodium chloride      sodium chloride Stopped (01/01/23 1841)     PRN Meds: polyvinyl alcohol, 1 drop, Q4H PRN  glucose, 4 tablet, PRN  dextrose bolus, 125 mL, PRN   Or  dextrose bolus, 250 mL, PRN  glucagon (rDNA), 1 mg, PRN  dextrose, , Continuous PRN  midazolam, 2 mg, Q1H PRN  sodium chloride flush, 5-40 mL, PRN  sodium chloride, , PRN  fentanNYL, 50 mcg, Q1H PRN  sodium chloride flush, 5-40 mL, PRN  sodium chloride, , PRN  lidocaine viscous hcl, 15 mL, Q6H PRN  phenol, 1 spray, Q2H PRN  sodium chloride flush, 10 mL, PRN  sodium chloride, , PRN  polyethylene glycol, 17 g, Daily PRN  acetaminophen, 650 mg, Q6H PRN   Or  acetaminophen, 650 mg, Q6H PRN  albuterol sulfate HFA, 2 puff, Q4H PRN  ondansetron, 4 mg, Q6H PRN      Labs      Recent Results (from the past 24 hour(s))   POCT Glucose    Collection Time: 01/11/23 12:32 PM   Result Value Ref Range    POC Glucose 185 (H) 70 - 99 MG/DL   POCT Glucose    Collection Time: 01/11/23  3:32 PM   Result Value Ref Range    POC Glucose 212 (H) 70 - 99 MG/DL   POCT Glucose    Collection Time: 01/11/23  9:23 PM   Result Value Ref Range    POC Glucose 247 (H) 70 - 99 MG/DL   POCT Glucose    Collection Time: 01/11/23 11:51 PM   Result Value Ref Range    POC Glucose 208 (H) 70 - 99 MG/DL   CBC with Auto Differential    Collection Time: 01/12/23  4:00 AM   Result Value Ref Range    WBC 43.6 (HH) 4.0 - 10.5 K/CU MM    RBC 2.31 (L) 4.2 - 5.4 M/CU MM    Hemoglobin 7.7 (L) 12.5 - 16.0 GM/DL    Hematocrit 23.0 (L) 37 - 47 %    MCV 99.6 78 - 100 FL    MCH 33.3 (H) 27 - 31 PG    MCHC 33.5 32.0 - 36.0 %    RDW 12.9 11.7 - 14.9 %    Platelets 396 020 - 246 K/CU MM    MPV 9.8 7.5 - 11.1 FL    Myelocyte Percent 1 (H) 0.0 %    Metamyelocytes Relative 2 (H) 0.0 %    Bands Relative 10 5 - 11 %    Segs Relative 83.0 (H) 36 - 66 %    Lymphocytes % 3.0 (L) 24 - 44 %    Monocytes % 1.0 0 - 4 %    Myelocytes Absolute 0.44 K/CU MM    Metamyelocytes Absolute 0.87 K/CU MM    Bands Absolute 4.36 K/CU MM    Segs Absolute 36.2 K/CU MM    Lymphocytes Absolute 1.3 K/CU MM    Monocytes Absolute 0.4 K/CU MM    Differential Type MANUAL DIFFERENTIAL     Anisocytosis 1+     Polychromasia 2+    Comprehensive Metabolic Panel    Collection Time: 01/12/23  4:00 AM   Result Value Ref Range    Sodium 137 135 - 145 MMOL/L    Potassium 5.2 (H) 3.5 - 5.1 MMOL/L    Chloride 97 (L) 99 - 110 mMol/L    CO2 32 21 - 32 MMOL/L    BUN 80 (H) 6 - 23 MG/DL    Creatinine 1.2 (H) 0.6 - 1.1 MG/DL    Est, Glom Filt Rate 44 (L) >60 mL/min/1.73m2    Glucose 221 (H) 70 - 99 MG/DL    Calcium 7.6 (L) 8.3 - 10.6 MG/DL    Albumin 2.3 (L) 3.4 - 5.0 GM/DL    Total Protein 4.6 (L) 6.4 - 8.2 GM/DL    Total Bilirubin 0.3 0.0 - 1.0 MG/DL     (H) 10 - 40 U/L    AST 91 (H) 15 - 37 IU/L    Alkaline Phosphatase 77 40 - 129 IU/L    Anion Gap 8 4 - 16   POCT Glucose    Collection Time: 01/12/23  4:03 AM   Result Value Ref Range    POC Glucose 248 (H) 70 - 99 MG/DL   POCT Glucose    Collection Time: 01/12/23  7:44 AM   Result Value Ref Range    POC Glucose 234 (H) 70 - 99 MG/DL        Imaging/Diagnostics Last 24 Hours   XR CHEST PORTABLE    Result Date: 12/31/2022  EXAMINATION: ONE XRAY VIEW OF THE CHEST 12/31/2022 4:57 am COMPARISON: 12/27/2022 HISTORY: ORDERING SYSTEM PROVIDED HISTORY: f/u ARDS TECHNOLOGIST PROVIDED HISTORY: Reason for exam:->f/u ARDS Reason for Exam: f/u ARDS FINDINGS: Cardiomediastinal silhouette is stable. Similar bilateral airspace opacities. No pleural effusion or pneumothorax. No gross bony abnormality.      Stable chest.     Total critical care time 35 minutes   Patient is critical with acute respiratory failure, acute metabolic encephalopathy, shock  During this reviewed labs, imaging, and discussed with treatment care plan with attending    Critical care time excludes separately billable procedure    Electronically signed by Alf Romero PA-C on 1/12/2023 at 9:56 AM

## 2023-01-12 NOTE — PROGRESS NOTES
Updated Geovanna, PA regarding pt' ABGS  pH 7.41  pCO2 55  pO2 65  HCO3 34.9    Pt on peep of 12 and 100% FiO2

## 2023-01-12 NOTE — PROGRESS NOTES
I have personally evaluated and examined the patient independently. I have reviewed the patient's available data,including medical history and recent test results. Reviewed and discussed documentation as provided by SEVEN Harper PA-C. I agree with the physical exam findings, assessment and plan. My documented complex medical decision making constitutes a substantive portion of the supervisory note. Acute respiratory failure with hypoxia, intubated 1/3/2023  Acute respiratory distress syndrome, possibly idiopathic, progressive and severe  Shock, presumably septic shock due to above  Chronic diastolic heart failure  History of paroxysmal atrial fibrillation  History of hypertension  History of GERD  ANGELA (effects of Bactrim likely)  Advanced age, frailty    Note worsening respiratory status as evidenced by excalating FIO2 requirement over past 48 hours (currently 100% FIO2 plus 12 cm water pressure PEEP, marginal SPO2)      Continue current high level ventilatory support lung protective strategy titration of FiO2 to achieve saturation value 88 to 92% via pulse oximetry  Level 3 sedation (propofol, Versed infusions)  Hemodynamic support Levophed infusion, enteral midodrine  Enteral nutritional support  Amiodarone plus anticoagulation for management of atrial fibrillation  Periodic diuresis  Addition of Bactrim 1/11/2023 given Identification of Stentrophomonas although significance of the same regarding respiratory failure uncertain. Continuation of systemic steroids for empiric treatment of ARDS (note that the patient does not have a well-established pre-existing pulmonary history per se, review of older CT scan of the chest from 2019 essentially unremarkable save for perhaps mild emphysematous changes). Repeat cultures including sputum negative for growth of pathogens      Very complex medical decisions were required for this evaluation and management.   This patient suffers from critical illness related to respiratory failure hemodynamic instability without the current level of support progressive decline of clinical status organ dysfunction and untoward mortality. I devoted 32 minutes of time directly to the ongoing management and care of this very ill individual, this time is independent of any time provided for the performance of procedures. Discussion regarding goals of care with family, POA advocate conservative approach transitional to comfort measures. Reviewed in detail with the two sons and daughter-in-law at the bedside after critical care rounds. Rudie Sicard  [phone]  .

## 2023-01-12 NOTE — PROGRESS NOTES
Geovanna, PA updated regarding pts elevated HR, 120s; no new orders at this time. Pt's family at bedside, had long discussion regarding pt's plan of care and all questions answered, support given. Family wants to wait until tomorrow to make any decisions.

## 2023-01-12 NOTE — PROGRESS NOTES
Pt. has had increased bleeding with oral care. Hgb 7.7 gm/dL; Oneta Due, PA aware. Received verbal orders to hold Eliquis.

## 2023-01-12 NOTE — PROGRESS NOTES
In-Patient Progress Note    Patient:  Allison Christopher 81 y.o. female MRN: [id number]     Date of Service: 1/12/2023    Hospital Day: 15      Chief complaint: had no chief complaint listed for this encounter. Assessment and Plan   Allison Christopher, a 81 y.o. female, with a history of hypertension (amlodipine 2.5 Mg daily), A. fib (amiodarone 200 Mg daily, apixaban 5 Mg twice daily), depression/anxiety (Lexapro 20 Mg daily), osteoporosis (alendronate 70 Mg weekly), GERD (pantoprazole 40 Mg daily), was initially admitted to [facility] on 12/22/2022 with complaints of shortness of breath, lethargy, wheezing; with diagnosis of acute on chronic respiratory failure with hypoxia secondary to acute bilateral lower lobe pneumonia. She required supplemental oxygen on admission at 3 LPM NC. Throughout the course admission, continue to require increased amounts of oxygen. Antibiotics were broadened to cefepime/ doxy, started on steroids, and scheduled bronchodilators/ICS. She appeared overall comfortable w/o distress however biochemical work-up/imaging suggested worsening condition. Repeat CTA on 12/28 negative for PE but extensive mixed groundglass and consolidative opacities that were increasing. Viral panel, urinary antigens, MRSA swab, BLC and sputum culture were all negative. Overnight reported increased distress with hypoxia and subsequent anxiety. Oxygen titrated up to 15 LPM High flow. Discussed need for increased care/ evaluation and going to pursue transfer to higher level of care. She was accepted and transferred to [facility] ICU for intensivist evaluation. She was given 20mg IV decadron and started on BiPAP prior to departure to [facility] on  12/30/2022.     Assessment and plan    Acute hypoxic respiratory failure  ARDS 2/2 multifocal pneumonia  Multifocal pneumonia, likely viral, suspected superimposed bacterial pneumonia  Mechanical ventilation since 1/3/2023  Patient presented with hypoxic respiratory failure. CTA chest 12/28 shows no evidence of PE but did show extensive mixed groundglass and consolidative opacities bilaterally, increased from 12/23. Respiratory viral panel -12/28. Urine strep pneumoniae and Legionella antigen negative-12/23. Elevated inflammatory markers. .2. Vancomycin stopped after MRSA nares screen came negative. Continued on HFNC initially but O2 saturation could not be maintained and patient had be intubated for severe hypoxia on 1/3/2023 around 1am.  -Decadron: completed 20 Mg for 5 days, now on 10 Mg  -ARDS compliant vent management as able  -Wean FiO2 as able  - Still on high FiO2 and PEEP for several days   -Respiratory culture positive for moderate growth Stenotrophomonas maltophilia. Started on dual coverage antibiotics, Bactrim and minocycline. -Guarded prognosis. Critical care having ongoing discussion with family regarding goals of care     Septic shock, likely multifactorial, ARDS, bacterial pneumonia   -on pressors  -can consider switching propofol to other sedatives if needed    H/o Anxiety/depression  -On  escitalopram     History of A. fib/a flutter  -Continue amiodarone and Eliquis    History of heart failure preserved ejection fraction  EF 55-60% per echo on 12/28. proBNP 790 on 12/29. Stress test was done back in 2019 which was negative for reversible perfusion defect.  -lasix PRN       # Peptic ulcer prophylaxis: Pantoprazole  # DVT Prophylaxis: Eliquis  #CODE STATUS: Full code      Current living situation: -  Expected Disposition: -TBD   Estimated discharge date: -TBD       Review of System     Review of Systems  -could not be reviewed     I have reviewed all pertinent PMHx, PSHx, FamHx, SocialHx, medications, and allergies and updated history as appropriate.     Physical Exam   VITAL SIGNS:  BP (!) 99/41   Pulse 86   Temp 97.2 °F (36.2 °C) (Rectal)   Resp 12   Ht 5' 8.11\" (1.73 m)   Wt 190 lb 4.1 oz (86.3 kg)   SpO2 94%   BMI 28.84 kg/m²   Tmax over 24 hours:  Temp (24hrs), Av.9 °F (36.6 °C), Min:96.3 °F (35.7 °C), Max:100 °F (37.8 °C)      Patient Vitals for the past 6 hrs:   BP Temp Temp src Pulse Resp SpO2   23 1530 -- -- -- 86 12 --   23 1515 -- -- -- 85 15 --   23 1512 -- -- -- 85 12 94 %   23 1510 -- -- -- 85 13 94 %   23 1500 -- -- -- 85 15 94 %   23 1457 -- -- -- 86 12 --   23 1445 -- -- -- 86 13 --   23 1430 -- -- -- 86 17 --   23 1415 -- -- -- 87 14 --   23 1400 -- -- -- 87 14 94 %   23 1300 (!) 99/41 97.2 °F (36.2 °C) Rectal 90 14 92 %   23 1200 (!) 134/59 97.3 °F (36.3 °C) Rectal 90 16 93 %   23 1100 -- -- -- 92 16 --           Intake/Output Summary (Last 24 hours) at 2023 1609  Last data filed at 2023 1200  Gross per 24 hour   Intake 1950.03 ml   Output 1375 ml   Net 575.03 ml       Wt Readings from Last 2 Encounters:   23 190 lb 4.1 oz (86.3 kg)   22 172 lb 6.4 oz (78.2 kg)     Body mass index is 28.84 kg/m². Physical Exam  Constitutional:       Appearance: She is well-developed. Comments: Intubated and sedated    HENT:      Head: Normocephalic and atraumatic. Right Ear: External ear normal.      Left Ear: External ear normal.      Nose: Nose normal.   Eyes:      Conjunctiva/sclera: Conjunctivae normal.      Pupils: Pupils are equal, round, and reactive to light. Neck:      Thyroid: No thyromegaly. Vascular: No JVD. Trachea: No tracheal deviation. Cardiovascular:      Rate and Rhythm: Normal rate and regular rhythm. Heart sounds: No murmur heard. No friction rub. Pulmonary:      Breath sounds: Rales present. Abdominal:      General: Bowel sounds are normal.      Palpations: Abdomen is soft. Genitourinary:     Vagina: Normal.   Musculoskeletal:         General: Normal range of motion. Cervical back: Normal range of motion and neck supple. Skin:     General: Skin is warm and dry. Capillary Refill: Capillary refill takes less than 2 seconds. Neurological:      Motor: No abnormal muscle tone. Comments: Sedated and mechanically ventilated   Psychiatric:      Comments: Sedated and mechanically ventilated         Current Medications      metoclopramide  5 mg IntraVENous Q6H    sodium zirconium cyclosilicate  10 g Oral TID    minocycline  200 mg Per NG tube 2 times per day    calcium gluconate IVPB  1,000 mg IntraVENous Once    polyethylene glycol  17 g Oral BID    senna  1 tablet Oral BID    sulfamethoxazole-trimethoprim (BACTRIM) IVPB  5 mg/kg IntraVENous Q8H    docusate  100 mg Oral BID    insulin lispro  0-8 Units SubCUTAneous Q4H    lansoprazole  30 mg Per NG tube Daily    methylPREDNISolone  60 mg IntraVENous Q6H    midodrine  5 mg Oral q8h    chlorhexidine  15 mL Mouth/Throat BID    lidocaine PF  5 mL IntraDERmal Once    sodium chloride flush  5-40 mL IntraVENous 2 times per day    aspirin  81 mg Per NG tube Daily    lidocaine PF  5 mL IntraDERmal Once    sodium chloride flush  5-40 mL IntraVENous 2 times per day    budesonide-formoterol  2 puff Inhalation BID    amiodarone  200 mg Oral Daily    [Held by provider] amLODIPine  2.5 mg Oral Daily    [Held by provider] apixaban  5 mg Oral BID    escitalopram  20 mg Oral Daily    sodium chloride flush  10 mL IntraVENous 2 times per day    ipratropium-albuterol  1 ampule Inhalation Q4H WA    traZODone  50 mg Oral Nightly         Labs and Imaging Studies   Laboratory findings:  XR CHEST PORTABLE    Result Date: 12/31/2022  EXAMINATION: ONE XRAY VIEW OF THE CHEST 12/31/2022 4:57 am COMPARISON: 12/27/2022 HISTORY: ORDERING SYSTEM PROVIDED HISTORY: f/u ARDS TECHNOLOGIST PROVIDED HISTORY: Reason for exam:->f/u ARDS Reason for Exam: f/u ARDS FINDINGS: Cardiomediastinal silhouette is stable. Similar bilateral airspace opacities. No pleural effusion or pneumothorax. No gross bony abnormality.      Stable chest.       Recent Results (from the past 24 hour(s))   POCT Glucose    Collection Time: 01/11/23  9:23 PM   Result Value Ref Range    POC Glucose 247 (H) 70 - 99 MG/DL   POCT Glucose    Collection Time: 01/11/23 11:51 PM   Result Value Ref Range    POC Glucose 208 (H) 70 - 99 MG/DL   CBC with Auto Differential    Collection Time: 01/12/23  4:00 AM   Result Value Ref Range    WBC 43.6 (HH) 4.0 - 10.5 K/CU MM    RBC 2.31 (L) 4.2 - 5.4 M/CU MM    Hemoglobin 7.7 (L) 12.5 - 16.0 GM/DL    Hematocrit 23.0 (L) 37 - 47 %    MCV 99.6 78 - 100 FL    MCH 33.3 (H) 27 - 31 PG    MCHC 33.5 32.0 - 36.0 %    RDW 12.9 11.7 - 14.9 %    Platelets 601 872 - 920 K/CU MM    MPV 9.8 7.5 - 11.1 FL    Myelocyte Percent 1 (H) 0.0 %    Metamyelocytes Relative 2 (H) 0.0 %    Bands Relative 10 5 - 11 %    Segs Relative 83.0 (H) 36 - 66 %    Lymphocytes % 3.0 (L) 24 - 44 %    Monocytes % 1.0 0 - 4 %    Myelocytes Absolute 0.44 K/CU MM    Metamyelocytes Absolute 0.87 K/CU MM    Bands Absolute 4.36 K/CU MM    Segs Absolute 36.2 K/CU MM    Lymphocytes Absolute 1.3 K/CU MM    Monocytes Absolute 0.4 K/CU MM    Differential Type MANUAL DIFFERENTIAL     Anisocytosis 1+     Polychromasia 2+    Comprehensive Metabolic Panel    Collection Time: 01/12/23  4:00 AM   Result Value Ref Range    Sodium 137 135 - 145 MMOL/L    Potassium 5.2 (H) 3.5 - 5.1 MMOL/L    Chloride 97 (L) 99 - 110 mMol/L    CO2 32 21 - 32 MMOL/L    BUN 80 (H) 6 - 23 MG/DL    Creatinine 1.2 (H) 0.6 - 1.1 MG/DL    Est, Glom Filt Rate 44 (L) >60 mL/min/1.73m2    Glucose 221 (H) 70 - 99 MG/DL    Calcium 7.6 (L) 8.3 - 10.6 MG/DL    Albumin 2.3 (L) 3.4 - 5.0 GM/DL    Total Protein 4.6 (L) 6.4 - 8.2 GM/DL    Total Bilirubin 0.3 0.0 - 1.0 MG/DL     (H) 10 - 40 U/L    AST 91 (H) 15 - 37 IU/L    Alkaline Phosphatase 77 40 - 129 IU/L    Anion Gap 8 4 - 16   POCT Glucose    Collection Time: 01/12/23  4:03 AM   Result Value Ref Range    POC Glucose 248 (H) 70 - 99 MG/DL   POCT Glucose    Collection Time: 01/12/23  7:44 AM Result Value Ref Range    POC Glucose 234 (H) 70 - 99 MG/DL   POCT Glucose    Collection Time: 01/12/23 11:54 AM   Result Value Ref Range    POC Glucose 261 (H) 70 - 99 MG/DL   Basic Metabolic Panel w/ Reflex to MG    Collection Time: 01/12/23  3:04 PM   Result Value Ref Range    Sodium 134 (L) 135 - 145 MMOL/L    Potassium 5.1 3.5 - 5.1 MMOL/L    Chloride 94 (L) 99 - 110 mMol/L    CO2 31 21 - 32 MMOL/L    Anion Gap 9 4 - 16    BUN 82 (H) 6 - 23 MG/DL    Creatinine 1.4 (H) 0.6 - 1.1 MG/DL    Est, Glom Filt Rate 37 (L) >60 mL/min/1.73m2    Glucose 204 (H) 70 - 99 MG/DL    Calcium 7.5 (L) 8.3 - 10.6 MG/DL   POCT Glucose    Collection Time: 01/12/23  3:17 PM   Result Value Ref Range    POC Glucose 230 (H) 70 - 99 MG/DL           Electronically signed by Selina Low MD on 1/12/2023 at 4:09 PM      Comment: Please note this report has been produced using speech recognition software and may contain errors related to that system including errors in grammar, punctuation, and spelling, as well as words and phrases that may be inappropriate. If there are any questions or concerns please feel free to contact the dictating provider for clarification.

## 2023-01-13 NOTE — PROGRESS NOTES
I have personally evaluated and examined the patient independently. I have reviewed the patient's available data,including medical history and recent test results. Reviewed and discussed documentation as provided by SEVEN Allison PA-C. I agree with the physical exam findings, assessment and plan. My documented complex medical decision making constitutes a substantive portion of the supervisory note. Acute respiratory failure with hypoxia, intubated 1/3/2023  Acute respiratory distress syndrome, possibly idiopathic, progressive and severe  Shock, presumably septic shock due to above, free of Levophed  Hyperlactatemia due to hypoxemia  Chronic diastolic heart failure  History of paroxysmal atrial fibrillation  History of hypertension  History of GERD  ANGELA (effects of Bactrim likely)  Advanced age, frailty    Note worsening respiratory status as evidenced by excalating FIO2 requirement over past 72 hours (currently 100% FIO2 plus 12 cm water pressure PEEP, marginal SPO2)      Continue current high level ventilatory support lung protective strategy  Currently off continuous sedation infusion, non-responsive  Hemodynamic support enteral midodrine  Enteral nutritional support  Amiodarone plus anticoagulation for management of atrial fibrillation  Periodic diuresis  Addition of Bactrim/Minocycline1/11/2023 given Identification of Stentrophomonas although significance of the same regarding respiratory failure uncertain. Continuation of systemic steroids for empiric treatment of ARDS (note that the patient does not have a well-established pre-existing pulmonary history per se, review of older CT scan of the chest from 2019 essentially unremarkable save for perhaps mild emphysematous changes). Repeat cultures including sputum negative for growth of pathogens      Very complex medical decisions were required for this evaluation and management.   This patient suffers from critical illness related to respiratory failure hemodynamic instability without the current level of support progressive decline of clinical status organ dysfunction and untoward mortality. I devoted 31 minutes of time directly to the ongoing management and care of this very ill individual, this time is independent of any time provided for the performance of procedures. Discussion regarding goals of care with family, POA advocate conservative approach transitional to comfort measures. Reviewed in detail with the two sons and daughter-in-law at the bedside 1/11/2023 after critical care rounds. [facility]  [phone]  .

## 2023-01-13 NOTE — CARE COORDINATION
Per MD's notes discussed goals of care with family and they are considering options. Physician has discussed possible transition comfort measures. Pt remains on vent with FiO2 at 100%. CM following for end of life care needs/ hospice.

## 2023-01-13 NOTE — DISCHARGE SUMMARY
V2.0  Discharge Summary    Name:  Lennox Mody /Age/Sex: 1935 (14 y.o. female)   Admit Date: 2022  Discharge Date: 23    MRN & CSN:  [id number] & [id number] Encounter Date and Time 23 1:49 PM EST    Attending:  Morena Sanchez., DO Discharging Provider: Deb Gold, [place] Course:     Brief HPI: Lennox Mody is a 81 y.o.  female, with past medical history significant for A. fib/atrial flutter-on amiodarone and Eliquis, hypertension, GERD, CHF,, who presented from [place] with worsening hypoxic respiratory failure. Patient was admitted on  with a shortness of breath, imaging showed bilateral lower lobe pneumonia and was started on CAP coverage. Patiet's respiratory status continued to get worse with increasing oxygen requirement and was started on BiPAP this morning. CTA chest with evidence of possible ARDS with extensive multifocal pneumonia with underlying cardiogenic edema. Started on Decadron ARDS protocol 20 mg IV daily for 5 days followed by 10 mg IV daily for another 5 days. Patient was transferred to [address] on . Hospital course was complicated with worsening respiratory failure requiring emergent intubation, ARDS, multifocal pneumonia, respiratory cultures growing stenotrophomonas-was started on appropriate antibiotics-Bactrim and minocycline. Patient developed ANGELA complicated with hyperkalemia. Hyperlactemia secondary to hypoxia. Unable to obtain any neurological exam even with any sedation, sluggish pupillary response and breathing over the ventilator otherwise no extremity movement to painful stimulus, no, gag. Patient was requiring full ventilatory support FiO2 100% during the PEEP-12 this morning. Called family members updated patient's status. This afternoon, worsening hypoxia with full vent support. Stat chest x-ray ordered.   Patient's family arrived at the bedside, updated patient's critical condition and discussed CODE STATUS. Terminal condition of the patient has been clearly explained by intensivist, mary Sanchez and Jazmyn Holland decided to change the CODE STATUS to DNR comfort care and terminal compassionate extubation. Was compassionately extubated at 1412 hrs., passed at 1418 hrs on 1/13/2023      Brief Problem Based Course:     Acute hypoxic respiratory failure - s/p intubation 1/3  ARDS  Multifocal bacterial vs viral pneumonia  Septic shock  -Requiring full ventilatory support-FiO2 100%, PEEP-12 mm. Unable to change vent settings given ABG findings. She has been on high vent support settings for few days now. -  Re cultured 01/09. Antibiotics discontinued 01/09. -Received a Decadron 20 mg x total 5 days  then 10 mg x 5 days  -Continue Solu-Medrol 60 mg every 6 hours  -Continue DuoNebs, Dulera, albuterol as needed  -Sputum culture from 1/4 growing Candida albicans, blood cultures -NGTD, Resp Cx growing stenotrophomonas-started on Bactrim, added minocycline today  -Currently on small dose of levo,  wean as able to keep MAP greater than 65. ANGELA, drug induced  Most likely secondary to Bactrim  Mild hyperkalemia-Lokelma 10 g 3 times daily x1 day, repeat check at 1300 hr  Monitor daily BMP     Constipation  Aggressive bowel regimen  KUB on 1/11-no E/O of stool impaction, ileus/obstruction  Continue current regimen     Bleeding from oral mucosa  Possible oral mucosa/ palate laceration- unable to see the actice site  Hb 7.7, held Eliquis        History of diastolic congestive heart failure  -Echocardiogram on 12/28-EF 55 to 60%  -proBNP-790 on 12/29  -Stress test negative for ischemia in 2019    Elevated gastric residual  -Bowel regimen and Reglan ordered.  -Continue to follow. -KUB ordered, reviewed.   D/cherri tube feeds     History of A. fib/a flutter  -Continue home amiodarone and (held Eliquis today 1/12)       The patient expressed appropriate understanding of, and agreement with the discharge recommendations, medications, and plan. Consults this admission:  IP CONSULT TO HOSPITALIST  IP CONSULT TO DIETITIAN  IP CONSULT TO DIETITIAN  PHARMACY TO DOSE VANCOMYCIN  IP CONSULT TO [address] [place] [address]    Discharge Diagnosis:   Respiratory failure with hypoxia ([address]  ARDS  Septic shock  ANGELA      Discharge Instruction:     Not applicable  Discharge Medications:        Medication List        ASK your doctor about these medications      alendronate 70 MG tablet  Commonly known as: FOSAMAX  Take 1 tablet by mouth every 7 days     amiodarone 200 MG tablet  Commonly known as: CORDARONE  Take 1 tablet by mouth daily     amLODIPine 2.5 MG tablet  Commonly known as: NORVASC  Take 1 tablet by mouth daily     apixaban 5 MG Tabs tablet  Commonly known as: Eliquis  Take 1 tablet by mouth 2 times daily     aspirin 81 MG chewable tablet  Take 1 tablet by mouth daily     escitalopram 20 MG tablet  Commonly known as: LEXAPRO  Take 1 tablet by mouth daily     estradiol 0.5 MG tablet  Commonly known as: ESTRACE     pantoprazole 40 MG tablet  Commonly known as: PROTONIX  Take 1 tablet by mouth every morning (before breakfast)             Objective Findings at Discharge:   BP (!) 118/40   Pulse (!) 105   Temp 97.9 °F (36.6 °C) (Rectal)   Resp 16   Ht 5' 8.11\" (1.73 m)   Wt 192 lb 7.4 oz (87.3 kg)   SpO2 (!) 87%   BMI 29.17 kg/m²       Physical Exam:           Labs and Imaging   XR ABDOMEN (KUB) (SINGLE AP VIEW)    Result Date: 2023  EXAMINATION: ONE SUPINE XRAY VIEW(S) OF THE ABDOMEN 2023 10:05 am COMPARISON: 2023 at 1725 hours HISTORY: ORDERING SYSTEM PROVIDED HISTORY: high gastric residuals TECHNOLOGIST PROVIDED HISTORY: Reason for exam:->high gastric residuals Reason for Exam: high gastric residuals FINDINGS: Nasogastric tube tip and side port within decompressed stomach. No radiographic evidence of intestinal obstruction, free intraperitoneal air or fluid.   No pathologic distention GI tract, bowel wall edema or mucosal thickening. Multiple calcifications project over the pelvic soft tissues similar to prior study consistent with phleboliths, urinary tract stones or appendicoliths. Calcifications also project in the subcostal region of the right upper abdomen. Gallstones cannot be excluded. Gallbladder ultrasound suggested if clinically indicated for further evaluation. Multilevel moderate-advanced degenerative disc disease lumbar spine, incidentally visualized lung bases suggest consolidating infiltrate on the left. Correlation with clinical evidence of left basilar pneumonia suggested. Nasogastric tube tip and side port within decompressed stomach. Soft tissue calcifications subcostal region right upper abdomen approximately 10 mm in size. Differential considerations include gallstones. Gallbladder ultrasound suggested for further evaluation if clinically indicated. Multiple pelvic soft tissue calcifications most consistent with phleboliths however, urinary tract stone or appendicolith cannot be excluded. XR ABDOMEN (KUB) (SINGLE AP VIEW)    Result Date: 1/8/2023  EXAMINATION: ONE SUPINE XRAY VIEW(S) OF THE ABDOMEN 1/8/2023 5:33 pm COMPARISON: 01/03/2023 HISTORY: ORDERING SYSTEM PROVIDED HISTORY: eval ileus? TECHNOLOGIST PROVIDED HISTORY: Reason for exam:->eval ileus? Reason for Exam: eval ileus? FINDINGS: Enteric catheter remains in place. Gas seen in nondilated colon. No significant small bowel gas. No extraluminal gas. Unremarkable appearing abdomen     XR CHEST PORTABLE    Result Date: 1/8/2023  EXAMINATION: ONE XRAY VIEW OF THE CHEST 1/8/2023 4:56 am COMPARISON: 01/06/2023 HISTORY: ORDERING SYSTEM PROVIDED HISTORY: f/u resp failure TECHNOLOGIST PROVIDED HISTORY: Reason for exam:->f/u resp failure Reason for Exam: f/u resp failure Additional signs and symptoms: hypoxia FINDINGS: An endotracheal tube, enteric tube and right internal jugular central venous catheter are present.   There are persistent diffuse bilateral airspace opacities extending into the upper and lower lobes. There is no pleural effusion or pneumothorax identified. Persistent diffuse bilateral airspace opacities, not significantly changed suggestive of ARDS or a multilobar pneumonia. XR CHEST 1 VIEW    Result Date: 1/10/2023  EXAMINATION: ONE XRAY VIEW OF THE CHEST 1/10/2023 8:40 am COMPARISON: 01/08/2023 HISTORY: ORDERING SYSTEM PROVIDED HISTORY: RESPIRATORY FAILURE TECHNOLOGIST PROVIDED HISTORY: Reason for exam:->RESPIRATORY FAILURE Reason for Exam: RESPIRATORY FAILURE Additional signs and symptoms: RESPIRATORY FAILURE Relevant Medical/Surgical History: RESPIRATORY FAILURE FINDINGS: Cardiac silhouette is within normal limits. Pulmonary vasculature is within normal limits. Diffuse bilateral interstitial infiltrates are noted, greater on the right. There has been no significant interval change. Therapeutic tubes and catheters are stable. No pneumothorax is identified. Bony structures are unremarkable. Stable bilateral interstitial infiltrates.        CBC:   Recent Labs     01/11/23  0356 01/12/23  0400 01/12/23  1805 01/13/23  0538   WBC 46.6* 43.6*  --  36.8*   HGB 8.1* 7.7* 8.0* 7.6*    239  --  247     BMP:    Recent Labs     01/12/23  0400 01/12/23  1504 01/13/23  0800    134* 136   K 5.2* 5.1 5.9*   CL 97* 94* 94*   CO2 32 31 28   BUN 80* 82* 79*   CREATININE 1.2* 1.4* 1.3*   GLUCOSE 221* 204* 199*     Hepatic:   Recent Labs     01/12/23  0400 01/13/23  0800   AST 91* 241*   * 267*   BILITOT 0.3 0.7   ALKPHOS 77 76     Lipids:   Lab Results   Component Value Date/Time    CHOL 221 02/14/2020 01:41 PM    HDL 69 02/14/2020 01:41 PM    TRIG 168 01/11/2023 03:56 AM     Hemoglobin A1C:   Lab Results   Component Value Date/Time    LABA1C 6.7 01/10/2023 10:10 PM     TSH: No results found for: TSH  Troponin:   Lab Results   Component Value Date/Time    TROPONINT <0.010 12/24/2022 11:20 PM    TROPONINT <0.010 12/22/2022 11:00 PM    TROPONINT <0.010 12/22/2022 07:10 PM     Lactic Acid: No results for input(s): LACTA in the last 72 hours. BNP: No results for input(s): PROBNP in the last 72 hours.   UA:  Lab Results   Component Value Date/Time    NITRU NEGATIVE 01/09/2023 11:20 AM    COLORU YELLOW 01/09/2023 11:20 AM    PHUR 5.5 10/16/2019 04:37 PM    WBCUA 10 01/09/2023 11:20 AM    RBCUA 290 01/09/2023 11:20 AM    MUCUS RARE 01/09/2023 11:20 AM    TRICHOMONAS NONE SEEN 01/09/2023 11:20 AM    YEAST MANY 01/09/2023 11:20 AM    BACTERIA RARE 01/09/2023 11:20 AM    CLARITYU CLOUDY 01/09/2023 11:20 AM    SPECGRAV 1.020 01/09/2023 11:20 AM    LEUKOCYTESUR NEGATIVE 01/09/2023 11:20 AM    UROBILINOGEN 0.2 01/09/2023 11:20 AM    BILIRUBINUR NEGATIVE 01/09/2023 11:20 AM    BILIRUBINUR neg 10/16/2019 04:37 PM    BLOODU LARGE NUMBER OR AMOUNT OBSERVED 01/09/2023 11:20 AM    GLUCOSEU Neg 10/16/2019 04:37 PM    KETUA NEGATIVE 01/09/2023 11:20 AM     Urine Cultures:   Lab Results   Component Value Date/Time    LABURIN >100,000 CFU/ml 10/16/2019 04:39 PM     Blood Cultures: No results found for: BC  No results found for: BLOODCULT2  Organism:   Lab Results   Component Value Date/Time    ORG Escherichia coli 10/16/2019 04:39 PM       Time Spent Discharging patient 40 minutes    Electronically signed by Radha Fernandez PA-C on 1/13/2023 at 1:49 PM

## 2023-01-13 NOTE — PROGRESS NOTES
In-Patient Progress Note    Patient:  Suri Macias 81 y.o. female MRN: [id number]     Date of Service: 1/13/2023    Hospital Day: 15      Chief complaint: had no chief complaint listed for this encounter. Assessment and Plan   Suri Macias, a 81 y.o. female, with a history of hypertension (amlodipine 2.5 Mg daily), A. fib (amiodarone 200 Mg daily, apixaban 5 Mg twice daily), depression/anxiety (Lexapro 20 Mg daily), osteoporosis (alendronate 70 Mg weekly), GERD (pantoprazole 40 Mg daily), was initially admitted to [facility] on 12/22/2022 with complaints of shortness of breath, lethargy, wheezing; with diagnosis of acute on chronic respiratory failure with hypoxia secondary to acute bilateral lower lobe pneumonia. She required supplemental oxygen on admission at 3 LPM NC. Throughout the course admission, continue to require increased amounts of oxygen. Antibiotics were broadened to cefepime/ doxy, started on steroids, and scheduled bronchodilators/ICS. She appeared overall comfortable w/o distress however biochemical work-up/imaging suggested worsening condition. Repeat CTA on 12/28 negative for PE but extensive mixed groundglass and consolidative opacities that were increasing. Viral panel, urinary antigens, MRSA swab, BLC and sputum culture were all negative. Overnight reported increased distress with hypoxia and subsequent anxiety. Oxygen titrated up to 15 LPM High flow. Discussed need for increased care/ evaluation and going to pursue transfer to higher level of care. She was accepted and transferred to [facility] ICU for intensivist evaluation. She was given 20mg IV decadron and started on BiPAP prior to departure to [facility] on  12/30/2022.     Assessment and plan    Acute hypoxic respiratory failure  ARDS 2/2 multifocal pneumonia  Multifocal pneumonia, likely viral, suspected superimposed bacterial pneumonia  Mechanical ventilation since 1/3/2023  Patient presented with hypoxic respiratory failure. CTA chest  shows no evidence of PE but did show extensive mixed groundglass and consolidative opacities bilaterally, increased from . Respiratory viral panel -. Urine strep pneumoniae and Legionella antigen negative-. Elevated inflammatory markers. .2. Vancomycin stopped after MRSA nares screen came negative. Continued on HFNC initially but O2 saturation could not be maintained and patient had be intubated for severe hypoxia on 1/3/2023 around 1am.  -Decadron: completed 20 Mg for 5 days, now on 10 Mg  -ARDS compliant vent management as able  -Wean FiO2 as able  - Still on high FiO2 and PEEP for several days   -Respiratory culture positive for moderate growth Stenotrophomonas maltophilia. On dual coverage antibiotics, Bactrim and minocycline. -Guarded prognosis. -Critical care team had discussion with the family. Patient CODE STATUS changed to DNR CC. Septic shock, likely multifactorial, ARDS, bacterial pneumonia       H/o Anxiety/depression  -On  escitalopram     History of A. fib/a flutter  -Continue amiodarone and Eliquis    History of heart failure preserved ejection fraction  EF 55-60% per echo on . proBNP 790 on . Stress test was done back in  which was negative for reversible perfusion defect.  -lasix PRN       # Peptic ulcer prophylaxis: Pantoprazole  # DVT Prophylaxis: Eliquis  #CODE STATUS: DNR CC    Current living situation: -  Expected Disposition: -TBD   Estimated discharge date: -TBD       Review of System     Review of Systems  -could not be reviewed     I have reviewed all pertinent PMHx, PSHx, FamHx, SocialHx, medications, and allergies and updated history as appropriate.     Physical Exam   VITAL SIGNS:  BP (!) 118/40   Pulse (!) 129   Temp 97.9 °F (36.6 °C) (Rectal)   Resp 12   Ht 5' 8.11\" (1.73 m)   Wt 192 lb 7.4 oz (87.3 kg)   SpO2 (!) 77%   BMI 29.17 kg/m²   Tmax over 24 hours:  Temp (24hrs), Av.8 °F (37.1 °C), Min:97.9 °F (36.6 °C), Max:100 °F (37.8 °C)      Patient Vitals for the past 6 hrs:   BP Temp Temp src Pulse Resp SpO2   01/13/23 1412 -- -- -- (!) 129 12 (!) 77 %   01/13/23 1300 (!) 118/40 -- -- (!) 105 16 (!) 87 %   01/13/23 1200 (!) 112/47 97.9 °F (36.6 °C) Rectal (!) 126 14 94 %   01/13/23 1151 -- -- -- (!) 128 17 92 %   01/13/23 1129 -- -- -- 95 15 91 %   01/13/23 1100 -- -- -- (!) 128 15 (!) 88 %           Intake/Output Summary (Last 24 hours) at 1/13/2023 1603  Last data filed at 1/13/2023 0800  Gross per 24 hour   Intake 2337.52 ml   Output 1550 ml   Net 787.52 ml       Wt Readings from Last 2 Encounters:   01/13/23 192 lb 7.4 oz (87.3 kg)   12/28/22 172 lb 6.4 oz (78.2 kg)     Body mass index is 29.17 kg/m². Physical Exam  Constitutional:       Appearance: She is well-developed. Comments: Intubated    HENT:      Head: Normocephalic and atraumatic. Right Ear: External ear normal.      Left Ear: External ear normal.      Nose: Nose normal.   Eyes:      Conjunctiva/sclera: Conjunctivae normal.      Pupils: Pupils are equal, round, and reactive to light. Neck:      Thyroid: No thyromegaly. Vascular: No JVD. Trachea: No tracheal deviation. Cardiovascular:      Rate and Rhythm: Normal rate and regular rhythm. Heart sounds: No murmur heard. No friction rub. Pulmonary:      Breath sounds: Rales present. Abdominal:      General: Bowel sounds are normal.      Palpations: Abdomen is soft. Genitourinary:     Vagina: Normal.   Musculoskeletal:         General: Normal range of motion. Cervical back: Normal range of motion and neck supple. Skin:     General: Skin is warm and dry. Capillary Refill: Capillary refill takes less than 2 seconds. Neurological:      Motor: No abnormal muscle tone.       Comments: B/l pupillary reflex +, no withdrawals to pain, no gag/cough          Current Medications      sodium zirconium cyclosilicate  10 g Oral TID    docusate  100 mg Oral BID insulin lispro  0-8 Units SubCUTAneous Q4H    lansoprazole  30 mg Per NG tube Daily    chlorhexidine  15 mL Mouth/Throat BID    aspirin  81 mg Per NG tube Daily    amiodarone  200 mg Oral Daily    [Held by provider] amLODIPine  2.5 mg Oral Daily    [Held by provider] apixaban  5 mg Oral BID    escitalopram  20 mg Oral Daily         Labs and Imaging Studies   Laboratory findings:  XR CHEST PORTABLE    Result Date: 12/31/2022  EXAMINATION: ONE XRAY VIEW OF THE CHEST 12/31/2022 4:57 am COMPARISON: 12/27/2022 HISTORY: ORDERING SYSTEM PROVIDED HISTORY: f/u ARDS TECHNOLOGIST PROVIDED HISTORY: Reason for exam:->f/u ARDS Reason for Exam: f/u ARDS FINDINGS: Cardiomediastinal silhouette is stable. Similar bilateral airspace opacities. No pleural effusion or pneumothorax. No gross bony abnormality.      Stable chest.       Recent Results (from the past 24 hour(s))   Blood gas, arterial    Collection Time: 01/12/23  6:00 PM   Result Value Ref Range    pH, Bld 7.41 7.34 - 7.45    pCO2, Arterial 55.0 (H) 32 - 45 MMHG    pO2, Arterial 65 (L) 75 - 100 MMHG    Base Exc, Mixed 8.4 (H) 0 - 2.3    HCO3, Arterial 34.9 (H) 18 - 23 MMOL/L    CO2 Content 36.6 (H) 19 - 24 MMOL/L    O2 Sat 89.9 (L) 96 - 97 %    Carbon Monoxide, Blood 1.7 0 - 5 %    Methemoglobin, Arterial 2.2 (H) <1.5 %    Comment AC/PC Pi 28 f 15 100% +12    Hemoglobin and Hematocrit    Collection Time: 01/12/23  6:05 PM   Result Value Ref Range    Hemoglobin 8.0 (L) 12.5 - 16.0 GM/DL    Hematocrit 23.9 (L) 37 - 47 %   Lactic Acid    Collection Time: 01/12/23  6:05 PM   Result Value Ref Range    Lactate 2.8 (HH) 0.5 - 1.9 mMOL/L   POCT Glucose    Collection Time: 01/12/23  8:46 PM   Result Value Ref Range    POC Glucose 301 (H) 70 - 99 MG/DL   POCT Glucose    Collection Time: 01/13/23 12:46 AM   Result Value Ref Range    POC Glucose 210 (H) 70 - 99 MG/DL   EKG 12 Lead    Collection Time: 01/13/23  1:10 AM   Result Value Ref Range    Ventricular Rate 137 BPM    Atrial Rate 137 BPM    P-R Interval 114 ms    QRS Duration 84 ms    Q-T Interval 292 ms    QTc Calculation (Bazett) 440 ms    P Axis -65 degrees    R Axis -16 degrees    T Axis 163 degrees    Diagnosis       Unusual P axis and short HI, probable junctional tachycardia  Low voltage QRS  Inferior infarct (cited on or before 10-CHRISTIAN-2023)  Possible Anterolateral infarct (cited on or before 10-CHRISTIAN-2023)  Abnormal ECG  When compared with ECG of 10-CHRISTIAN-2023 20:30,  Previous ECG has undetermined rhythm, needs review  Serial changes of evolving Anterior infarct present  Serial changes of evolving Anterolateral infarct present  Serial changes of evolving Inferior infarct present     Lactic Acid    Collection Time: 01/13/23  1:38 AM   Result Value Ref Range    Lactate 3.4 (HH) 0.5 - 1.9 mMOL/L   POCT Glucose    Collection Time: 01/13/23  4:01 AM   Result Value Ref Range    POC Glucose 280 (H) 70 - 99 MG/DL   CBC with Auto Differential    Collection Time: 01/13/23  5:38 AM   Result Value Ref Range    WBC 36.8 (HH) 4.0 - 10.5 K/CU MM    RBC 2.29 (L) 4.2 - 5.4 M/CU MM    Hemoglobin 7.6 (L) 12.5 - 16.0 GM/DL    Hematocrit 22.4 (L) 37 - 47 %    MCV 97.8 78 - 100 FL    MCH 33.2 (H) 27 - 31 PG    MCHC 33.9 32.0 - 36.0 %    RDW 13.3 11.7 - 14.9 %    Platelets 177 324 - 628 K/CU MM    MPV 10.1 7.5 - 11.1 FL    Myelocyte Percent 2 (H) 0.0 %    Bands Relative 1 (L) 5 - 11 %    Segs Relative 94.0 (H) 36 - 66 %    Lymphocytes % 2.0 (L) 24 - 44 %    Monocytes % 1.0 0 - 4 %    Myelocytes Absolute 0.74 K/CU MM    Bands Absolute 0.37 K/CU MM    Segs Absolute 34.6 K/CU MM    Lymphocytes Absolute 0.7 K/CU MM    Monocytes Absolute 0.4 K/CU MM    Differential Type MANUAL DIFFERENTIAL     Macrocytes 1+     Polychromasia 1+    Lactic Acid    Collection Time: 01/13/23  5:38 AM   Result Value Ref Range    Lactate 4.1 (HH) 0.5 - 1.9 mMOL/L   Comprehensive Metabolic Panel w/ Reflex to MG    Collection Time: 01/13/23  8:00 AM   Result Value Ref Range    Sodium 136 135 - 145 MMOL/L    Potassium 5.9 (HH) 3.5 - 5.1 MMOL/L    Chloride 94 (L) 99 - 110 mMol/L    CO2 28 21 - 32 MMOL/L    BUN 79 (H) 6 - 23 MG/DL    Creatinine 1.3 (H) 0.6 - 1.1 MG/DL    Est, Glom Filt Rate 40 (L) >60 mL/min/1.73m2    Glucose 199 (H) 70 - 99 MG/DL    Calcium 7.8 (L) 8.3 - 10.6 MG/DL    Albumin 3.0 (L) 3.4 - 5.0 GM/DL    Total Protein 5.0 (L) 6.4 - 8.2 GM/DL    Total Bilirubin 0.7 0.0 - 1.0 MG/DL     (H) 10 - 40 U/L     (H) 15 - 37 IU/L    Alkaline Phosphatase 76 40 - 129 IU/L    Anion Gap 14 4 - 16   POCT Glucose    Collection Time: 01/13/23  8:25 AM   Result Value Ref Range    POC Glucose 228 (H) 70 - 99 MG/DL   Lactic Acid    Collection Time: 01/13/23 11:30 AM   Result Value Ref Range    Lactate 3.8 (HH) 0.5 - 1.9 mMOL/L   POCT Glucose    Collection Time: 01/13/23 11:34 AM   Result Value Ref Range    POC Glucose 248 (H) 70 - 99 MG/DL   POCT Glucose    Collection Time: 01/13/23 12:52 PM   Result Value Ref Range    POC Glucose 313 (H) 70 - 99 MG/DL           Electronically signed by Todd Gong MD on 1/13/2023 at 4:03 PM      Comment: Please note this report has been produced using speech recognition software and may contain errors related to that system including errors in grammar, punctuation, and spelling, as well as words and phrases that may be inappropriate. If there are any questions or concerns please feel free to contact the dictating provider for clarification.

## 2023-01-13 NOTE — PROGRESS NOTES
The patient has progressively worsened since this morning, falling saturations values, progressive hypotension in spite of maximal ventilatory support, pressor administration. I spoke with the family, reviewed the situation, they agree to comfort care measures, withdrawal of all aggressive medical support including ventilation, pressors. Code status DNR CC. The family may stay in room during withdrawal of care.     Chase Roy  [phone]

## 2023-01-13 NOTE — PROGRESS NOTES
01/13/23 1345   Encounter Summary   Encounter Overview/Reason  Volunteer Encounter   Service Provided For: Patient and family together   Referral/Consult From: Roberta Hearne Marimar   Last Encounter  01/13/23  (Father visited with family and anointed patient)   Complexity of Encounter Moderate   Begin Time 1235   End Time  1310   Total Time Calculated 35 min   Encounter    Type Follow up   Rituals, Rites and Sacraments   Type Sacrament of Sick   Grief, Loss, and Adjustments   Type Anticipatory Grief   Assessment/Intervention/Outcome   Assessment Tearful;Calm   Intervention Prayer (assurance of)/[place];Sustaining Presence/Ministry of presence   Outcome Comfort   Plan and Referrals   Plan/Referrals Coordinate Rites and/or Rituals; Referred to (Comment)  (Father Huber Mckenzie for anointing of the sick)

## 2023-01-13 NOTE — PROGRESS NOTES
Pt O2 sat 82%. MAP 46s Neda [place]ota, RT and Geovanna, PA with CC team at bedside. Vent settings adjusted per Geovanna, PA. fiO2 100%, PEEP 15. CXR ordered per Geovanna. Levophed infusion started.

## 2023-01-13 NOTE — PROGRESS NOTES
01/13/23 0741   Patient Observation   Heart Rate (!) 127   Resp 15   SpO2 92 %   Vent Information   Vent Mode AC/PC   Ventilator Settings   FiO2  100 %   Vt (Set, mL) 499 mL   Resp Rate (Set) 15 bmp   PEEP/CPAP (cmH2O) 12   Pressure Support (cm H2O) 0 cm H2O   Pressure Ordered 28   Vent Patient Data (Readings)   Vt (Measured) 864 mL   Peak Inspiratory Pressure (cmH2O) 41 cmH2O   Rate Measured 15 br/min   Minute Volume (L/min) 13 Liters   Mean Airway Pressure (cmH2O) 21 cmH20   Plateau Pressure (cm H2O) 42 cm H2O   I:E Ratio 1:2.40   I Time/ I Time % 1 s   Vent Alarm Settings   High Pressure (cmH2O) 50 cmH2O   Low Minute Volume (lpm) 2.5 L/min   High Minute Volume (lpm) 20 L/min   Low Exhaled Vt (ml) 250 mL   High Exhaled Vt (ml) 1000 mL   RR High (bpm) 40 br/min   Apnea (secs) 20 secs

## 2023-01-13 NOTE — PROGRESS NOTES
Pt mary at bedside with Dr. Jodee Dietrich and TONY Austin. Decision made by pt POA to change code status to [facility] and compassionately extubate. [facility] called and updated on change in code status and WOL.

## 2023-01-13 NOTE — PROGRESS NOTES
01/13/23 1137   Encounter Summary   Encounter Overview/Reason  Spiritual/Emotional Needs   Service Provided For: Patient and family together   Referral/Consult From: Roberta Flanaganulevard   Last Encounter  01/13/23  (Family would like to have a  visit)   Complexity of Encounter Low   Begin Time 1131   End Time  1138   Total Time Calculated 7 min   Encounter    Type Follow up   Spiritual/Emotional needs   Type Spiritual Support   Rituals, Rites and Sacraments   Type Anointing  (Family would like patient to be anointer)   Grief, Loss, and Adjustments   Type Anticipatory Grief   Assessment/Intervention/Outcome   Assessment Peaceful   Intervention Sustaining Presence/Ministry of presence   Outcome Comfort   Plan and Referrals   Plan/Referrals Other (Comment); Coordinate Rites and/or Rituals  (family is Zoroastrianism and would like to have a  visit)

## 2023-01-14 LAB
CULTURE: NORMAL
CULTURE: NORMAL
Lab: NORMAL
Lab: NORMAL
SPECIMEN: NORMAL
SPECIMEN: NORMAL

## 2025-03-25 NOTE — PROGRESS NOTES
12/31/22 0712   NIV Type   Equipment Type v60   Mode Bilevel   Mask Type Full face mask   Mask Size Medium   Settings/Measurements   PIP Observed 14 cm H20   IPAP 12 cmH20   CPAP/EPAP 8 cmH2O   Vt (Measured) 891 mL   Rate Ordered 12   Resp 18   FiO2  80 %   Minute Volume (L/min) 19.1 Liters   Mask Leak (lpm) 22 lpm   Comfort Level Good   Using Accessory Muscles No   SpO2 93   Breath Sounds   Right Upper Lobe Diminished   Right Middle Lobe Diminished   Right Lower Lobe Diminished   Left Upper Lobe Diminished   Left Lower Lobe Diminished   Alarm Settings   Alarms On Y   Low Pressure (cmH2O) 3 cmH2O   High Pressure (cmH2O) 17 cmH2O   Apnea (secs) 20 secs   RR Low (bpm) 13   RR High (bpm) 40 br/min Surgery